# Patient Record
Sex: MALE | Race: BLACK OR AFRICAN AMERICAN | Employment: OTHER | URBAN - METROPOLITAN AREA
[De-identification: names, ages, dates, MRNs, and addresses within clinical notes are randomized per-mention and may not be internally consistent; named-entity substitution may affect disease eponyms.]

---

## 2019-03-22 ENCOUNTER — OFFICE VISIT (OUTPATIENT)
Dept: FAMILY MEDICINE CLINIC | Age: 59
End: 2019-03-22

## 2019-03-22 VITALS
DIASTOLIC BLOOD PRESSURE: 80 MMHG | RESPIRATION RATE: 16 BRPM | TEMPERATURE: 99.1 F | WEIGHT: 212 LBS | HEIGHT: 72 IN | SYSTOLIC BLOOD PRESSURE: 169 MMHG | HEART RATE: 74 BPM | OXYGEN SATURATION: 99 % | BODY MASS INDEX: 28.71 KG/M2

## 2019-03-22 DIAGNOSIS — N52.9 ERECTILE DYSFUNCTION, UNSPECIFIED ERECTILE DYSFUNCTION TYPE: ICD-10-CM

## 2019-03-22 DIAGNOSIS — E11.9 TYPE 2 DIABETES MELLITUS WITHOUT COMPLICATION, WITHOUT LONG-TERM CURRENT USE OF INSULIN (HCC): ICD-10-CM

## 2019-03-22 DIAGNOSIS — Z00.00 ANNUAL PHYSICAL EXAM: Primary | ICD-10-CM

## 2019-03-22 DIAGNOSIS — I10 ESSENTIAL HYPERTENSION: ICD-10-CM

## 2019-03-22 RX ORDER — LISINOPRIL 40 MG/1
40 TABLET ORAL DAILY
Qty: 90 TAB | Refills: 1 | Status: SHIPPED | OUTPATIENT
Start: 2019-03-22 | End: 2021-08-10

## 2019-03-22 RX ORDER — LISINOPRIL 40 MG/1
40 TABLET ORAL DAILY
COMMUNITY
End: 2019-03-22 | Stop reason: SDUPTHER

## 2019-03-22 RX ORDER — AMLODIPINE BESYLATE 10 MG/1
TABLET ORAL DAILY
COMMUNITY
End: 2019-03-22 | Stop reason: SDUPTHER

## 2019-03-22 RX ORDER — METFORMIN HYDROCHLORIDE 500 MG/1
500 TABLET ORAL
COMMUNITY
End: 2019-03-22 | Stop reason: SDUPTHER

## 2019-03-22 RX ORDER — HYDRALAZINE HYDROCHLORIDE 25 MG/1
25 TABLET, FILM COATED ORAL 3 TIMES DAILY
COMMUNITY
End: 2019-03-22 | Stop reason: SDUPTHER

## 2019-03-22 RX ORDER — SILDENAFIL CITRATE 20 MG/1
TABLET ORAL
Qty: 90 TAB | Refills: 1 | Status: CANCELLED | OUTPATIENT
Start: 2019-03-22

## 2019-03-22 RX ORDER — METFORMIN HYDROCHLORIDE 500 MG/1
500 TABLET ORAL
Qty: 90 TAB | Refills: 1 | Status: SHIPPED | OUTPATIENT
Start: 2019-03-22 | End: 2021-08-16 | Stop reason: ALTCHOICE

## 2019-03-22 RX ORDER — LANCETS
EACH MISCELLANEOUS
COMMUNITY
End: 2021-08-10

## 2019-03-22 RX ORDER — HYDRALAZINE HYDROCHLORIDE 25 MG/1
25 TABLET, FILM COATED ORAL 3 TIMES DAILY
Qty: 270 TAB | Refills: 1 | Status: SHIPPED | OUTPATIENT
Start: 2019-03-22 | End: 2021-08-10

## 2019-03-22 RX ORDER — AMLODIPINE BESYLATE 10 MG/1
10 TABLET ORAL DAILY
Qty: 90 TAB | Refills: 1 | Status: SHIPPED | OUTPATIENT
Start: 2019-03-22 | End: 2021-08-10

## 2019-03-22 NOTE — PROGRESS NOTES
Identified pt with two pt identifiers(name and ). Chief Complaint Patient presents with  Complete Physical  
  annual physical exam   
 Medication Refill Health Maintenance Due Topic  Hepatitis C Screening  DTaP/Tdap/Td series (1 - Tdap)  Shingrix Vaccine Age 50> (1 of 2)  FOBT Q 1 YEAR AGE 50-75  Influenza Age 5 to Adult Wt Readings from Last 3 Encounters:  
No data found for [employer] Temp Readings from Last 3 Encounters:  
No data found for Temp BP Readings from Last 3 Encounters:  
No data found for BP Pulse Readings from Last 3 Encounters:  
No data found for Pulse Learning Assessment: 
:  
 
No flowsheet data found. Depression Screening: 
:  
 
3 most recent PHQ Screens 3/22/2019 Little interest or pleasure in doing things Not at all Feeling down, depressed, irritable, or hopeless Not at all Total Score PHQ 2 0 Fall Risk Assessment: 
:  
 
No flowsheet data found. Abuse Screening: 
:  
 
Abuse Screening Questionnaire 3/22/2019 Do you ever feel afraid of your partner? Elie De León Are you in a relationship with someone who physically or mentally threatens you? Elie De León Is it safe for you to go home? [facility] Coordination of Care Questionnaire: 
:  
 
1) Have you been to an emergency room, urgent care clinic since your last visit? no  
Hospitalized since your last visit? no          
 
2) Have you seen or consulted any other health care providers outside of [address] [place] [address] since your last visit? no  (Include any pap smears or colon screenings in this section.) 3) Do you have an Advance Directive on file? no 
Are you interested in receiving information about Advance Directives? no 
 
Patient is accompanied by self I have received verbal consent from Julius Casillas to discuss any/all medical information while they are present in the room. Reviewed record in preparation for visit and have obtained necessary documentation. Medication reconciliation up to date and corrected with patient at this time.

## 2019-03-22 NOTE — PATIENT INSTRUCTIONS
DASH Diet: Care Instructions Your Care Instructions The DASH diet is an eating plan that can help lower your blood pressure. DASH stands for Dietary Approaches to Stop Hypertension. Hypertension is high blood pressure. The DASH diet focuses on eating foods that are high in calcium, potassium, and magnesium. These nutrients can lower blood pressure. The foods that are highest in these nutrients are fruits, vegetables, low-fat dairy products, nuts, seeds, and legumes. But taking calcium, potassium, and magnesium supplements instead of eating foods that are high in those nutrients does not have the same effect. The DASH diet also includes whole grains, fish, and poultry. The DASH diet is one of several lifestyle changes your doctor may recommend to lower your high blood pressure. Your doctor may also want you to decrease the amount of sodium in your diet. Lowering sodium while following the DASH diet can lower blood pressure even further than just the DASH diet alone. Follow-up care is a key part of your treatment and safety. Be sure to make and go to all appointments, and call your doctor if you are having problems. It's also a good idea to know your test results and keep a list of the medicines you take. How can you care for yourself at home? Following the DASH diet · Eat 4 to 5 servings of fruit each day. A serving is 1 medium-sized piece of fruit, ½ cup chopped or canned fruit, 1/4 cup dried fruit, or 4 ounces (½ cup) of fruit juice. Choose fruit more often than fruit juice. · Eat 4 to 5 servings of vegetables each day. A serving is 1 cup of lettuce or raw leafy vegetables, ½ cup of chopped or cooked vegetables, or 4 ounces (½ cup) of vegetable juice. Choose vegetables more often than vegetable juice. · Get 2 to 3 servings of low-fat and fat-free dairy each day. A serving is 8 ounces of milk, 1 cup of yogurt, or 1 ½ ounces of cheese. · Eat 6 to 8 servings of grains each day. A serving is 1 slice of bread, 1 ounce of dry cereal, or ½ cup of cooked rice, pasta, or cooked cereal. Try to choose whole-grain products as much as possible. · Limit lean meat, poultry, and fish to 2 servings each day. A serving is 3 ounces, about the size of a deck of cards. · Eat 4 to 5 servings of nuts, seeds, and legumes (cooked dried beans, lentils, and split peas) each week. A serving is 1/3 cup of nuts, 2 tablespoons of seeds, or ½ cup of cooked beans or peas. · Limit fats and oils to 2 to 3 servings each day. A serving is 1 teaspoon of vegetable oil or 2 tablespoons of salad dressing. · Limit sweets and added sugars to 5 servings or less a week. A serving is 1 tablespoon jelly or jam, ½ cup sorbet, or 1 cup of lemonade. · Eat less than 2,300 milligrams (mg) of sodium a day. If you limit your sodium to 1,500 mg a day, you can lower your blood pressure even more. Tips for success · Start small. Do not try to make dramatic changes to your diet all at once. You might feel that you are missing out on your favorite foods and then be more likely to not follow the plan. Make small changes, and stick with them. Once those changes become habit, add a few more changes. · Try some of the following: ? Make it a goal to eat a fruit or vegetable at every meal and at snacks. This will make it easy to get the recommended amount of fruits and vegetables each day. ? Try yogurt topped with fruit and nuts for a snack or healthy dessert. ? Add lettuce, tomato, cucumber, and onion to sandwiches. ? Combine a ready-made pizza crust with low-fat mozzarella cheese and lots of vegetable toppings. Try using tomatoes, squash, spinach, broccoli, carrots, cauliflower, and onions. ? Have a variety of cut-up vegetables with a low-fat dip as an appetizer instead of chips and dip. ? Sprinkle sunflower seeds or chopped almonds over salads.  Or try adding chopped walnuts or almonds to cooked vegetables. ? Try some vegetarian meals using beans and peas. Add garbanzo or kidney beans to salads. Make burritos and tacos with mashed lim beans or black beans. Where can you learn more? Go to http://alyson-mango.info/. Enter E714 in the search box to learn more about \"DASH Diet: Care Instructions. \" Current as of: July 22, 2018 Content Version: 11.9 © 8036-5419 [employer]. Care instructions adapted under license by [employer] (which disclaims liability or warranty for this information). If you have questions about a medical condition or this instruction, always ask your healthcare professional. [address] any warranty or liability for your use of this information.

## 2019-03-22 NOTE — PROGRESS NOTES
[facility] [address] [address] Fink, [address] 
[phone] Bonilla Pride is a 61 y.o. male Presenting for his annual checkup and health maintenance review and follow-up Last screening colonoscopy: 3 years ago. Results showed no abnromalities. Repeat due in 10 years. Diet:Healthy, well balanced, avoids fast food, eats good amount of fruits and vegetables Exercise: At work Smoking:  Former smoker 15-30 pack years, currently vapes Alcohol: Drinks 6 alcoholic drinks per week Sexual Activity: Currently sexually active, does not use protection Of note patient has been out of HTN and diabetes meds for last 2 weeks, otherwise denies any symptoms other than difficulty getting and maintianing erections. Vaccinations reviewed Immunization History Administered Date(s) Administered  Tdap 09/07/2010 Flu shot: does not get SHingles shot: Does not get Past Medical History:  
Diagnosis Date  Diabetes ([address] type 2  
 ED (erectile dysfunction)  HTN (hypertension)  Impaired fasting glucose   
 
 has a past surgical history that includes hx tonsil and adenoidectomy. Patient has no known allergies. Current Outpatient Medications Medication Sig  
 lancets misc by Does Not Apply route.  metFORMIN (GLUCOPHAGE) 500 mg tablet Take 1 Tab by mouth daily (with breakfast).  lisinopril (PRINIVIL, ZESTRIL) 40 mg tablet Take 1 Tab by mouth daily.  hydrALAZINE (APRESOLINE) 25 mg tablet Take 1 Tab by mouth three (3) times daily.  amLODIPine (NORVASC) 10 mg tablet Take 1 Tab by mouth daily. No current facility-administered medications for this visit. SOCIAL HX:  reports that he has quit smoking. His smoking use included cigarettes. He uses smokeless tobacco. He reports that he drinks about 3.6 oz of alcohol per week. He reports that he does not use drugs. FAMILY HX: family history includes Diabetes in his mother. Smoker: Social History Tobacco Use Smoking Status Former Smoker  Types: Cigarettes Smokeless Tobacco Current User Tobacco Comment Patient reports he is Vaping ETOH:  
Social History Substance and Sexual Activity Alcohol Use Yes  Alcohol/week: 3.6 oz  Types: 6 Cans of beer per week  Frequency: 2-3 times a week  Drinks per session: 3 or 4 Review of Systems (Positive ROS in BOLD) Constitutional: fever, chills, fatigue Respiratory: cough, shortness of breath or wheezing Cardiovascular: chest pain or palpitations Physical exam 
Blood pressure 169/80, pulse 74, temperature 99.1 °F (37.3 °C), temperature source Oral, resp. rate 16, height 6' (1.829 m), weight 212 lb (96.2 kg), SpO2 99 %. Wt Readings from Last 3 Encounters:  
03/22/19 212 lb (96.2 kg) General: Patient in NAD HEENT: PER/EOMI, no conjunctival pallor or scleral icterus. Normal post pharynx exam 
Cardiovascular: Regular rate and rhythm, No murmurs, rubs or gallops. No LE edema Respiratory: Lungs clear to auscultation bilaterally, no wheezing, rales or rhonchi. No accessory muscle use. GI:  Normoactive BS, non-tender in all 4 quadrants, non-distended, no hepato/splenomegaly Skin: No rashes or lesions noted on exposed skin Neuro: AAOx3. No gross motor or sensory defecits Psych: Appropriate mood and affect Assessment 1. Annual physical exam 
Counseled on diet and exercise. Preventative screening UTD, other than orders noted below. Immunizations UTD, other than orders noted below, pt declines flu/shingrix/pneumonia vaccines. Pt. Currently vapes, does nto currently meet guidline for low dose CT scan screening. Annual labs ordered. Medications refilled as needed - LIPID PANEL 
- MICROALBUMIN, UR, RAND W/ MICROALB/CREAT RATIO 
- HEMOGLOBIN A1C WITH EAG 
- PSA, DIAGNOSTIC (PROSTATE SPECIFIC AG) 2. Essential hypertension Elevated today, but has been out of medicaitons for 2 weeks, does not check BPs at home, adivsed patient to keep log, currently asymptomatic 
 
- lisinopril (PRINIVIL, ZESTRIL) 40 mg tablet; Take 1 Tab by mouth daily. Dispense: 90 Tab; Refill: 1 
- hydrALAZINE (APRESOLINE) 25 mg tablet; Take 1 Tab by mouth three (3) times daily. Dispense: 270 Tab; Refill: 1 
- amLODIPine (NORVASC) 10 mg tablet; Take 1 Tab by mouth daily. Dispense: 90 Tab; Refill: 1 
- LIPID PANEL 
- MICROALBUMIN, UR, RAND W/ MICROALB/CREAT RATIO 3. Type 2 diabetes mellitus without complication, without long-term current use of insulin [facility] Last HbA1c in 2017 was in 5s, will recheck, if still around 6 or lower, will D/C metformin 
 
- metFORMIN (GLUCOPHAGE) 500 mg tablet; Take 1 Tab by mouth daily (with breakfast). Dispense: 90 Tab; Refill: 1 
- HEMOGLOBIN A1C WITH EAG 4. Erectile dysfunction, unspecified erectile dysfunction type Likely organic cause, given difficulty getting and maintaining erections, as well as not having nocturnal or morning erections. No history of CAD or heart issues. Pt will need to get HTN under control and stop smoking. Will get basic lab workup. If he does those things, and labs are normal, will consider trial of viagra 
 
 
 
- sildenafil, antihypertensive, (REVATIO) 20 mg tablet; Take 2 tabs 1 hour prior to sexual activity as needed  Dispense: 90 Tab; Refill: 1 Follow-up and Dispositions · Return in about 3 months (around 6/22/2019) for HTN F/U.  
  
 
 
 
Babs Frank MD

## 2019-03-23 LAB
ALBUMIN/CREAT UR: 7.3 MG/G CREAT (ref 0–30)
CHOLEST SERPL-MCNC: 128 MG/DL (ref 100–199)
CREAT UR-MCNC: 161.3 MG/DL
EST. AVERAGE GLUCOSE BLD GHB EST-MCNC: 108 MG/DL
HBA1C MFR BLD: 5.4 % (ref 4.8–5.6)
HDLC SERPL-MCNC: 53 MG/DL
LDLC SERPL CALC-MCNC: 47 MG/DL (ref 0–99)
MICROALBUMIN UR-MCNC: 11.8 UG/ML
PSA SERPL-MCNC: 3.2 NG/ML (ref 0–4)
TESTOST SERPL-MCNC: 464 NG/DL (ref 264–916)
TRIGL SERPL-MCNC: 142 MG/DL (ref 0–149)
TSH SERPL DL<=0.005 MIU/L-ACNC: 2.31 UIU/ML (ref 0.45–4.5)
VLDLC SERPL CALC-MCNC: 28 MG/DL (ref 5–40)

## 2021-07-19 ENCOUNTER — HOSPITAL ENCOUNTER (EMERGENCY)
Age: 61
Discharge: OTHER INSTITUTION | End: 2021-07-20
Attending: EMERGENCY MEDICINE
Payer: COMMERCIAL

## 2021-07-19 ENCOUNTER — APPOINTMENT (OUTPATIENT)
Dept: CT IMAGING | Age: 61
End: 2021-07-19
Attending: EMERGENCY MEDICINE
Payer: COMMERCIAL

## 2021-07-19 DIAGNOSIS — I60.9 SUBARACHNOID BLEED (HCC): Primary | ICD-10-CM

## 2021-07-19 DIAGNOSIS — I16.1 HYPERTENSIVE EMERGENCY: ICD-10-CM

## 2021-07-19 DIAGNOSIS — N17.9 AKI (ACUTE KIDNEY INJURY) (HCC): ICD-10-CM

## 2021-07-19 DIAGNOSIS — Q28.2 AVM (ARTERIOVENOUS MALFORMATION) BRAIN: ICD-10-CM

## 2021-07-19 LAB
ALBUMIN SERPL-MCNC: 3.4 G/DL (ref 3.5–5)
ALBUMIN/GLOB SERPL: 1.2 {RATIO} (ref 1.1–2.2)
ALP SERPL-CCNC: 51 U/L (ref 45–117)
ALT SERPL-CCNC: 48 U/L (ref 12–78)
ANION GAP SERPL CALC-SCNC: 8 MMOL/L (ref 5–15)
AST SERPL-CCNC: 26 U/L (ref 15–37)
BASOPHILS # BLD: 0 K/UL (ref 0–0.1)
BASOPHILS NFR BLD: 1 % (ref 0–1)
BILIRUB SERPL-MCNC: 0.5 MG/DL (ref 0.2–1)
BUN SERPL-MCNC: 16 MG/DL (ref 6–20)
BUN/CREAT SERPL: 12 (ref 12–20)
CALCIUM SERPL-MCNC: 8.2 MG/DL (ref 8.5–10.1)
CHLORIDE SERPL-SCNC: 103 MMOL/L (ref 97–108)
CO2 SERPL-SCNC: 29 MMOL/L (ref 21–32)
CREAT SERPL-MCNC: 1.32 MG/DL (ref 0.7–1.3)
DIFFERENTIAL METHOD BLD: ABNORMAL
EOSINOPHIL # BLD: 0.2 K/UL (ref 0–0.4)
EOSINOPHIL NFR BLD: 2 % (ref 0–7)
ERYTHROCYTE [DISTWIDTH] IN BLOOD BY AUTOMATED COUNT: 13.7 % (ref 11.5–14.5)
GLOBULIN SER CALC-MCNC: 2.8 G/DL (ref 2–4)
GLUCOSE BLD STRIP.AUTO-MCNC: 138 MG/DL (ref 65–117)
GLUCOSE BLD STRIP.AUTO-MCNC: 187 MG/DL (ref 65–117)
GLUCOSE SERPL-MCNC: 145 MG/DL (ref 65–100)
HCT VFR BLD AUTO: 39.4 % (ref 36.6–50.3)
HGB BLD-MCNC: 13.4 G/DL (ref 12.1–17)
IMM GRANULOCYTES # BLD AUTO: 0 K/UL (ref 0–0.04)
IMM GRANULOCYTES NFR BLD AUTO: 1 % (ref 0–0.5)
INR PPP: 1 (ref 0.9–1.1)
LYMPHOCYTES # BLD: 2.2 K/UL (ref 0.8–3.5)
LYMPHOCYTES NFR BLD: 34 % (ref 12–49)
MCH RBC QN AUTO: 28.6 PG (ref 26–34)
MCHC RBC AUTO-ENTMCNC: 34 G/DL (ref 30–36.5)
MCV RBC AUTO: 84 FL (ref 80–99)
MONOCYTES # BLD: 0.6 K/UL (ref 0–1)
MONOCYTES NFR BLD: 10 % (ref 5–13)
NEUTS SEG # BLD: 3.5 K/UL (ref 1.8–8)
NEUTS SEG NFR BLD: 53 % (ref 32–75)
NRBC # BLD: 0 K/UL (ref 0–0.01)
NRBC BLD-RTO: 0 PER 100 WBC
PLATELET # BLD AUTO: 217 K/UL (ref 150–400)
PMV BLD AUTO: 10 FL (ref 8.9–12.9)
POTASSIUM SERPL-SCNC: 3.7 MMOL/L (ref 3.5–5.1)
PROT SERPL-MCNC: 6.2 G/DL (ref 6.4–8.2)
PROTHROMBIN TIME: 9.9 SEC (ref 9–11.1)
RBC # BLD AUTO: 4.69 M/UL (ref 4.1–5.7)
SERVICE CMNT-IMP: ABNORMAL
SERVICE CMNT-IMP: ABNORMAL
SODIUM SERPL-SCNC: 140 MMOL/L (ref 136–145)
TROPONIN I SERPL-MCNC: <0.05 NG/ML
WBC # BLD AUTO: 6.5 K/UL (ref 4.1–11.1)

## 2021-07-19 PROCEDURE — 96375 TX/PRO/DX INJ NEW DRUG ADDON: CPT

## 2021-07-19 PROCEDURE — 74011000636 HC RX REV CODE- 636: Performed by: EMERGENCY MEDICINE

## 2021-07-19 PROCEDURE — 96365 THER/PROPH/DIAG IV INF INIT: CPT

## 2021-07-19 PROCEDURE — 74011000250 HC RX REV CODE- 250: Performed by: EMERGENCY MEDICINE

## 2021-07-19 PROCEDURE — 96366 THER/PROPH/DIAG IV INF ADDON: CPT

## 2021-07-19 PROCEDURE — 85025 COMPLETE CBC W/AUTO DIFF WBC: CPT

## 2021-07-19 PROCEDURE — 99285 EMERGENCY DEPT VISIT HI MDM: CPT

## 2021-07-19 PROCEDURE — 82962 GLUCOSE BLOOD TEST: CPT

## 2021-07-19 PROCEDURE — 70450 CT HEAD/BRAIN W/O DYE: CPT

## 2021-07-19 PROCEDURE — 93005 ELECTROCARDIOGRAM TRACING: CPT

## 2021-07-19 PROCEDURE — 85610 PROTHROMBIN TIME: CPT

## 2021-07-19 PROCEDURE — 74011250636 HC RX REV CODE- 250/636: Performed by: EMERGENCY MEDICINE

## 2021-07-19 PROCEDURE — 70498 CT ANGIOGRAPHY NECK: CPT

## 2021-07-19 PROCEDURE — 80053 COMPREHEN METABOLIC PANEL: CPT

## 2021-07-19 PROCEDURE — 74011250637 HC RX REV CODE- 250/637: Performed by: EMERGENCY MEDICINE

## 2021-07-19 PROCEDURE — 84484 ASSAY OF TROPONIN QUANT: CPT

## 2021-07-19 PROCEDURE — 36415 COLL VENOUS BLD VENIPUNCTURE: CPT

## 2021-07-19 RX ORDER — MORPHINE SULFATE 4 MG/ML
4 INJECTION INTRAVENOUS
Status: COMPLETED | OUTPATIENT
Start: 2021-07-19 | End: 2021-07-19

## 2021-07-19 RX ORDER — ONDANSETRON 2 MG/ML
4 INJECTION INTRAMUSCULAR; INTRAVENOUS
Status: DISCONTINUED | OUTPATIENT
Start: 2021-07-19 | End: 2021-07-20 | Stop reason: HOSPADM

## 2021-07-19 RX ORDER — LABETALOL HYDROCHLORIDE 5 MG/ML
20 INJECTION, SOLUTION INTRAVENOUS ONCE
Status: COMPLETED | OUTPATIENT
Start: 2021-07-19 | End: 2021-07-19

## 2021-07-19 RX ORDER — HYDRALAZINE HYDROCHLORIDE 20 MG/ML
20 INJECTION INTRAMUSCULAR; INTRAVENOUS ONCE
Status: COMPLETED | OUTPATIENT
Start: 2021-07-19 | End: 2021-07-19

## 2021-07-19 RX ORDER — NIMODIPINE 30 MG/1
60 CAPSULE, LIQUID FILLED ORAL ONCE
Status: COMPLETED | OUTPATIENT
Start: 2021-07-19 | End: 2021-07-19

## 2021-07-19 RX ORDER — ACETAMINOPHEN 500 MG
1000 TABLET ORAL
Status: COMPLETED | OUTPATIENT
Start: 2021-07-19 | End: 2021-07-19

## 2021-07-19 RX ADMIN — NIMODIPINE 60 MG: 30 CAPSULE ORAL at 21:47

## 2021-07-19 RX ADMIN — MORPHINE SULFATE 4 MG: 4 INJECTION INTRAVENOUS at 22:45

## 2021-07-19 RX ADMIN — ONDANSETRON 4 MG: 2 INJECTION INTRAMUSCULAR; INTRAVENOUS at 23:53

## 2021-07-19 RX ADMIN — ACETAMINOPHEN 1000 MG: 500 TABLET ORAL at 22:12

## 2021-07-19 RX ADMIN — HYDRALAZINE HYDROCHLORIDE 20 MG: 20 INJECTION INTRAMUSCULAR; INTRAVENOUS at 22:11

## 2021-07-19 RX ADMIN — SODIUM CHLORIDE 5 MG/HR: 9 INJECTION, SOLUTION INTRAVENOUS at 21:30

## 2021-07-19 RX ADMIN — SODIUM CHLORIDE 15 MG/HR: 9 INJECTION, SOLUTION INTRAVENOUS at 23:42

## 2021-07-19 RX ADMIN — IOPAMIDOL 100 ML: 755 INJECTION, SOLUTION INTRAVENOUS at 21:26

## 2021-07-19 RX ADMIN — LABETALOL HYDROCHLORIDE 20 MG: 5 INJECTION INTRAVENOUS at 23:51

## 2021-07-20 ENCOUNTER — APPOINTMENT (OUTPATIENT)
Dept: INTERVENTIONAL RADIOLOGY/VASCULAR | Age: 61
DRG: 064 | End: 2021-07-20
Attending: STUDENT IN AN ORGANIZED HEALTH CARE EDUCATION/TRAINING PROGRAM
Payer: COMMERCIAL

## 2021-07-20 ENCOUNTER — HOSPITAL ENCOUNTER (INPATIENT)
Dept: INTERVENTIONAL RADIOLOGY/VASCULAR | Age: 61
Discharge: HOME | DRG: 064 | End: 2021-07-20
Attending: STUDENT IN AN ORGANIZED HEALTH CARE EDUCATION/TRAINING PROGRAM
Payer: COMMERCIAL

## 2021-07-20 ENCOUNTER — ANESTHESIA EVENT (OUTPATIENT)
Dept: INTERVENTIONAL RADIOLOGY/VASCULAR | Age: 61
DRG: 064 | End: 2021-07-20
Payer: COMMERCIAL

## 2021-07-20 ENCOUNTER — APPOINTMENT (OUTPATIENT)
Dept: MRI IMAGING | Age: 61
DRG: 064 | End: 2021-07-20
Attending: NURSE PRACTITIONER
Payer: COMMERCIAL

## 2021-07-20 ENCOUNTER — APPOINTMENT (OUTPATIENT)
Dept: CT IMAGING | Age: 61
DRG: 064 | End: 2021-07-20
Attending: NURSE PRACTITIONER
Payer: COMMERCIAL

## 2021-07-20 ENCOUNTER — HOSPITAL ENCOUNTER (INPATIENT)
Age: 61
LOS: 21 days | Discharge: HOME HEALTH | DRG: 064 | End: 2021-08-10
Attending: INTERNAL MEDICINE | Admitting: INTERNAL MEDICINE
Payer: COMMERCIAL

## 2021-07-20 ENCOUNTER — ANESTHESIA (OUTPATIENT)
Dept: INTERVENTIONAL RADIOLOGY/VASCULAR | Age: 61
DRG: 064 | End: 2021-07-20
Payer: COMMERCIAL

## 2021-07-20 ENCOUNTER — APPOINTMENT (OUTPATIENT)
Dept: NON INVASIVE DIAGNOSTICS | Age: 61
DRG: 064 | End: 2021-07-20
Attending: NURSE PRACTITIONER
Payer: COMMERCIAL

## 2021-07-20 VITALS
BODY MASS INDEX: 28.13 KG/M2 | RESPIRATION RATE: 23 BRPM | HEART RATE: 74 BPM | SYSTOLIC BLOOD PRESSURE: 162 MMHG | OXYGEN SATURATION: 99 % | DIASTOLIC BLOOD PRESSURE: 64 MMHG | HEIGHT: 72 IN | WEIGHT: 207.67 LBS | TEMPERATURE: 98.4 F

## 2021-07-20 VITALS
SYSTOLIC BLOOD PRESSURE: 112 MMHG | HEART RATE: 62 BPM | OXYGEN SATURATION: 99 % | DIASTOLIC BLOOD PRESSURE: 62 MMHG | RESPIRATION RATE: 14 BRPM

## 2021-07-20 DIAGNOSIS — I60.9 SAH (SUBARACHNOID HEMORRHAGE) (HCC): ICD-10-CM

## 2021-07-20 DIAGNOSIS — I67.848 CEREBRAL VASOSPASM: ICD-10-CM

## 2021-07-20 DIAGNOSIS — Q28.2 CEREBRAL ARTERIOVENOUS MALFORMATION (AVM): ICD-10-CM

## 2021-07-20 PROBLEM — I10 MALIGNANT HYPERTENSION: Status: ACTIVE | Noted: 2021-07-20

## 2021-07-20 PROBLEM — E87.6 ACUTE HYPOKALEMIA: Status: ACTIVE | Noted: 2021-07-20

## 2021-07-20 LAB
ANION GAP SERPL CALC-SCNC: 6 MMOL/L (ref 5–15)
ATRIAL RATE: 69 BPM
BUN SERPL-MCNC: 14 MG/DL (ref 6–20)
BUN/CREAT SERPL: 14 (ref 12–20)
CALCIUM SERPL-MCNC: 8.7 MG/DL (ref 8.5–10.1)
CALCULATED P AXIS, ECG09: 77 DEGREES
CALCULATED R AXIS, ECG10: 73 DEGREES
CALCULATED T AXIS, ECG11: 43 DEGREES
CHLORIDE SERPL-SCNC: 110 MMOL/L (ref 97–108)
CHOLEST SERPL-MCNC: 125 MG/DL
CO2 SERPL-SCNC: 25 MMOL/L (ref 21–32)
COVID-19 RAPID TEST, COVR: NOT DETECTED
CREAT SERPL-MCNC: 0.98 MG/DL (ref 0.7–1.3)
DIAGNOSIS, 93000: NORMAL
ECHO AV PEAK GRADIENT: 10.07 MMHG
ECHO AV PEAK VELOCITY: 158.69 CM/S
ECHO LA AREA 4C: 23.38 CM2
ECHO LA VOL 4C: 79.79 ML (ref 18–58)
ECHO LA VOLUME INDEX A4C: 36.27 ML/M2 (ref 16–28)
ECHO LV INTERNAL DIMENSION DIASTOLIC: 4.15 CM (ref 4.2–5.9)
ECHO LV INTERNAL DIMENSION SYSTOLIC: 3.03 CM
ECHO LV IVSD: 1.08 CM (ref 0.6–1)
ECHO LV MASS 2D: 208.6 G (ref 88–224)
ECHO LV MASS INDEX 2D: 94.8 G/M2 (ref 49–115)
ECHO LV POSTERIOR WALL DIASTOLIC: 1.62 CM (ref 0.6–1)
ECHO LVOT PEAK GRADIENT: 8.17 MMHG
ECHO LVOT PEAK VELOCITY: 142.88 CM/S
ECHO MV A VELOCITY: 87.09 CM/S
ECHO MV E VELOCITY: 83.75 CM/S
ECHO MV E/A RATIO: 0.96
ECHO RV INTERNAL DIMENSION: 3.89 CM
ECHO RV TAPSE: 2.65 CM (ref 1.5–2)
ERYTHROCYTE [DISTWIDTH] IN BLOOD BY AUTOMATED COUNT: 13.3 % (ref 11.5–14.5)
EST. AVERAGE GLUCOSE BLD GHB EST-MCNC: 114 MG/DL
GLUCOSE BLD STRIP.AUTO-MCNC: 140 MG/DL (ref 65–117)
GLUCOSE BLD STRIP.AUTO-MCNC: 97 MG/DL (ref 65–117)
GLUCOSE SERPL-MCNC: 123 MG/DL (ref 65–100)
HBA1C MFR BLD: 5.6 % (ref 4–5.6)
HCT VFR BLD AUTO: 43 % (ref 36.6–50.3)
HDLC SERPL-MCNC: 68 MG/DL
HDLC SERPL: 1.8 {RATIO} (ref 0–5)
HGB BLD-MCNC: 14.6 G/DL (ref 12.1–17)
LDLC SERPL CALC-MCNC: 25.2 MG/DL (ref 0–100)
MCH RBC QN AUTO: 28.6 PG (ref 26–34)
MCHC RBC AUTO-ENTMCNC: 34 G/DL (ref 30–36.5)
MCV RBC AUTO: 84.1 FL (ref 80–99)
NRBC # BLD: 0 K/UL (ref 0–0.01)
NRBC BLD-RTO: 0 PER 100 WBC
P-R INTERVAL, ECG05: 166 MS
PLATELET # BLD AUTO: 232 K/UL (ref 150–400)
PMV BLD AUTO: 10.5 FL (ref 8.9–12.9)
POTASSIUM SERPL-SCNC: 3.5 MMOL/L (ref 3.5–5.1)
Q-T INTERVAL, ECG07: 380 MS
QRS DURATION, ECG06: 84 MS
QTC CALCULATION (BEZET), ECG08: 407 MS
RBC # BLD AUTO: 5.11 M/UL (ref 4.1–5.7)
SERVICE CMNT-IMP: ABNORMAL
SERVICE CMNT-IMP: NORMAL
SODIUM SERPL-SCNC: 141 MMOL/L (ref 136–145)
SOURCE, COVRS: NORMAL
TRIGL SERPL-MCNC: 159 MG/DL (ref ?–150)
VENTRICULAR RATE, ECG03: 69 BPM
VLDLC SERPL CALC-MCNC: 31.8 MG/DL
WBC # BLD AUTO: 7.9 K/UL (ref 4.1–11.1)

## 2021-07-20 PROCEDURE — 92610 EVALUATE SWALLOWING FUNCTION: CPT

## 2021-07-20 PROCEDURE — 77030021532 HC CATH ANGI DX IMPRS MRTM -B

## 2021-07-20 PROCEDURE — 82962 GLUCOSE BLOOD TEST: CPT

## 2021-07-20 PROCEDURE — 74011250636 HC RX REV CODE- 250/636: Performed by: NURSE PRACTITIONER

## 2021-07-20 PROCEDURE — 70450 CT HEAD/BRAIN W/O DYE: CPT

## 2021-07-20 PROCEDURE — 77030012468 HC VLV BLEEDBK CNTRL ABBT -B

## 2021-07-20 PROCEDURE — 65610000006 HC RM INTENSIVE CARE

## 2021-07-20 PROCEDURE — 93306 TTE W/DOPPLER COMPLETE: CPT

## 2021-07-20 PROCEDURE — 70553 MRI BRAIN STEM W/O & W/DYE: CPT

## 2021-07-20 PROCEDURE — 76942 ECHO GUIDE FOR BIOPSY: CPT

## 2021-07-20 PROCEDURE — 74011000250 HC RX REV CODE- 250: Performed by: NURSE PRACTITIONER

## 2021-07-20 PROCEDURE — 80061 LIPID PANEL: CPT

## 2021-07-20 PROCEDURE — 76060000033 HC ANESTHESIA 1 TO 1.5 HR

## 2021-07-20 PROCEDURE — 74011250637 HC RX REV CODE- 250/637: Performed by: NURSE PRACTITIONER

## 2021-07-20 PROCEDURE — B31F1ZZ FLUOROSCOPY OF LEFT VERTEBRAL ARTERY USING LOW OSMOLAR CONTRAST: ICD-10-PCS | Performed by: STUDENT IN AN ORGANIZED HEALTH CARE EDUCATION/TRAINING PROGRAM

## 2021-07-20 PROCEDURE — C1769 GUIDE WIRE: HCPCS

## 2021-07-20 PROCEDURE — A9576 INJ PROHANCE MULTIPACK: HCPCS

## 2021-07-20 PROCEDURE — APPSS45 APP SPLIT SHARED TIME 31-45 MINUTES: Performed by: NURSE PRACTITIONER

## 2021-07-20 PROCEDURE — 83036 HEMOGLOBIN GLYCOSYLATED A1C: CPT

## 2021-07-20 PROCEDURE — 87635 SARS-COV-2 COVID-19 AMP PRB: CPT

## 2021-07-20 PROCEDURE — 74011250636 HC RX REV CODE- 250/636

## 2021-07-20 PROCEDURE — B3151ZZ FLUOROSCOPY OF BILATERAL COMMON CAROTID ARTERIES USING LOW OSMOLAR CONTRAST: ICD-10-PCS | Performed by: STUDENT IN AN ORGANIZED HEALTH CARE EDUCATION/TRAINING PROGRAM

## 2021-07-20 PROCEDURE — 74011000258 HC RX REV CODE- 258: Performed by: NURSE ANESTHETIST, CERTIFIED REGISTERED

## 2021-07-20 PROCEDURE — 74011636320 HC RX REV CODE- 636/320

## 2021-07-20 PROCEDURE — 2709999900 HC NON-CHARGEABLE SUPPLY

## 2021-07-20 PROCEDURE — C1760 CLOSURE DEV, VASC: HCPCS

## 2021-07-20 PROCEDURE — B41F1ZZ FLUOROSCOPY OF RIGHT LOWER EXTREMITY ARTERIES USING LOW OSMOLAR CONTRAST: ICD-10-PCS | Performed by: STUDENT IN AN ORGANIZED HEALTH CARE EDUCATION/TRAINING PROGRAM

## 2021-07-20 PROCEDURE — 74011250636 HC RX REV CODE- 250/636: Performed by: NURSE ANESTHETIST, CERTIFIED REGISTERED

## 2021-07-20 PROCEDURE — 85027 COMPLETE CBC AUTOMATED: CPT

## 2021-07-20 PROCEDURE — B31B1ZZ FLUOROSCOPY OF LEFT EXTERNAL CAROTID ARTERY USING LOW OSMOLAR CONTRAST: ICD-10-PCS | Performed by: STUDENT IN AN ORGANIZED HEALTH CARE EDUCATION/TRAINING PROGRAM

## 2021-07-20 PROCEDURE — 74011000250 HC RX REV CODE- 250: Performed by: NURSE ANESTHETIST, CERTIFIED REGISTERED

## 2021-07-20 PROCEDURE — 36415 COLL VENOUS BLD VENIPUNCTURE: CPT

## 2021-07-20 PROCEDURE — 80048 BASIC METABOLIC PNL TOTAL CA: CPT

## 2021-07-20 RX ORDER — DOCUSATE SODIUM 100 MG/1
100 CAPSULE, LIQUID FILLED ORAL
Status: DISCONTINUED | OUTPATIENT
Start: 2021-07-20 | End: 2021-07-24

## 2021-07-20 RX ORDER — LORAZEPAM 2 MG/ML
1 INJECTION INTRAMUSCULAR
Status: COMPLETED | OUTPATIENT
Start: 2021-07-20 | End: 2021-07-20

## 2021-07-20 RX ORDER — ACETAMINOPHEN 325 MG/1
650 TABLET ORAL
Status: DISCONTINUED | OUTPATIENT
Start: 2021-07-20 | End: 2021-08-10 | Stop reason: HOSPADM

## 2021-07-20 RX ORDER — LIDOCAINE HYDROCHLORIDE 20 MG/ML
20 INJECTION, SOLUTION INFILTRATION; PERINEURAL
Status: ACTIVE | OUTPATIENT
Start: 2021-07-20 | End: 2021-07-21

## 2021-07-20 RX ORDER — LISINOPRIL 20 MG/1
40 TABLET ORAL DAILY
Status: DISCONTINUED | OUTPATIENT
Start: 2021-07-20 | End: 2021-08-02

## 2021-07-20 RX ORDER — MAGNESIUM SULFATE 100 %
4 CRYSTALS MISCELLANEOUS AS NEEDED
Status: DISCONTINUED | OUTPATIENT
Start: 2021-07-20 | End: 2021-07-29

## 2021-07-20 RX ORDER — INSULIN LISPRO 100 [IU]/ML
INJECTION, SOLUTION INTRAVENOUS; SUBCUTANEOUS EVERY 6 HOURS
Status: DISCONTINUED | OUTPATIENT
Start: 2021-07-20 | End: 2021-07-20

## 2021-07-20 RX ORDER — HYDRALAZINE HYDROCHLORIDE 20 MG/ML
10 INJECTION INTRAMUSCULAR; INTRAVENOUS
Status: DISCONTINUED | OUTPATIENT
Start: 2021-07-20 | End: 2021-07-20

## 2021-07-20 RX ORDER — HYDRALAZINE HYDROCHLORIDE 20 MG/ML
10 INJECTION INTRAMUSCULAR; INTRAVENOUS
Status: COMPLETED | OUTPATIENT
Start: 2021-07-20 | End: 2021-07-20

## 2021-07-20 RX ORDER — PROPOFOL 10 MG/ML
INJECTION, EMULSION INTRAVENOUS AS NEEDED
Status: DISCONTINUED | OUTPATIENT
Start: 2021-07-20 | End: 2021-07-20 | Stop reason: HOSPADM

## 2021-07-20 RX ORDER — DEXAMETHASONE SODIUM PHOSPHATE 4 MG/ML
10 INJECTION, SOLUTION INTRA-ARTICULAR; INTRALESIONAL; INTRAMUSCULAR; INTRAVENOUS; SOFT TISSUE ONCE
Status: COMPLETED | OUTPATIENT
Start: 2021-07-20 | End: 2021-07-20

## 2021-07-20 RX ORDER — LORAZEPAM 2 MG/ML
1 INJECTION INTRAMUSCULAR ONCE
Status: COMPLETED | OUTPATIENT
Start: 2021-07-20 | End: 2021-07-20

## 2021-07-20 RX ORDER — ACETAMINOPHEN 650 MG/1
650 SUPPOSITORY RECTAL
Status: DISCONTINUED | OUTPATIENT
Start: 2021-07-20 | End: 2021-08-10 | Stop reason: HOSPADM

## 2021-07-20 RX ORDER — LEVETIRACETAM 500 MG/1
500 TABLET ORAL 2 TIMES DAILY
Status: DISCONTINUED | OUTPATIENT
Start: 2021-07-20 | End: 2021-08-10 | Stop reason: HOSPADM

## 2021-07-20 RX ORDER — DEXMEDETOMIDINE HYDROCHLORIDE 4 UG/ML
0-1.5 INJECTION, SOLUTION INTRAVENOUS
Status: DISCONTINUED | OUTPATIENT
Start: 2021-07-20 | End: 2021-07-20

## 2021-07-20 RX ORDER — HYDROCHLOROTHIAZIDE 25 MG/1
25 TABLET ORAL DAILY
Status: DISCONTINUED | OUTPATIENT
Start: 2021-07-20 | End: 2021-07-21

## 2021-07-20 RX ORDER — POTASSIUM CHLORIDE 7.45 MG/ML
10 INJECTION INTRAVENOUS
Status: COMPLETED | OUTPATIENT
Start: 2021-07-20 | End: 2021-07-20

## 2021-07-20 RX ORDER — NALOXONE HYDROCHLORIDE 0.4 MG/ML
0.4 INJECTION, SOLUTION INTRAMUSCULAR; INTRAVENOUS; SUBCUTANEOUS AS NEEDED
Status: DISCONTINUED | OUTPATIENT
Start: 2021-07-20 | End: 2021-08-10 | Stop reason: HOSPADM

## 2021-07-20 RX ORDER — VERAPAMIL HYDROCHLORIDE 2.5 MG/ML
40 INJECTION, SOLUTION INTRAVENOUS ONCE
Status: ACTIVE | OUTPATIENT
Start: 2021-07-20 | End: 2021-07-20

## 2021-07-20 RX ORDER — SODIUM CHLORIDE, SODIUM LACTATE, POTASSIUM CHLORIDE, CALCIUM CHLORIDE 600; 310; 30; 20 MG/100ML; MG/100ML; MG/100ML; MG/100ML
50 INJECTION, SOLUTION INTRAVENOUS CONTINUOUS
Status: DISCONTINUED | OUTPATIENT
Start: 2021-07-20 | End: 2021-07-26

## 2021-07-20 RX ORDER — LABETALOL HYDROCHLORIDE 5 MG/ML
10 INJECTION, SOLUTION INTRAVENOUS
Status: DISCONTINUED | OUTPATIENT
Start: 2021-07-20 | End: 2021-07-20

## 2021-07-20 RX ORDER — DEXTROSE 50 % IN WATER (D50W) INTRAVENOUS SYRINGE
25-50 AS NEEDED
Status: DISCONTINUED | OUTPATIENT
Start: 2021-07-20 | End: 2021-07-22 | Stop reason: SDUPTHER

## 2021-07-20 RX ORDER — ONDANSETRON 2 MG/ML
4 INJECTION INTRAMUSCULAR; INTRAVENOUS
Status: DISCONTINUED | OUTPATIENT
Start: 2021-07-20 | End: 2021-08-10 | Stop reason: HOSPADM

## 2021-07-20 RX ORDER — LABETALOL HYDROCHLORIDE 5 MG/ML
10 INJECTION, SOLUTION INTRAVENOUS
Status: DISCONTINUED | OUTPATIENT
Start: 2021-07-20 | End: 2021-07-21

## 2021-07-20 RX ORDER — LIDOCAINE HYDROCHLORIDE 20 MG/ML
20 INJECTION, SOLUTION INFILTRATION; PERINEURAL
Status: DISPENSED | OUTPATIENT
Start: 2021-07-20 | End: 2021-07-20

## 2021-07-20 RX ORDER — PROPOFOL 10 MG/ML
INJECTION, EMULSION INTRAVENOUS
Status: DISCONTINUED | OUTPATIENT
Start: 2021-07-20 | End: 2021-07-20 | Stop reason: HOSPADM

## 2021-07-20 RX ORDER — SODIUM CHLORIDE, SODIUM LACTATE, POTASSIUM CHLORIDE, CALCIUM CHLORIDE 600; 310; 30; 20 MG/100ML; MG/100ML; MG/100ML; MG/100ML
INJECTION, SOLUTION INTRAVENOUS
Status: DISCONTINUED | OUTPATIENT
Start: 2021-07-20 | End: 2021-07-20 | Stop reason: HOSPADM

## 2021-07-20 RX ORDER — LABETALOL HYDROCHLORIDE 5 MG/ML
10 INJECTION, SOLUTION INTRAVENOUS
Status: COMPLETED | OUTPATIENT
Start: 2021-07-20 | End: 2021-07-20

## 2021-07-20 RX ORDER — NIMODIPINE 30 MG/1
60 CAPSULE, LIQUID FILLED ORAL EVERY 4 HOURS
Status: DISCONTINUED | OUTPATIENT
Start: 2021-07-20 | End: 2021-08-09

## 2021-07-20 RX ORDER — DEXAMETHASONE SODIUM PHOSPHATE 4 MG/ML
4 INJECTION, SOLUTION INTRA-ARTICULAR; INTRALESIONAL; INTRAMUSCULAR; INTRAVENOUS; SOFT TISSUE EVERY 6 HOURS
Status: DISCONTINUED | OUTPATIENT
Start: 2021-07-20 | End: 2021-07-21

## 2021-07-20 RX ORDER — LORAZEPAM 2 MG/ML
INJECTION INTRAMUSCULAR
Status: COMPLETED
Start: 2021-07-20 | End: 2021-07-20

## 2021-07-20 RX ADMIN — SODIUM CHLORIDE 15 MG/HR: 900 INJECTION, SOLUTION INTRAVENOUS at 22:31

## 2021-07-20 RX ADMIN — DEXAMETHASONE SODIUM PHOSPHATE 4 MG: 4 INJECTION, SOLUTION INTRAMUSCULAR; INTRAVENOUS at 23:10

## 2021-07-20 RX ADMIN — LEVETIRACETAM 500 MG: 500 TABLET ORAL at 10:53

## 2021-07-20 RX ADMIN — LORAZEPAM 1 MG: 2 INJECTION INTRAMUSCULAR; INTRAVENOUS at 23:00

## 2021-07-20 RX ADMIN — DEXMEDETOMIDINE HYDROCHLORIDE 10 MCG: 100 INJECTION, SOLUTION, CONCENTRATE INTRAVENOUS at 07:33

## 2021-07-20 RX ADMIN — SODIUM CHLORIDE 15 MG/HR: 900 INJECTION, SOLUTION INTRAVENOUS at 23:50

## 2021-07-20 RX ADMIN — NIMODIPINE 60 MG: 30 CAPSULE, LIQUID FILLED ORAL at 04:01

## 2021-07-20 RX ADMIN — LABETALOL HYDROCHLORIDE 10 MG: 5 INJECTION, SOLUTION INTRAVENOUS at 15:11

## 2021-07-20 RX ADMIN — SODIUM CHLORIDE 15 MG/HR: 900 INJECTION, SOLUTION INTRAVENOUS at 03:07

## 2021-07-20 RX ADMIN — SODIUM CHLORIDE 12.5 MG/HR: 900 INJECTION, SOLUTION INTRAVENOUS at 12:32

## 2021-07-20 RX ADMIN — SODIUM CHLORIDE 12.5 MG/HR: 900 INJECTION, SOLUTION INTRAVENOUS at 14:13

## 2021-07-20 RX ADMIN — NIMODIPINE 60 MG: 30 CAPSULE, LIQUID FILLED ORAL at 15:24

## 2021-07-20 RX ADMIN — NIMODIPINE 60 MG: 30 CAPSULE, LIQUID FILLED ORAL at 18:29

## 2021-07-20 RX ADMIN — LABETALOL HYDROCHLORIDE 10 MG: 5 INJECTION, SOLUTION INTRAVENOUS at 18:29

## 2021-07-20 RX ADMIN — DEXAMETHASONE SODIUM PHOSPHATE 10 MG: 4 INJECTION, SOLUTION INTRAMUSCULAR; INTRAVENOUS at 17:17

## 2021-07-20 RX ADMIN — SODIUM CHLORIDE, POTASSIUM CHLORIDE, SODIUM LACTATE AND CALCIUM CHLORIDE 100 ML/HR: 600; 310; 30; 20 INJECTION, SOLUTION INTRAVENOUS at 18:32

## 2021-07-20 RX ADMIN — PROPOFOL 50 MG: 10 INJECTION, EMULSION INTRAVENOUS at 07:38

## 2021-07-20 RX ADMIN — LEVETIRACETAM 500 MG: 500 TABLET ORAL at 17:17

## 2021-07-20 RX ADMIN — HYDROCHLOROTHIAZIDE 25 MG: 25 TABLET ORAL at 17:17

## 2021-07-20 RX ADMIN — PROPOFOL 50 MG: 10 INJECTION, EMULSION INTRAVENOUS at 07:46

## 2021-07-20 RX ADMIN — POTASSIUM CHLORIDE 10 MEQ: 7.46 INJECTION, SOLUTION INTRAVENOUS at 10:54

## 2021-07-20 RX ADMIN — LISINOPRIL 40 MG: 20 TABLET ORAL at 17:17

## 2021-07-20 RX ADMIN — DEXMEDETOMIDINE HYDROCHLORIDE 10 MCG: 100 INJECTION, SOLUTION, CONCENTRATE INTRAVENOUS at 07:46

## 2021-07-20 RX ADMIN — SODIUM CHLORIDE 15 MG/HR: 900 INJECTION, SOLUTION INTRAVENOUS at 18:58

## 2021-07-20 RX ADMIN — GADOTERIDOL 20 ML: 279.3 INJECTION, SOLUTION INTRAVENOUS at 22:13

## 2021-07-20 RX ADMIN — NIMODIPINE 60 MG: 30 CAPSULE, LIQUID FILLED ORAL at 23:10

## 2021-07-20 RX ADMIN — LABETALOL HYDROCHLORIDE 10 MG: 5 INJECTION INTRAVENOUS at 21:23

## 2021-07-20 RX ADMIN — DEXMEDETOMIDINE HYDROCHLORIDE 10 MCG: 100 INJECTION, SOLUTION, CONCENTRATE INTRAVENOUS at 07:38

## 2021-07-20 RX ADMIN — NIMODIPINE 60 MG: 30 CAPSULE, LIQUID FILLED ORAL at 10:52

## 2021-07-20 RX ADMIN — LORAZEPAM 1 MG: 2 INJECTION INTRAMUSCULAR; INTRAVENOUS at 21:23

## 2021-07-20 RX ADMIN — SODIUM CHLORIDE 15 MG/HR: 9 INJECTION, SOLUTION INTRAVENOUS at 01:17

## 2021-07-20 RX ADMIN — SODIUM CHLORIDE 15 MG/HR: 900 INJECTION, SOLUTION INTRAVENOUS at 05:49

## 2021-07-20 RX ADMIN — SODIUM CHLORIDE, POTASSIUM CHLORIDE, SODIUM LACTATE AND CALCIUM CHLORIDE: 600; 310; 30; 20 INJECTION, SOLUTION INTRAVENOUS at 07:23

## 2021-07-20 RX ADMIN — DEXMEDETOMIDINE HYDROCHLORIDE 10 MCG: 100 INJECTION, SOLUTION, CONCENTRATE INTRAVENOUS at 07:52

## 2021-07-20 RX ADMIN — SODIUM CHLORIDE 12.5 MG/HR: 900 INJECTION, SOLUTION INTRAVENOUS at 10:53

## 2021-07-20 RX ADMIN — LABETALOL HYDROCHLORIDE 10 MG: 5 INJECTION INTRAVENOUS at 02:22

## 2021-07-20 RX ADMIN — SODIUM CHLORIDE, POTASSIUM CHLORIDE, SODIUM LACTATE AND CALCIUM CHLORIDE 100 ML/HR: 600; 310; 30; 20 INJECTION, SOLUTION INTRAVENOUS at 01:08

## 2021-07-20 RX ADMIN — POTASSIUM CHLORIDE 10 MEQ: 7.46 INJECTION, SOLUTION INTRAVENOUS at 12:35

## 2021-07-20 RX ADMIN — PROPOFOL 50 MCG/KG/MIN: 10 INJECTION, EMULSION INTRAVENOUS at 07:46

## 2021-07-20 RX ADMIN — SODIUM CHLORIDE 15 MG/HR: 900 INJECTION, SOLUTION INTRAVENOUS at 20:33

## 2021-07-20 RX ADMIN — LORAZEPAM 1 MG: 2 INJECTION INTRAMUSCULAR; INTRAVENOUS at 21:40

## 2021-07-20 NOTE — CONSULTS
[address] [place] [address]    Patient: Lisa Overton MRN: [id number]  SSN: [id number]    YOB: 1960  Age: 64 y.o. Sex: male      Chief Complaint: Dizziness    Subjective:      Lisa Overton is a 64 y.o. male with a pmhx of HTN and DMII who presented to the [facility] ED on 21 for acute onset of dizziness, headache, left sided hearing loss, and slight left-sided weakness. Upon arrival to the ED his BP was 265/120. A code stroke was called and CT head showed an acute subarachnoid hemorrhage anterior to the sarahi. 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 He was started on a cardene gtt, given both hydralazine and labetalol pushes and was transferred to [facility] for a higher level of care. NIS was consulted and plan for a cerebral angiogram and possible embolization of the AVM/AVF today. The patient denies any family hx of aneurysms or AVMs. He reports he was formerly a 2 PPD smoker x 30 years but quit approx 1 month ago. He is supposed to be taking BP medications but has been off them for over a month. At this time, he reports 4/10 frontal headache and mild blurred vision. He denies any neck pain but does report mild neck stiffness. He denies any nausea and reports his dizziness has resolved.      Past Medical History:   Diagnosis Date    Diabetes ([address]     type 2    ED (erectile dysfunction)     HTN (hypertension)     Hypertension     Impaired fasting glucose      Family History   Problem Relation Age of Onset    Diabetes Mother         type 2    Hypertension Mother     Heart Disease Mother         CHF    Hypertension Father     Heart Disease Father 48        MI     Social History     Tobacco Use    Smoking status: Former Smoker     Packs/day: 2.00     Years: 20.00     Pack years: 40.00     Types: Cigarettes     Quit date: 2015     Years since quittin.4    Smokeless tobacco: Current User    Tobacco comment: Patient reports he is Vaping    Substance Use Topics    Alcohol use: Yes     Alcohol/week: 6.0 standard drinks     Types: 6 Cans of beer per week      Prior to Admission Medications   Prescriptions Last Dose Informant Patient Reported? Taking? amLODIPine (NORVASC) 10 mg tablet  Self Yes No   Sig: Take 10 mg by mouth daily (with breakfast). amLODIPine (NORVASC) 10 mg tablet   No No   Sig: Take 1 Tab by mouth daily. aspirin (ASPIRIN) 325 mg tablet   No No   Sig: Take 1 Tab by mouth two (2) times a day. bisacodyl (DULCOLAX, BISACODYL,) 10 mg suppository   No No   Sig: Insert 10 mg into rectum daily. hydrALAZINE (APRESOLINE) 25 mg tablet  Self Yes No   Sig: Take 25 mg by mouth three (3) times daily. hydrALAZINE (APRESOLINE) 25 mg tablet   No No   Sig: Take 1 Tab by mouth three (3) times daily. hydrochlorothiazide (HYDRODIURIL) 25 mg tablet  Self Yes No   Sig: Take 25 mg by mouth daily (after breakfast). lancets misc   Yes No   Sig: by Does Not Apply route. lisinopril (PRINIVIL, ZESTRIL) 40 mg tablet  Self Yes No   Sig: Take 40 mg by mouth daily (after breakfast). lisinopril (PRINIVIL, ZESTRIL) 40 mg tablet   No No   Sig: Take 1 Tab by mouth daily. metFORMIN (GLUCOPHAGE) 500 mg tablet  Self Yes No   Sig: Take 500 mg by mouth Daily (before breakfast). metFORMIN (GLUCOPHAGE) 500 mg tablet   No No   Sig: Take 1 Tab by mouth daily (with breakfast). oxyCODONE-acetaminophen (PERCOCET 7.5) 7.5-325 mg per tablet   No No   Sig: Take 1-2 Tabs by mouth every four (4) hours as needed for Pain. Max Daily Amount: 12 Tabs. promethazine (PHENERGAN) 25 mg tablet   No No   Sig: Take 1 Tab by mouth every six (6) hours as needed for Nausea. traMADol (ULTRAM) 50 mg tablet   No No   Sig: Take 1 Tab by mouth every six (6) hours as needed for Pain. Max Daily Amount: 200 mg.       Facility-Administered Medications: None       No Known Allergies    Review of Systems:  Pertinent items are noted in the History of Present Illness. Objective:     Visit Vitals  BP (!) 169/70   Pulse 79   Temp 98.3 °F (36.8 °C)   Resp 22   SpO2 96%     Physical Exam:  GENERAL: Calm, cooperative, NAD  SKIN: Warm, dry, color appropriate for ethnicity. Neurologic Exam:  Mental Status:  Alert and oriented x 4. Appropriate affect, mood and behavior. Language:    Normal fluency, repetition, comprehension and naming. Cranial Nerves:   Pupils 2 mm, equal, round and reactive to light. Visual fields full to confrontation. Extraocular movements intact. Facial sensation intact. Full facial strength, no asymmetry. Hearing diminished on left side. No dysarthria. Shoulder shrug 5/5 bilaterally. Motor:    No pronator drift. Bulk and tone normal.      5/5 power in all extremities proximally and distally. No involuntary movements. Sensation:    Sensation intact throughout to light touch. Coordination & Gait: Normal. FTN and HTS intact with no ataxia present.     NIHSS:      1a-LOC:0    1b-Month/Age:0    1c-Open/Close Hand:0    2-Best Gaze:0    3-Visual Fields:0    4-Facial Palsy:0    5a-Left Arm:0    5b-Right Arm:0    6a-Left Le    6b-Right Le    7-Limb Ataxia:0    8-Sensory:0    9-Best Language:0    10-Dysarthria:0    11-Extinction/Inattention:0  TOTAL SCORE:0    Labs:  Lab Results   Component Value Date/Time    WBC 6.5 2021 09:38 PM    HGB 13.4 2021 09:38 PM    HCT 39.4 2021 09:38 PM    PLATELET 874  09:38 PM    MCV 84.0 2021 09:38 PM      Lab Results   Component Value Date/Time    Sodium 140 2021 09:38 PM    Potassium 3.7 2021 09:38 PM    Chloride 103 2021 09:38 PM    CO2 29 2021 09:38 PM    Anion gap 8 2021 09:38 PM    Glucose 145 (H) 2021 09:38 PM    BUN 16 2021 09:38 PM    Creatinine 1.32 (H) 2021 09:38 PM    BUN/Creatinine ratio 12 2021 09:38 PM    GFR est AA >60 2021 09:38 PM    GFR est non-AA 55 (L) 07/19/2021 09:38 PM    Calcium 8.2 (L) 07/19/2021 09:38 PM     Lab Results   Component Value Date/Time    Troponin-I, Qt. <0.05 07/19/2021 09:38 PM     Imaging:  CT Results (maximum last 3): Results from Hospital Encounter encounter on 07/19/21    CTA CODE NEURO HEAD AND NECK W CONT    Narrative  PRELIMINARY REPORT:    Mild atherosclerotic change at the right carotid bifurcation. 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 One measures  4 mm. Another measures 4 mm within the superior cerebellum. The lytic aneurysm  within the superior cerebellum is irregular and may be the source of the  hemorrhage. Preliminary report was provided by Dr. Karli Maria, the on-call radiologist, at 2135  hours. Findings discussed with Dr. Dean Murray    Final report to follow. CT CODE NEURO HEAD WO CONTRAST    Narrative  INDICATION:  Code Stroke    Exam: Unenhanced head CT. No comparisons. 5 mm axial images were obtained from  the skull base to vertex. Sagittal and coronal reformats were performed. CT dose  reduction was achieved through use of a standardized protocol tailored for this  examination and automatic exposure control for dose modulation. Findings: The ventricles and cisterns are of normal size and configuration. There are no extra-axial fluid collections mass lesions or mass effect. There  are no extra-axial fluid collections or midline shift. There is acute  subarachnoid hemorrhage in the prepontine cistern and. Paranasal sinuses are  aerated and no depressed skull fracture    Impression  1. Acute subarachnoid hemorrhage anterior to the sarahi    The findings were called to Dr. Dean Murray on 7/19/2021 at 2107 by myself.   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 Shiva Delgadillo today   - NPO except meds for procedure   - Day 1/21 of Nimotop to prevent delayed cerebral ischemia   - Keppra 500 mg BID for seizure ppx   - LR at 100 ml/hr to maintain euvolemia   - Strict I&O   - ECHO pending   - q 1 neuro checks   - SBP goal <160,  Cardene/Hydralazine/Labetalol PRN   - PT/OT/SLP evals   - Assess NICOM PRN for fluid responsiveness    2.) HTN emergency   - With /120 on admission    I have discussed the diagnosis and the intended plan as seen in the above orders with Dr. Shiva Delgadillo, the patient and the primary RN. Patient updated on current plan of care and is in agreement. All questions were answered. Thank you for this consult and participating in the care of this patient.   Signed By: Manny Mendez NP     July 20, 2021

## 2021-07-20 NOTE — PROGRESS NOTES
SLP Contact Note    SLP evaluation complete. Based off of bedside presentation, would recommend soft and bite sized diet and thin liquids. However, per RN, pt looking different neurologically than he did an hour ago and is going for a repeat head CT. Therefore, SLP will not place diet order until these results come back and come confirm that it is reasonable to initiate a diet.       Thank you,  VANDANA Clinton.Ed, [id number] [facility]  Speech-Language Pathologist

## 2021-07-20 NOTE — ED NOTES
Discharge or Transfer Assessment: Patient A&O x4 and in no distress. Physical re-examination reveals mild improvement in pts condition with reassessment of vital signs completed at the time of admission transfer and/or discharge. Verification of hospital location [facility] and room 7114(ICU) completed with EMS provider [facility] transport team.    Last BP, 162/64, HR 74, regular.

## 2021-07-20 NOTE — ED NOTES
BP right arm 191/63  Left arm 192/63    Dr. Connor Colby aware of continued elevated blood pressure. New order received. Transport eta 2330. Patient and significant other, Sara Layton, updated. Sara Layton can be reached at [phone] (girlfriend of 21 years). Patient denies any  contact for other family members at this time.

## 2021-07-20 NOTE — ED NOTES
Nicardipine gtt at 15mg/hr; BP remains elevated above goal range. Patient continues to c/o frontal area intense headache. Dr. Chitra Clifford updated on patient condition. Order received. Bed assignment received from [facility],  7157-bed 1. [facility] called for transport.

## 2021-07-20 NOTE — PROGRESS NOTES
0730: Bedside shift change report given to Misbah Martini  (oncoming nurse) by Keon Alarcon  (offgoing nurse). Report included the following information SBAR, Kardex, Intake/Output and MAR.     0900: Pt back from angio. 1115: Pt having vision changes. Pt complains of diploplia. No other neuro changes noted to exam. Notified NIS and Intensivist. Pt to go for stat head CT.    1135: Pt back from CT. Neuro IR NP at bedside. 1600: Pt complaining of blurred vision. Notified intensivist and NIS.    9980: Per NIS, hold off on CT for now. RN to continue to monitor. MRI later this afternoon. 1900: MRI screening form complete. Per MRI tech, MRI tentatively scheduled at 2100.    1930: Bedside shift change report given to Keon Alarcon (oncoming nurse) by Misbah Martini  (offgoing nurse). Report included the following information SBAR, Kardex, Intake/Output and MAR.

## 2021-07-20 NOTE — H&P
[facility]    ICU TEAM History and Physical    Name: Jl Baca   : 1960   MRN: [id number]   Date: 2021      Progress Note: 2021      Reason for ICU Admission: [facility]    HPI:  Jl Baca is a 64 y.o. male with significant past with a history of hypertension and diabetes, not currently take any medications, who presented to the ER with report of dizziness, headache, and left sided hearing loss with some slight left-sided weakness that started at 7:45 PM on 21. Patient reported that he was at home when the symptoms started with some pain in the left neck/left head and then noticed that he had some hearing loss of the left ear. Reports that this being new. Had some mild weakness of the left arm which is improved. But having some dizziness reports feeling off balance. Denies any alcohol use. Denies any trauma or injuries to the head or neck. Upon arrival to ED patient's blood pressure was 260/120. Started on Cardene gtt. Denies any nausea or vmiting. Denies any slurred speech or facial droop. Patient reports that he supposed to be on medications for blood pressure but has not been taking them for a long time. On no anticoagulants. CT and CTA head/neck was done: Acute subarachnoid hemorrhage anterior to the sarahi. 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 Tele-neuro evaluated and NIS was called for consult. Patient was transferred to [facility] ICU. Plan for diagnostic angiogram in the am.     Subjective:   Overnight Events:   2021       POD:  * No surgery found *    S/P:       Assessment:     ICU Problems:  1. SAH with AVM and multiple associated aneurysms. 2. Hypertensive Emergency  3. DM2    ICU Comprehensive Plan of Care:     Plans for this Shift:   1. Admit to ICU  2. NIS consulted  3. Keep n.p.o. for plan for angiogram morning  4. Neurochecks every hour  5. Nimotop per NIS  6. Keppra per NIS  7. SBP Goal of: > 90 mmHg and < 160 mmHg  8.  MAP Goal of: > 65 mmHg  9. Nicardipine (Cardene) - For above SBP/MAP goals  10. IVFs: LR @ 100 ml/hr  11. Transfusion Trigger (Hgb): <7 g/dL  12. Respiratory Goals:  a. Chlorhexidine   b. Optimize PEEP/Ventilation/Oxygenation  c. Goal Tidal Volume 6 cc/kg based on IBW  d. Aim for lung protective ventilation  e. Head of bed > 30 degrees  13. Pulmonary toilet: Incentive Spirometry   14. SpO2 Goal: > 92%  15. Keep K>4; Mg>2   16. PT/OT: NA   17. Discussed Plan of Care/Code Status: Full Code  18. Appreciate Consultants Input: NIS  19. Discussed Care Plan with Bedside RN  20. Documentation of Current Medications  21. Rest of Plan Below:    F - Feeding:  No NPO  A - Analgesia: Acetaminophen  S - Sedation: None  T - DVT Prophylaxis: SCD's or Sequential Compression Device   H - Head of Bed: > 30 Degrees  U - Ulcer Prophylaxis: Not at this time   G - Glycemic Control: Q 6hr Accuchecks while NPO  S - Spontaneous Breathing Trial: N/A  B - Bowel Regimen: Docusate (Colace)  I - Indwelling Catheter:   Tubes: None  Lines: Peripheral IV  Drains: None  D - De-escalation of Antibiotics: none    Active Problem List:     Problem List  Date Reviewed: 3/22/2019        Codes Class    SAH (subarachnoid hemorrhage) (HCC) ICD-10-CM: I60.9  ICD-9-CM: 200         Essential hypertension ICD-10-CM: I10  ICD-9-CM: 401.9         Type 2 diabetes mellitus without complication, without long-term current use of insulin (HCC) ICD-10-CM: E11.9  ICD-9-CM: 250.00         Arthritis of hip ICD-10-CM: M16.10  ICD-9-CM: 716.95               Past Medical History:      has a past medical history of Diabetes ([address] ED (erectile dysfunction), HTN (hypertension), Hypertension, and Impaired fasting glucose. Past Surgical History:      has a past surgical history that includes hx tonsil and adenoidectomy. Home Medications:     Prior to Admission medications    Medication Sig Start Date End Date Taking? Authorizing Provider   lancets misc by Does Not Apply route. Provider, Historical   metFORMIN (GLUCOPHAGE) 500 mg tablet Take 1 Tab by mouth daily (with breakfast). 3/22/19   Elliott Ramesh MD   lisinopril (PRINIVIL, ZESTRIL) 40 mg tablet Take 1 Tab by mouth daily. 3/22/19   Elliott Ramesh MD   hydrALAZINE (APRESOLINE) 25 mg tablet Take 1 Tab by mouth three (3) times daily. 3/22/19   Elliott Ramesh MD   amLODIPine (NORVASC) 10 mg tablet Take 1 Tab by mouth daily. 3/22/19   Elliott Ramesh MD   oxyCODONE-acetaminophen (PERCOCET 7.5) 7.5-325 mg per tablet Take 1-2 Tabs by mouth every four (4) hours as needed for Pain. Max Daily Amount: 12 Tabs. 3/20/15   Julianne Ravi PA   traMADol (ULTRAM) 50 mg tablet Take 1 Tab by mouth every six (6) hours as needed for Pain. Max Daily Amount: 200 mg. 3/20/15   MELANI Warren   bisacodyl (DULCOLAX, BISACODYL,) 10 mg suppository Insert 10 mg into rectum daily. 3/20/15   MELANI Warren   promethazine (PHENERGAN) 25 mg tablet Take 1 Tab by mouth every six (6) hours as needed for Nausea. 3/20/15   MELANI Warren   aspirin (ASPIRIN) 325 mg tablet Take 1 Tab by mouth two (2) times a day. 3/20/15   MELANI Warren   metFORMIN (GLUCOPHAGE) 500 mg tablet Take 500 mg by mouth Daily (before breakfast). Provider, Historical   lisinopril (PRINIVIL, ZESTRIL) 40 mg tablet Take 40 mg by mouth daily (after breakfast). Provider, Historical   hydrochlorothiazide (HYDRODIURIL) 25 mg tablet Take 25 mg by mouth daily (after breakfast). Provider, Historical   amLODIPine (NORVASC) 10 mg tablet Take 10 mg by mouth daily (with breakfast). Provider, Historical   hydrALAZINE (APRESOLINE) 25 mg tablet Take 25 mg by mouth three (3) times daily.     Provider, Historical       Allergies/Social/Family History:     No Known Allergies   Social History     Tobacco Use    Smoking status: Former Smoker     Packs/day: 2.00     Years: 20.00     Pack years: 40.00     Types: Cigarettes     Quit date: 2015     Years since quittin.4    Smokeless tobacco: Current User    Tobacco comment: Patient reports he is Vaping    Substance Use Topics    Alcohol use: Yes     Alcohol/week: 6.0 standard drinks     Types: 6 Cans of beer per week      Family History   Problem Relation Age of Onset    Diabetes Mother         type 2    Hypertension Mother     Heart Disease Mother         CHF    Hypertension Father     Heart Disease Father 48        MI       Review of Systems:     Review of systems not obtained due to patient factors. Objective:   Vital Signs:  Visit Vitals  BP (!) 169/70   Pulse 79   Temp 98.3 °F (36.8 °C)   Resp 22   SpO2 96%        Temp (24hrs), Av.5 °F (36.9 °C), Min:98.3 °F (36.8 °C), Max:98.8 °F (37.1 °C)           Intake/Output:   No intake or output data in the 24 hours ending 21    Physical Exam:    General:  alert, cooperative, no distress, appears stated age  Eye:  conjunctivae/corneas clear. PERRL, EOM's intact. Neurologic:  Decreased hearing in left ear. MARTÍNEZ, follows commands appropriately. Lymphatic:  Cervical, supraclavicular, and axillary nodes normal.   Neck:  normal and no erythema or exudates noted. Lungs:  clear to auscultation bilaterally  Heart:  regular rate and rhythm, S1, S2 normal, no murmur, click, rub or gallop  Abdomen:  soft, non-tender. Bowel sounds normal. No masses,  no organomegaly  Cardiovascular:  Regular rate and rhythm, S1S2 present, without murmur or extra heart sounds, pedal pulses normal and no edema  Skin:  Normal. and no rash or abnormalities    LABS AND  DATA: Personally reviewed  Recent Labs     21   WBC 6.5   HGB 13.4   HCT 39.4        Recent Labs     21      K 3.7      CO2 29   BUN 16   CREA 1.32*   *   CA 8.2*     Recent Labs     21   AP 51   TP 6.2*   ALB 3.4*   GLOB 2.8     Recent Labs     21   INR 1.0   PTP 9.9      No results for input(s): PHI, PCO2I, PO2I, FIO2I in the last 72 hours.   Recent Labs     07/19/21 2138   TROIQ <0.05       Hemodynamics:   PAP:   CO:     Wedge:   CI:     CVP:    SVR:       PVR:       Ventilator Settings:  Mode Rate Tidal Volume Pressure FiO2 PEEP                    Peak airway pressure:      Minute ventilation:          MEDS: Reviewed    Imaging:      CXR Results  (Last 48 hours)    None          CT Results  (Last 48 hours)               07/19/21 2126  CTA CODE NEURO HEAD AND NECK W CONT Preliminary result    Narrative:  PRELIMINARY REPORT:       Mild atherosclerotic change at the right carotid bifurcation. No flow limiting   stenosis within the neck       There is an AVM in the superior cerebellum with multiple aneurysms. One measures   4 mm. Another measures 4 mm within the superior cerebellum. The lytic aneurysm   within the superior cerebellum is irregular and may be the source of the   hemorrhage. Preliminary report was provided by Dr. Bria Collazo, the on-call radiologist, at 2135   hours. Findings discussed with Dr. Isabel Gallegos       Final report to follow. 07/19/21 2104  CT CODE NEURO HEAD WO CONTRAST Final result    Impression:  1. Acute subarachnoid hemorrhage anterior to the sarahi       The findings were called to Dr. Isabel Gallegos on 7/19/2021 at 2107 by myself. 789           Narrative:  INDICATION:  Code Stroke        Exam: Unenhanced head CT. No comparisons. 5 mm axial images were obtained from   the skull base to vertex. Sagittal and coronal reformats were performed. CT dose   reduction was achieved through use of a standardized protocol tailored for this   examination and automatic exposure control for dose modulation. Findings: The ventricles and cisterns are of normal size and configuration. There are no extra-axial fluid collections mass lesions or mass effect. There   are no extra-axial fluid collections or midline shift. There is acute   subarachnoid hemorrhage in the prepontine cistern and.  Paranasal sinuses are   aerated and no depressed skull fracture               ECHO:  Pending    Multidisciplinary Rounds Completed:  Pending    ABCDEF Bundle/Checklist Completed:  Yes    SPECIAL EQUIPMENT  None    DISPOSITION  Stay in ICU    CRITICAL CARE CONSULTANT NOTE  I had a face to face encounter with the patient, reviewed and interpreted patient data including clinical events, labs, images, vital signs, I/O's, and examined patient. I have discussed the case and the plan and management of the patient's care with the consulting services, the bedside nurses and the respiratory therapist.      NOTE OF PERSONAL INVOLVEMENT IN CARE   This patient has a high probability of imminent, clinically significant deterioration, which requires the highest level of preparedness to intervene urgently. I participated in the decision-making and personally managed or directed the management of the following life and organ supporting interventions that required my frequent assessment to treat or prevent imminent deterioration. I personally spent 45 minutes of critical care time. This is time spent at this critically ill patient's bedside actively involved in patient care as well as the coordination of care and discussions with the patient's family. This does not include any procedural time which has been billed separately.     Chandler Montero AGACNP-BC     [facility]

## 2021-07-20 NOTE — ED NOTES
TRANSFER - OUT REPORT:    Verbal report given to Pantera Rios RN(name) on [employer]  being transferred to [place] [address](unit) for routine progression of care       Report consisted of patients Situation, Background, Assessment and   Recommendations(SBAR). Information from the following report(s) SBAR, ED Summary, Intake/Output, MAR, Recent Results and Cardiac Rhythm SR, rate 70-80s was reviewed with the receiving nurse. Lines:   Peripheral IV 07/19/21 Left Forearm (Active)   Site Assessment Clean, dry, & intact 07/19/21 2106   Phlebitis Assessment 0 07/19/21 2106   Infiltration Assessment 0 07/19/21 2106   Dressing Status Clean, dry, & intact 07/19/21 2106   Dressing Type Tape;Trach dressing 07/19/21 2106   Alcohol Cap Used Yes 07/19/21 2106       Peripheral IV 65/29/37 Left Cephalic (Active)   Site Assessment Clean, dry, & intact 07/19/21 2113   Phlebitis Assessment 0 07/19/21 2113   Infiltration Assessment 0 07/19/21 2113   Dressing Status Clean, dry, & intact 07/19/21 2113   Dressing Type 4 X 4 07/19/21 2113        Opportunity for questions and clarification was provided.       Patient transported with:   Monitor  [employer]/ [employer] transport team.

## 2021-07-20 NOTE — ADVANCED PRACTICE NURSE
NIS Brief Update Note:       Mr. Soha Yoon was taken for diagnostic angiogram today with Dr. Kashif Scott. Diagnostic revealed AVM in the cerebellum with multiple feeders coming off the left PCA, left anterior choroidal and left SCA with a large single draining vein that drains into the vein of Gaston. 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 Post procedure the patient was seen and examined. He began to complain of diplopia ( new post procedure) around 11 AM. 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 Plan:   - STAT head CT to eval for any post procedure changes   - eye patch for diplopia   - continue with close monitoring of neuro status ( q1 hour neuro changes)   - MRI brain w/wo   - plan for diagnostic angiogram again tomorrow AM with possible embolization of flow related aneurysm     Physical Exam:  GENERAL: Calm, cooperative, NAD  SKIN: Warm, dry, color appropriate for ethnicity.      Neurologic Exam:  Mental Status:             Alert and oriented x 4. Appropriate affect, mood and behavior.        Language:                   Normal fluency, repetition, comprehension and naming.     Cranial Nerves:           Pupils 2 mm, equal, round and reactive to light. Visual fields full to confrontation. Left eye fails to abduct when looking towards the left                                       Diplopia when looking far away                                          Facial sensation intact. Full facial strength, no asymmetry. Hearing diminished on left side. No dysarthria. Shoulder shrug 5/5 bilaterally.    Motor:                          No pronator drift. Bulk and tone normal.                                       5/5 power in all extremities proximally and distally. No involuntary movements.     Sensation:                   Sensation intact throughout to light touch.      Coordination & Gait:   Normal. FTN and HTS intact with no ataxia present.     NIHSS:      1a-LOC:0    1b-Month/Age:0    1c-Open/Close Hand:0    2-Best Gaze:0    3-Visual Fields:0    4-Facial Palsy:0    5a-Left Arm:0    5b-Right Arm:0    6a-Left Le    6b-Right Le    7-Limb Ataxia:0    8-Sensory:0    9-Best Language:0    10-Dysarthria:0    11-Extinction/Inattention:0  TOTAL SCORE:0    M.  Aliyah Yoon, MIRIAM  NIS

## 2021-07-20 NOTE — PROGRESS NOTES
Reviewed chart for transitions of care, and discussed in rounds. CM met with patient at bedside to explain role and offer support. Patient is alert and oriented x4, and confirmed demographics. Baseline:   ADLs/IDALS:Independent, to include driving  Previous Home Health:None  Previous SNF/IPR:None  ER Contact: Wife Pau Zuniga [phone]    Patient lives in a single level house with 6 steps to enter. Patient is independent with ADLs/IDALs   Patient has no previous HH or equipment needs. Patient's preferred pharmacy is [address] [place] [address]. Patient's wife is expected to transport at discharge. Reason for Admission:  Acute SAH                     RUR Score:    12%                 Plan for utilizing home health:    TBD      PCP: First and Last name:  Kisha Miranda MD     Name of Practice:    Are you a current patient: Yes/No:    Approximate date of last visit: over a year   Can you participate in a virtual visit with your PCP: Yes                    Current Advanced Directive/Advance Care Plan: Full Code      Healthcare Decision Maker:   Click here to complete [address] including selection of the Healthcare Decision Maker Relationship (ie \"Primary\")           Patient presented  to [facility] with neck pain, dizziness and left sided weakness and hearing loss. Per notes patient has not taken his B/P meds in a month. Patient taken tot angio for cerebral angiogram.     Care manager met with patient to introduce self and explain role. Patient is independent with ADL' and IADL's and works for [employer]. CM confirmed demographic information. Will follow for transitions of care.   Jennifer Troy RN,Care  Management

## 2021-07-20 NOTE — ANESTHESIA PREPROCEDURE EVALUATION
Anesthetic History   No history of anesthetic complications            Review of Systems / Medical History  Patient summary reviewed, nursing notes reviewed and pertinent labs reviewed    Pulmonary          Smoker (quit last month)         Neuro/Psych   Within defined limits           Cardiovascular    Hypertension: well controlled              Exercise tolerance: >4 METS     GI/Hepatic/Renal  Within defined limits              Endo/Other    Diabetes: well controlled, type 2    Arthritis     Other Findings              Physical Exam    Airway  Mallampati: III  TM Distance: 4 - 6 cm  Neck ROM: normal range of motion   Mouth opening: Normal     Cardiovascular    Rhythm: regular  Rate: normal         Dental    Dentition: Upper dentition intact and Lower dentition intact     Pulmonary  Breath sounds clear to auscultation               Abdominal  GI exam deferred       Other Findings            Anesthetic Plan    ASA: 3  Anesthesia type: MAC          Induction: Intravenous  Anesthetic plan and risks discussed with: Patient

## 2021-07-20 NOTE — PROGRESS NOTES
Physical Therapy: [facility]    2098: Chart reviewed and discussed with RN in preparation for PT eval. Pt is on bedrest with legs straight for 2 hours s/p angiogram. Will follow up after 1100 as able and appropriate. 1157: Made second attempt to see pt for PT eval. Per RN, pt may be taken down for a second angiogram shortly and RN requested we hold for now. Will follow up this afternoon as able and appropriate.     Phan Cobos, SPT

## 2021-07-20 NOTE — PROGRESS NOTES
Problem: Dysphagia (Adult)  Goal: *Acute Goals and Plan of Care (Insert Text)  Description: Speech Therapy Goals  Initiated 7/20/2021    1. Patient will tolerate soft and bite sized diet and thin liquids without adverse effects within 7 days. Outcome: Progressing Towards Goal     SPEECH LANGUAGE PATHOLOGY BEDSIDE SWALLOW EVALUATION  Patient: Yazmin Reeves [de-identified]64 y.o. male)  Date: 7/20/2021  Primary Diagnosis: SAH (subarachnoid hemorrhage) (HCC) [I60.9]        Precautions:       ASSESSMENT :  Based on the objective data described below, the patient presents with mostly functional oropharyngeal phases of swallow on this date save for some impulsivity causing lingual residue with solid consistency. Based off of bedside presentation, would recommend soft and bite sized diet and thin liquids. However, per RN, pt looking different neurologically than he did an hour ago and is going for a repeat head CT. Per chart review, pt now going for  asecond angiogram. Therefore, SLP will not place diet order until these results come back. RN can certainly complete swallow screen if pt is appropriate. SLP will follow-up tomorrow. Patient will benefit from skilled intervention to address the above impairments. Patients rehabilitation potential is considered to be Guarded     PLAN :  Recommendations and Planned Interventions:  --Await further neuro intervention  --RN can complete swallow screen again and initiate soft and bite sized diet/thin liquids if pt appropriate    Frequency/Duration: Patient will be followed by speech-language pathology 3 times a week to address goals. Discharge Recommendations: To Be Determined     SUBJECTIVE:   Patient stated, \"It's okay to [address] [place] [address] when SLP apologized after laughing at [facility] that pt was watching on tv.     OBJECTIVE:     Past Medical History:   Diagnosis Date    Acute hypokalemia 7/20/2021    Cerebral arteriovenous malformation (AVM) 7/20/2021    Diabetes ([address]     type 2 ED (erectile dysfunction)     HTN (hypertension)     Hypertension     Impaired fasting glucose     Malignant hypertension 7/20/2021     Past Surgical History:   Procedure Laterality Date    HX TONSIL AND ADENOIDECTOMY         Diet prior to admission: regular diet/thin liquids  Current Diet:  NPO   Cognitive and Communication Status:  Neurologic State: Alert  Orientation Level: Oriented X4  Cognition: Follows commands  Perception: Appears intact  Perseveration: No perseveration noted  Safety/Judgement: Awareness of environment  Oral Assessment:  Oral Assessment  Labial: No impairment  Dentition: Natural  Oral Hygiene: oral mucosa moist and clear of secretions  Lingual: No impairment  Velum: No impairment  Mandible: No impairment  P.O. Trials:  Patient Position: upright in bed  Vocal quality prior to P.O.: No impairment  Consistency Presented: Thin liquid; Solid  How Presented: Self-fed/presented;Cup/sip;Spoon;Straw     Bolus Acceptance: No impairment  Bolus Formation/Control: Impaired  Type of Impairment:  (impulsive with oral residue)  Propulsion: No impairment  Oral Residue: 10-50% of bolus; Lingual  Initiation of Swallow: No impairment  Laryngeal Elevation: Functional  Aspiration Signs/Symptoms: None  Pharyngeal Phase Characteristics: No impairment, issues, or problems              Oral Phase Severity: Mild  Pharyngeal Phase Severity : No impairment    NOMS:   The NOMS functional outcome measure was used to quantify this patient's level of swallowing impairment. Based on the NOMS, the patient was determined to be at level 4 for swallow function       NOMS Swallowing Levels:  Level 1 (CN): NPO  Level 2 (CM): NPO but takes consistency in therapy  Level 3 (CL): Takes less than 50% of nutrition p.o. and continues with nonoral feedings; and/or safe with mod cues; and/or max diet restriction  Level 4 (CK):  Safe swallow but needs mod cues; and/or mod diet restriction; and/or still requires some nonoral feeding/supplements  Level 5 (CJ): Safe swallow with min diet restriction; and/or needs min cues  Level 6 (CI): Independent with p.o.; rare cues; usually self cues; may need to avoid some foods or needs extra time  Level 7 ([address] [place] [address]): Independent for all p.o.  DANNY. (2003). National Outcomes Measurement System (NOMS): Adult Speech-Language Pathology User's Guide. Pain:  Pain Scale 1: Numeric (0 - 10)  Pain Intensity 1: 4  Pain Location 1: Head    After treatment:   Call bell within reach and Nursing notified    COMMUNICATION/EDUCATION:     The patient's plan of care including recommendations, planned interventions, and recommended diet changes were discussed with: Registered nurse. Patient/family have participated as able in goal setting and plan of care.     Thank you for this referral.  EMILIA Salazar  Time Calculation: 10 mins

## 2021-07-20 NOTE — BRIEF OP NOTE
NEUROINTERVENTIONAL SURGERY POST-PROCEDURE NOTE    PROCEDURE:  Diagnostic cerebral angiogram    VESSEL(S) STUDIED:  1. Right common carotid artery  2. Left common carotid artery  3. Left internal carotid artery  4. Left external carotid artery  5. Left vertebral artery   6. Right common femoral artery VESSEL(S) TREATED:  1. None      PRELIMINARY REPORT & DISPOSITION:   There is evidence of a cerebellar AVM with multiple feeders coming off the left PCA, left anterior choroidal and left SCA with a large single draining vein that drains into the vein of Gaston. 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 EBL: 5 cc    COMPLICATIONS:  None    FOLLOW-UP:  SBP <140 nmmHg DATE OF SERVICE:  7/20/2021 1:52 PM     ATTENDING SURGEON(S):  Theresa Ventura MD    ANESTHESIA:   General anesthesia    MEDICATIONS:   See nursing record    PUNCTURE SITE:  Right common femoral artery. Arteriotomy closed with 6F angioseal. Flat x 2 hours. Right leg straight for 4h.           Theresa Ventura MD  [address] [facility]

## 2021-07-20 NOTE — PROGRESS NOTES
SLP Contact Note    Consult received and appreciated. Chart reviewed. Pt is NPO for angio this morning and therefore will hold for now. SLP will complete evaluation as pt and SLP schedule permit.       Thank you,  GABBIE DaltonEd, [id number] [facility]  Speech-Language Pathologist

## 2021-07-20 NOTE — PROGRESS NOTES
Pt arrives in bed  to angio department accompanied by stafffor cerebral angiogram procedure. All assessments completed and consent was reviewed. Education given was regarding procedure, under anesthesia care, post-procedure care and  management/follow-up. Opportunity for questions was provided and all questions and concerns were addressed. Under anesthesia care for procedure, see that record for vitals and meds  angioseal placed to right femoral artery  Post procedure; Pt tolerated well, responds to voice, will transport and report to ICU RN Wolf Naranjo.   Py without change in status and without incident

## 2021-07-20 NOTE — ED PROVIDER NOTES
24-year-old male with significant past with a history of hypertension diabetes not currently take any medications presenting to the ER with report of dizziness, headache and left hearing loss with some slight left-sided weakness that started at 7:45 PM.  Patient reports that he was at home when the symptoms started with some pain in the left neck/left head and then noticed that he had some hearing loss of the left ear. Reports that this being new. Had some mild weakness of the left arm which is improved. But having some dizziness reports feeling off balance. Denies any alcohol use. Denies any trauma or injuries to the head or neck. Upon arrival patient's blood pressure was 260/120. Denies any nausea or vomiting. Denies any slurred speech or facial droop. Patient reports that he supposed to be on medications for blood pressure but has not been taking them for a long time. No anticoagulants.              Past Medical History:   Diagnosis Date    Diabetes ([facility] [address]     type 2    ED (erectile dysfunction)     HTN (hypertension)     Hypertension     Impaired fasting glucose        Past Surgical History:   Procedure Laterality Date    HX TONSIL AND ADENOIDECTOMY           Family History:   Problem Relation Age of Onset    Diabetes Mother         type 2    Hypertension Mother     Heart Disease Mother         CHF    Hypertension Father     Heart Disease Father 48        MI       Social History     Socioeconomic History    Marital status:      Spouse name: Not on file    Number of children: Not on file    Years of education: Not on file    Highest education level: Not on file   Occupational History    Not on file   Tobacco Use    Smoking status: Former Smoker     Packs/day: 2.00     Years: 20.00     Pack years: 40.00     Types: Cigarettes     Quit date: 2015     Years since quittin.4    Smokeless tobacco: Current User    Tobacco comment: Patient reports he is Vaping    Substance and Sexual Activity    Alcohol use: Yes     Alcohol/week: 6.0 standard drinks     Types: 6 Cans of beer per week    Drug use: Never    Sexual activity: Yes   Other Topics Concern    Not on file   Social History Narrative    ** Merged History Encounter **          Social Determinants of Health     Financial Resource Strain:     Difficulty of Paying Living Expenses:    Food Insecurity:     Worried About Running Out of Food in the Last Year:     Ran Out of Food in the Last Year:    Transportation Needs:     Lack of Transportation (Medical):  Lack of Transportation (Non-Medical):    Physical Activity:     Days of Exercise per Week:     Minutes of Exercise per Session:    Stress:     Feeling of Stress :    Social Connections:     Frequency of Communication with Friends and Family:     Frequency of Social Gatherings with Friends and Family:     Attends Holiness Services:     Active Member of Clubs or Organizations:     Attends Club or Organization Meetings:     Marital Status:    Intimate Partner Violence:     Fear of Current or Ex-Partner:     Emotionally Abused:     Physically Abused:     Sexually Abused: ALLERGIES: Patient has no known allergies. Review of Systems   Constitutional: Negative for chills and fever. HENT: Positive for hearing loss. Negative for congestion and sore throat. Eyes: Negative for photophobia, pain and visual disturbance. Respiratory: Negative for shortness of breath. Cardiovascular: Negative for chest pain. Gastrointestinal: Negative for abdominal pain, diarrhea, nausea and vomiting. Genitourinary: Negative for dysuria and flank pain. Musculoskeletal: Positive for neck pain. Negative for back pain. Skin: Negative for rash. Neurological: Positive for dizziness, weakness and headaches. Negative for tremors, syncope, facial asymmetry, speech difficulty and numbness.        Vitals:    07/19/21 2340 07/19/21 2350 07/19/21 2351 07/20/21 0010   BP: Chestine Dubs ) 192/62 (!) 199/67 (!) 192/68 (!) 162/64   Pulse: 82 85 86 74   Resp: 20 23     Temp:       SpO2: 98% 99%     Weight:       Height:                Physical Exam  Vitals and nursing note reviewed. Constitutional:       Appearance: He is well-developed. HENT:      Head: Normocephalic. Right Ear: Hearing, tympanic membrane, ear canal and external ear normal.      Left Ear: Tympanic membrane, ear canal and external ear normal. Decreased hearing noted. Nose: Nose normal.      Mouth/Throat:      Pharynx: Oropharynx is clear. Eyes:      Extraocular Movements: Extraocular movements intact. Conjunctiva/sclera: Conjunctivae normal.      Pupils: Pupils are equal, round, and reactive to light. Cardiovascular:      Rate and Rhythm: Normal rate and regular rhythm. Pulses: Normal pulses. Pulmonary:      Effort: Pulmonary effort is normal. No respiratory distress. Breath sounds: Normal breath sounds. Abdominal:      General: Bowel sounds are normal.      Palpations: Abdomen is soft. Tenderness: There is no abdominal tenderness. Musculoskeletal:         General: Normal range of motion. Cervical back: Normal range of motion and neck supple. Skin:     General: Skin is warm. Capillary Refill: Capillary refill takes less than 2 seconds. Findings: No rash. Neurological:      Mental Status: He is alert and oriented to person, place, and time. Sensory: Sensation is intact. Motor: Motor function is intact. Coordination: Coordination is intact. Finger-Nose-Finger Test normal.      Comments: Reports decreased hearing of the left ear. MDM  Number of Diagnoses or Management Options  SHILO (acute kidney injury) ([address]  AVM (arteriovenous malformation) brain  Hypertensive emergency  Subarachnoid bleed ([facility] [address]  Diagnosis management comments: Based on patient's symptoms level 1 stroke alert was called.   Concern for intracranial hemorrhage as result of patient's symptoms and elevated blood pressure. No anticoagulation. Patient went to CAT scan and received call from radiologist reporting subarachnoid hemorrhage anterior to the sarahi. Spoke with telemetry neuro. Patient going to get CTAs. Starting patient on Cardene for elevated blood pressure. CTA head neck showing AVM near the cerebellum with multiple aneurysm with one measuring up to 4 mm in size. Spoke with neuro intervention regarding patient's exam and imaging findings. Recommending systolic blood pressure less than 140. Plan to take patient for procedure in the morning. Spoke with Dr. Lethaniel Severe, ICU intensivist.  Accepting patient to ICU. Will transfer. Total critical care time spent exclusive of procedures:  60min         Amount and/or Complexity of Data Reviewed  Clinical lab tests: reviewed  Tests in the radiology section of CPT®: reviewed  Tests in the medicine section of CPT®: reviewed      ED Course as of Jul 20 0010 Mon Jul 19, 2021 2057 VAN score: Negative  Last Known Well: 1945  Blood Glucose (EMS acceptable): 187   Blood Pressure (EMS acceptable): 265/120  Anticoagulants: none   GLUCOSE,FAST - POC(!): 187 [ZD]   2119 IMPRESSION  1. Acute subarachnoid hemorrhage anterior to the sarahi   CT CODE NEURO HEAD WO CONTRAST [ZD]   2127 EKG normal sinus rhythm rate of 69 bpm with normal intervals. LVH criteria. No ST elevation or depressions. EKG interpreted by Rashard Perla MD      [ZD]   2141 AVM in the superior cerebellum with multiple aneurysms. One measures  4 mm. Another measures 4 mm within the superior cerebellum. The lytic aneurysm  within the superior cerebellum is irregular and may be the source of the  hemorrhage. CTA CODE NEURO HEAD AND NECK W CONT [ZD]   2311 Blood pressure remaining elevated still. Patient is on Cardene 15 mg/h with blood pressure still uncontrolled. Given 20 mg of hydralazine with no improvement. No improvement after receiving morphine.   Will order labetalol [ZD]      ED Course User Index  [ZD] Julia Golden MD       Procedures

## 2021-07-20 NOTE — PROGRESS NOTES
Occupational Therapy  07/20/21    Order acknowledged, chart reviewed. Pt is on bedrest with legs straight for 2 hours s/p angiogram. Will follow up after 1100 as able and appropriate.     Thank you,   Ming Galvan, OTD, OTR/L

## 2021-07-20 NOTE — ANESTHESIA POSTPROCEDURE EVALUATION
* No procedures listed *. MAC    Anesthesia Post Evaluation        Patient participation: complete - patient participated  Level of consciousness: awake  Pain management: adequate  Airway patency: patent  Anesthetic complications: no  Cardiovascular status: hemodynamically stable  Respiratory status: acceptable  Hydration status: acceptable  Comments: The patient is ready for PACU discharge. Jazz Piña DO                   Post anesthesia nausea and vomiting:  controlled      INITIAL Post-op Vital signs: No vitals data found for the desired time range.

## 2021-07-20 NOTE — ED TRIAGE NOTES
Signs and symptoms: neck pain and dizziness  VAN score: Negative  Last Known Well: 1945  Blood Glucose (EMS acceptable): 187   Blood Pressure (EMS acceptable): 265/120  Anticoagulants: none    Code Stroke Level 1 initiated at this time.

## 2021-07-20 NOTE — ED NOTES
Patient reports improvement in hearing and no more numbness in neck or left arm. C/o continued headache, frontal area bilaterally, above eyes. Dr. Julita Rivas updated on change in patient status.

## 2021-07-20 NOTE — PROGRESS NOTES
TRANSFER - IN REPORT:    Verbal report received from April, RN (name) on Mark Benavidez  being received from [facility] ED (unit) for routine progression of care      Report consisted of patients Situation, Background, Assessment and   Recommendations(SBAR). Information from the following report(s) SBAR, Kardex, ED Summary, Intake/Output, MAR, Accordion, Recent Results, Cardiac Rhythm NSR, Alarm Parameters  and Dual Neuro Assessment was reviewed with the receiving nurse. Opportunity for questions and clarification was provided. Assessment completed upon patients arrival to unit and care assumed. 0100: Patient arrived via EMS. Neuro NP at bedside. Dual neuro. Patient intact. Slight deficit in left hearing. SBP goal <160. Primary Nurse Lai Bell and Jennifer Anthony, RN performed a dual skin assessment on this patient No impairment noted    0700: Neuro IR physician and angio nurses at bedside. Patient transported to angio suite.

## 2021-07-21 ENCOUNTER — APPOINTMENT (OUTPATIENT)
Dept: VASCULAR SURGERY | Age: 61
DRG: 064 | End: 2021-07-21
Attending: NURSE PRACTITIONER
Payer: COMMERCIAL

## 2021-07-21 LAB
ANION GAP SERPL CALC-SCNC: 10 MMOL/L (ref 5–15)
BASILAR ARTERY EDV: 12.3 CM/S
BASILAR ARTERY MEAN VEL: 29 CM/S
BASILAR ARTERY PSV: 63 CM/S
BUN SERPL-MCNC: 14 MG/DL (ref 6–20)
BUN/CREAT SERPL: 14 (ref 12–20)
CALCIUM SERPL-MCNC: 7.9 MG/DL (ref 8.5–10.1)
CHLORIDE SERPL-SCNC: 113 MMOL/L (ref 97–108)
CO2 SERPL-SCNC: 19 MMOL/L (ref 21–32)
CREAT SERPL-MCNC: 0.98 MG/DL (ref 0.7–1.3)
ERYTHROCYTE [DISTWIDTH] IN BLOOD BY AUTOMATED COUNT: 13.3 % (ref 11.5–14.5)
GLUCOSE SERPL-MCNC: 158 MG/DL (ref 65–100)
HCT VFR BLD AUTO: 40.9 % (ref 36.6–50.3)
HGB BLD-MCNC: 13.9 G/DL (ref 12.1–17)
LEFT EX ICA EDV: 12 CM/S
LEFT EX ICA MEAN VEL: 25 CM/S
LEFT EX ICA PSV: 51 CM/S
LEFT ICA DIST DIAS: 12.1 CM/S
LEFT ICA DIST SYS: 50.8 CM/S
LEFT PCA 1 EDV: 19 CM/S
LEFT PCA 1 MEAN VEL: 28 CM/S
LEFT PCA 1 PSV: 47 CM/S
LEFT VERTEBRAL EDV TCD: 11.9 CM/S
LEFT VERTEBRAL MEAN VEL: 22 CM/S
LEFT VERTEBRAL PSV TCD: 41.5 CM/S
MCH RBC QN AUTO: 28.4 PG (ref 26–34)
MCHC RBC AUTO-ENTMCNC: 34 G/DL (ref 30–36.5)
MCV RBC AUTO: 83.6 FL (ref 80–99)
NRBC # BLD: 0 K/UL (ref 0–0.01)
NRBC BLD-RTO: 0 PER 100 WBC
PLATELET # BLD AUTO: 226 K/UL (ref 150–400)
PMV BLD AUTO: 10.4 FL (ref 8.9–12.9)
POTASSIUM SERPL-SCNC: 4 MMOL/L (ref 3.5–5.1)
RBC # BLD AUTO: 4.89 M/UL (ref 4.1–5.7)
RIGHT EX ICA EDV: 14 CM/S
RIGHT EX ICA MEAN VEL: 29 CM/S
RIGHT EX ICA PSV: 60 CM/S
RIGHT ICA DIST DIAS: 13.9 CM/S
RIGHT ICA DIST SYS: 60 CM/S
RIGHT PCA 1 EDV: 23.3 CM/S
RIGHT PCA 1 MEAN VEL: 30 CM/S
RIGHT PCA 1 PSV: 42.9 CM/S
RIGHT VERTEBRAL EDV TCD: 14 CM/S
RIGHT VERTEBRAL MEAN VEL: 24 CM/S
RIGHT VERTEBRAL PSV TCD: 44.8 CM/S
SODIUM SERPL-SCNC: 142 MMOL/L (ref 136–145)
WBC # BLD AUTO: 6.7 K/UL (ref 4.1–11.1)

## 2021-07-21 PROCEDURE — 74011250637 HC RX REV CODE- 250/637: Performed by: SURGERY

## 2021-07-21 PROCEDURE — 74011250636 HC RX REV CODE- 250/636: Performed by: NURSE PRACTITIONER

## 2021-07-21 PROCEDURE — 36415 COLL VENOUS BLD VENIPUNCTURE: CPT

## 2021-07-21 PROCEDURE — 74011250637 HC RX REV CODE- 250/637: Performed by: NURSE PRACTITIONER

## 2021-07-21 PROCEDURE — 74011000250 HC RX REV CODE- 250: Performed by: NURSE PRACTITIONER

## 2021-07-21 PROCEDURE — 80048 BASIC METABOLIC PNL TOTAL CA: CPT

## 2021-07-21 PROCEDURE — 65610000006 HC RM INTENSIVE CARE

## 2021-07-21 PROCEDURE — 93886 INTRACRANIAL COMPLETE STUDY: CPT

## 2021-07-21 PROCEDURE — 85027 COMPLETE CBC AUTOMATED: CPT

## 2021-07-21 RX ORDER — CLONIDINE 0.2 MG/24H
1 PATCH, EXTENDED RELEASE TRANSDERMAL
Status: DISCONTINUED | OUTPATIENT
Start: 2021-07-21 | End: 2021-07-26

## 2021-07-21 RX ORDER — METOPROLOL TARTRATE 50 MG/1
50 TABLET ORAL EVERY 12 HOURS
Status: DISCONTINUED | OUTPATIENT
Start: 2021-07-21 | End: 2021-07-24

## 2021-07-21 RX ORDER — LABETALOL HYDROCHLORIDE 5 MG/ML
20 INJECTION, SOLUTION INTRAVENOUS
Status: DISCONTINUED | OUTPATIENT
Start: 2021-07-21 | End: 2021-07-24

## 2021-07-21 RX ORDER — AMLODIPINE BESYLATE 5 MG/1
10 TABLET ORAL DAILY
Status: DISCONTINUED | OUTPATIENT
Start: 2021-07-22 | End: 2021-07-21

## 2021-07-21 RX ORDER — DEXAMETHASONE SODIUM PHOSPHATE 4 MG/ML
4 INJECTION, SOLUTION INTRA-ARTICULAR; INTRALESIONAL; INTRAMUSCULAR; INTRAVENOUS; SOFT TISSUE EVERY 8 HOURS
Status: DISCONTINUED | OUTPATIENT
Start: 2021-07-21 | End: 2021-07-26

## 2021-07-21 RX ADMIN — DEXAMETHASONE SODIUM PHOSPHATE 4 MG: 4 INJECTION, SOLUTION INTRAMUSCULAR; INTRAVENOUS at 16:00

## 2021-07-21 RX ADMIN — LEVETIRACETAM 500 MG: 500 TABLET ORAL at 09:19

## 2021-07-21 RX ADMIN — NIMODIPINE 60 MG: 30 CAPSULE, LIQUID FILLED ORAL at 07:14

## 2021-07-21 RX ADMIN — LEVETIRACETAM 500 MG: 500 TABLET ORAL at 18:16

## 2021-07-21 RX ADMIN — DEXAMETHASONE SODIUM PHOSPHATE 4 MG: 4 INJECTION, SOLUTION INTRAMUSCULAR; INTRAVENOUS at 05:21

## 2021-07-21 RX ADMIN — SODIUM CHLORIDE 15 MG/HR: 900 INJECTION, SOLUTION INTRAVENOUS at 04:52

## 2021-07-21 RX ADMIN — SODIUM CHLORIDE 12.5 MG/HR: 900 INJECTION, SOLUTION INTRAVENOUS at 20:37

## 2021-07-21 RX ADMIN — SODIUM CHLORIDE 15 MG/HR: 900 INJECTION, SOLUTION INTRAVENOUS at 06:31

## 2021-07-21 RX ADMIN — SODIUM CHLORIDE 15 MG/HR: 900 INJECTION, SOLUTION INTRAVENOUS at 16:09

## 2021-07-21 RX ADMIN — HYDROCHLOROTHIAZIDE 25 MG: 25 TABLET ORAL at 09:19

## 2021-07-21 RX ADMIN — SODIUM CHLORIDE, POTASSIUM CHLORIDE, SODIUM LACTATE AND CALCIUM CHLORIDE 100 ML/HR: 600; 310; 30; 20 INJECTION, SOLUTION INTRAVENOUS at 16:14

## 2021-07-21 RX ADMIN — LABETALOL HYDROCHLORIDE 20 MG: 5 INJECTION INTRAVENOUS at 20:37

## 2021-07-21 RX ADMIN — SODIUM CHLORIDE, POTASSIUM CHLORIDE, SODIUM LACTATE AND CALCIUM CHLORIDE 25 ML/HR: 600; 310; 30; 20 INJECTION, SOLUTION INTRAVENOUS at 22:07

## 2021-07-21 RX ADMIN — LISINOPRIL 40 MG: 20 TABLET ORAL at 09:19

## 2021-07-21 RX ADMIN — SODIUM CHLORIDE 15 MG/HR: 900 INJECTION, SOLUTION INTRAVENOUS at 03:14

## 2021-07-21 RX ADMIN — DEXAMETHASONE SODIUM PHOSPHATE 4 MG: 4 INJECTION, SOLUTION INTRAMUSCULAR; INTRAVENOUS at 10:14

## 2021-07-21 RX ADMIN — NIMODIPINE 60 MG: 30 CAPSULE, LIQUID FILLED ORAL at 18:16

## 2021-07-21 RX ADMIN — SODIUM CHLORIDE 15 MG/HR: 900 INJECTION, SOLUTION INTRAVENOUS at 10:11

## 2021-07-21 RX ADMIN — NIMODIPINE 60 MG: 30 CAPSULE, LIQUID FILLED ORAL at 03:04

## 2021-07-21 RX ADMIN — SODIUM CHLORIDE 15 MG/HR: 900 INJECTION, SOLUTION INTRAVENOUS at 08:17

## 2021-07-21 RX ADMIN — SODIUM CHLORIDE 15 MG/HR: 900 INJECTION, SOLUTION INTRAVENOUS at 11:57

## 2021-07-21 RX ADMIN — NIMODIPINE 60 MG: 30 CAPSULE, LIQUID FILLED ORAL at 10:14

## 2021-07-21 RX ADMIN — SODIUM CHLORIDE, POTASSIUM CHLORIDE, SODIUM LACTATE AND CALCIUM CHLORIDE 100 ML/HR: 600; 310; 30; 20 INJECTION, SOLUTION INTRAVENOUS at 05:20

## 2021-07-21 RX ADMIN — NIMODIPINE 60 MG: 30 CAPSULE, LIQUID FILLED ORAL at 22:05

## 2021-07-21 RX ADMIN — METOPROLOL TARTRATE 50 MG: 50 TABLET, FILM COATED ORAL at 22:00

## 2021-07-21 RX ADMIN — SODIUM CHLORIDE 10 MG/HR: 900 INJECTION, SOLUTION INTRAVENOUS at 22:45

## 2021-07-21 RX ADMIN — SODIUM CHLORIDE 15 MG/HR: 900 INJECTION, SOLUTION INTRAVENOUS at 01:30

## 2021-07-21 RX ADMIN — SODIUM CHLORIDE 15 MG/HR: 900 INJECTION, SOLUTION INTRAVENOUS at 18:49

## 2021-07-21 RX ADMIN — SODIUM CHLORIDE 15 MG/HR: 900 INJECTION, SOLUTION INTRAVENOUS at 13:54

## 2021-07-21 RX ADMIN — DEXAMETHASONE SODIUM PHOSPHATE 4 MG: 4 INJECTION, SOLUTION INTRAMUSCULAR; INTRAVENOUS at 22:06

## 2021-07-21 RX ADMIN — NIMODIPINE 60 MG: 30 CAPSULE, LIQUID FILLED ORAL at 16:00

## 2021-07-21 NOTE — PROGRESS NOTES
Bedside, Verbal and Written shift change report given to Kaity Eddy RN (oncoming nurse) by Vikas Miramontes RN (offgoing nurse). Report included the following information SBAR, Kardex, Intake/Output, MAR, Accordion, Recent Results, Med Rec Status, Cardiac Rhythm NSR, Alarm Parameters  and Dual Neuro Assessment. 2300: Patient presenting with worsening left sided facial droop. Neuro NP notified.

## 2021-07-21 NOTE — PROGRESS NOTES
Physical Therapy: Hold    Chart reviewed and attempted to see patient for PT evaluation, however ECHO at bedside and noted per chart patient to be taken for diagnostic angiogram today. Will follow up as able and appropriate.     Cornelia Turner, SPT

## 2021-07-21 NOTE — PROGRESS NOTES
Occupational Therapy  07/21/21    Order acknowledged, chart reviewed. Attempted to see for OT evaluaiton however ECHO at bedside. Will follow up as able & appropriate.      Thank you,   Marina Salazar, OTD, OTR/L

## 2021-07-21 NOTE — PROGRESS NOTES
Bedside and Verbal shift change report given to Caitlin Pryor RN (oncoming nurse) by Martin Mendez RN (offgoing nurse). Report included the following information SBAR, Kardex, Intake/Output, MAR, Recent Results, Cardiac Rhythm NSR, Alarm Parameters  and Dual Neuro Assessment. 0800 VSS, Lt facial droop noted.  equal 5/5 strength. Equal dorsi and plantar flexion. No complaints of pain or numbness. RYDER, 3.

## 2021-07-21 NOTE — PROGRESS NOTES
Speech pathology note  Reviewed chart and note patient NPO for diagnostic angiogram today. SLP will follow when patient cleared for PO intake. Thank you.     Olayinka Ramirez., GIO-SLP

## 2021-07-21 NOTE — PROGRESS NOTES
Neurocritical Care Brief Progress Note:    Pt planned to go to MRI this evening. Pt stating he is extremely claustrophobic and would require medicine to undergo the study. Was given 1 mg and then taken for scan. While in MRI, additional 1 mg was given. Once he returned to the floor he was noted with worsesning of his left facial droop, was previously mild and now with moderate droop. No drift in left arm nor left leg. Pt very drowsy and slow to respond, but is following commands appropriately. This is likely med effect from the Ativan. Called radiology to get stat read, per radiologist tiny 1 mm foci of diffusion restriction in the left cerebellum and left cerebral hemisphere as described likely embolic in origin, and possible mild meningeal enhancement which may be secondary to meningeal irritation from known subarachnoid hemorrhage. D/w Dr. Dannial Merlin, agrees that this is likely med effect given worsening his previous droop since MRI just obtained and was negative for worsening hemorrhage. Addendum: At 0000 neuro check pt still quite drowsy but facial droop appears improved but not yet back to baseline.       Andi Sarmiento NP  Neurocritical Care Nurse Practitioner  [phone]

## 2021-07-21 NOTE — PROGRESS NOTES
[facility]   1960   [id number]     Neurointerventional Surgery Progress Note  [facility]  [facility]uro[facility] Care NP      Admit Date: 2021   LOS: 1 day        Daily Progress Note: 2021  The patient denied any new neurological symptoms overnight. Continues to have diplopia, ataxia, and facial droop. Denied any headache, nausea, vomiting, or dysphagia. Planned for diagnostic angiogram with possible embolization of flow related aneurysm has being cancelled due to related high risk involved which Dr. Kashif Scott discussed with patient and spouse. Planned for radiation therapy. A comprehensive review of systems was negative except for: positive for diplopia and facial asymmetry. NIHSS Stroke Scale      1a  Level of consciousness: 0=alert; keenly responsive   1b. LOC questions:  0=Answers both questions correctly   1c. LOC commands: 0=Performs both tasks correctly   2. Best Gaze: 0=normal   3. Visual: 0=No visual loss   4. Facial Palsy: 2=Partial paralysis (total or near total paralysis of the lower face)   5a. Motor left arm: 0=No drift, arm holds 90 (or 45) degrees for full 10 seconds   5b. Motor right arm: 0=No drift, arm holds 90 (or 45) degrees for full 10 seconds   6a. Motor left le=No drift; leg holds 30-degree position for full 5 seconds. 6b  Motor right le=No drift; leg holds 30-degree position for full 5 seconds. 7. Limb Ataxia: 1=Present in one limb   8. Sensory: 0=Normal; no sensory loss   9. Best Language:  0=No aphasia, normal   10. Dysarthria: 1=Mild to moderate, patient slurs at least some words and at worst, can be understood with some difficulty   11.  Extinction and Inattention: 0=No abnormality    Total:    4          Objective:     Patient Vitals for the past 8 hrs:   BP Pulse Resp SpO2   21 0900 (!) 150/77 71 15 98 %   21 0830 (!) 155/70 65 18 98 %   21 0800 (!) 128/51 72 20 --   21 0700 (!) 160/72 68 20 98 %   21 0600 (!) 167/69 81 19 94 %   07/21/21 0500 (!) 131/45 63 20 96 %       Physical Exam:    GENERAL: Alert, cooperative, no distress, appears stated age,   LUNG: Clear to auscultation bilaterally  HEART: Regular rate and rhythm, S1, S2 normal, no murmur, click, rub or gallop  Menta Status: AOx3. Follow commands appropriately   Cranial Nerves: facial asymetry and diplopia otherwise; II-XII intact sensation grossly intact. , negative findings: speech normal in context and clarity, memory intact grossly  Reflexes: Normal/Full and symmetric. Motor strength: 5/5 x4 ext; Full proximally and distally, no involuntary movements - tremors  Sensation: Intact to vibration, pain, and light touch  Cerebellar: ataxia L limb Finger-to-nose and heel-to-shin intact  Gait: Deferred  Plantar responses: Down going toes bilaterally    Lab Results   Component Value Date/Time    WBC 6.7 07/21/2021 04:13 AM    HGB 13.9 07/21/2021 04:13 AM    HCT 40.9 07/21/2021 04:13 AM    PLATELET 947 73/16/8099 04:13 AM    MCV 83.6 07/21/2021 04:13 AM      Lab Results   Component Value Date/Time    Sodium 142 07/21/2021 04:13 AM    Potassium 4.0 07/21/2021 04:13 AM    Chloride 113 (H) 07/21/2021 04:13 AM    CO2 19 (L) 07/21/2021 04:13 AM    Anion gap 10 07/21/2021 04:13 AM    Glucose 158 (H) 07/21/2021 04:13 AM    BUN 14 07/21/2021 04:13 AM    Creatinine 0.98 07/21/2021 04:13 AM    BUN/Creatinine ratio 14 07/21/2021 04:13 AM    GFR est AA >60 07/21/2021 04:13 AM    GFR est non-AA >60 07/21/2021 04:13 AM    Calcium 7.9 (L) 07/21/2021 04:13 AM    Bilirubin, total 0.5 07/19/2021 09:38 PM    Alk.  phosphatase 51 07/19/2021 09:38 PM    Protein, total 6.2 (L) 07/19/2021 09:38 PM    Albumin 3.4 (L) 07/19/2021 09:38 PM    Globulin 2.8 07/19/2021 09:38 PM    A-G Ratio 1.2 07/19/2021 09:38 PM    ALT (SGPT) 48 07/19/2021 09:38 PM    AST (SGOT) 26 07/19/2021 09:38 PM         CT Results (most recent):  Results from [place] encounter on 07/20/21    CT HEAD WO CONT    Narrative  EXAM: CT HEAD WO CONT    INDICATION: new onset of diploplia    COMPARISON: Head CT performed on 7/19/2021. CONTRAST: None. TECHNIQUE: Unenhanced CT of the head was performed using 5 mm images. Brain and  bone windows were generated. Coronal and sagittal reformats. CT dose reduction  was achieved through use of a standardized protocol tailored for this  examination and automatic exposure control for dose modulation. FINDINGS:  Ventricles are unchanged in size. . Gray-white differentiation appears to be less  distinct on the current study. . Again noted is subarachnoid hemorrhage in the  prepontine cistern and possible extension into the sylvian fissures and ambient  cistern. This is less pronounced on the current study. . The basilar cisterns are  open. No CT evidence of acute infarct. The bone windows demonstrate no abnormalities. The visualized portions of the  paranasal sinuses and mastoid air cells are clear. Impression  1. Stable ventricular size. 2.  Questionable diffuse cerebral edema. 3.  Subarachnoid hemorrhage in the prepontine cistern and ambient cisterns as  well as sylvian fissures are decreased from the prior study. MRI Results (most recent):  Results from [place] encounter on 07/20/21    MRI BRAIN W WO CONT    Narrative  EXAM:  MRI BRAIN W WO CONT    INDICATION:    further imaging eval of cerebellar AVM    COMPARISON:  CTA performed the same day. CONTRAST: 20 ml of ProHance    TECHNIQUE:  Multiplanar multisequence acquisition without and with contrast of the brain. FINDINGS:  The ventricles are normal in size and position. Tiny 1 mm foci of tracer noted  in the left cerebellum, left parietal-occipital region, left frontal lobe, left  parietal lobe again noted is a vascular malformation involving the left superior  cerebellar peduncle which is better evaluated on the recent CTA. Possible mild  meningeal enhancement is present.  No definite abnormal intraparenchymal  enhancement. Scattered sulcal FLAIR hyperintensity likely reflecting known  subarachnoid hemorrhage. No definite cerebral edema is identified. Dilated  superior orbital veins are noted which is a new finding. Expected arterial  flow-voids are present. The paranasal sinuses, mastoid air cells, and middle ears are clear. The orbital  contents are within normal limits. No significant osseous or scalp lesions are  identified. Impression  1. Tiny 1 mm foci of diffusion restriction in the left cerebellum and left  cerebral hemisphere as described likely embolic in origin. 2.  Possible mild meningeal enhancement which may be secondary to meningeal  irritation from known subarachnoid hemorrhage. 3.  Dilated superior ophthalmic veins are noted which is new when compared to  prior imaging. This is of uncertain clinical significance. There appears to be  normal enhancement of the cavernous sinus bilaterally. Assessment/Plan:   64 y.o. 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 Clayton Knox 1, Mistry Grade 2  · S/P: Procedure(s): Diagnostic cerebral angiogram Diagnostic revealed            AVM in the cerebellum with multiple feeders coming off the left PCA, left            anterior choroidal and left SCA with a large single draining vein that drains          into the vein of [place]. 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 · Day 2/21 of Nimotop to prevent delayed cerebral ischemia  · Keppra 500 mg BID for seizure ppx  · LR at 100 ml/hr to maintain euvolemia  · Strict I&O  · ECHO shows EF of 70%; Dilated left atrium.  No shunt  · q 1 neuro checks  · SBP goal <160,  Cardene/Hydralazine/Labetalol PRN  · PT/OT/SLP evals  · Assess NICOM PRN for fluid responsiveness  · Daily TCD's for vasospasm watch     2.) HTN emergency              - With /120 on admission    Darylene Beverage, NP  12:06 PM     Greater than 35 minutes were spent in patient management, greater than half of which was spent in counseling and coordination of care.

## 2021-07-22 ENCOUNTER — APPOINTMENT (OUTPATIENT)
Dept: VASCULAR SURGERY | Age: 61
DRG: 064 | End: 2021-07-22
Attending: NURSE PRACTITIONER
Payer: COMMERCIAL

## 2021-07-22 LAB
ABDOMINAL PROX AORTA VEL: 137.4 CM/S
ANION GAP SERPL CALC-SCNC: 8 MMOL/L (ref 5–15)
BUN SERPL-MCNC: 17 MG/DL (ref 6–20)
BUN/CREAT SERPL: 18 (ref 12–20)
CALCIUM SERPL-MCNC: 8.2 MG/DL (ref 8.5–10.1)
CELIAC EDV: 28.6 CM/S
CELIAC PSV: 244.5 CM/S
CHLORIDE SERPL-SCNC: 112 MMOL/L (ref 97–108)
CO2 SERPL-SCNC: 20 MMOL/L (ref 21–32)
CREAT SERPL-MCNC: 0.96 MG/DL (ref 0.7–1.3)
DIST AORTIC AP: 1.72 CM
ERYTHROCYTE [DISTWIDTH] IN BLOOD BY AUTOMATED COUNT: 13.7 % (ref 11.5–14.5)
GLUCOSE BLD STRIP.AUTO-MCNC: 128 MG/DL (ref 65–117)
GLUCOSE BLD STRIP.AUTO-MCNC: 135 MG/DL (ref 65–117)
GLUCOSE BLD STRIP.AUTO-MCNC: 153 MG/DL (ref 65–117)
GLUCOSE BLD STRIP.AUTO-MCNC: 159 MG/DL (ref 65–117)
GLUCOSE SERPL-MCNC: 154 MG/DL (ref 65–100)
HCT VFR BLD AUTO: 41.5 % (ref 36.6–50.3)
HGB BLD-MCNC: 14.4 G/DL (ref 12.1–17)
LEFT KIDNEY LENGTH: 11.01 CM
LEFT KIDNEY WIDTH: 5.46 CM
LEFT RENAL DIST DIAS: 22.6 CM/S
LEFT RENAL DIST RAR: 0.79
LEFT RENAL DIST RI: 0.79
LEFT RENAL DIST SYS: 108.1 CM/S
LEFT RENAL LOWER PARENCHYMA MAX: 35.5 CM/S
LEFT RENAL LOWER PARENCHYMA MIN: 13.5 CM/S
LEFT RENAL LOWER PARENCHYMA RI: 0.62
LEFT RENAL MID DIAS: 22.6 CM/S
LEFT RENAL MID RAR: 1.01
LEFT RENAL MID RI: 0.84
LEFT RENAL MID SYS: 138.8 CM/S
LEFT RENAL MIDDLE PARENCHYMA MAX: 40.9 CM/S
LEFT RENAL MIDDLE PARENCHYMA MIN: 13.5 CM/S
LEFT RENAL MIDDLE PARENCHYMA RI: 0.67
LEFT RENAL PROX DIAS: 22.6 CM/S
LEFT RENAL PROX RAR: 0.87
LEFT RENAL PROX RI: 0.81
LEFT RENAL PROX SYS: 119.1 CM/S
LEFT RENAL UPPER PARENCHYMA MAX: 31.3 CM/S
LEFT RENAL UPPER PARENCHYMA MIN: 10.4 CM/S
LEFT RENAL UPPER PARENCHYMA RI: 0.67
MCH RBC QN AUTO: 29 PG (ref 26–34)
MCHC RBC AUTO-ENTMCNC: 34.7 G/DL (ref 30–36.5)
MCV RBC AUTO: 83.7 FL (ref 80–99)
NRBC # BLD: 0 K/UL (ref 0–0.01)
NRBC BLD-RTO: 0 PER 100 WBC
PLATELET # BLD AUTO: 240 K/UL (ref 150–400)
PMV BLD AUTO: 10.3 FL (ref 8.9–12.9)
POTASSIUM SERPL-SCNC: 4.1 MMOL/L (ref 3.5–5.1)
PROX AORTIC AP: 2.13 CM
PROX SMA EDV: 16.9 CM/S
PROX SMA PSV: 213.1 CM/S
RBC # BLD AUTO: 4.96 M/UL (ref 4.1–5.7)
RIGHT KIDNEY LENGTH: 10.51 CM
RIGHT KIDNEY WIDTH: 5 CM
RIGHT RENAL DIST DIAS: 36.5 CM/S
RIGHT RENAL DIST RAR: 1.44
RIGHT RENAL DIST RI: 0.82
RIGHT RENAL DIST SYS: 198.4 CM/S
RIGHT RENAL LOWER PARENCHYMA MAX: 18.2 CM/S
RIGHT RENAL LOWER PARENCHYMA MIN: 6.5 CM/S
RIGHT RENAL LOWER PARENCHYMA RI: 0.64
RIGHT RENAL MID DIAS: 40.4 CM/S
RIGHT RENAL MID RAR: 1.5
RIGHT RENAL MID RI: 0.8
RIGHT RENAL MID SYS: 206.2 CM/S
RIGHT RENAL MIDDLE PARENCHYMA MAX: 31.1 CM/S
RIGHT RENAL MIDDLE PARENCHYMA MIN: 10.8 CM/S
RIGHT RENAL MIDDLE PARENCHYMA RI: 0.65
RIGHT RENAL PROX DIAS: 22.3 CM/S
RIGHT RENAL PROX RAR: 0.88
RIGHT RENAL PROX RI: 0.82
RIGHT RENAL PROX SYS: 120.8 CM/S
RIGHT RENAL UPPER PARENCHYMA MAX: 24.3 CM/S
RIGHT RENAL UPPER PARENCHYMA MIN: 10.8 CM/S
RIGHT RENAL UPPER PARENCHYMA RI: 0.56
SERVICE CMNT-IMP: ABNORMAL
SODIUM SERPL-SCNC: 140 MMOL/L (ref 136–145)
WBC # BLD AUTO: 13.6 K/UL (ref 4.1–11.1)

## 2021-07-22 PROCEDURE — 74011000250 HC RX REV CODE- 250: Performed by: NURSE PRACTITIONER

## 2021-07-22 PROCEDURE — 74011250637 HC RX REV CODE- 250/637: Performed by: NURSE PRACTITIONER

## 2021-07-22 PROCEDURE — APPNB30 APP NON BILLABLE TIME 0-30 MINS: Performed by: NURSE PRACTITIONER

## 2021-07-22 PROCEDURE — 82962 GLUCOSE BLOOD TEST: CPT

## 2021-07-22 PROCEDURE — 65610000006 HC RM INTENSIVE CARE

## 2021-07-22 PROCEDURE — 97530 THERAPEUTIC ACTIVITIES: CPT

## 2021-07-22 PROCEDURE — 99232 SBSQ HOSP IP/OBS MODERATE 35: CPT | Performed by: STUDENT IN AN ORGANIZED HEALTH CARE EDUCATION/TRAINING PROGRAM

## 2021-07-22 PROCEDURE — 93975 VASCULAR STUDY: CPT

## 2021-07-22 PROCEDURE — 74011000258 HC RX REV CODE- 258: Performed by: NURSE PRACTITIONER

## 2021-07-22 PROCEDURE — 97535 SELF CARE MNGMENT TRAINING: CPT

## 2021-07-22 PROCEDURE — 85027 COMPLETE CBC AUTOMATED: CPT

## 2021-07-22 PROCEDURE — 74011250636 HC RX REV CODE- 250/636: Performed by: NURSE PRACTITIONER

## 2021-07-22 PROCEDURE — 97165 OT EVAL LOW COMPLEX 30 MIN: CPT

## 2021-07-22 PROCEDURE — 97161 PT EVAL LOW COMPLEX 20 MIN: CPT

## 2021-07-22 PROCEDURE — 97116 GAIT TRAINING THERAPY: CPT

## 2021-07-22 PROCEDURE — 80048 BASIC METABOLIC PNL TOTAL CA: CPT

## 2021-07-22 PROCEDURE — 36415 COLL VENOUS BLD VENIPUNCTURE: CPT

## 2021-07-22 RX ORDER — INSULIN LISPRO 100 [IU]/ML
INJECTION, SOLUTION INTRAVENOUS; SUBCUTANEOUS
Status: DISCONTINUED | OUTPATIENT
Start: 2021-07-22 | End: 2021-08-10 | Stop reason: HOSPADM

## 2021-07-22 RX ORDER — MAGNESIUM SULFATE 100 %
4 CRYSTALS MISCELLANEOUS AS NEEDED
Status: DISCONTINUED | OUTPATIENT
Start: 2021-07-22 | End: 2021-08-10 | Stop reason: HOSPADM

## 2021-07-22 RX ORDER — HYDRALAZINE HYDROCHLORIDE 10 MG/1
10 TABLET, FILM COATED ORAL 3 TIMES DAILY
Status: DISCONTINUED | OUTPATIENT
Start: 2021-07-22 | End: 2021-07-23

## 2021-07-22 RX ORDER — DEXTROSE 50 % IN WATER (D50W) INTRAVENOUS SYRINGE
12.5-25 AS NEEDED
Status: DISCONTINUED | OUTPATIENT
Start: 2021-07-22 | End: 2021-08-10 | Stop reason: HOSPADM

## 2021-07-22 RX ADMIN — SODIUM CHLORIDE 10 MG/HR: 900 INJECTION, SOLUTION INTRAVENOUS at 07:02

## 2021-07-22 RX ADMIN — NIMODIPINE 60 MG: 30 CAPSULE, LIQUID FILLED ORAL at 15:28

## 2021-07-22 RX ADMIN — NIMODIPINE 60 MG: 30 CAPSULE, LIQUID FILLED ORAL at 04:05

## 2021-07-22 RX ADMIN — SODIUM CHLORIDE 12.5 MG/HR: 900 INJECTION, SOLUTION INTRAVENOUS at 09:35

## 2021-07-22 RX ADMIN — SODIUM CHLORIDE 10 MG/HR: 900 INJECTION, SOLUTION INTRAVENOUS at 18:02

## 2021-07-22 RX ADMIN — NIMODIPINE 60 MG: 30 CAPSULE, LIQUID FILLED ORAL at 19:00

## 2021-07-22 RX ADMIN — LABETALOL HYDROCHLORIDE 20 MG: 5 INJECTION INTRAVENOUS at 00:05

## 2021-07-22 RX ADMIN — LEVETIRACETAM 500 MG: 500 TABLET ORAL at 09:17

## 2021-07-22 RX ADMIN — DEXAMETHASONE SODIUM PHOSPHATE 4 MG: 4 INJECTION, SOLUTION INTRAMUSCULAR; INTRAVENOUS at 22:07

## 2021-07-22 RX ADMIN — SODIUM CHLORIDE 10 MG/HR: 900 INJECTION, SOLUTION INTRAVENOUS at 15:00

## 2021-07-22 RX ADMIN — SODIUM CHLORIDE 10 MG/HR: 900 INJECTION, SOLUTION INTRAVENOUS at 20:27

## 2021-07-22 RX ADMIN — SODIUM CHLORIDE 7.5 MG/HR: 900 INJECTION, SOLUTION INTRAVENOUS at 01:30

## 2021-07-22 RX ADMIN — SODIUM CHLORIDE 10 MG/HR: 900 INJECTION, SOLUTION INTRAVENOUS at 12:11

## 2021-07-22 RX ADMIN — NIMODIPINE 60 MG: 30 CAPSULE, LIQUID FILLED ORAL at 12:07

## 2021-07-22 RX ADMIN — METOPROLOL TARTRATE 50 MG: 50 TABLET, FILM COATED ORAL at 20:26

## 2021-07-22 RX ADMIN — SODIUM CHLORIDE 7.5 MG/HR: 900 INJECTION, SOLUTION INTRAVENOUS at 23:54

## 2021-07-22 RX ADMIN — LEVETIRACETAM 500 MG: 500 TABLET ORAL at 19:00

## 2021-07-22 RX ADMIN — NIMODIPINE 60 MG: 30 CAPSULE, LIQUID FILLED ORAL at 07:01

## 2021-07-22 RX ADMIN — DOCUSATE SODIUM 100 MG: 100 CAPSULE, LIQUID FILLED ORAL at 20:26

## 2021-07-22 RX ADMIN — SODIUM CHLORIDE, POTASSIUM CHLORIDE, SODIUM LACTATE AND CALCIUM CHLORIDE 50 ML/HR: 600; 310; 30; 20 INJECTION, SOLUTION INTRAVENOUS at 22:00

## 2021-07-22 RX ADMIN — HYDRALAZINE HYDROCHLORIDE 10 MG: 10 TABLET, FILM COATED ORAL at 19:00

## 2021-07-22 RX ADMIN — LISINOPRIL 40 MG: 20 TABLET ORAL at 09:17

## 2021-07-22 RX ADMIN — DEXAMETHASONE SODIUM PHOSPHATE 4 MG: 4 INJECTION, SOLUTION INTRAMUSCULAR; INTRAVENOUS at 06:59

## 2021-07-22 RX ADMIN — METOPROLOL TARTRATE 50 MG: 50 TABLET, FILM COATED ORAL at 09:17

## 2021-07-22 RX ADMIN — DEXAMETHASONE SODIUM PHOSPHATE 4 MG: 4 INJECTION, SOLUTION INTRAMUSCULAR; INTRAVENOUS at 14:44

## 2021-07-22 RX ADMIN — NIMODIPINE 60 MG: 30 CAPSULE, LIQUID FILLED ORAL at 22:07

## 2021-07-22 RX ADMIN — HYDRALAZINE HYDROCHLORIDE 10 MG: 10 TABLET, FILM COATED ORAL at 22:07

## 2021-07-22 NOTE — PROGRESS NOTES
Problem: Self Care Deficits Care Plan (Adult)  Goal: *Acute Goals and Plan of Care (Insert Text)  Description:   FUNCTIONAL STATUS PRIOR TO ADMISSION: Patient was independent and active without use of DME, independent for basic and instrumental ADLs, working full time as a , driving. HOME SUPPORT: The patient lived with family but did not require assist.    Occupational Therapy Goals  Initiated 7/22/2021  1. Patient will perform grooming at the sink with supervision/set-up within 7 day(s). 2.  Patient will perform bathing with supervision/set-up within 7 day(s). 3.  Patient will perform upper and lower body dressing with supervision/set-up within 7 day(s). 4.  Patient will perform toilet transfers with supervision/set-up within 7 day(s). 5.  Patient will perform all aspects of toileting with supervision/set-up within 7 day(s). 6.  Patient will participate in upper extremity therapeutic exercise/activities with supervision/set-up for 10 minutes within 7 day(s). 7.  Patient will utilize energy conservation techniques during functional activities with verbal and visual cues within 7 day(s). Outcome: Not Met     OCCUPATIONAL THERAPY EVALUATION  Patient: Robel Barber [de-identified]64 y.o. male)  Date: 7/22/2021  Primary Diagnosis: SAH (subarachnoid hemorrhage) ([facility] [address] [I60.9]        Precautions: Fall, SBP <140, Seizure       ASSESSMENT  Based on the objective data described below, the patient presents with decreased balance, activity tolerance, safety awareness, insight into deficits, attention, strength, coordination, and visual tracking s/p admission for [facility], VM noted on imaging. At baseline, he is highly IND for ADLs and mobility with no AD, lives with family, works as an . He now presents below this baseline, requiring CGA-Min A x1-2 for limited OOB mobility, toileting, dressing, and bathing tasks completed this session.  Min-Max cues required for safety awareness for all functional tasks, patient initially agitated and frustrated, however progressed to calm and cooperative with MAX education. With visual scanning assessment, patient with R dysconjugate gaze and frequent R eye blinking, reports resolved diplopia and no blurred vision, unaware of E ocular deficits. Considering his high PLOF and current status, recommend d/c to IPR to maximize safety & functional independence. Current Level of Function Impacting Discharge (ADLs/self-care): Min A for ADLs, CGA-Min A x1-2 for OOB mobility    Functional Outcome Measure: The patient scored 45/100 on the Barthel Index, indicating 55% impairment in ADLs and mobility, infer at least 80% dependence on others for IADLs. Other factors to consider for discharge: fall risk, PMH, PLOF, acute neuro deficits     Patient will benefit from skilled therapy intervention to address the above noted impairments. PLAN :  Recommendations and Planned Interventions: self care training, functional mobility training, therapeutic exercise, balance training, visual/perceptual training, therapeutic activities, cognitive retraining, endurance activities, neuromuscular re-education, patient education, home safety training, and family training/education    Frequency/Duration: Patient will be followed by occupational therapy 5 times a week to address goals. Recommendation for discharge: (in order for the patient to meet his/her long term goals)  Therapy 3 hours per day 5-7 days per week    This discharge recommendation:  A follow-up discussion with the attending provider and/or case management is planned    IF patient discharges home will need the following DME: To be determined (TBD) pending progress       SUBJECTIVE:   Patient stated This is crazy, why can't y'all just take in the wheelchair to the other bathroom.  MAX safety education with fair-poor carryover    OBJECTIVE DATA SUMMARY:   HISTORY:   Past Medical History:   Diagnosis Date    Acute hypokalemia 7/20/2021    Cerebral arteriovenous malformation (AVM) 7/20/2021    Diabetes ([facility] [address]     type 2    ED (erectile dysfunction)     HTN (hypertension)     Hypertension     Impaired fasting glucose     Malignant hypertension 7/20/2021     Past Surgical History:   Procedure Laterality Date    HX TONSIL AND ADENOIDECTOMY       Expanded or extensive additional review of patient history:     Home Situation  Home Environment: Private residence  Living Alone: No  Support Systems: Spouse/Significant Other/Partner, Family member(s)  Current DME Used/Available at Home: None  Tub or Shower Type: Tub/Shower combination    Hand dominance: Right    EXAMINATION OF PERFORMANCE DEFICITS:  Cognitive/Behavioral Status:  Neurologic State: Alert  Orientation Level: Oriented X4  Cognition: Decreased attention/concentration; Follows commands; Impulsive; Impaired decision making;Poor safety awareness  Perception: Appears intact  Perseveration: No perseveration noted  Safety/Judgement: Awareness of environment;Decreased awareness of need for assistance;Decreased awareness of need for safety;Decreased insight into deficits    Skin: Appears intact    Edema: None    Hearing:       Vision/Perceptual:    Tracking: Able to track stimulus in all quadrants w/o difficulty (dysconjugate R ocular gaze w/ tracking no blur per pt)                 Diplopia: No    Acuity: Within Defined Limits    Corrective Lenses: Reading glasses    Range of Motion:  AROM: Within functional limits                         Strength:  Strength: Generally decreased, functional                Coordination:  Coordination: Generally decreased, functional  Fine Motor Skills-Upper: Left Intact; Right Intact    Gross Motor Skills-Upper: Left Intact; Right Intact    Tone & Sensation:  Tone: Normal  Sensation: Intact                      Balance:  Sitting: Intact  Standing: Impaired; With support  Standing - Static: Fair;Constant support  Standing - Dynamic : Fair;Poor;Constant support    Functional Mobility and Transfers for ADLs:  Bed Mobility:  Supine to Sit: Contact guard assistance (mod cues for safety)  Sit to Supine: Contact guard assistance (mod cues for safety)  Scooting: Contact guard assistance    Transfers:  Sit to Stand: Contact guard assistance  Stand to Sit: Contact guard assistance  Bathroom Mobility: Contact guard assistance;Minimum assistance (A x1-2 for 2 lateral LOB)  Toilet Transfer : Contact guard assistance (mod cues for safety)  Assistive Device : Gait Belt    ADL Assessment:  Feeding: Setup    Oral Facial Hygiene/Grooming: Contact guard assistance    Bathing: Minimum assistance    Upper Body Dressing: Minimum assistance    Lower Body Dressing: Minimum assistance    Toileting: Minimum assistance                ADL Intervention and task modifications:       Grooming  Grooming Assistance: Contact guard assistance  Position Performed: Standing (at sink)  Washing Face: Contact guard assistance  Washing Hands: Contact guard assistance (min cues for safety)  Cues: Verbal cues provided;Visual cues provided; Tactile cues provided         Lower Body Bathing  Bathing Assistance: Minimum assistance  Perineal  : Minimum assistance  Position Performed: Standing  Cues: Tactile cues provided;Verbal cues provided;Visual cues provided  Lower Body : Minimum assistance (proximal LEs)  Position Performed: Standing  Cues: Physical assistance;Verbal cues provided;Visual cues provided    Upper Body Dressing Assistance  Dressing Assistance: Minimum assistance  Hospital Gown: Minimum  assistance (mod cues for coordination with lines/leads)  Cues: Physical assistance; Tactile cues provided;Verbal cues provided;Visual cues provided    Lower Body Dressing Assistance  Dressing Assistance: Minimum assistance  Pants With Elastic Waist: Minimum assistance (simulated)  Socks: Supervision; Compensatory technique training  Leg Crossed Method Used: Yes  Position Performed: Seated edge of bed;Standing  Cues: Physical assistance; Tactile cues provided;Verbal cues provided;Visual cues provided    Toileting  Toileting Assistance: Minimum assistance (intermittent Min A for mobility)  Bladder Hygiene: Supervision  Bowel Hygiene: Contact guard assistance (simulated)  Clothing Management: Minimum assistance  Cues: Tactile cues provided;Verbal cues provided;Visual cues provided    Cognitive Retraining  Safety/Judgement: Awareness of environment;Decreased awareness of need for assistance;Decreased awareness of need for safety;Decreased insight into deficits      Functional Measure:  BUE WNL, no functional deficit beside very minimally decreased LUE coordination with FTN (not limiting, possibly d/t RUE dominance)  Barthel Index:    Bathin  Bladder: 5  Bowels: 5  Groomin  Dressin  Feeding: 10  Mobility: 0  Stairs: 0  Toilet Use: 5  Transfer (Bed to Chair and Back): 10  Total: 45/100        The Barthel ADL Index: Guidelines  1. The index should be used as a record of what a patient does, not as a record of what a patient could do. 2. The main aim is to establish degree of independence from any help, physical or verbal, however minor and for whatever reason. 3. The need for supervision renders the patient not independent. 4. A patient's performance should be established using the best available evidence. Asking the patient, friends/relatives and nurses are the usual sources, but direct observation and common sense are also important. However direct testing is not needed. 5. Usually the patient's performance over the preceding 24-48 hours is important, but occasionally longer periods will be relevant. 6. Middle categories imply that the patient supplies over 50 per cent of the effort. 7. Use of aids to be independent is allowed. Mei Overton., Barthel, D.W. (1789). Functional evaluation: the Barthel Index. [address] [facility] (14)2.   RAMONA Sadler, Bianka Collins., Martha Olivarez., Jw SHIRLEY. (1999). Measuring the change indisability after inpatient rehabilitation; comparison of the responsiveness of the Barthel Index and Functional Crawford Measure. Journal of Neurology, Neurosurgery, and Psychiatry, 66(4), 243-550. RODOLFO Clark, ANABELA Rodriguez, & Elias Pang M.A. (2004.) Assessment of post-stroke quality of life in cost-effectiveness studies: The usefulness of the Barthel Index and the EuroQoL-5D. Quality of Life Research, 15, 305-77       Occupational Therapy Evaluation Charge Determination   History Examination Decision-Making   LOW Complexity : Brief history review  LOW Complexity : 1-3 performance deficits relating to physical, cognitive , or psychosocial skils that result in activity limitations and / or participation restrictions  LOW Complexity : No comorbidities that affect functional and no verbal or physical assistance needed to complete eval tasks       Based on the above components, the patient evaluation is determined to be of the following complexity level: LOW   Pain Rating:  None reported    Activity Tolerance:   Fair and initially hypertensive (above parameters of SBP <140), improved with increased cardene (RN adjusting during session    After treatment patient left in no apparent distress:    Supine in bed, Call bell within reach, Bed / chair alarm activated, and Side rails x 3    COMMUNICATION/EDUCATION:   The patients plan of care was discussed with: Physical therapist, Registered nurse, and Neuro NP. Patient was educated regarding his deficit(s) of decreased balance, visual tracking, safety awareness, insight into deficits, activity tolerance, strength, and coordination as this relates to his diagnosis of SAH with AVM. He demonstrated Fair understanding as evidenced by verbal discussion & carryover with activity.     Home safety education was provided and the patient/caregiver indicated understanding., Patient/family have participated as able in goal setting and plan of care. , and Patient/family agree to work toward stated goals and plan of care. This patients plan of care is appropriate for delegation to BETTY.     Thank you for this referral.  REINA Bray, OTR/L  Time Calculation: 34 mins

## 2021-07-22 NOTE — PROGRESS NOTES
Brandontraci Vinicio   1960   [id number]     Neurointerventional Surgery Progress Note  [facility]      Admit Date: 2021     LOS: 3 day        Daily Progress Note: 2021  The patient denied any new neurological symptoms overnight. Continues to have diplopia, ataxia, and facial droop. Denied any headache, nausea, vomiting, or dysphagia. Having right vision disconjugate. BP remains elevated in the 150's despite Cardene drip of 12 mg, 50 mg of metoprolol, clonidine 0.2, and lisinopril 40 mg. Also on Nimotop 60 mg Q 4hr.     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 Started patient on Hydralazine with the plan to taper Cardene drip. The patient may need nephrology evaluation    Dr. Gilma Fowler discussed patient's brainstem lesion with neurosurgery for radiation therapy which neuro surgery will defer to IR team.    A comprehensive review of systems was negative except for: positive for diplopia and facial asymmetry. NIHSS Stroke Scale      1a  Level of consciousness: 0=alert; keenly responsive   1b. LOC questions:  0=Answers both questions correctly   1c. LOC commands: 0=Performs both tasks correctly   2. Best Gaze: 0=normal   3. Visual: 0=No visual loss   4. Facial Palsy: 2=Partial paralysis (total or near total paralysis of the lower face)   5a. Motor left arm: 0=No drift, arm holds 90 (or 45) degrees for full 10 seconds   5b. Motor right arm: 0=No drift, arm holds 90 (or 45) degrees for full 10 seconds   6a. Motor left le=No drift; leg holds 30-degree position for full 5 seconds. 6b  Motor right le=No drift; leg holds 30-degree position for full 5 seconds. 7. Limb Ataxia: 1=Present in one limb   8. Sensory: 0=Normal; no sensory loss   9. Best Language:  0=No aphasia, normal   10.  Dysarthria: 1=Mild to moderate, patient slurs at least some words and at worst, can be understood with some difficulty   11. Extinction and Inattention: 0=No abnormality    Total:    4          Objective:     Patient Vitals for the past 8 hrs:   BP Temp Pulse Resp SpO2   07/22/21 1600 124/65 97.6 °F (36.4 °C) 63 18 96 %   07/22/21 1500 137/69 -- 61 19 98 %   07/22/21 1400 137/66 -- 63 25 97 %   07/22/21 1300 132/67 -- 63 14 98 %   07/22/21 1200 (!) 147/83 98 °F (36.7 °C) 68 19 100 %   07/22/21 1100 (!) 117/57 -- 64 19 --       Physical Exam:    GENERAL: Alert, cooperative, no distress, appears stated age,   LUNG: Clear to auscultation bilaterally  HEART: Regular rate and rhythm, S1, S2 normal, no murmur, click, rub or gallop  Mental Status: AOx3. Follow commands appropriately  Abdomen: normal bowel sounds, non-tender and non-distended    Cranial Nerves: facial asymetry and diplopia otherwise; II-XII intact sensation grossly intact. , negative findings: speech normal in context and clarity, memory intact grossly  Reflexes: Normal/Full and symmetric. Motor strength: 5/5 x4 ext;  Full proximally and distally, no involuntary movements - tremors  Sensation: Intact to vibration, pain, and light touch  Cerebellar: ataxia L limb Finger-to-nose and heel-to-shin intact  Gait: Deferred  Plantar responses: Down going toes bilaterally    Lab Results   Component Value Date/Time    WBC 13.6 (H) 07/22/2021 07:08 AM    HGB 14.4 07/22/2021 07:08 AM    HCT 41.5 07/22/2021 07:08 AM    PLATELET 009 45/40/1142 07:08 AM    MCV 83.7 07/22/2021 07:08 AM      Lab Results   Component Value Date/Time    Sodium 140 07/22/2021 07:08 AM    Potassium 4.1 07/22/2021 07:08 AM    Chloride 112 (H) 07/22/2021 07:08 AM    CO2 20 (L) 07/22/2021 07:08 AM    Anion gap 8 07/22/2021 07:08 AM    Glucose 154 (H) 07/22/2021 07:08 AM    BUN 17 07/22/2021 07:08 AM    Creatinine 0.96 07/22/2021 07:08 AM    BUN/Creatinine ratio 18 07/22/2021 07:08 AM    GFR est AA >60 07/22/2021 07:08 AM    GFR est non-AA >60 07/22/2021 07:08 AM    Calcium 8.2 (L) 07/22/2021 07:08 AM Bilirubin, total 0.5 07/19/2021 09:38 PM    Alk. phosphatase 51 07/19/2021 09:38 PM    Protein, total 6.2 (L) 07/19/2021 09:38 PM    Albumin 3.4 (L) 07/19/2021 09:38 PM    Globulin 2.8 07/19/2021 09:38 PM    A-G Ratio 1.2 07/19/2021 09:38 PM    ALT (SGPT) 48 07/19/2021 09:38 PM    AST (SGOT) 26 07/19/2021 09:38 PM         CT Results (most recent):  Results from [place] encounter on 07/20/21    CT HEAD WO CONT    Narrative  EXAM: CT HEAD WO CONT    INDICATION: new onset of diploplia    COMPARISON: Head CT performed on 7/19/2021. CONTRAST: None. TECHNIQUE: Unenhanced CT of the head was performed using 5 mm images. Brain and  bone windows were generated. Coronal and sagittal reformats. CT dose reduction  was achieved through use of a standardized protocol tailored for this  examination and automatic exposure control for dose modulation. FINDINGS:  Ventricles are unchanged in size. . Gray-white differentiation appears to be less  distinct on the current study. . Again noted is subarachnoid hemorrhage in the  prepontine cistern and possible extension into the sylvian fissures and ambient  cistern. This is less pronounced on the current study. . The basilar cisterns are  open. No CT evidence of acute infarct. The bone windows demonstrate no abnormalities. The visualized portions of the  paranasal sinuses and mastoid air cells are clear. Impression  1. Stable ventricular size. 2.  Questionable diffuse cerebral edema. 3.  Subarachnoid hemorrhage in the prepontine cistern and ambient cisterns as  well as sylvian fissures are decreased from the prior study. MRI Results (most recent):  Results from [place] encounter on 07/20/21    MRI BRAIN W WO CONT    Narrative  EXAM:  MRI BRAIN W WO CONT    INDICATION:    further imaging eval of cerebellar AVM    COMPARISON:  CTA performed the same day.     CONTRAST: 20 ml of ProHance    TECHNIQUE:  Multiplanar multisequence acquisition without and with contrast of the brain. FINDINGS:  The ventricles are normal in size and position. Tiny 1 mm foci of tracer noted  in the left cerebellum, left parietal-occipital region, left frontal lobe, left  parietal lobe again noted is a vascular malformation involving the left superior  cerebellar peduncle which is better evaluated on the recent CTA. Possible mild  meningeal enhancement is present. No definite abnormal intraparenchymal  enhancement. Scattered sulcal FLAIR hyperintensity likely reflecting known  subarachnoid hemorrhage. No definite cerebral edema is identified. Dilated  superior orbital veins are noted which is a new finding. Expected arterial  flow-voids are present. The paranasal sinuses, mastoid air cells, and middle ears are clear. The orbital  contents are within normal limits. No significant osseous or scalp lesions are  identified. Impression  1. Tiny 1 mm foci of diffusion restriction in the left cerebellum and left  cerebral hemisphere as described likely embolic in origin. 2.  Possible mild meningeal enhancement which may be secondary to meningeal  irritation from known subarachnoid hemorrhage. 3.  Dilated superior ophthalmic veins are noted which is new when compared to  prior imaging. This is of uncertain clinical significance. There appears to be  normal enhancement of the cavernous sinus bilaterally. Assessment/Plan:   64 y.o. 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       Clayton Knox 1, Mistry Grade 2  · S/P: Procedure(s): Diagnostic cerebral angiogram Diagnostic revealed            AVM in the cerebellum with multiple feeders coming off the left PCA, left            anterior choroidal and left SCA with a large single draining vein that drains          into the vein of Gaston. 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 · Day 3/21 of Nimotop to prevent delayed cerebral ischemia  · Keppra 500 mg BID for seizure ppx  · LR at 100 ml/hr to maintain euvolemia  · Strict I&O  · ECHO shows EF of 70%; Dilated left atrium. No shunt  · q 1 neuro checks  · SBP goal <140,  Cardene/Hydralazine/Labetalol PRN  · PT/OT/SLP evals  · Assess NICOM PRN for fluid responsiveness  · Daily TCD's for vasospasm watch     2.) HTN emergency  · With /120 on admission  · Started Hydralazine low dose as we come down Denis Mtz NP  12:06 PM     Greater than 35 minutes were spent in patient management, greater than half of which was spent in counseling and coordination of care.

## 2021-07-22 NOTE — PROGRESS NOTES
Problem: Mobility Impaired (Adult and Pediatric)  Goal: *Acute Goals and Plan of Care (Insert Text)  Description: FUNCTIONAL STATUS PRIOR TO ADMISSION: Patient was independent with all mobility and active (worked as ). He lived at home with his wife in a 1-story house. Physical Therapy Goals  Initiated 7/22/2021  1. Patient will move from supine to sit and sit to supine , scoot up and down, and roll side to side in bed with independence within 7 day(s). 2.  Patient will transfer from bed to chair and chair to bed with minimal assistance using the least restrictive device within 7 day(s). 3.  Patient will perform sit to stand with supervision/set-up within 7 day(s). 4.  Patient will ambulate with min assistance  for 50 feet with the least restrictive device within 7 day(s). 5.  Patient will ascend/descend 3 stairs with 2 handrail(s) with moderate assistance within 7 day(s). Outcome: Not Met     PHYSICAL THERAPY EVALUATION  Patient: Ronald Delong [de-identified]64 y.o. male)  Date: 7/22/2021  Primary Diagnosis: SA[facility] [I60.9]        Precautions: Fall, SBP <140    ASSESSMENT  Based on the objective data described below, the patient presents with impaired cognition (decreased insight into deficits, decreased safety awareness, perseveration), ataxic gait, impaired balance, decreased activity tolerance, and overall decline in functional mobility s/p SAH and superior cerebellar AVM. At baseline, pt is independent with all mobility. He lives with his wife and he works as an . This date, pt received in supine and agreeable to therapy. Pt with no focal deficits in LEs with seated formal testing (strength, sensation, and coordination intact). Pt needed moderate cues for safety awareness for all transfers. Pt ambulated 10 ft and demonstrated ataxic gait, needing Paz x2 to correct several LOB.  Pt with frustrated outburst (upset about lines, not wanting to use room toilet) but able to be redirected with education and quickly apologized. Further mobility deferred secondary to blood pressure; nurse aware and reports has BP meds available. (BP listed below.) Pt is far below his independent baseline and would benefit from IPR to maximize safety and function with mobility. BP in supine prior to therapy: 165/81  Sitting EOB: 150/87  Standin/76  Supine at end of therapy: 148/70    Current Level of Function Impacting Discharge (mobility/balance): Paz x2 for ambulation    Functional Outcome Measure: The patient scored 45/100 on the Barthel outcome measure which is indicative of 55% impairment or dependence on others for functional mobility and ADLs. Other factors to consider for discharge: Fall risk, impaired cognition, independent baseline, lives with wife     Patient will benefit from skilled therapy intervention to address the above noted impairments. PLAN :  Recommendations and Planned Interventions: bed mobility training, transfer training, gait training, therapeutic exercises, neuromuscular re-education, patient and family training/education, and therapeutic activities      Frequency/Duration: Patient will be followed by physical therapy: 5 times a week to address goals. Recommendation for discharge: (in order for the patient to meet his/her long term goals)  Therapy 3 hours per day 5-7 days per week    This discharge recommendation:  Has not yet been discussed the attending provider and/or case management    IF patient discharges home will need the following DME: to be determined (TBD)         SUBJECTIVE:   Patient stated \"Can't you just put me in wheelchair and roll me to a real bathroom?  (Pt frustrated at having to use the toilet under the sink)    OBJECTIVE DATA SUMMARY:   HISTORY:    Past Medical History:   Diagnosis Date    Acute hypokalemia 2021    Cerebral arteriovenous malformation (AVM) 2021    [facility] ([facility]     type 2    ED (erectile dysfunction)     HTN (hypertension)     Hypertension     Impaired fasting glucose     Malignant hypertension 7/20/2021     Past Surgical History:   Procedure Laterality Date    HX TONSIL AND ADENOIDECTOMY       Home Situation  Home Environment: Private residence  Living Alone: No  Support Systems: Spouse/Significant Other/Partner, Family member(s)  Current DME Used/Available at Home: None  Tub or Shower Type: Tub/Shower combination    EXAMINATION/PRESENTATION/DECISION MAKING:   Critical Behavior:  Neurologic State: Alert  Orientation Level: Oriented X4  Cognition: Decreased attention/concentration, Follows commands, Impulsive, Impaired decision making, Poor safety awareness  Safety/Judgement: Awareness of environment, Decreased awareness of need for assistance, Decreased awareness of need for safety, Decreased insight into deficits    Range Of Motion:  AROM: Within functional limits    Strength:    Strength: Generally decreased, functional     Tone & Sensation:   Tone: Normal  Sensation: Intact     Coordination:  Coordination: Generally decreased, functional  Vision:   Tracking: Able to track stimulus in all quadrants w/o difficulty (dysconjugate R ocular gaze w/ tracking no blur per pt)  Diplopia: No  Acuity: Within Defined Limits  Corrective Lenses: Reading glasses  Functional Mobility:  Bed Mobility:  Supine to Sit: Contact guard assistance  Sit to Supine: Contact guard assistance  Scooting: Contact guard assistance    Transfers:  Sit to Stand: Contact guard assistance  Stand to Sit: Contact guard assistance     Balance:   Sitting: Intact  Standing: Impaired; With support  Standing - Static: Fair;Constant support  Standing - Dynamic : Fair;Poor;Constant support  Ambulation/Gait Training:  Distance (ft): 10 Feet (ft)  Assistive Device: Gait belt  Ambulation - Level of Assistance: Minimal assistance;Assist x2  Gait Abnormalities: Ataxic; Shuffling gait  Base of Support: Widened  Speed/Rita: Pace decreased (<100 feet/min)  Step Length: Right shortened;Left shortened    Functional Measure:  Barthel Index:    Bathin  Bladder: 5  Bowels: 5  Groomin  Dressin  Feeding: 10  Mobility: 0  Stairs: 0  Toilet Use: 5  Transfer (Bed to Chair and Back): 10  Total: 45/100       The Barthel ADL Index: Guidelines  1. The index should be used as a record of what a patient does, not as a record of what a patient could do. 2. The main aim is to establish degree of independence from any help, physical or verbal, however minor and for whatever reason. 3. The need for supervision renders the patient not independent. 4. A patient's performance should be established using the best available evidence. Asking the patient, friends/relatives and nurses are the usual sources, but direct observation and common sense are also important. However direct testing is not needed. 5. Usually the patient's performance over the preceding 24-48 hours is important, but occasionally longer periods will be relevant. 6. Middle categories imply that the patient supplies over 50 per cent of the effort. 7. Use of aids to be independent is allowed. Krystal Chacko., Barthel, D.W. (1243). Functional evaluation: the Barthel Index. [facility] (14)2. Gregory Anthony brittney Nia, RAMONA, Betsy Nielson., Dez Corcoran.[facility], [address] [place] [address] (). Measuring the change indisability after inpatient rehabilitation; comparison of the responsiveness of the Barthel Index and Functional [place] Measure. Journal of Neurology, Neurosurgery, and Psychiatry, 66(4), 596-450. Delaney Salazar, N.J.A, ANABELA Rodriguez, & Lai Ralph M.A. (2004.) Assessment of post-stroke quality of life in cost-effectiveness studies: The usefulness of the Barthel Index and the EuroQoL-5D.  Quality of Life Research, 15, 153-89        Physical Therapy Evaluation Charge Determination   History Examination Presentation Decision-Making   HIGH Complexity :3+ comorbidities / personal factors will impact the outcome/ POC  HIGH Complexity : 4+ Standardized tests and measures addressing body structure, function, activity limitation and / or participation in recreation  MEDIUM Complexity : Evolving with changing characteristics  Other outcome measures Barthel  MEDIUM      Based on the above components, the patient evaluation is determined to be of the following complexity level: MEDIUM    Activity Tolerance:   Fair, Poor    After treatment patient left in no apparent distress:   Supine in bed (per RN, prepping for procedure), Call bell within reach, and Bed / chair alarm activated    COMMUNICATION/EDUCATION:   The patients plan of care was discussed with: Occupational therapist and Registered nurse. Fall prevention education was provided and the patient/caregiver indicated understanding., Patient/family have participated as able in goal setting and plan of care. , and Patient/family agree to work toward stated goals and plan of care. Thank you for this referral.  Radha Diez, SPT   Time Calculation: 42 mins    Regarding student involvement in patient care:  A student participated in this treatment session. Per CMS Medicare statements and APTA guidelines I certify that the following was true:  1. I was present and directly observed the entire session. 2. I made all skilled judgments and clinical decisions regarding care. 3. I am the practitioner responsible for assessment, treatment, and documentation.

## 2021-07-22 NOTE — PROGRESS NOTES
Speech Therapy    Chart reviewed and spoke with RN. Note patient initially seen by SLP 7/20 and found to have mostly functional oropharyngeal swallow with soft and bite sized diet with thin liquids recommended. He had some neuro changes and had further testing. He has since resumed a regular consistency diet and has been tolerating it without difficulty, per RN. Will complete SLP order. Thank you. Aleah Jameson M.S., CCC-SLP

## 2021-07-22 NOTE — PROGRESS NOTES
[facility]    ICU Intensivist- Critical Care Progress Note    Name: Kathleen Butts   : 1960   MRN: [id number]   Admit: 2021 12:52 AM      Diagnosis:     Principal Problem:    Cerebral arteriovenous malformation (AVM)     Problem List:    Celiac artery stenosis    Hypertensive emergency    Vasogenic cerebral edema    SAH (subarachnoid hemorrhage)    Type 2 diabetes mellitus without complication, without long-term current use of insulin    ICU Comprehensive Plan of Care:     Plans for this Shift:   1. Brainstem AVM with diffuse vasogenic cerebral edema and SAH (prepontine cistern & syivian fissues)- management with possible radiation therapy deferred to IR team. MRI suggests embolic restriction in left cerebellum and left cerebral hemisphere, as well as mild meningeal inflammation. 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 Continue vasospasm watch. 2. Hypertensive emergency- better controlled, still requires Cardene infusion, as well as multiple oral antihypertensives. Subjective:     Progress Note:   2021   3:44 PM     Reason for ICU Admission:   Cerebral arteriovenous malformation (AVM)     Overnight Events: The patient denied any new neurological symptoms overnight. Continues to have diplopia with right vision disconjugate, ataxia, and facial droop. SBP remains elevated in the 150's   (despite Cardene drip of 12 mg, 50 mg of metoprolol, clonidine 0.2, lisinopril 40 mg, and Nimotop 60 mg Q 4hr). Past Medical History:      has a past medical history of Acute hypokalemia (2021), Cerebral arteriovenous malformation (AVM) (2021), Diabetes ([facility] ED (erectile dysfunction), HTN (hypertension), Hypertension, Impaired fasting glucose, and Malignant hypertension (2021).     Past Surgical History:      has a past surgical history that includes hx tonsil and adenoidectomy. Current Medications:     Current Facility-Administered Medications   Medication Dose Route Frequency    insulin lispro (HUMALOG) injection   SubCUTAneous AC&HS    glucose chewable tablet 16 g  4 Tablet Oral PRN    dextrose (D50W) injection syrg 12.5-25 g  12.5-25 g IntraVENous PRN    glucagon (GLUCAGEN) injection 1 mg  1 mg IntraMUSCular PRN    cloNIDine (CATAPRES) 0.2 mg/24 hr patch 1 Patch  1 Patch TransDERmal Q7D    labetaloL (NORMODYNE;TRANDATE) injection 20 mg  20 mg IntraVENous Q4H PRN    dexamethasone (DECADRON) 4 mg/mL injection 4 mg  4 mg IntraVENous Q8H    metoprolol tartrate (LOPRESSOR) tablet 50 mg  50 mg Oral Q12H    ondansetron (ZOFRAN) injection 4 mg  4 mg IntraVENous Q6H PRN    naloxone (NARCAN) injection 0.4 mg  0.4 mg IntraVENous PRN    acetaminophen (TYLENOL) tablet 650 mg  650 mg Oral Q4H PRN    Or    acetaminophen (TYLENOL) solution 650 mg  650 mg Per NG tube Q4H PRN    Or    acetaminophen (TYLENOL) suppository 650 mg  650 mg Rectal Q4H PRN    lactated Ringers infusion  125 mL/hr IntraVENous CONTINUOUS    glucose chewable tablet 16 g  4 Tablet Oral PRN    niMODipine (NIMOTOP) capsule 60 mg  60 mg Oral Q4H    levETIRAcetam (KEPPRA) tablet 500 mg  500 mg Oral BID    niCARdipine (CARDENE) 25 mg in 0.9% sodium chloride 250 mL infusion  0-15 mg/hr IntraVENous TITRATE    docusate sodium (COLACE) capsule 100 mg  100 mg Oral DAILY PRN    lisinopriL (PRINIVIL, ZESTRIL) tablet 40 mg  40 mg Oral DAILY       Allergies/Social/Family History:     No Known Allergies   Social History     Tobacco Use    Smoking status: Former Smoker     Packs/day: 2.00     Years: 20.00     Pack years: 40.00     Types: Cigarettes     Quit date: 2015     Years since quittin.4    Smokeless tobacco: Current User    Tobacco comment: Patient reports he is Vaping    Substance Use Topics    Alcohol use:  Yes     Alcohol/week: 6.0 standard drinks     Types: 6 Cans of beer per week      Family History   Problem Relation Age of Onset    Diabetes Mother         type 2    Hypertension Mother     Heart Disease Mother         CHF    Hypertension Father     Heart Disease Father 48        MI       Review of Systems:     Pertinent items are noted in HPI. Objective:   Vital Signs:  Visit Vitals  /69   Pulse 61   Temp 98 °F (36.7 °C)   Resp 19   Ht 6' (1.829 m)   Wt 107 kg (235 lb 14.3 oz)   SpO2 98%   BMI 31.99 kg/m²      O2 Device: None (Room air) Temp (24hrs), Av °F (36.7 °C), Min:97.8 °F (36.6 °C), Max:98.1 °F (36.7 °C)           Intake/Output:     Intake/Output Summary (Last 24 hours) at 2021 1544  Last data filed at 2021 1500  Gross per 24 hour   Intake 3736.75 ml   Output 2350 ml   Net 1386.75 ml       Physical Exam:  General:   Alert, cooperative, no distress, appears stated age. Facial asymmetry with diplopia. Dysmetria LUE. Eyes:   Conjunctivae/corneas clear. PERRL, EOMs intact. Ears:   Normal external ear canals bilaterally. Nose:   No drainage or sinus tenderness. Mouth/Throat:  Moist mucous membranes. Dentition normal.    Neck:  Symmetrical, trachea midline, no JVD or carotid bruit. Back:    No CVA tenderness to percussion. Lungs:    Clear to auscultation bilaterally. Heart:   Regular rate and rhythm. S1, S2 normal, without murmur, click, rub or gallop. Abdomen:    Normoactive bowel sounds. Soft, non-tender, no masses or organomegaly. Extremities:  Atraumatic, no cyanosis or edema. Vascular:  Pulses 2+ and symmetric UE/LE bilat   Skin:  Normal color, non-diaphoretic, brisk capillary refill (Less than 2 seconds). No rashes or lesions. Lymph nodes:  No Lymphadenopathy. Neurologic:  CN II-XII intact. Normal strength.         LABS AND  DATA: Personally reviewed  Recent Labs     21  0708 21  0413   WBC 13.6* 6.7   HGB 14.4 13.9   HCT 41.5 40.9    226     Recent Labs     21  0708 07/21/21  0413    142   K 4.1 4.0   * 113*   CO2 20* 19*   BUN 17 14   CREA 0.96 0.98   * 158*   CA 8.2* 7.9*     Recent Labs     07/19/21 2138   AP 51   TP 6.2*   ALB 3.4*   GLOB 2.8     Recent Labs     07/19/21 2138   INR 1.0   PTP 9.9      No results for input(s): PHI, PCO2I, PO2I, FIO2I in the last 72 hours. Recent Labs     07/19/21 2138   TROIQ <0.05       MEDS: Reviewed    RADIOLOGY:  No results found. Assessment:     Hospital Problems  Date Reviewed: 7/20/2021        Codes Class Noted POA    Malignant hypertension ICD-10-CM: I10  ICD-9-CM: 401.0 Acute 7/20/2021 Yes        * (Principal) Cerebral arteriovenous malformation (AVM) ICD-10-CM: Q28.2  ICD-9-CM: 747.81 Acute 7/20/2021 Yes    Overview Signed 7/20/2021  7:03 AM by Alejandra Lara MD     ([id number])- CT (Brain): There is an AVM in the superior cerebellum with multiple aneurysms. One measures 4 mm. Another measures 4 mm within the superior cerebellum. Acute hypokalemia ICD-10-CM: E87.6  ICD-9-CM: 276.8 Acute 7/20/2021 Yes        SAH (subarachnoid hemorrhage) ([facility] ICD-10-CM: I60.9  ICD-9-CM: 616  7/20/2021 Yes            Multidisciplinary Rounds Completed: Yes     ABCDEF Bundle/Checklist  Pain Medications: None  Target RASS: 0 - Alert & Calm - Spontaneously pays attention to caregiver  Sedation Medications: None  CAM-ICU:  Negative  Discussed Plan of Care (goals of care):  Yes  Addressed Code Status: Full Code     CARDIOVASCULAR  Cardiac Gtts: Nicardipine (Cardene)  SBP Goal of: > 90 mmHg  MAP Goal of: > 65 mmHg  Transfusion Trigger (Hgb): <7 g/dL     RESPIRATORY  Vent Goals:   Head of bed > 30 degrees  Aggressive bronchopulmonary hygiene  DVT Prophylaxis (if no, list reason): SCD's or Sequential Compression Device   SPO2 Goal: > 92%  Pulmonary toilet: Incentive Spirometry      T/L/D  Tubes: None  Lines: Peripheral IV  Drains: None     SPECIAL EQUIPMENT  None     DISPOSITION  Stay in ICU     CRITICAL CARE CONSULTANT NOTE  I provided a face-to-face bedside physician/patient encounter, greater than the usual and customary amount normally needed, due to the high complexity of medical decision-making required.     I reviewed and interpreted patient data including clinical events, labs, images, vital signs, I/O's, and examined patient.       I have actively participated in multi-disciplinary discussions (Neurology, IR, ICU Nursing Staff) regarding the case in formulating an optimal therapeutic plan, and effecting a management strategy for this patient.     NOTE OF PERSONAL INVOLVEMENT IN CARE   This patient has a high probability of imminent, clinically significant deterioration, which requires the highest level of preparedness to intervene urgently. I participated in the decision-making and personally managed, or directed the management of, a myriad of life and organ supporting interventions which required my frequent-interval clinical reassessments, in order to treat or prevent imminent deterioration.     I personally spent 40 minutes of critical care time. This is time spent at this critically ill patient's bedside actively involved in patient care as well as the coordination of care and discussions with the patient's family.   This does not include any procedural time which has been billed separately.       Rashmi Saeed MD, [facility] Critical Care  7/22/2021

## 2021-07-22 NOTE — PROGRESS NOTES
1930: Bedside and Verbal shift change report given to MEETA Langston, RN (oncoming nurse) by HERIBERTO Neil RN (offgoing nurse). Report included the following information SBAR, Kardex, Procedure Summary, Intake/Output, MAR, Accordion, Recent Results, Cardiac Rhythm NSR, Alarm Parameters  and Dual Neuro Assessment. 0730: Bedside and Verbal shift change report given to GABBIE Jorgensen RN (oncoming nurse) by Lilliana Langston RN (offgoing nurse). Report included the following information SBAR, Kardex, Procedure Summary, Intake/Output, MAR, Accordion, Recent Results, Cardiac Rhythm NSR, Alarm Parameters  and Dual Neuro Assessment. Shift Summary:  Patient remained neurologically stable overnight. Stated this morning that his diplopia had resolved. NSR. Patient required multiple doses of PRN labetalol to maintain SBP <140. Clonidine patch added yesterday and PO metoprolol added. Cardene titrated all night as well. RA. Adequate UOP- 1500 mL. Order to titrate IVF to have total IV intake goal 125 ml/hr. Currently, LR running at 25 ml/hr and nicardipine at 10 mg/hr (100 ml/hr).

## 2021-07-22 NOTE — PROGRESS NOTES
Physician Progress Note      PATIENT:               Ronak Stanley  CSN #:                  [id number]  :                       1960  ADMIT DATE:       2021 12:52 AM  DISCH DATE:  RESPONDING  PROVIDER #:        Evaristo Cedillo MD          QUERY TEXT:    Hello NP [address]    Patient admitted with [address] and underwent diagnostic angiogram. noted to have new onset of diplopia post angiogram.    If possible could you please clarify: The medical record reflects the following:    Risk Factors: 61M ICH  Clinical Indicators:     Nathaniel BARNEY  Post procedure the patient was seen and examined. He began to complain of diplopia ( new post procedure) around 11 AM. 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 Kathy Wray MD  PRELIMINARY REPORT & DISPOSITION:  There is evidence of a cerebellar AVM with multiple feeders coming off the left PCA, left anterior choroidal and left SCA with a large single draining vein that drains into the vein of Gaston. 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 CT Head  post angiogram  IMPRESSION  1. Stable ventricular size. ?  2. Questionable diffuse cerebral edema. ?  3. Subarachnoid hemorrhage in the prepontine cistern and ambient cisterns as  well as sylvian fissures are decreased from the prior study. MRI  post angiogram  IMPRESSION  1. Tiny 1 mm foci of diffusion restriction in the left cerebellum and left  cerebral hemisphere as described likely embolic in origin. ?  2. Possible mild meningeal enhancement which may be secondary to meningeal  irritation from known subarachnoid hemorrhage. ?  3. Dilated superior ophthalmic veins are noted which is new when compared to  prior imaging. This is of uncertain clinical significance.  There appears to be  normal enhancement of the cavernous sinus bilaterally. Treatment: - STAT head CT to eval for any post procedure changes  - eye patch for diplopia  - continue with close monitoring of neuro status ( q1 hour neuro changes)  - MRI brain w/wo  - plan for diagnostic angiogram again tomorrow AM with possible embolization of flow related aneurysm      procedure not carried out as determined too high risk per MD and NP note 7/21    Thank you,    Ana Lilia Acevedo BSN RN CRCR  Clinical Documentation Improvement  [phone]  Options provided:  -- diplopia as a postprocedural complication  -- diplopia related to other incidental risk factor (please specify) occurring following procedure and not a complication, Please document incidental risk factor. -- diplopia not related to procedure and is new exam finding related to admission condition  -- Other - I will add my own diagnosis  -- Disagree - Not applicable / Not valid  -- Disagree - Clinically unable to determine / Unknown  -- Refer to Clinical Documentation Reviewer    PROVIDER RESPONSE TEXT:    diplopia not related to procedure and is new exam finding related to admission condition.     Query created by: Milton Mojica on 7/22/2021 2:25 PM      Electronically signed by:  Rubia Rivera MD 7/22/2021 3:47 PM

## 2021-07-22 NOTE — PROGRESS NOTES
[facility]    ICU Intensivist- Critical Care Progress Note    Name: Aston Ortiz   : 1960   MRN: [id number]   Admit: 2021 12:52 AM      Diagnosis:     Principal Problem:    Cerebral arteriovenous malformation (AVM)    Problem List:   Acute hypokalemia   Malignant hypertension   SAH (subarachnoid hemorrhage)   Type 2 diabetes mellitus without complication, without long-term current use of insulin   Essential hypertension   Arthritis of hip    ICU Comprehensive Plan of Care:     Plans for this Shift:   1. Multiple cerebellar aneurysms- the lytic aneurysm within the superior cerebellum is irregular and may be the source of the hemorrhage. Cerebral angiography is planned. 2. Malignant hypertension- multiple pharmaceutical approach is required for satisfactory B/P control. 3. Acute hypokalemia- potassium being repleted      Subjective:     Progress Note:   2021   11:48 PM     Reason for ICU Admission:   Cerebral arteriovenous malformation (AVM)     Recent Events:   Aston Ortiz is a 64year-old W/M with HTN, NNIDDM who presented via privately owned vehicle to [facility]/ED for emergent medical evaluation of his sudden onset of dizziness, headache, left sided hearing loss,and some slight left-sided weakness which started suddenly ([id number] ~19:45 PM on 21). Patient was home when his symptoms began with mild pain in the left neck/left head, which progressed to some hearing loss of the left ear and associated dizziness, followed by some mild transient weakness of the left arm (which had quickly improved). Initial B/P upon arrival was 260/120 required Cardene gtt. CT and CTA head/neck revealed an acute subarachnoid hemorrhage anterior to the sarahi. There was an AVM in the superior cerebellum with multiple 4 mm aneurysms. Diagnostic angiogram planned.   Past Medical History:      has a past medical history of Acute hypokalemia (2021), Cerebral arteriovenous malformation (AVM) (2021), Diabetes ([facility] [address] ED (erectile dysfunction), HTN (hypertension), Hypertension, Impaired fasting glucose, and Malignant hypertension (2021). Past Surgical History:      has a past surgical history that includes hx tonsil and adenoidectomy.     Current Medications:     Current Facility-Administered Medications   Medication Dose Route Frequency    cloNIDine (CATAPRES) 0.2 mg/24 hr patch 1 Patch  1 Patch TransDERmal Q7D    labetaloL (NORMODYNE;TRANDATE) injection 20 mg  20 mg IntraVENous Q4H PRN    dexamethasone (DECADRON) 4 mg/mL injection 4 mg  4 mg IntraVENous Q8H    metoprolol tartrate (LOPRESSOR) tablet 50 mg  50 mg Oral Q12H    ondansetron (ZOFRAN) injection 4 mg  4 mg IntraVENous Q6H PRN    naloxone (NARCAN) injection 0.4 mg  0.4 mg IntraVENous PRN    acetaminophen (TYLENOL) tablet 650 mg  650 mg Oral Q4H PRN    Or    acetaminophen (TYLENOL) solution 650 mg  650 mg Per NG tube Q4H PRN    Or    acetaminophen (TYLENOL) suppository 650 mg  650 mg Rectal Q4H PRN    lactated Ringers infusion  125 mL/hr IntraVENous CONTINUOUS    glucose chewable tablet 16 g  4 Tablet Oral PRN    glucagon (GLUCAGEN) injection 1 mg  1 mg IntraMUSCular PRN    dextrose (D50W) injection syrg 12.5-25 g  25-50 mL IntraVENous PRN    niMODipine (NIMOTOP) capsule 60 mg  60 mg Oral Q4H    levETIRAcetam (KEPPRA) tablet 500 mg  500 mg Oral BID    niCARdipine (CARDENE) 25 mg in 0.9% sodium chloride 250 mL infusion  0-15 mg/hr IntraVENous TITRATE    docusate sodium (COLACE) capsule 100 mg  100 mg Oral DAILY PRN    lisinopriL (PRINIVIL, ZESTRIL) tablet 40 mg  40 mg Oral DAILY       Allergies/Social/Family History:     No Known Allergies   Social History     Tobacco Use    Smoking status: Former Smoker     Packs/day: 2.00     Years: 20.00     Pack years: 40.00     Types: Cigarettes     Quit date: 2015     Years since quittin.4    Smokeless tobacco: Current User    Tobacco comment: Patient reports he is Vaping    Substance Use Topics    Alcohol use: Yes     Alcohol/week: 6.0 standard drinks     Types: 6 Cans of beer per week      Family History   Problem Relation Age of Onset    Diabetes Mother         type 2    Hypertension Mother     Heart Disease Mother         CHF    Hypertension Father     Heart Disease Father 48        MI       Review of Systems:     A comprehensive review of systems was negative. Objective:   Vital Signs:  Visit Vitals  /68   Pulse 79   Temp 98.1 °F (36.7 °C)   Resp 23   Ht 6' (1.829 m)   Wt 98 kg (216 lb 0.8 oz)   SpO2 97%   BMI 29.30 kg/m²      O2 Device: None (Room air) Temp (24hrs), Av.1 °F (36.7 °C), Min:97.7 °F (36.5 °C), Max:98.6 °F (37 °C)           Intake/Output:     Intake/Output Summary (Last 24 hours) at 2021 2348  Last data filed at 2021 2200  Gross per 24 hour   Intake 6848.27 ml   Output 1500 ml   Net 5348.27 ml       Physical Exam:  General:   Alert, cooperative, no distress, appears stated age. Eyes:   Conjunctivae/corneas clear. PERRL, EOMs intact. Ears:   Normal external ear canals bilaterally. Decreased hearing acuity on left. Nose:   No drainage or sinus tenderness. Mouth/Throat:  Moist mucous membranes. Dentition normal.    Neck:  Symmetrical, trachea midline, no JVD or carotid bruit. Back:    No CVA tenderness to percussion. Lungs:    Clear to auscultation bilaterally. Heart:   Regular rate and rhythm. S1, S2 normal, without murmur, click, rub or gallop. Abdomen:    Normoactive bowel sounds. Soft, non-tender, no masses or organomegaly. Extremities:  Atraumatic, no cyanosis or edema. Vascular:  Pulses 2+ and symmetric UE/LE bilat   Skin:  Normal color, non-diaphoretic, briske capillary refill (Less than 2 seconds). No rashes or lesions. Lymph nodes:  No Lymphadenopathy. Neurologic:  CN II-XII intact. Normal strength.         LABS AND  DATA: Personally reviewed  Recent Labs     21  0413 21  0411   WBC 6.7 7.9   HGB 13.9 14.6   HCT 40.9 43.0    232     Recent Labs     07/21/21  0413 07/20/21 0411    141   K 4.0 3.5   * 110*   CO2 19* 25   BUN 14 14   CREA 0.98 0.98   * 123*   CA 7.9* 8.7     Recent Labs     07/19/21 2138   AP 51   TP 6.2*   ALB 3.4*   GLOB 2.8     Recent Labs     07/19/21 2138   INR 1.0   PTP 9.9      No results for input(s): PHI, PCO2I, PO2I, FIO2I in the last 72 hours. Recent Labs     07/19/21 2138   TROIQ <0.05     MEDS: Reviewed    RADIOLOGY:  No results found. Assessment:     Hospital Problems  Date Reviewed: 7/20/2021        Codes Class Noted POA    Malignant hypertension ICD-10-CM: I10  ICD-9-CM: 401.0 Acute 7/20/2021 Yes        * (Principal) Cerebral arteriovenous malformation (AVM) ICD-10-CM: Q28.2  ICD-9-CM: 747.81 Acute 7/20/2021 Yes    Overview Signed 7/20/2021  7:03 AM by Rosa Khoury MD     ([id number])- CT (Brain): There is an AVM in the superior cerebellum with multiple aneurysms. One measures 4 mm. Another measures 4 mm within the superior cerebellum. Acute hypokalemia ICD-10-CM: E87.6  ICD-9-CM: 276.8 Acute 7/20/2021 Yes        SAH (subarachnoid hemorrhage) ([facility] [address] ICD-10-CM: I60.9  ICD-9-CM: 661  7/20/2021 Yes            Multidisciplinary Rounds Completed: Yes    ABCDEF Bundle/Checklist  Pain Medications: None  Target RASS: 0 - Alert & Calm - Spontaneously pays attention to caregiver  Sedation Medications: None  CAM-ICU:  Negative  Discussed Plan of Care (goals of care):  Yes  Addressed Code Status: Full Code    CARDIOVASCULAR  Cardiac Gtts: Nicardipine (Cardene)  SBP Goal of: > 90 mmHg  MAP Goal of: > 65 mmHg  Transfusion Trigger (Hgb): <7 g/dL    RESPIRATORY  Vent Goals:   Head of bed > 30 degrees  Aggressive bronchopulmonary hygiene  DVT Prophylaxis (if no, list reason): SCD's or Sequential Compression Device   SPO2 Goal: > 92%  Pulmonary toilet: Incentive Spirometry     T/L/D  Tubes: None  Lines: Peripheral IV  Drains: None    SPECIAL EQUIPMENT  None    DISPOSITION  Stay in ICU    CRITICAL CARE CONSULTANT NOTE  I provided a face-to-face bedside physician/patient encounter, greater than the usual and customary amount normally needed, due to the high complexity of medical decision-making required. I reviewed and interpreted patient data including clinical events, labs, images, vital signs, I/O's, and examined patient. I have actively participated in multi-disciplinary discussions (Neurosurgery, Radiology, ICU Nursing Staff) regarding the case in formulating an optimal therapeutic plan, and effecting a management strategy for this patient. NOTE OF PERSONAL INVOLVEMENT IN CARE   This patient has a high probability of imminent, clinically significant deterioration, which requires the highest level of preparedness to intervene urgently. I participated in the decision-making and personally managed, or directed the management of, a myriad of life and organ supporting interventions which required my frequent-interval clinical reassessments, in order to treat or prevent imminent deterioration. I personally spent 40 minutes of critical care time. This is time spent at this critically ill patient's bedside actively involved in patient care as well as the coordination of care and discussions with the patient's family. This does not include any procedural time which has been billed separately.     Liborio Soliz MD, [facility] Critical Care  7/21/2021

## 2021-07-22 NOTE — PROGRESS NOTES
Lesion is in the brainstem/cerebellum  I spoke with   Dr Gilma Fowler and he is referring pt for focused rad therapy  I agree and will defer care to the IR team

## 2021-07-22 NOTE — PROGRESS NOTES
Occupational Therapy  07/22/21    Orders received, chart reviewed and patient evaluated by occupational therapy. Pending progression with skilled acute occupational therapy, recommend:  Therapy 3 hours per day 5-7 days per week     Recommend with nursing ADLs with assist, OOB to chair 3x/day and toileting via brief ambulation to/from toilet with 1-2 assist (second assist for lines) and gait belt. Thank you for completing as able in order to maintain patient strength, endurance and independence. Full evaluation to follow.      Thank you,   Yuval Jackson, REINA, OTR/L

## 2021-07-23 ENCOUNTER — APPOINTMENT (OUTPATIENT)
Dept: CT IMAGING | Age: 61
DRG: 064 | End: 2021-07-23
Attending: NURSE PRACTITIONER
Payer: COMMERCIAL

## 2021-07-23 ENCOUNTER — HOSPITAL ENCOUNTER (INPATIENT)
Dept: VASCULAR SURGERY | Age: 61
Discharge: HOME | DRG: 064 | End: 2021-07-23
Attending: NURSE PRACTITIONER
Payer: COMMERCIAL

## 2021-07-23 PROBLEM — G93.6 VASOGENIC CEREBRAL EDEMA (HCC): Status: ACTIVE | Noted: 2021-07-21

## 2021-07-23 PROBLEM — I77.1 CELIAC ARTERY STENOSIS (HCC): Chronic | Status: ACTIVE | Noted: 2021-07-21

## 2021-07-23 PROBLEM — I16.1 HYPERTENSIVE EMERGENCY: Status: ACTIVE | Noted: 2021-07-21

## 2021-07-23 LAB
ANION GAP SERPL CALC-SCNC: 7 MMOL/L (ref 5–15)
BASILAR ARTERY EDV: 22.7 CM/S
BASILAR ARTERY MEAN VEL: 35 CM/S
BASILAR ARTERY PSV: 59 CM/S
BUN SERPL-MCNC: 21 MG/DL (ref 6–20)
BUN/CREAT SERPL: 24 (ref 12–20)
CALCIUM SERPL-MCNC: 7.9 MG/DL (ref 8.5–10.1)
CHLORIDE SERPL-SCNC: 111 MMOL/L (ref 97–108)
CO2 SERPL-SCNC: 22 MMOL/L (ref 21–32)
CREAT SERPL-MCNC: 0.86 MG/DL (ref 0.7–1.3)
ERYTHROCYTE [DISTWIDTH] IN BLOOD BY AUTOMATED COUNT: 13.6 % (ref 11.5–14.5)
GLUCOSE BLD STRIP.AUTO-MCNC: 125 MG/DL (ref 65–117)
GLUCOSE BLD STRIP.AUTO-MCNC: 144 MG/DL (ref 65–117)
GLUCOSE BLD STRIP.AUTO-MCNC: 158 MG/DL (ref 65–117)
GLUCOSE BLD STRIP.AUTO-MCNC: 191 MG/DL (ref 65–117)
GLUCOSE SERPL-MCNC: 160 MG/DL (ref 65–100)
HCT VFR BLD AUTO: 42 % (ref 36.6–50.3)
HGB BLD-MCNC: 14.2 G/DL (ref 12.1–17)
LEFT EX ICA EDV: 14 CM/S
LEFT EX ICA MEAN VEL: 24 CM/S
LEFT EX ICA PSV: 44 CM/S
LEFT ICA DIST DIAS: 13.7 CM/S
LEFT ICA DIST SYS: 43.5 CM/S
LEFT PCA 1 EDV: 17.9 CM/S
LEFT PCA 1 MEAN VEL: 26 CM/S
LEFT PCA 1 PSV: 41 CM/S
LEFT VERTEBRAL EDV TCD: 16.2 CM/S
LEFT VERTEBRAL MEAN VEL: 27 CM/S
LEFT VERTEBRAL PSV TCD: 48.5 CM/S
MAGNESIUM SERPL-MCNC: 2.8 MG/DL (ref 1.6–2.4)
MCH RBC QN AUTO: 28.2 PG (ref 26–34)
MCHC RBC AUTO-ENTMCNC: 33.8 G/DL (ref 30–36.5)
MCV RBC AUTO: 83.5 FL (ref 80–99)
NRBC # BLD: 0 K/UL (ref 0–0.01)
NRBC BLD-RTO: 0 PER 100 WBC
PHOSPHATE SERPL-MCNC: 2.4 MG/DL (ref 2.6–4.7)
PLATELET # BLD AUTO: 255 K/UL (ref 150–400)
PMV BLD AUTO: 10.5 FL (ref 8.9–12.9)
POTASSIUM SERPL-SCNC: 4.1 MMOL/L (ref 3.5–5.1)
RBC # BLD AUTO: 5.03 M/UL (ref 4.1–5.7)
RIGHT EX ICA EDV: 15 CM/S
RIGHT EX ICA MEAN VEL: 28 CM/S
RIGHT EX ICA PSV: 54 CM/S
RIGHT ICA DIST DIAS: 15 CM/S
RIGHT ICA DIST SYS: 53.9 CM/S
RIGHT PCA 1 EDV: 24.3 CM/S
RIGHT PCA 1 MEAN VEL: 30 CM/S
RIGHT PCA 1 PSV: 42.5 CM/S
RIGHT VERTEBRAL EDV TCD: 14 CM/S
RIGHT VERTEBRAL MEAN VEL: 21 CM/S
RIGHT VERTEBRAL PSV TCD: 36 CM/S
SERVICE CMNT-IMP: ABNORMAL
SODIUM SERPL-SCNC: 140 MMOL/L (ref 136–145)
WBC # BLD AUTO: 12.5 K/UL (ref 4.1–11.1)

## 2021-07-23 PROCEDURE — 82962 GLUCOSE BLOOD TEST: CPT

## 2021-07-23 PROCEDURE — APPNB30 APP NON BILLABLE TIME 0-30 MINS: Performed by: NURSE PRACTITIONER

## 2021-07-23 PROCEDURE — 74011250637 HC RX REV CODE- 250/637: Performed by: NURSE PRACTITIONER

## 2021-07-23 PROCEDURE — 36415 COLL VENOUS BLD VENIPUNCTURE: CPT

## 2021-07-23 PROCEDURE — 80048 BASIC METABOLIC PNL TOTAL CA: CPT

## 2021-07-23 PROCEDURE — 97112 NEUROMUSCULAR REEDUCATION: CPT

## 2021-07-23 PROCEDURE — 93886 INTRACRANIAL COMPLETE STUDY: CPT

## 2021-07-23 PROCEDURE — 74011250636 HC RX REV CODE- 250/636: Performed by: NURSE PRACTITIONER

## 2021-07-23 PROCEDURE — 83735 ASSAY OF MAGNESIUM: CPT

## 2021-07-23 PROCEDURE — 74011000250 HC RX REV CODE- 250: Performed by: NURSE PRACTITIONER

## 2021-07-23 PROCEDURE — 85027 COMPLETE CBC AUTOMATED: CPT

## 2021-07-23 PROCEDURE — 74011636637 HC RX REV CODE- 636/637: Performed by: NURSE PRACTITIONER

## 2021-07-23 PROCEDURE — 99232 SBSQ HOSP IP/OBS MODERATE 35: CPT | Performed by: STUDENT IN AN ORGANIZED HEALTH CARE EDUCATION/TRAINING PROGRAM

## 2021-07-23 PROCEDURE — 70450 CT HEAD/BRAIN W/O DYE: CPT

## 2021-07-23 PROCEDURE — 84100 ASSAY OF PHOSPHORUS: CPT

## 2021-07-23 PROCEDURE — 65610000006 HC RM INTENSIVE CARE

## 2021-07-23 PROCEDURE — 97116 GAIT TRAINING THERAPY: CPT

## 2021-07-23 PROCEDURE — 97530 THERAPEUTIC ACTIVITIES: CPT

## 2021-07-23 PROCEDURE — 97535 SELF CARE MNGMENT TRAINING: CPT

## 2021-07-23 RX ORDER — HYDRALAZINE HYDROCHLORIDE 25 MG/1
25 TABLET, FILM COATED ORAL 3 TIMES DAILY
Status: DISCONTINUED | OUTPATIENT
Start: 2021-07-23 | End: 2021-07-24

## 2021-07-23 RX ORDER — HYDRALAZINE HYDROCHLORIDE 10 MG/1
15 TABLET, FILM COATED ORAL ONCE
Status: COMPLETED | OUTPATIENT
Start: 2021-07-23 | End: 2021-07-23

## 2021-07-23 RX ORDER — HYDRALAZINE HYDROCHLORIDE 20 MG/ML
20 INJECTION INTRAMUSCULAR; INTRAVENOUS ONCE
Status: COMPLETED | OUTPATIENT
Start: 2021-07-23 | End: 2021-07-23

## 2021-07-23 RX ADMIN — HYDRALAZINE HYDROCHLORIDE 20 MG: 20 INJECTION, SOLUTION INTRAMUSCULAR; INTRAVENOUS at 04:28

## 2021-07-23 RX ADMIN — DEXAMETHASONE SODIUM PHOSPHATE 4 MG: 4 INJECTION, SOLUTION INTRAMUSCULAR; INTRAVENOUS at 21:54

## 2021-07-23 RX ADMIN — HYDRALAZINE HYDROCHLORIDE 10 MG: 10 TABLET, FILM COATED ORAL at 08:24

## 2021-07-23 RX ADMIN — NIMODIPINE 60 MG: 30 CAPSULE, LIQUID FILLED ORAL at 22:00

## 2021-07-23 RX ADMIN — NIMODIPINE 60 MG: 30 CAPSULE, LIQUID FILLED ORAL at 03:10

## 2021-07-23 RX ADMIN — SODIUM CHLORIDE 10 MG/HR: 900 INJECTION, SOLUTION INTRAVENOUS at 03:11

## 2021-07-23 RX ADMIN — NIMODIPINE 60 MG: 30 CAPSULE, LIQUID FILLED ORAL at 14:38

## 2021-07-23 RX ADMIN — SODIUM CHLORIDE 7.5 MG/HR: 900 INJECTION, SOLUTION INTRAVENOUS at 16:15

## 2021-07-23 RX ADMIN — SODIUM CHLORIDE 7.5 MG/HR: 900 INJECTION, SOLUTION INTRAVENOUS at 09:37

## 2021-07-23 RX ADMIN — LEVETIRACETAM 500 MG: 500 TABLET ORAL at 08:24

## 2021-07-23 RX ADMIN — HYDRALAZINE HYDROCHLORIDE 15 MG: 10 TABLET, FILM COATED ORAL at 18:15

## 2021-07-23 RX ADMIN — SODIUM CHLORIDE 7.5 MG/HR: 900 INJECTION, SOLUTION INTRAVENOUS at 13:34

## 2021-07-23 RX ADMIN — NIMODIPINE 60 MG: 30 CAPSULE, LIQUID FILLED ORAL at 06:57

## 2021-07-23 RX ADMIN — HYDRALAZINE HYDROCHLORIDE 25 MG: 25 TABLET, FILM COATED ORAL at 21:54

## 2021-07-23 RX ADMIN — SODIUM CHLORIDE 10 MG/HR: 900 INJECTION, SOLUTION INTRAVENOUS at 06:08

## 2021-07-23 RX ADMIN — LEVETIRACETAM 500 MG: 500 TABLET ORAL at 18:15

## 2021-07-23 RX ADMIN — SODIUM CHLORIDE, POTASSIUM CHLORIDE, SODIUM LACTATE AND CALCIUM CHLORIDE 50 ML/HR: 600; 310; 30; 20 INJECTION, SOLUTION INTRAVENOUS at 21:00

## 2021-07-23 RX ADMIN — NIMODIPINE 60 MG: 30 CAPSULE, LIQUID FILLED ORAL at 11:40

## 2021-07-23 RX ADMIN — DEXAMETHASONE SODIUM PHOSPHATE 4 MG: 4 INJECTION, SOLUTION INTRAMUSCULAR; INTRAVENOUS at 14:38

## 2021-07-23 RX ADMIN — METOPROLOL TARTRATE 50 MG: 50 TABLET, FILM COATED ORAL at 08:24

## 2021-07-23 RX ADMIN — METOPROLOL TARTRATE 50 MG: 50 TABLET, FILM COATED ORAL at 21:53

## 2021-07-23 RX ADMIN — LISINOPRIL 40 MG: 20 TABLET ORAL at 08:24

## 2021-07-23 RX ADMIN — DEXAMETHASONE SODIUM PHOSPHATE 4 MG: 4 INJECTION, SOLUTION INTRAMUSCULAR; INTRAVENOUS at 06:56

## 2021-07-23 RX ADMIN — NIMODIPINE 60 MG: 30 CAPSULE, LIQUID FILLED ORAL at 18:15

## 2021-07-23 RX ADMIN — INSULIN LISPRO 2 UNITS: 100 INJECTION, SOLUTION INTRAVENOUS; SUBCUTANEOUS at 12:26

## 2021-07-23 RX ADMIN — HYDRALAZINE HYDROCHLORIDE 10 MG: 10 TABLET, FILM COATED ORAL at 16:54

## 2021-07-23 RX ADMIN — SODIUM CHLORIDE 7.5 MG/HR: 900 INJECTION, SOLUTION INTRAVENOUS at 21:00

## 2021-07-23 NOTE — PROGRESS NOTES
[facility]    ICU Intensivist- Critical Care Progress Note    Name: Herman Henderson   : 1960   MRN: [id number]   Admit: 2021 12:52 AM      Diagnosis:     Principal Problem:    Cerebral arteriovenous malformation (AVM)     Problem List:    Celiac artery stenosis    Hypertensive emergency    Vasogenic cerebral edema    SAH (subarachnoid hemorrhage)    Type 2 diabetes mellitus without complication, without long-term current use of insulin     ICU Comprehensive Plan of Care:     Plans for this Shift:   1. Brainstem AVM with diffuse vasogenic cerebral edema and SAH (prepontine cistern & syivian fissues)- continue vasospasm watch.     2. Hypertensive emergency- better controlled. Subjective:     Progress Note:   2021   6:47 AM     Reason for ICU Admission:   Cerebral arteriovenous malformation (AVM)     Overnight Events:   No significant events. Past Medical History:      has a past medical history of Acute hypokalemia (2021), Celiac artery stenosis ([address] (2021), Cerebral arteriovenous malformation (AVM) (2021), Diabetes ([address] ED (erectile dysfunction), HTN (hypertension), Hypertension, Hypertensive emergency (2021), Impaired fasting glucose, Malignant hypertension (2021), and Vasogenic cerebral edema ([address] (2021). Past Surgical History:      has a past surgical history that includes hx tonsil and adenoidectomy.     Current Medications:     Current Facility-Administered Medications   Medication Dose Route Frequency    insulin lispro (HUMALOG) injection   SubCUTAneous AC&HS    glucose chewable tablet 16 g  4 Tablet Oral PRN    dextrose (D50W) injection syrg 12.5-25 g  12.5-25 g IntraVENous PRN    glucagon (GLUCAGEN) injection 1 mg  1 mg IntraMUSCular PRN    hydrALAZINE (APRESOLINE) tablet 10 mg  10 mg Oral TID    cloNIDine (CATAPRES) 0.2 mg/24 hr patch 1 Patch  1 Patch TransDERmal Q7D    labetaloL (NORMODYNE;TRANDATE) injection 20 mg  20 mg IntraVENous Q4H PRN    dexamethasone (DECADRON) 4 mg/mL injection 4 mg  4 mg IntraVENous Q8H    metoprolol tartrate (LOPRESSOR) tablet 50 mg  50 mg Oral Q12H    ondansetron (ZOFRAN) injection 4 mg  4 mg IntraVENous Q6H PRN    naloxone (NARCAN) injection 0.4 mg  0.4 mg IntraVENous PRN    acetaminophen (TYLENOL) tablet 650 mg  650 mg Oral Q4H PRN    Or    acetaminophen (TYLENOL) solution 650 mg  650 mg Per NG tube Q4H PRN    Or    acetaminophen (TYLENOL) suppository 650 mg  650 mg Rectal Q4H PRN    lactated Ringers infusion  125 mL/hr IntraVENous CONTINUOUS    glucose chewable tablet 16 g  4 Tablet Oral PRN    niMODipine (NIMOTOP) capsule 60 mg  60 mg Oral Q4H    levETIRAcetam (KEPPRA) tablet 500 mg  500 mg Oral BID    niCARdipine (CARDENE) 25 mg in 0.9% sodium chloride 250 mL infusion  0-15 mg/hr IntraVENous TITRATE    docusate sodium (COLACE) capsule 100 mg  100 mg Oral DAILY PRN    lisinopriL (PRINIVIL, ZESTRIL) tablet 40 mg  40 mg Oral DAILY       Allergies/Social/Family History:     No Known Allergies   Social History     Tobacco Use    Smoking status: Former Smoker     Packs/day: 2.00     Years: 20.00     Pack years: 40.00     Types: Cigarettes     Quit date: 2015     Years since quittin.4    Smokeless tobacco: Current User    Tobacco comment: Patient reports he is Vaping    Substance Use Topics    Alcohol use: Yes     Alcohol/week: 6.0 standard drinks     Types: 6 Cans of beer per week      Family History   Problem Relation Age of Onset    Diabetes Mother         type 2    Hypertension Mother     Heart Disease Mother         CHF    Hypertension Father     Heart Disease Father 48        MI       Review of Systems:     A comprehensive review of systems was negative.     Objective:   Vital Signs:  Visit Vitals  /65   Pulse (!) 54   Temp 97.6 °F (36.4 °C)   Resp 14   Ht 6' (1.829 m)   Wt 107 kg (235 lb 14.3 oz)   SpO2 100%   BMI 31.99 kg/m²      O2 Device: None (Room air) Temp (24hrs), Av.6 °F (36.4 °C), Min:97 °F (36.1 °C), Max:98 °F (36.7 °C)           Intake/Output:     Intake/Output Summary (Last 24 hours) at 2021 0647  Last data filed at 2021 0600  Gross per 24 hour   Intake 3704.58 ml   Output 1400 ml   Net 2304.58 ml       Physical Exam:  General:   Alert, cooperative, no distress, appears stated age. Facial asymmetry with diplopia. Dysmetria LUE. Eyes:   Conjunctivae/corneas clear. PERRL, EOMs intact. Ears:   Normal external ear canals bilaterally. Nose:   No drainage or sinus tenderness. Mouth/Throat:  Moist mucous membranes. Dentition normal.    Neck:  Symmetrical, trachea midline, no JVD or carotid bruit. Back:    No CVA tenderness to percussion. Lungs:    Clear to auscultation bilaterally. Heart:   Regular rate and rhythm. S1, S2 normal, without murmur, click, rub or gallop. Abdomen:    Normoactive bowel sounds. Soft, non-tender, no masses or organomegaly. Extremities:  Atraumatic, no cyanosis or edema. Vascular:  Pulses 2+ and symmetric UE/LE bilat   Skin:  Normal color, non-diaphoretic, brisk capillary refill (Less than 2 seconds). No rashes or lesions. Lymph nodes:  No Lymphadenopathy. Neurologic:  CN II-XII intact. Normal strength.       LABS AND  DATA: Personally reviewed  Recent Labs     21  0430 21  0708   WBC 12.5* 13.6*   HGB 14.2 14.4   HCT 42.0 41.5    240     Recent Labs     21  0430 21  0708    140   K 4.1 4.1   * 112*   CO2 22 20*   BUN 21* 17   CREA 0.86 0.96   * 154*   CA 7.9* 8.2*   MG 2.8*  --    PHOS 2.4*  --      No results for input(s): AP, TBIL, TP, ALB, GLOB, AML, LPSE in the last 72 hours. No lab exists for component: SGOT, GPT, AMYP  No results for input(s): INR, PTP, APTT, INREXT in the last 72 hours. No results for input(s): PHI, PCO2I, PO2I, FIO2I in the last 72 hours. No results for input(s): CPK, CKMB, TROIQ, BNPP in the last 72 hours.     MEDS: Reviewed    RADIOLOGY:  No results found. Assessment:     Hospital Problems  Date Reviewed: 7/20/2021        Codes Class Noted POA    Celiac artery stenosis (HCC) (Chronic) ICD-10-CM: I77.4  ICD-9-CM: 447. 4 Chronic 7/21/2021 Yes        Vasogenic cerebral edema (HCC) ICD-10-CM: G93.6  ICD-9-CM: 544. 5 Acute 7/21/2021 No        Hypertensive emergency ICD-10-CM: I16.1  ICD-9-CM: 401.9 Acute 7/21/2021 Yes        Malignant hypertension ICD-10-CM: I10  ICD-9-CM: 401.0 Acute 7/20/2021 Yes        * (Principal) Cerebral arteriovenous malformation (AVM) ICD-10-CM: Q28.2  ICD-9-CM: 747.81 Acute 7/20/2021 Yes    Overview Signed 7/20/2021  7:03 AM by Alejandra Lara MD     ([id number])- CT (Brain): There is an AVM in the superior cerebellum with multiple aneurysms. One measures 4 mm. Another measures 4 mm within the superior cerebellum.              Acute hypokalemia ICD-10-CM: E87.6  ICD-9-CM: 276.8 Acute 7/20/2021 Yes        SAH (subarachnoid hemorrhage) ([facility] [address] ICD-10-CM: I60.9  ICD-9-CM: 327  7/20/2021 Yes            Multidisciplinary Rounds Completed:  Yes     ABCDEF Bundle/Checklist  Pain Medications: None  Target RASS: 0 - Alert & Calm - Spontaneously pays attention to caregiver  Sedation Medications: None  CAM-ICU:  Negative  Discussed Plan of Care (goals of care): Yes  Addressed Code Status: Full Code     CARDIOVASCULAR  Cardiac Gtts: Nicardipine (Cardene)  SBP Goal of: > 90 mmHg  MAP Goal of: > 65 mmHg  Transfusion Trigger (Hgb): <7 g/dL     RESPIRATORY  Vent Goals:   Head of bed > 30 degrees  Aggressive bronchopulmonary hygiene  DVT Prophylaxis (if no, list reason): SCD's or Sequential Compression Device   SPO2 Goal: > 92%  Pulmonary toilet: Incentive Spirometry      T/L/D  Tubes: None  Lines: Peripheral IV  Drains: None     SPECIAL EQUIPMENT  None     DISPOSITION  Stay in ICU     CRITICAL CARE CONSULTANT NOTE  I provided a face-to-face bedside physician/patient encounter, greater than the usual and customary amount normally needed, due to the high complexity of medical decision-making required.     I reviewed and interpreted patient data including clinical events, labs, images, vital signs, I/O's, and examined patient.       I have actively participated in multi-disciplinary discussions (Neurology, IR, ICU Nursing Staff) regarding the case in formulating an optimal therapeutic plan, and effecting a management strategy for this patient.     NOTE OF PERSONAL INVOLVEMENT IN CARE   This patient has a high probability of imminent, clinically significant deterioration, which requires the highest level of preparedness to intervene urgently.  I participated in the decision-making and personally managed, or directed the management of, a myriad of life and organ supporting interventions which required my frequent-interval clinical reassessments, in order to treat or prevent imminent deterioration.     I personally spent 30 minutes of critical care time.  This is time spent at this critically ill patient's bedside actively involved in patient care as well as the coordination of care and discussions with the patient's family.  This does not include any procedural time which has been billed separately.  Cara Smith MD, [address]  Staff [address] [facility]  7/23/2021      \

## 2021-07-23 NOTE — PROGRESS NOTES
[facility]   1960   [id number]     Neurointerventional Surgery Progress Note  Ashlyn Valdovinos, [facility]  Neurocritical Care NP      Admit Date: 7/20/2021     LOS: 4 day        Daily Progress Note: 7/23/2021  The patient admits improvement with diplopia; misalignment improving; eye patch in-place. Admits dizziness with ambulation. Denied any headache, nausea, vomiting, or dysphagia. Having right vision disconjugate. BP slightly improved after adding hydralazine 10 mg tid; remains elevated. Remain on Cardene drip of 7.5 mg, 50 mg of metoprolol, clonidine 0.2, and lisinopril 40 mg. Also on Nimotop 60 mg Q 4hr.       TCD's 7/23/21 Very limited study. Only the PCA was visualized and is patent and without evidence of vasospasm. The MCA, JOSSELYN and terminal ICA were not visualized. The vertebral and basilar appear patent and without evidence of vasospasm. 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 Patient denied any abd pain, diarrhea,  nausea or vomiting except for constipation which he received bowel regimen. BS present and passing gas; non-distended or tender. Mild leukocytosis likely due to steroids induced; patient is afebrile and shows no signs of infection    Last head imagine was 7/20/21; will repeat head CT wo contrast given patient reporting vertiginous dizziness and cerebellum subarachnoid hemorrhage. Patient's dizziness dissipated the day after admission. A comprehensive review of systems was negative except for diplopia and facial asymmetry. The patient admits improvement with diplopia seems to have diplopia distance away. Admits vertiginous dizziness with ambulation likely from cerebellum SAH. NIHSS Stroke Scale    1a  Level of consciousness: 0=alert; keenly responsive   1b. LOC questions:  0=Answers both questions correctly   1c. LOC commands: 0=Performs both tasks correctly   2.   Best Gaze: 1=partial gaze palsy; gaze is abnormal in one or both eyes, but forced deviation or total gaze paresis is not present. 3. Visual: 0=No visual loss   4. Facial Palsy: 1=Minor paralysis (flattened nasolabial fold, asymmetric on smiling)   5a. Motor left arm: 0=No drift, arm holds 90 (or 45) degrees for full 10 seconds   5b. Motor right arm: 0=No drift, arm holds 90 (or 45) degrees for full 10 seconds   6a. Motor left le=No drift; leg holds 30-degree position for full 5 seconds. 6b  Motor right le=No drift; leg holds 30-degree position for full 5 seconds. 7. Limb Ataxia: 0=Absent   8. Sensory: 0=Normal; no sensory loss   9. Best Language:  0=No aphasia, normal   10. Dysarthria: 1=Mild to moderate, patient slurs at least some words and at worst, can be understood with some difficulty   11. Extinction and Inattention: 0=No abnormality    Total:    3         Objective:     Patient Vitals for the past 8 hrs:   BP Pulse Resp SpO2 Weight   21 0915 -- -- -- -- 108.3 kg (238 lb 12.1 oz)   21 0700 125/78 66 20 100 % --   21 0600 122/65 (!) 54 14 100 % --   21 0500 138/66 60 18 100 % --       Physical Exam:    GENERAL: Alert, cooperative, no distress, appears stated age,   LUNG: Clear to auscultation bilaterally  HEART: Regular rate and rhythm, S1, S2 normal, no murmur, click, rub or gallop  Mental Status: AOx3. Follow commands appropriately  Abdomen: normal bowel sounds, non-tender and non-distended    Cranial Nerves: facial asymetry and diplopia far distance otherwise; II-XII intact sensation grossly intact. , speech mild dysarthric otherwise normal in context and clarity, memory intact grossly  Reflexes: Normal/Full and symmetric. Motor strength: 5/5 x4 ext;  Full proximally and distally, no involuntary movements - tremors  Sensation: Intact to pain, and light touch  Cerebellar: improved ataxia L limb Finger-to-nose and heel-to-shin intact  Gait: Deferred  Plantar responses: Down going toes bilaterally    Lab Results   Component Value Date/Time    WBC 12.5 (H) 07/23/2021 04:30 AM    HGB 14.2 07/23/2021 04:30 AM    HCT 42.0 07/23/2021 04:30 AM    PLATELET 289 54/95/3164 04:30 AM    MCV 83.5 07/23/2021 04:30 AM      Lab Results   Component Value Date/Time    Sodium 140 07/23/2021 04:30 AM    Potassium 4.1 07/23/2021 04:30 AM    Chloride 111 (H) 07/23/2021 04:30 AM    CO2 22 07/23/2021 04:30 AM    Anion gap 7 07/23/2021 04:30 AM    Glucose 160 (H) 07/23/2021 04:30 AM    BUN 21 (H) 07/23/2021 04:30 AM    Creatinine 0.86 07/23/2021 04:30 AM    BUN/Creatinine ratio 24 (H) 07/23/2021 04:30 AM    GFR est AA >60 07/23/2021 04:30 AM    GFR est non-AA >60 07/23/2021 04:30 AM    Calcium 7.9 (L) 07/23/2021 04:30 AM    Bilirubin, total 0.5 07/19/2021 09:38 PM    Alk. phosphatase 51 07/19/2021 09:38 PM    Protein, total 6.2 (L) 07/19/2021 09:38 PM    Albumin 3.4 (L) 07/19/2021 09:38 PM    Globulin 2.8 07/19/2021 09:38 PM    A-G Ratio 1.2 07/19/2021 09:38 PM    ALT (SGPT) 48 07/19/2021 09:38 PM    AST (SGOT) 26 07/19/2021 09:38 PM         CT Results (most recent):  Results from [place] encounter on 07/20/21    CT HEAD WO CONT    Narrative  EXAM: CT HEAD WO CONT    INDICATION: new onset of diploplia    COMPARISON: Head CT performed on 7/19/2021. CONTRAST: None. TECHNIQUE: Unenhanced CT of the head was performed using 5 mm images. Brain and  bone windows were generated. Coronal and sagittal reformats. CT dose reduction  was achieved through use of a standardized protocol tailored for this  examination and automatic exposure control for dose modulation. FINDINGS:  Ventricles are unchanged in size. . Gray-white differentiation appears to be less  distinct on the current study. . Again noted is subarachnoid hemorrhage in the  prepontine cistern and possible extension into the sylvian fissures and ambient  cistern. This is less pronounced on the current study. . The basilar cisterns are  open.  No CT evidence of acute infarct. The bone windows demonstrate no abnormalities. The visualized portions of the  paranasal sinuses and mastoid air cells are clear. Impression  1. Stable ventricular size. 2.  Questionable diffuse cerebral edema. 3.  Subarachnoid hemorrhage in the prepontine cistern and ambient cisterns as  well as sylvian fissures are decreased from the prior study. MRI Results (most recent):  Results from [facility] encounter on 07/20/21    MRI BRAIN W WO CONT    Narrative  EXAM:  MRI BRAIN W WO CONT    INDICATION:    further imaging eval of cerebellar AVM    COMPARISON:  CTA performed the same day. CONTRAST: 20 ml of ProHance    TECHNIQUE:  Multiplanar multisequence acquisition without and with contrast of the brain. FINDINGS:  The ventricles are normal in size and position. Tiny 1 mm foci of tracer noted  in the left cerebellum, left parietal-occipital region, left frontal lobe, left  parietal lobe again noted is a vascular malformation involving the left superior  cerebellar peduncle which is better evaluated on the recent CTA. Possible mild  meningeal enhancement is present. No definite abnormal intraparenchymal  enhancement. Scattered sulcal FLAIR hyperintensity likely reflecting known  subarachnoid hemorrhage. No definite cerebral edema is identified. Dilated  superior orbital veins are noted which is a new finding. Expected arterial  flow-voids are present. The paranasal sinuses, mastoid air cells, and middle ears are clear. The orbital  contents are within normal limits. No significant osseous or scalp lesions are  identified. Impression  1. Tiny 1 mm foci of diffusion restriction in the left cerebellum and left  cerebral hemisphere as described likely embolic in origin. 2.  Possible mild meningeal enhancement which may be secondary to meningeal  irritation from known subarachnoid hemorrhage.     3.  Dilated superior ophthalmic veins are noted which is new when compared to  prior imaging. This is of uncertain clinical significance. There appears to be  normal enhancement of the cavernous sinus bilaterally. Assessment/Plan:   64 y.o. 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 Clayton Knox 1, Mistry Grade 2  · S/P: Procedure(s): Diagnostic cerebral angiogram Diagnostic revealed            AVM in the cerebellum with multiple feeders coming off the left PCA, left            anterior choroidal and left SCA with a large single draining vein that drains          into the vein of Gaston. 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 · Day 4/21 of Nimotop to prevent delayed cerebral ischemia  · Keppra 500 mg BID for seizure ppx  · LR at 125 ml/hr to maintain euvolemia  · Strict I&O; +575  · ECHO shows EF of 70%; Dilated left atrium. No shunt  · q 1 neuro checks  · SBP goal <140,  Cardene/Hydralazine/Labetalol PRN  · PT/OT/SLP evals  · Assess NICOM PRN for fluid responsiveness  · Daily TCD's for vasospasm watch  · Eye patch for diplopia with alternating each eye Q1hr and off 1hr  · Will require Non-emergent ophthalmology evaluation upon dc  · Pending repeat head CT wo contrast     2.) HTN emergency  · With /120 on admission  · BP appears to be improving after adding Hydralazine low dose; will                    increase to 25 mg tid and come off Cardene sachin Giang NP  12:08 PM     Greater than 35 minutes were spent in patient management, greater than half of which was spent in counseling and coordination of care.

## 2021-07-23 NOTE — PROGRESS NOTES
Problem: Mobility Impaired (Adult and Pediatric)  Goal: *Acute Goals and Plan of Care (Insert Text)  Description: FUNCTIONAL STATUS PRIOR TO ADMISSION: Patient was independent with all mobility and active (worked as ). He lived at home with his wife in a 1-story house. Physical Therapy Goals  Initiated 7/22/2021  1. Patient will move from supine to sit and sit to supine , scoot up and down, and roll side to side in bed with independence within 7 day(s). 2.  Patient will transfer from bed to chair and chair to bed with minimal assistance using the least restrictive device within 7 day(s). 3.  Patient will perform sit to stand with supervision/set-up within 7 day(s). 4.  Patient will ambulate with min assistance  for 50 feet with the least restrictive device within 7 day(s). 5.  Patient will ascend/descend 3 stairs with 2 handrail(s) with moderate assistance within 7 day(s). Outcome: Progressing Towards Goal    PHYSICAL THERAPY TREATMENT  Patient: Jonathan Alexander (89 y.o. male)  Date: 7/23/2021  Diagnosis: SAH (subarachnoid hemorrhage) (HCC) [I60.9] Cerebral arteriovenous malformation (AVM)       Precautions:  Fall risk  Chart, physical therapy assessment, plan of care and goals were reviewed. ASSESSMENT  Patient continues with skilled PT services and is progressing towards goals. Patient remains limited by impaired cognition, decreased safety awareness, ataxic gait, impaired balance, decreased activity tolerance, and overall decline in functional mobility. Today, pt completed all transfers with SBA but was limited by dizziness. He states he is dizzy constantly (including at rest) but it gets worse as he transfers supine>sit>stand and even worse when he is walking. He demonstrates an ataxic gait pattern and inattention to R environment (today with 3 instances of running into obstacles on the R despite max verbal cues).  Today, ambulated 30ft x 2 reps with Paz (seated rest in between ambulation secondary to increasing dizziness). BP today in 140's seated EOB before ambulation and 165/73 seated in the chair at end of session. Continuing to recommend IPR as patient is far below his independent baseline. Current Level of Function Impacting Discharge (mobility/balance): Paz for gait with VC for safety    Other factors to consider for discharge: Fall risk, independent baseline, visual deficits, lives with wife         PLAN :  Patient continues to benefit from skilled intervention to address the above impairments. Continue treatment per established plan of care. to address goals. Recommendation for discharge: (in order for the patient to meet his/her long term goals)  Therapy 3 hours per day 5-7 days per week    This discharge recommendation:  Has not yet been discussed the attending provider and/or case management    IF patient discharges home will need the following DME: TBD       SUBJECTIVE:   Patient stated I have no feeling at all in my left ear.     OBJECTIVE DATA SUMMARY:   Critical Behavior:  Neurologic State: Alert, Eyes open spontaneously  Orientation Level: Oriented X4  Cognition: Follows commands  Safety/Judgement: Awareness of environment, Decreased awareness of need for assistance, Decreased awareness of need for safety, Decreased insight into deficits  Functional Mobility Training:  Bed Mobility:  Supine to Sit: Stand-by assistance  Scooting: Stand-by assistance     Transfers:  Sit to Stand: Stand-by assistance  Stand to Sit: Stand-by assistance        Balance:  Sitting: Intact  Standing: Impaired  Standing - Static: Fair  Standing - Dynamic : Fair;Poor;Constant support  Ambulation/Gait Training:  Distance (ft): 30 Feet (ft) (x 2 reps)  Assistive Device: Gait belt  Ambulation - Level of Assistance: Minimal assistance;Assist x1  Gait Abnormalities: Ataxic; Altered arm swing;Trunk sway increased  Speed/Rita: Pace decreased (<100 feet/min); Shuffled  Step Length: Left shortened;Right shortened    Activity Tolerance:   Fair    After treatment patient left in no apparent distress:   Sitting in chair, Call bell within reach, and Bed / chair alarm activated    COMMUNICATION/COLLABORATION:   The patients plan of care was discussed with: Occupational therapist and Registered nurse. Denita Clements, [facility]   Time Calculation: 38 mins    Regarding student involvement in patient care:  A student participated in this treatment session. Per CMS Medicare statements and APTA guidelines I certify that the following was true:  1. I was present and directly observed the entire session. 2. I made all skilled judgments and clinical decisions regarding care. 3. I am the practitioner responsible for assessment, treatment, and documentation.

## 2021-07-23 NOTE — PROGRESS NOTES
Neurocritical Care Brief Progress Note:    28-year-old male with SAH with cerebellar AVM with multiple feeders coming off the left PCA, left anterior choroidal and left SCA with a large single draining vein that drains into the vein of Gaston. 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 Complains of continued double vision, left facial droop/numbness and left side hearing loss. Physical Exam:  Gen: NAD, calm, cooperative  Neuro: A&Ox4. Follows commands. Speech clear. Affect normal. PERRL, 3 mm bilaterally. Visual fields full to confrontation. However, has stated double vision at a distance. No disconjugate gaze present. EOMI. Significant left lower facial droop with stated left facial numbness noted on light touch. Stated decreased hearing on the left. Beni spontaneously. Strength 5/5 in UE and LE BL. Negative drift. Bulk and tone normal. No involuntary movements. Gait deferred. Ataxia not noted on my exam.   Skin: Warm, dry, color appropriate for ethnicity. Continue current plan per NIS.      Daisy Wu NP  Neurocritical Care Nurse Practitioner  [phone]

## 2021-07-23 NOTE — PROGRESS NOTES
Problem: Self Care Deficits Care Plan (Adult)  Goal: *Acute Goals and Plan of Care (Insert Text)  Description:   FUNCTIONAL STATUS PRIOR TO ADMISSION: Patient was independent and active without use of DME, independent for basic and instrumental ADLs, working full time as a , driving. HOME SUPPORT: The patient lived with family but did not require assist.    Occupational Therapy Goals  Initiated 7/22/2021  1. Patient will perform grooming at the sink with supervision/set-up within 7 day(s). 2.  Patient will perform bathing with supervision/set-up within 7 day(s). 3.  Patient will perform upper and lower body dressing with supervision/set-up within 7 day(s). 4.  Patient will perform toilet transfers with supervision/set-up within 7 day(s). 5.  Patient will perform all aspects of toileting with supervision/set-up within 7 day(s). 6.  Patient will participate in upper extremity therapeutic exercise/activities with supervision/set-up for 10 minutes within 7 day(s). 7.  Patient will utilize energy conservation techniques during functional activities with verbal and visual cues within 7 day(s). Outcome: Progressing Towards Goal     OCCUPATIONAL THERAPY TREATMENT  Patient: Jose J Rubio [de-identified]64 y.o. male)  Date: 7/23/2021  Diagnosis: SAH (subarachnoid hemorrhage) (HCC) [I60.9] Cerebral arteriovenous malformation (AVM)       Precautions:  Fall, SBP <160  Chart, occupational therapy assessment, plan of care, and goals were reviewed. ASSESSMENT  Patient continues with skilled OT services and is progressing towards goals however remains limited by impaired standing balance, activity tolerance, safety awareness, attention to the R, visual acuity, coordination, BLE ROM, distal lower body access, and carryover of safety precautions noted with lower body dressing, toileting, OOB mobility, and visual tasks completed this session.  Improved engagement with therapy and adherence to safety precautions, however requires up to [address] for bathroom mobility with mod cues for attention to R side. Min-Max A for lower body ADLs with fair-poor maintenance of limiting hip flexion for distal lower body access. Diplopia continues from yesterday (started hours after OT eval, moreso with distance vision), persisting with binocular vision but not with monocular, therefore eye patch provided, educated on wearing schedule. Considering his high PLOF, recommend d/c to IPR as he remains far from his IND baseline. Current Level of Function Impacting Discharge (ADLs): Min-Max A for ADLs, CGA-Min A for limited OOB mobility    Other factors to consider for discharge: fall risk, visual deficits, R inattention, assist for all OOB mobility/ADLs/IADLs         PLAN :  Patient continues to benefit from skilled intervention to address the above impairments. Continue treatment per established plan of care to address goals. Recommend with staff: Recommend with nursing, ADLs with assist, OOB to chair 3x/day and toileting via functional mobility to and from bathroom. Thank you for completing as able in order to maintain patient strength, endurance and independence. Recommendation for discharge: (in order for the patient to meet his/her long term goals)  Therapy 3 hours per day 5-7 days per week    This discharge recommendation:  Has been made in collaboration with the attending provider and/or case management    IF patient discharges home will need the following DME: To be determined (TBD)         SUBJECTIVE:   Patient stated Vik Mahoney need to go back. It's getting worse.     OBJECTIVE DATA SUMMARY:   Cognitive/Behavioral Status:  Neurologic State: Alert  Orientation Level: Oriented X4  Cognition: Appropriate for age attention/concentration; Follows commands;Poor safety awareness  Perception: Cues to attend to right side of body  Perseveration: No perseveration noted  Safety/Judgement: Decreased awareness of environment;Decreased awareness of need for assistance;Decreased insight into deficits; Decreased awareness of need for safety    Functional Mobility and Transfers for ADLs:  Bed Mobility:  Supine to Sit: Stand-by assistance  Scooting: Stand-by assistance    Transfers:  Sit to Stand: Stand-by assistance;Contact guard assistance  Functional Transfers  Bathroom Mobility: Contact guard assistance;Minimum assistance  Toilet Transfer : Contact guard assistance       Balance:  Sitting: Intact  Standing: Impaired; Without support  Standing - Static: Fair  Standing - Dynamic : Fair;Poor;Constant support    ADL Intervention:       Grooming  Grooming Assistance: Contact guard assistance  Position Performed: Standing (at sink)  Washing Hands: Contact guard assistance      Lower Body Dressing Assistance  Dressing Assistance: Moderate assistance  Underpants: Compensatory technique training  Pants With Elastic Waist: Minimum assistance; Compensatory technique training (simulated)  Socks: Compensatory technique training;Maximum assistance (decreased L>R hip ER seated in chair)  Leg Crossed Method Used: No (unable in chair)  Position Performed: Seated in chair;Standing  Cues: Physical assistance;Visual cues provided;Verbal cues provided; Tactile cues provided    Toileting  Toileting Assistance: Contact guard assistance  Bladder Hygiene: Supervision  Bowel Hygiene: Contact guard assistance  Clothing Management: Contact guard assistance  Cues: Tactile cues provided;Verbal cues provided;Visual cues provided    Cognitive Retraining  Orientation Retraining: Awareness of environment (mod cues for R environment)  Safety/Judgement: Decreased awareness of environment;Decreased awareness of need for assistance;Decreased insight into deficits; Decreased awareness of need for safety    Neuromuscular Re-Education:   Persisting diplopia with distance vision, eye patch provided, discussed wearing schedule of 2 hours on each eye, 2 hours off, and off when sleeping    Pain:  None    Activity Tolerance:   Fair and increased dizziness with continued activity    After treatment patient left in no apparent distress:   Sitting in chair, Call bell within reach, and Bed / chair alarm activated    COMMUNICATION/COLLABORATION:   The patients plan of care was discussed with: Physical therapist and Registered nurse.      REINA Wheeler, OTR/L  Time Calculation: 38 mins

## 2021-07-24 ENCOUNTER — APPOINTMENT (OUTPATIENT)
Dept: CT IMAGING | Age: 61
DRG: 064 | End: 2021-07-24
Attending: NURSE PRACTITIONER
Payer: COMMERCIAL

## 2021-07-24 ENCOUNTER — APPOINTMENT (OUTPATIENT)
Dept: VASCULAR SURGERY | Age: 61
DRG: 064 | End: 2021-07-24
Attending: NURSE PRACTITIONER
Payer: COMMERCIAL

## 2021-07-24 LAB
ANION GAP SERPL CALC-SCNC: 6 MMOL/L (ref 5–15)
BUN SERPL-MCNC: 17 MG/DL (ref 6–20)
BUN/CREAT SERPL: 23 (ref 12–20)
CALCIUM SERPL-MCNC: 8.2 MG/DL (ref 8.5–10.1)
CHLORIDE SERPL-SCNC: 109 MMOL/L (ref 97–108)
CO2 SERPL-SCNC: 24 MMOL/L (ref 21–32)
CREAT SERPL-MCNC: 0.75 MG/DL (ref 0.7–1.3)
ERYTHROCYTE [DISTWIDTH] IN BLOOD BY AUTOMATED COUNT: 13.2 % (ref 11.5–14.5)
GLUCOSE BLD STRIP.AUTO-MCNC: 116 MG/DL (ref 65–117)
GLUCOSE BLD STRIP.AUTO-MCNC: 156 MG/DL (ref 65–117)
GLUCOSE BLD STRIP.AUTO-MCNC: 240 MG/DL (ref 65–117)
GLUCOSE SERPL-MCNC: 120 MG/DL (ref 65–100)
HCT VFR BLD AUTO: 43.2 % (ref 36.6–50.3)
HGB BLD-MCNC: 14.8 G/DL (ref 12.1–17)
MAGNESIUM SERPL-MCNC: 2.6 MG/DL (ref 1.6–2.4)
MCH RBC QN AUTO: 28.5 PG (ref 26–34)
MCHC RBC AUTO-ENTMCNC: 34.3 G/DL (ref 30–36.5)
MCV RBC AUTO: 83.1 FL (ref 80–99)
NRBC # BLD: 0 K/UL (ref 0–0.01)
NRBC BLD-RTO: 0 PER 100 WBC
PHOSPHATE SERPL-MCNC: 2.6 MG/DL (ref 2.6–4.7)
PLATELET # BLD AUTO: 254 K/UL (ref 150–400)
PMV BLD AUTO: 10.2 FL (ref 8.9–12.9)
POTASSIUM SERPL-SCNC: 3.9 MMOL/L (ref 3.5–5.1)
RBC # BLD AUTO: 5.2 M/UL (ref 4.1–5.7)
SERVICE CMNT-IMP: ABNORMAL
SERVICE CMNT-IMP: ABNORMAL
SERVICE CMNT-IMP: NORMAL
SODIUM SERPL-SCNC: 139 MMOL/L (ref 136–145)
TROPONIN I SERPL-MCNC: <0.05 NG/ML
WBC # BLD AUTO: 10.7 K/UL (ref 4.1–11.1)

## 2021-07-24 PROCEDURE — 36415 COLL VENOUS BLD VENIPUNCTURE: CPT

## 2021-07-24 PROCEDURE — 74011250637 HC RX REV CODE- 250/637: Performed by: ANESTHESIOLOGY

## 2021-07-24 PROCEDURE — 74011250637 HC RX REV CODE- 250/637: Performed by: NURSE PRACTITIONER

## 2021-07-24 PROCEDURE — 74011000250 HC RX REV CODE- 250: Performed by: NURSE PRACTITIONER

## 2021-07-24 PROCEDURE — 80048 BASIC METABOLIC PNL TOTAL CA: CPT

## 2021-07-24 PROCEDURE — 93886 INTRACRANIAL COMPLETE STUDY: CPT

## 2021-07-24 PROCEDURE — 74011250636 HC RX REV CODE- 250/636: Performed by: NURSE PRACTITIONER

## 2021-07-24 PROCEDURE — 74011250636 HC RX REV CODE- 250/636: Performed by: STUDENT IN AN ORGANIZED HEALTH CARE EDUCATION/TRAINING PROGRAM

## 2021-07-24 PROCEDURE — 99232 SBSQ HOSP IP/OBS MODERATE 35: CPT | Performed by: STUDENT IN AN ORGANIZED HEALTH CARE EDUCATION/TRAINING PROGRAM

## 2021-07-24 PROCEDURE — 74011636637 HC RX REV CODE- 636/637: Performed by: NURSE PRACTITIONER

## 2021-07-24 PROCEDURE — 82962 GLUCOSE BLOOD TEST: CPT

## 2021-07-24 PROCEDURE — 74011000258 HC RX REV CODE- 258: Performed by: NURSE PRACTITIONER

## 2021-07-24 PROCEDURE — 93005 ELECTROCARDIOGRAM TRACING: CPT

## 2021-07-24 PROCEDURE — 84484 ASSAY OF TROPONIN QUANT: CPT

## 2021-07-24 PROCEDURE — APPSS45 APP SPLIT SHARED TIME 31-45 MINUTES: Performed by: NURSE PRACTITIONER

## 2021-07-24 PROCEDURE — 74011000250 HC RX REV CODE- 250: Performed by: STUDENT IN AN ORGANIZED HEALTH CARE EDUCATION/TRAINING PROGRAM

## 2021-07-24 PROCEDURE — 85027 COMPLETE CBC AUTOMATED: CPT

## 2021-07-24 PROCEDURE — 70450 CT HEAD/BRAIN W/O DYE: CPT

## 2021-07-24 PROCEDURE — 84100 ASSAY OF PHOSPHORUS: CPT

## 2021-07-24 PROCEDURE — 83735 ASSAY OF MAGNESIUM: CPT

## 2021-07-24 PROCEDURE — 65610000006 HC RM INTENSIVE CARE

## 2021-07-24 PROCEDURE — 74011250636 HC RX REV CODE- 250/636: Performed by: ANESTHESIOLOGY

## 2021-07-24 RX ORDER — HYDRALAZINE HYDROCHLORIDE 50 MG/1
50 TABLET, FILM COATED ORAL 3 TIMES DAILY
Status: DISCONTINUED | OUTPATIENT
Start: 2021-07-24 | End: 2021-07-24

## 2021-07-24 RX ORDER — LABETALOL HYDROCHLORIDE 5 MG/ML
20 INJECTION, SOLUTION INTRAVENOUS
Status: DISCONTINUED | OUTPATIENT
Start: 2021-07-24 | End: 2021-07-24

## 2021-07-24 RX ORDER — BISACODYL 5 MG
10 TABLET, DELAYED RELEASE (ENTERIC COATED) ORAL DAILY PRN
Status: DISCONTINUED | OUTPATIENT
Start: 2021-07-24 | End: 2021-08-10 | Stop reason: HOSPADM

## 2021-07-24 RX ORDER — HYDRALAZINE HYDROCHLORIDE 50 MG/1
100 TABLET, FILM COATED ORAL 3 TIMES DAILY
Status: DISCONTINUED | OUTPATIENT
Start: 2021-07-24 | End: 2021-08-01

## 2021-07-24 RX ORDER — AMLODIPINE BESYLATE 5 MG/1
10 TABLET ORAL DAILY
Status: DISCONTINUED | OUTPATIENT
Start: 2021-07-24 | End: 2021-08-02

## 2021-07-24 RX ORDER — METOPROLOL TARTRATE 50 MG/1
50 TABLET ORAL ONCE
Status: COMPLETED | OUTPATIENT
Start: 2021-07-24 | End: 2021-07-24

## 2021-07-24 RX ORDER — METOPROLOL TARTRATE 50 MG/1
100 TABLET ORAL EVERY 12 HOURS
Status: DISCONTINUED | OUTPATIENT
Start: 2021-07-24 | End: 2021-07-25

## 2021-07-24 RX ORDER — POLYETHYLENE GLYCOL 3350 17 G/17G
17 POWDER, FOR SOLUTION ORAL DAILY PRN
Status: DISCONTINUED | OUTPATIENT
Start: 2021-07-24 | End: 2021-08-10 | Stop reason: HOSPADM

## 2021-07-24 RX ORDER — AMOXICILLIN 250 MG
1 CAPSULE ORAL DAILY
Status: DISCONTINUED | OUTPATIENT
Start: 2021-07-25 | End: 2021-08-10 | Stop reason: HOSPADM

## 2021-07-24 RX ORDER — HYDRALAZINE HYDROCHLORIDE 20 MG/ML
20 INJECTION INTRAMUSCULAR; INTRAVENOUS
Status: DISCONTINUED | OUTPATIENT
Start: 2021-07-24 | End: 2021-08-10 | Stop reason: HOSPADM

## 2021-07-24 RX ORDER — LABETALOL HYDROCHLORIDE 5 MG/ML
10 INJECTION, SOLUTION INTRAVENOUS
Status: DISCONTINUED | OUTPATIENT
Start: 2021-07-24 | End: 2021-08-10 | Stop reason: HOSPADM

## 2021-07-24 RX ADMIN — METOPROLOL TARTRATE 50 MG: 50 TABLET, FILM COATED ORAL at 09:43

## 2021-07-24 RX ADMIN — LABETALOL HYDROCHLORIDE 20 MG: 5 INJECTION INTRAVENOUS at 07:08

## 2021-07-24 RX ADMIN — NIMODIPINE 60 MG: 30 CAPSULE, LIQUID FILLED ORAL at 19:25

## 2021-07-24 RX ADMIN — NIMODIPINE 60 MG: 30 CAPSULE, LIQUID FILLED ORAL at 15:46

## 2021-07-24 RX ADMIN — SODIUM CHLORIDE 15 MG/HR: 9 INJECTION, SOLUTION INTRAVENOUS at 17:55

## 2021-07-24 RX ADMIN — AMLODIPINE BESYLATE 10 MG: 5 TABLET ORAL at 18:04

## 2021-07-24 RX ADMIN — SODIUM CHLORIDE 10 MG/HR: 9 INJECTION, SOLUTION INTRAVENOUS at 13:00

## 2021-07-24 RX ADMIN — DEXAMETHASONE SODIUM PHOSPHATE 4 MG: 4 INJECTION, SOLUTION INTRAMUSCULAR; INTRAVENOUS at 15:45

## 2021-07-24 RX ADMIN — DEXAMETHASONE SODIUM PHOSPHATE 4 MG: 4 INJECTION, SOLUTION INTRAMUSCULAR; INTRAVENOUS at 07:08

## 2021-07-24 RX ADMIN — DOCUSATE SODIUM 100 MG: 100 CAPSULE, LIQUID FILLED ORAL at 09:42

## 2021-07-24 RX ADMIN — INSULIN LISPRO 3 UNITS: 100 INJECTION, SOLUTION INTRAVENOUS; SUBCUTANEOUS at 12:33

## 2021-07-24 RX ADMIN — SODIUM CHLORIDE 5 MG/HR: 900 INJECTION, SOLUTION INTRAVENOUS at 06:22

## 2021-07-24 RX ADMIN — HYDRALAZINE HYDROCHLORIDE 50 MG: 50 TABLET, FILM COATED ORAL at 09:42

## 2021-07-24 RX ADMIN — METOPROLOL TARTRATE 50 MG: 50 TABLET, FILM COATED ORAL at 12:33

## 2021-07-24 RX ADMIN — SODIUM CHLORIDE 15 MG/HR: 9 INJECTION, SOLUTION INTRAVENOUS at 20:22

## 2021-07-24 RX ADMIN — SODIUM CHLORIDE 10 MG/HR: 9 INJECTION, SOLUTION INTRAVENOUS at 15:29

## 2021-07-24 RX ADMIN — SODIUM CHLORIDE 7.5 MG/HR: 9 INJECTION, SOLUTION INTRAVENOUS at 10:19

## 2021-07-24 RX ADMIN — LEVETIRACETAM 500 MG: 500 TABLET ORAL at 09:42

## 2021-07-24 RX ADMIN — METOPROLOL TARTRATE 100 MG: 50 TABLET, FILM COATED ORAL at 20:23

## 2021-07-24 RX ADMIN — NIMODIPINE 60 MG: 30 CAPSULE, LIQUID FILLED ORAL at 07:08

## 2021-07-24 RX ADMIN — NIMODIPINE 60 MG: 30 CAPSULE, LIQUID FILLED ORAL at 04:00

## 2021-07-24 RX ADMIN — SODIUM CHLORIDE, POTASSIUM CHLORIDE, SODIUM LACTATE AND CALCIUM CHLORIDE 25 ML/HR: 600; 310; 30; 20 INJECTION, SOLUTION INTRAVENOUS at 13:01

## 2021-07-24 RX ADMIN — HYDRALAZINE HYDROCHLORIDE 20 MG: 20 INJECTION INTRAMUSCULAR; INTRAVENOUS at 18:03

## 2021-07-24 RX ADMIN — POLYETHYLENE GLYCOL 3350 17 G: 17 POWDER, FOR SOLUTION ORAL at 15:46

## 2021-07-24 RX ADMIN — LISINOPRIL 40 MG: 20 TABLET ORAL at 09:42

## 2021-07-24 RX ADMIN — ACETAMINOPHEN 650 MG: 325 TABLET ORAL at 20:55

## 2021-07-24 RX ADMIN — HYDRALAZINE HYDROCHLORIDE 100 MG: 50 TABLET, FILM COATED ORAL at 15:46

## 2021-07-24 RX ADMIN — LEVETIRACETAM 500 MG: 500 TABLET ORAL at 18:04

## 2021-07-24 RX ADMIN — NIMODIPINE 60 MG: 30 CAPSULE, LIQUID FILLED ORAL at 11:00

## 2021-07-24 RX ADMIN — SODIUM CHLORIDE 7.5 MG/HR: 900 INJECTION, SOLUTION INTRAVENOUS at 01:00

## 2021-07-24 RX ADMIN — SODIUM CHLORIDE 2 MG/MIN: 9 INJECTION, SOLUTION INTRAVENOUS at 21:36

## 2021-07-24 RX ADMIN — SODIUM CHLORIDE 15 MG/HR: 9 INJECTION, SOLUTION INTRAVENOUS at 22:00

## 2021-07-24 RX ADMIN — INSULIN LISPRO 2 UNITS: 100 INJECTION, SOLUTION INTRAVENOUS; SUBCUTANEOUS at 18:02

## 2021-07-24 RX ADMIN — LABETALOL HYDROCHLORIDE 10 MG: 5 INJECTION INTRAVENOUS at 16:55

## 2021-07-24 NOTE — PROGRESS NOTES
0730: Bedside shift change report given to Jules Alford RN (oncoming nurse) by Virginia Vigil RN (offgoing nurse). Report included the following information SBAR, Kardex, Intake/Output, MAR, Accordion, Recent Results, Cardiac Rhythm NSR-SB and Dual Neuro Assessment. 0800: Pt A&Ox4, L facial droop with diminished sensation on L side of face, MARTÍNEZ equally, conjugate gaze, PERRLA-4mm, continues to have occasional double vision, no complaints of pain. Dr. Dede Perry at bedside, aware of above exam. Order received to continue q1hr neuro exams today and to keep SBP<150.     1353: In CT.    1443: Per ECTOR Grant, keep SBP<140.     1540: Richard Dodd NP notified of HR 58-64,  with Cardene maxed at 15mg/hr. Per NP, continue to give PRN labetalol. 1900: Pt reports having developed chest pain and numbness in bilateral fingers and toes and in lips when blood pressure was 138/62 at 1830. BP currently 157/68. Pt states that he is no longer having chest pain or numbness now that his BP is in the 150s. Pt is agitated, stating that his blood pressure was too low which he believes was causing his numbness and chest pain. Neuro exam is unchanged. ECTOR Grant notified of this and is at bedside. No new orders received at this time. 1920: NICOM assessed at bedside and shows 32.1 % change in SVI with PLR indicating patient is fluid responsive. ECTOR Grant notified. No new orders received. 2035: Pt reports sharp 3/10 left sided neck/head pain. States that the pain feels similar to pain he had when he first came to hospital. Neuro exam otherwise unchanged. /76. Ana Lilia White NP notified and at bedside to assess pt. Order received for STAT EKG, troponin, PRN Tylenol, and for CT at 2200. Per Ana Lilia White NP, continue to hold LR infusion while Cardene gtt is running at 150mL/hr.     1915: EKG reviewed by Ana Lilia White NP and Yoli Mancilla NP. No orders received.      2330: Bedside shift change report given to Jackolyn Gondola, RN (oncoming nurse) by Jules Alford RN (offgoing nurse). Report included the following information SBAR, Kardex, Intake/Output, MAR, Accordion, Recent Results, Cardiac Rhythm SB-NSR and Dual Neuro Assessment.

## 2021-07-24 NOTE — PROGRESS NOTES
[facility]    ICU Intensivist- Critical Care Progress Note    Name: Herman Henderson   : 1960   MRN: [id number]   Admit: 2021 12:52 AM      Diagnosis:     Principal Problem:    Cerebral arteriovenous malformation (AVM)    Left Prepontine Hemorrhage      Problem List:    Celiac artery stenosis    Hypertensive emergency    Vasogenic cerebral edema    SAH (subarachnoid hemorrhage)    Type 2 diabetes mellitus without complication, without long-term current use of insulin     ICU Comprehensive Plan of Care:     1. Neurological System  TCDs  Nimodipine  Keppra  Decadron  Q1 hr neurochecks  Spontaneous Awakening Trial: N/A  Sedation: N/A  Analgesia: Acetaminophen    2. Cardiovascular System  Clonidine  Hydralazine  Lisinopril  Metoprolol  SBP Goal of: < 140 mmHg  MAP Goal of: > 65 mmHg  Nicardipine (Cardene) - For above SBP/MAP goals  Transfusion Trigger (Hgb): <7 g/dL  Keep K > 4; Mg > 2     3. Respiratory System  Head of bed > 30 degrees  Aggressive bronchopulmonary hygiene  Incentive spirometry  Spontaneous Breathing Trial: N/A  Pulmonary toilet: Incentive Spirometry   SpO2 Goal: > 92%  DVT Prophylaxis: SCD's or Sequential Compression Device     4. Renal/GI/Endocrine System  IVFs: LR  Ulcer Prophylaxis: Not at this time   Bowel Regimen: Docusate (Colace)  Feeding:  Yes   Blood Sugar Goal 120-180 - Glycemic Control: Insulin    5. Infectious Disease  Indwelling Catheter:  Tubes: None  Lines: Peripheral IV  Drains: None    6. PT/OT: PT consulted and on board, OT consulted and on board and Speech therapy consulted and on board     7. Goals of Care Discussion with family Yes     8. Plan of Care/Code Status: Full Code    9. Discussed Care Plan with Bedside RN    10.  Documentation of Current Medications    Subjective:     Progress Note:   2021   6:47 AM     Reason for ICU Admission:   Cerebral arteriovenous malformation (AVM)     Overnight Events:    -- On Cardene    Past Medical History: has a past medical history of Acute hypokalemia (7/20/2021), Celiac artery stenosis ([facility] [address] (7/21/2021), Cerebral arteriovenous malformation (AVM) (7/20/2021), Diabetes ([facility] [address] ED (erectile dysfunction), HTN (hypertension), Hypertension, Hypertensive emergency (7/21/2021), Impaired fasting glucose, Malignant hypertension (7/20/2021), and Vasogenic cerebral edema ([facility] [address] (7/21/2021). Past Surgical History:      has a past surgical history that includes hx tonsil and adenoidectomy.     Current Medications:     Current Facility-Administered Medications   Medication Dose Route Frequency    hydrALAZINE (APRESOLINE) tablet 50 mg  50 mg Oral TID    niCARdipine (CARDENE) 25 mg in 0.9% sodium chloride 250 mL infusion  0-15 mg/hr IntraVENous TITRATE    labetaloL (NORMODYNE;TRANDATE) injection 20 mg  20 mg IntraVENous Q4H PRN    insulin lispro (HUMALOG) injection   SubCUTAneous AC&HS    glucose chewable tablet 16 g  4 Tablet Oral PRN    dextrose (D50W) injection syrg 12.5-25 g  12.5-25 g IntraVENous PRN    glucagon (GLUCAGEN) injection 1 mg  1 mg IntraMUSCular PRN    cloNIDine (CATAPRES) 0.2 mg/24 hr patch 1 Patch  1 Patch TransDERmal Q7D    dexamethasone (DECADRON) 4 mg/mL injection 4 mg  4 mg IntraVENous Q8H    metoprolol tartrate (LOPRESSOR) tablet 50 mg  50 mg Oral Q12H    ondansetron (ZOFRAN) injection 4 mg  4 mg IntraVENous Q6H PRN    naloxone (NARCAN) injection 0.4 mg  0.4 mg IntraVENous PRN    acetaminophen (TYLENOL) tablet 650 mg  650 mg Oral Q4H PRN    Or    acetaminophen (TYLENOL) solution 650 mg  650 mg Per NG tube Q4H PRN    Or    acetaminophen (TYLENOL) suppository 650 mg  650 mg Rectal Q4H PRN    lactated Ringers infusion  125 mL/hr IntraVENous CONTINUOUS    glucose chewable tablet 16 g  4 Tablet Oral PRN    niMODipine (NIMOTOP) capsule 60 mg  60 mg Oral Q4H    levETIRAcetam (KEPPRA) tablet 500 mg  500 mg Oral BID    docusate sodium (COLACE) capsule 100 mg  100 mg Oral DAILY PRN    lisinopriL (PRINIVIL, ZESTRIL) tablet 40 mg  40 mg Oral DAILY       Allergies/Social/Family History:     No Known Allergies   Social History     Tobacco Use    Smoking status: Former Smoker     Packs/day: 2.00     Years: 20.00     Pack years: 40.00     Types: Cigarettes     Quit date: 2015     Years since quittin.4    Smokeless tobacco: Current User    Tobacco comment: Patient reports he is Vaping    Substance Use Topics    Alcohol use: Yes     Alcohol/week: 6.0 standard drinks     Types: 6 Cans of beer per week      Family History   Problem Relation Age of Onset    Diabetes Mother         type 2    Hypertension Mother     Heart Disease Mother         CHF    Hypertension Father     Heart Disease Father 48        MI       Review of Systems:     A comprehensive review of systems was negative. Objective:   Vital Signs:  Visit Vitals  BP (!) 142/74   Pulse (!) 58   Temp 97.4 °F (36.3 °C)   Resp 16   Ht 6' (1.829 m)   Wt 108.3 kg (238 lb 12.1 oz)   SpO2 98%   BMI 32.38 kg/m²      O2 Device: None (Room air) Temp (24hrs), Av.7 °F (36.5 °C), Min:97.4 °F (36.3 °C), Max:97.9 °F (36.6 °C)           Intake/Output:     Intake/Output Summary (Last 24 hours) at 2021 0912  Last data filed at 2021 1353  Gross per 24 hour   Intake 2749.75 ml   Output 1875 ml   Net 874.75 ml       Physical Exam:  General:   Alert, cooperative, no distress, appears stated age. Facial asymmetry with diplopia. Dysmetria LUE. Eyes:   Conjunctivae/corneas clear. PERRL, EOMs intact. Ears:   Normal external ear canals bilaterally. Nose:   No drainage or sinus tenderness. Mouth/Throat:  Moist mucous membranes. Dentition normal.    Neck:  Symmetrical, trachea midline, no JVD or carotid bruit. Back:    No CVA tenderness to percussion. Lungs:    Clear to auscultation bilaterally. Heart:   Regular rate and rhythm. S1, S2 normal, without murmur, click, rub or gallop. Abdomen:    Normoactive bowel sounds.  Soft, non-tender, no masses or organomegaly. Extremities:  Atraumatic, no cyanosis or edema. Vascular:  Pulses 2+ and symmetric UE/LE bilat   Skin:  Normal color, non-diaphoretic, brisk capillary refill (Less than 2 seconds). No rashes or lesions. Lymph nodes:  No Lymphadenopathy. Neurologic:  CN II-XII intact. Normal strength.       LABS AND  DATA: Personally reviewed  Recent Labs     07/24/21  0720 07/23/21  0430   WBC 10.7 12.5*   HGB 14.8 14.2   HCT 43.2 42.0    255     Recent Labs     07/24/21  0720 07/23/21  0430    140   K 3.9 4.1   * 111*   CO2 24 22   BUN 17 21*   CREA 0.75 0.86   * 160*   CA 8.2* 7.9*   MG 2.6* 2.8*   PHOS 2.6 2.4*     No results for input(s): AP, TBIL, TP, ALB, GLOB, AML, LPSE in the last 72 hours. No lab exists for component: SGOT, GPT, AMYP  No results for input(s): INR, PTP, APTT, INREXT, INREXT in the last 72 hours. No results for input(s): PHI, PCO2I, PO2I, FIO2I in the last 72 hours. No results for input(s): CPK, CKMB, TROIQ, BNPP in the last 72 hours. MEDS: Reviewed    RADIOLOGY:  CT HEAD WO CONT    Result Date: 7/23/2021  No hydrocephalus. Stable left prepontine subarachnoid hemorrhage          Assessment:     Hospital Problems  Date Reviewed: 7/20/2021        Codes Class Noted POA    Celiac artery stenosis (HCC) (Chronic) ICD-10-CM: I77.4  ICD-9-CM: 447. 4 Chronic 7/21/2021 Yes        Vasogenic cerebral edema (HCC) ICD-10-CM: G93.6  ICD-9-CM: 965. 5 Acute 7/21/2021 No        Hypertensive emergency ICD-10-CM: I16.1  ICD-9-CM: 401.9 Acute 7/21/2021 Yes        Malignant hypertension ICD-10-CM: I10  ICD-9-CM: 401.0 Acute 7/20/2021 Yes        * (Principal) Cerebral arteriovenous malformation (AVM) ICD-10-CM: Q28.2  ICD-9-CM: 747.81 Acute 7/20/2021 Yes    Overview Signed 7/20/2021  7:03 AM by Mik Song MD     ([id number])- CT (Brain): There is an AVM in the superior cerebellum with multiple aneurysms. One measures 4 mm.  Another measures 4 mm within the superior cerebellum. Acute hypokalemia ICD-10-CM: E87.6  ICD-9-CM: 276.8 Acute 7/20/2021 Yes        SAH (subarachnoid hemorrhage) ([facility] [address] ICD-10-CM: I60.9  ICD-9-CM: 648  7/20/2021 Yes            Multidisciplinary Rounds Completed:  Yes     ABCDEF Bundle/Checklist  Pain Medications: None  Target RASS: 0 - Alert & Calm - Spontaneously pays attention to caregiver  Sedation Medications: None  CAM-ICU:  Negative  Discussed Plan of Care (goals of care): Yes  Addressed Code Status: Full Code     CARDIOVASCULAR  Cardiac Gtts: Nicardipine (Cardene)  SBP Goal of: > 90 mmHg  MAP Goal of: > 65 mmHg  Transfusion Trigger (Hgb): <7 g/dL     RESPIRATORY  Vent Goals:   Head of bed > 30 degrees  Aggressive bronchopulmonary hygiene  DVT Prophylaxis (if no, list reason): SCD's or Sequential Compression Device   SPO2 Goal: > 92%  Pulmonary toilet: Incentive Spirometry      T/L/D  Tubes: None  Lines: Peripheral IV  Drains: None     SPECIAL EQUIPMENT  None     DISPOSITION  Stay in ICU     CRITICAL CARE CONSULTANT NOTE  I provided a face-to-face bedside physician/patient encounter, greater than the usual and customary amount normally needed, due to the high complexity of medical decision-making required.     I reviewed and interpreted patient data including clinical events, labs, images, vital signs, I/O's, and examined patient.       I have actively participated in multi-disciplinary discussions (Neurology, IR, ICU Nursing Staff) regarding the case in formulating an optimal therapeutic plan, and effecting a management strategy for this patient.     NOTE OF PERSONAL INVOLVEMENT IN CARE   This patient has a high probability of imminent, clinically significant deterioration, which requires the highest level of preparedness to intervene urgently.  I participated in the decision-making and personally managed, or directed the management of, a myriad of life and organ supporting interventions which required my frequent-interval clinical reassessments, in order to treat or prevent imminent deterioration.     I personally spent 55 minutes of critical care time.  This is time spent at this critically ill patient's bedside actively involved in patient care as well as the coordination of care and discussions with the patient's family.  This does not include any procedural time which has been billed separately.  Wilber Cherry [address], DO   Staff Intensivist/Anesthesiologist  Critical Care Medicine

## 2021-07-24 NOTE — PROGRESS NOTES
RN informed me that patient is complaining that he is receiving too many blood pressure medications and he would like to talk to a provider. I went and spoke to the patient and informed him that his head CT today showed a slight increase in the hemorrhage. We are trying to keep his blood pressure on the lower end to prevent any further bleeding. He reports he starts to feel bilateral numbness/tingling in his hands, feet, and around his lips with associated chest pain when his SBP drops to the high 130s. Huber Edmond He reports that when his systolic blood pressure increases to the 150's, his symptoms resolve. He denies any current symptoms. He is maxed out on Cardene. He also received IV labetalol and IV hydralazine in addition to PO antihypertensives due to inability to keep SBP <140. Informed RN to assess NICOM study to assess fluid responsive. ? If too many combinations of blood pressure medications is contributing to the patient's symptoms. SBP is currently in the 150's and the patient is asymptomatic. TCDs today show a very limited study. No evidence of vasospasm in the visualized segments bilaterally. The right MCA, bilateral JOSSELYN and terminal ICA were not visualized. The patient is +1800 mls on I's and O's currently. I would like to keep patient's BP under 140 due to hemorrhage and Head CT showing slight increase in hemorrhage today. May consider adding labetalol drip for now to slowly titrate to meet BP parameters. Will discuss with oncoming night shift Neurocritical care NP.     Grecia Reddy, [facility]  Neurocritical care NP

## 2021-07-24 NOTE — PROGRESS NOTES
Neurointerventional Surgery Progress Note  Taniajuan Stallings, AGACNP-BC  Neurocritical Care NP      Admit Date: 7/20/2021   LOS: 4 days        Daily Progress Note: 7/24/2021    S/P: POD 4 status post diagnostic cerebral angiogram    HPI: Yazmin Reeves is a 70-year-old male with a past medical history significant for hypertension and diabetes type 2 who initially presented to [facility] ED on 7/19/2021 for acute onset of dizziness, headache, left-sided hearing loss, and left-sided weakness. Upon arrival to the ED his blood pressure was noted to be 265/120 per review of notes. A code stroke was called and CT of the head showed an acute subarachnoid hemorrhage anterior to the sarahi. CTA of the head and neck showed an AVM in the left superior cerebellar peduncle, with the dominant drainage into the vein of Gaston. No large vessel occlusion, no hemodynamically significant stenosis. Redemonstrated subarachnoid hemorrhage in the basilar cisterns. He was started on a Cardene drip and was also given IV hydralazine and labetalol. He was then transferred to [facility] for a higher level of care. NIS was consulted and patient underwent a diagnostic cerebral angiogram on 7/20/2021. The angiogram showed evidence of a cerebral AVM with multiple feeders coming of the left PCA, left anterior choroidal and left SCA with a large single draining vein that drains into the vein of Villanueva. 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 Subjective: The patient is sitting up in the chair. He does complain of having some constipation. He has been receiving a stool softener. He does feel the urge to go and is passing gas. He was able to have a small bowel movement just now. He denies any headache, speech difficulty, difficulty swallowing, chest pain, shortness of breath, nausea, or vomiting. He still endorses some double vision, but reports that it has not worsened.   He also complains of dizziness with movement, balance, and coordination issues. He reports that he stutters at baseline. He also complains of left facial numbness and left facial weakness.      Current Facility-Administered Medications   Medication Dose Route Frequency Provider Last Rate Last Admin    niCARdipine (CARDENE) 25 mg in 0.9% sodium chloride 250 mL infusion  0-15 mg/hr IntraVENous TITRATE Adalgisa Medina  mL/hr at 07/24/21 1300 10 mg/hr at 07/24/21 1300    labetaloL (NORMODYNE;TRANDATE) injection 20 mg  20 mg IntraVENous Q4H PRN Lu Rodríguez MD        metoprolol tartrate (LOPRESSOR) tablet 100 mg  100 mg Oral Q12H Herberth Morales DO        hydrALAZINE (APRESOLINE) tablet 100 mg  100 mg Oral TID Herberth Morales DO        [START ON 7/25/2021] senna-docusate (PERICOLACE) 8.6-50 mg per tablet 1 Tablet  1 Tablet Oral DAILY Aarti Grant NP        polyethylene glycol (MIRALAX) packet 17 g  17 g Oral DAILY PRN Aarti Grant NP        bisacodyL (DULCOLAX) tablet 10 mg  10 mg Oral DAILY PRN Aarti Grant NP        insulin lispro (HUMALOG) injection   SubCUTAneous AC&HS Leora Madison NP   3 Units at 07/24/21 1233    glucose chewable tablet 16 g  4 Tablet Oral PRN Leora Madison NP        dextrose (D50W) injection syrg 12.5-25 g  12.5-25 g IntraVENous PRN Leora Madison NP        glucagon (GLUCAGEN) injection 1 mg  1 mg IntraMUSCular PRN Leora Madison NP        cloNIDine (CATAPRES) 0.2 mg/24 hr patch 1 Patch  1 Patch TransDERmal Q7D Brian Aj MD   1 Patch at 07/21/21 1606    dexamethasone (DECADRON) 4 mg/mL injection 4 mg  4 mg IntraVENous Q8H Rema Gallegos NP   4 mg at 07/24/21 0708    ondansetron (ZOFRAN) injection 4 mg  4 mg IntraVENous Q6H PRN ANGELINE Agudelo        naloxone [facility]) injection 0.4 mg  0.4 mg IntraVENous PRN ANGELINE Agudelo        acetaminophen (TYLENOL) tablet 650 mg  650 mg Oral Q4H PRN ANGELINE Agudelo        Or    acetaminophen (TYLENOL) solution 650 mg  650 mg Per NG tube Q4H PRN ANGELINE Newton        Or    acetaminophen (TYLENOL) suppository 650 mg  650 mg Rectal Q4H PRN ANGELINE Morales        lactated Ringers infusion  125 mL/hr IntraVENous CONTINUOUS Rosy [address], NP 25 mL/hr at 21 1301 25 mL/hr at 21 1301    glucose chewable tablet 16 g  4 Tablet Oral PRN ANGELINE Morales        niMODipine (NIMOTOP) capsule 60 mg  60 mg Oral Q4H Gabbi Gallegos, NP   60 mg at 21 1100    levETIRAcetam (KEPPRA) tablet 500 mg  500 mg Oral BID Rosy [address], NP   500 mg at 21 9125    lisinopriL (PRINIVIL, ZESTRIL) tablet 40 mg  40 mg Oral DAILY Eden Gallegos NP   40 mg at 21 194        No Known Allergies    Review of Systems:  Pertinent items are noted in the History of Present Illness. Objective:     Vital signs  Temp (24hrs), Av.5 °F (36.4 °C), Min:97.3 °F (36.3 °C), Max:97.7 °F (36.5 °C)    07 -  1900  In: 1855.8 [P.O.:1105; I.V.:750.8]  Out: 800 [Urine:800]  1901 -  0700  In: 4999.3 [P.O.:500;  I.V.:4499.3]  Out: 2450 [Urine:2450]    Visit Vitals  BP (!) 147/74   Pulse (!) 59   Temp 97.3 °F (36.3 °C)   Resp 16   Ht 6' (1.829 m)   Wt 238 lb 12.1 oz (108.3 kg)   SpO2 100%   BMI 32.38 kg/m²      O2 Device: None (Room air)     Pain control  Pain Assessment  Pain Scale 1: Numeric (0 - 10)  Pain Intensity 1: 0  Pain Location 1: Head  Pain Orientation 1: Posterior  Pain Description 1: Aching, Constant  Pain Intervention(s) 1: Distraction, Declines    PT/OT  Gait     Gait  Base of Support: Widened  Speed/Rita: Pace decreased (<100 feet/min), Shuffled  Step Length: Left shortened, Right shortened  Gait Abnormalities: Ataxic, Altered arm swing, Trunk sway increased  Ambulation - Level of Assistance: Minimal assistance, Assist x1  Distance (ft): 30 Feet (ft) (x 2 reps)  Assistive Device: Gait belt             Vitals:    21 1200 21 1238 07/24/21 1300 07/24/21 1400   BP: (!) 143/61 (!) 145/78 (!) 143/68 (!) 147/74   Pulse: 63 63 (!) 59 (!) 59   Resp: 14  16 16   Temp: 97.3 °F (36.3 °C)      SpO2: 100%  100% 100%   Weight:       Height:            Physical Exam:  GENERAL: alert, cooperative, no distress, appears stated age  LUNG: clear to auscultation bilaterally  HEART: regular rate and rhythm, S1, S2 normal, no murmur, click, rub or gallop  ABDOMEN: soft, non-tender to palpation. Active bowel sounds. No masses,  no organomegaly  EXTREMITIES:  extremities normal, atraumatic, no cyanosis, trace peripheral edema, distal pulse 2+ bilaterally   SKIN: Skin cool to touch. Appropriate for ethnicity. Right groin site clean, dry, and intact. No hematoma, bruising, or bleeding noted. Neurologic Exam:  Mental Status:  Alert and oriented x 4. Appropriate affect, mood and behavior. Language:    Normal fluency, repetition, comprehension and naming. Cranial Nerves:        Pupils equal, round and reactive to light. Visual fields full to confrontation. Left eye dysconjugate at times (intermittently deviates inward), otherwise, extraocular movements intact. Mild nystagmus when looking to the left. Decreased sensation on the left side of face. Left facial asymmetry     Tongue protrudes to midline, palate elevates symmetrically. No dysarthria. Motor:    No pronator drift. Bulk and tone normal.      5/5 power in all extremities proximally and distally. No involuntary movements. Sensation:    Decreased sensation on the left side of face, otherwise sensation intact to light touch. No neglect. Coordination & Gait: Incoordination in RUE and LUE with FTN (right worse than left). HTS intact bilaterally. Gait deferred.      NIHSS:      1a-LOC:0    1b-Month/Age:0    1c-Open/Close Hand:0    2-Best Gaze:0    3-Visual Fields:0    4-Facial Palsy:2    5a-Left Arm:0    5b-Right Arm:0 6a-Left Le    6b-Right Le    7-Limb Ataxia:2    8-Sensory:1    9-Best Language:0    10-Dysarthria:0    11-Extinction/Inattention:0  TOTAL SCORE:5        24 hour results:    Recent Results (from the past 24 hour(s))   GLUCOSE, POC    Collection Time: 21  5:04 PM   Result Value Ref Range    Glucose (POC) 125 (H) 65 - 117 mg/dL    Performed by Chris Reveles    GLUCOSE, POC    Collection Time: 21  9:55 PM   Result Value Ref Range    Glucose (POC) 191 (H) 65 - 117 mg/dL    Performed by [address] [place] [address] [id number]    Collection Time: 21  7:20 AM   Result Value Ref Range    Magnesium 2.6 (H) 1.6 - 2.4 mg/dL   PHOSPHORUS    Collection Time: 21  7:20 AM   Result Value Ref Range    Phosphorus 2.6 2.6 - 4.7 MG/DL   METABOLIC PANEL, BASIC    Collection Time: 21  7:20 AM   Result Value Ref Range    Sodium 139 136 - 145 mmol/L    Potassium 3.9 3.5 - 5.1 mmol/L    Chloride 109 (H) 97 - 108 mmol/L    CO2 24 21 - 32 mmol/L    Anion gap 6 5 - 15 mmol/L    Glucose 120 (H) 65 - 100 mg/dL    BUN 17 6 - 20 MG/DL    Creatinine 0.75 0.70 - 1.30 MG/DL    BUN/Creatinine ratio 23 (H) 12 - 20      GFR est AA >60 >60 ml/min/1.73m2    GFR est non-AA >60 >60 ml/min/1.73m2    Calcium 8.2 (L) 8.5 - 10.1 MG/DL   CBC W/O DIFF    Collection Time: 21  7:20 AM   Result Value Ref Range    WBC 10.7 4.1 - 11.1 K/uL    RBC 5.20 4. 10 - 5.70 M/uL    HGB 14.8 12.1 - 17.0 g/dL    HCT 43.2 36.6 - 50.3 %    MCV 83.1 80.0 - 99.0 FL    MCH 28.5 26.0 - 34.0 PG    MCHC 34.3 30.0 - 36.5 g/dL    RDW 13.2 11.5 - 14.5 %    PLATELET 119 132 - 659 K/uL    MPV 10.2 8.9 - 12.9 FL    NRBC 0.0 0  WBC    ABSOLUTE NRBC 0.00 0.00 - 0.01 K/uL   GLUCOSE, POC    Collection Time: 21  7:20 AM   Result Value Ref Range    Glucose (POC) 116 65 - 117 mg/dL    Performed by BISI BATRES    TCD INTRACRANIAL ARTERIES COMPLETE    Collection Time: 21  9:49 AM   Result Value Ref Range    Right PCA 1 PSV 38.6 cm/s    Right PCA 1 EDV 20.5 cm/s    Right Vertebral PSV 36.0 cm/s    Right Vertebral EDV 16.6 cm/s    Left PCA 1 EDV 16.3 cm/s    Left PCA 1 PSV 38 cm/s    Left MCA 1 EDV 28.7 cm/s    Left Vertebral PSV 48.9 cm/s    Left Vertebral EDV 19.2 cm/s    Basilar Artery EDV 20.1 cm/s    Right ICA dist sys 45.6 cm/s    Right ICA dist mabry 10.6 cm/s    Left ICA dist sys 44.3 cm/s    Left ICA dist mabry 14.5 cm/s    Right PCA 1 Mean Velocity 27 cm/s    Right External ICA PSV 46 cm/s    Right External ICA EDV 11 cm/s    Right External ICA Mean Velocity 23 cm/s    Left MCA 1 PSV 56 cm/s    Left MCA 1 Mean Velocity 38 cm/s    Left External ICA PSV 44 cm/s    Left External ICA EDV 15 cm/s    Left External ICA Mean Velocity 25 cm/s    Left PCA 1 Mean Velocity 23 cm/s    Left Lindegaard Ratio 1.5     Basilar Artery PSV 56 cm/s    Basilar Artery Mean Farhat 32 cm/s    Right Vertebral Mean Velocity 23 cm/s    Left Vertebral Mean Velocity 29 cm/s   GLUCOSE, POC    Collection Time: 07/24/21 11:30 AM   Result Value Ref Range    Glucose (POC) 240 (H) 65 - 117 mg/dL    Performed by Jewel Rubin           Imaging:  CT of head on 7/19/2021 at 2104 shows  IMPRESSION  1. Acute subarachnoid hemorrhage anterior to the sarahi. CTA of the head and neck on 7/19/2021 at 2126 shows  IMPRESSION     1. Arteriovenous malformation in the left superior cerebellar peduncle, with the  dominant drainage into the vein of Gaston. 2. No large vessel occlusion, no hemodynamically significant stenosis. 3. Redemonstrated subarachnoid hemorrhage in the basilar cisterns. CT the head on 7/20/2021 at 1129 shows  IMPRESSION  1. Stable ventricular size.     2.  Questionable diffuse cerebral edema.     3.  Subarachnoid hemorrhage in the prepontine cistern and ambient cisterns as  well as sylvian fissures are decreased from the prior study. CT of head on 7/23/2021 at 2233 shows     IMPRESSION  No hydrocephalus. Stable left prepontine subarachnoid hemorrhage.     CT of Head on 7/24/2021 at 1357  IMPRESSION:   Left prepontine hemorrhage is slightly increased. Assessment:     Principal Problem:    Cerebral arteriovenous malformation (AVM) (7/20/2021)      Overview: ([id number])- CT (Brain): There is an AVM in the superior cerebellum with multiple aneurysms. One measures 4 mm. Another measures 4 mm within the superior cerebellum. Active Problems:    Malignant hypertension (7/20/2021)      Acute hypokalemia (7/20/2021)      Celiac artery stenosis ([address] (7/21/2021)      Vasogenic cerebral edema ([address] (7/21/2021)      Hypertensive emergency (7/21/2021)      SAH (subarachnoid hemorrhage) ([address] (7/20/2021)        Plan:   Acute SAH in the setting of cerebellar AVM with associated flow related aneurysm, [place] 1, Mistry Grade 2  - s/p diagnostic cerebral angiogram on 7/20/2021  - Day 5 of 21 of Nimotop for the prevention of delayed cerebral ischemia  - continue Keppra 500 mg BID for seizure ppx  - continue LR at 125 ml/hr to maintain euvolemia  - Patient noted to be ataxic in BUE (R>L) appears to be relatively new on exam compared to previous documentation, he also has intermittent left eye dysconjugate gaze, left facial numbness and left facial weakness. Nystagmus noted when looking to the left. Given ataxia on exam Head CT was repeated today for any interval changes. Repeat Head CT shows left prepontine hemorrhage is slightly increased. - Strict I's and O's, patient +1180 mls on  I's and O's currently  - ECHO shows EF 65-70%, dilated left atrium. No shunting seen. Mild (grade 1) left ventricular diastolic dysfunction. - continue every hour neuro checks  - SBP goal <140, Cardene/Labetalol PRN  - PT/OT/SLP evals   - assess NICOM PRN for fluid responsiveness   - daily TCDs, TCDs today show there is a limited study. No evidence of vasospasm in the visualized segments bilaterally. The right MCA, bilateral JOSSELYN and terminal ICA were not visualized.   - NIS following     HTN  - SBP goal <140 as stated above  - Cardene/Labetalol PRN    Constipation  - discontinue Colace and start Rissa-colace daily  - Miralax ordered PRN daily  - Dulcolax ordered 10 mg daily PRN    Activity: Up with assistance   DVT ppx: SCDs  Dispo: TBD    Plan d/w Dr. [place] Duke, Dr. Audi Palafox, RN, and patient.        Shannon Medina NP

## 2021-07-24 NOTE — PROGRESS NOTES
1930: Bedside and Verbal shift change report given to MEETA Langston, RN (oncoming nurse) by HERIBERTO Neil RN (offgoing nurse). Report included the following information SBAR, Kardex, Intake/Output, MAR, Accordion, Recent Results, Cardiac Rhythm SB, Alarm Parameters  and Dual Neuro Assessment. 0730: Bedside and Verbal shift change report given to MÓNICA Sawant (oncoming nurse) by Estrellita Langston RN (offgoing nurse). Report included the following information SBAR, Kardex, Intake/Output, MAR, Accordion, Recent Results, Cardiac Rhythm SB, Alarm Parameters  and Dual Neuro Assessment.

## 2021-07-24 NOTE — PROGRESS NOTES
Neurocritical Care Brief Progress Note:    77-year-old male with SAH with cerebellar AVM with multiple feeders coming off the left PCA, left anterior choroidal and left SCA with a large single draining vein that drains into the vein of Gaston. 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 He received his eye patch today and states it is helping a lot. Denies any current dizziness. CTH tonight showed no hydrocephalus. Stable left prepontine SAH. Physical Exam:  Gen: NAD, calm, cooperative  Neuro: A&Ox4. Follows commands. Speech clear. Affect normal. PERRL, 3 mm bilaterally. Visual fields full to confrontation. However, has stated double vision at a distance. Mild disconjugate gaze present at times. EOMI. Significant left lower facial droop with stated left facial numbness noted on light touch. Stated decreased hearing on the left. Beni spontaneously. Strength 5/5 in UE and LE BL. Negative drift. Bulk and tone normal. No involuntary movements. Gait deferred. Ataxia not noted on my exam.   Skin: Warm, dry, color appropriate for ethnicity. Continue current plan per NIS.      Rama De NP  Neurocritical Care Nurse Practitioner  [phone]

## 2021-07-25 ENCOUNTER — APPOINTMENT (OUTPATIENT)
Dept: VASCULAR SURGERY | Age: 61
DRG: 064 | End: 2021-07-25
Attending: NURSE PRACTITIONER
Payer: COMMERCIAL

## 2021-07-25 LAB
ANION GAP SERPL CALC-SCNC: 6 MMOL/L (ref 5–15)
ATRIAL RATE: 59 BPM
ATRIAL RATE: 60 BPM
BUN SERPL-MCNC: 17 MG/DL (ref 6–20)
BUN/CREAT SERPL: 22 (ref 12–20)
CALCIUM SERPL-MCNC: 7.5 MG/DL (ref 8.5–10.1)
CALCULATED P AXIS, ECG09: 145 DEGREES
CALCULATED P AXIS, ECG09: 145 DEGREES
CALCULATED R AXIS, ECG10: 94 DEGREES
CALCULATED R AXIS, ECG10: 99 DEGREES
CALCULATED T AXIS, ECG11: -28 DEGREES
CALCULATED T AXIS, ECG11: -29 DEGREES
CHLORIDE SERPL-SCNC: 111 MMOL/L (ref 97–108)
CO2 SERPL-SCNC: 23 MMOL/L (ref 21–32)
CREAT SERPL-MCNC: 0.78 MG/DL (ref 0.7–1.3)
DIAGNOSIS, 93000: NORMAL
DIAGNOSIS, 93000: NORMAL
ERYTHROCYTE [DISTWIDTH] IN BLOOD BY AUTOMATED COUNT: 13.2 % (ref 11.5–14.5)
GLUCOSE BLD STRIP.AUTO-MCNC: 117 MG/DL (ref 65–117)
GLUCOSE BLD STRIP.AUTO-MCNC: 118 MG/DL (ref 65–117)
GLUCOSE BLD STRIP.AUTO-MCNC: 140 MG/DL (ref 65–117)
GLUCOSE BLD STRIP.AUTO-MCNC: 167 MG/DL (ref 65–117)
GLUCOSE BLD STRIP.AUTO-MCNC: 179 MG/DL (ref 65–117)
GLUCOSE SERPL-MCNC: 129 MG/DL (ref 65–100)
HCT VFR BLD AUTO: 41.5 % (ref 36.6–50.3)
HGB BLD-MCNC: 14.3 G/DL (ref 12.1–17)
MAGNESIUM SERPL-MCNC: 2.6 MG/DL (ref 1.6–2.4)
MCH RBC QN AUTO: 28.4 PG (ref 26–34)
MCHC RBC AUTO-ENTMCNC: 34.5 G/DL (ref 30–36.5)
MCV RBC AUTO: 82.5 FL (ref 80–99)
NRBC # BLD: 0 K/UL (ref 0–0.01)
NRBC BLD-RTO: 0 PER 100 WBC
P-R INTERVAL, ECG05: 138 MS
P-R INTERVAL, ECG05: 152 MS
PHOSPHATE SERPL-MCNC: 3.1 MG/DL (ref 2.6–4.7)
PLATELET # BLD AUTO: 247 K/UL (ref 150–400)
PMV BLD AUTO: 10.4 FL (ref 8.9–12.9)
POTASSIUM SERPL-SCNC: 3.9 MMOL/L (ref 3.5–5.1)
Q-T INTERVAL, ECG07: 412 MS
Q-T INTERVAL, ECG07: 420 MS
QRS DURATION, ECG06: 100 MS
QRS DURATION, ECG06: 92 MS
QTC CALCULATION (BEZET), ECG08: 412 MS
QTC CALCULATION (BEZET), ECG08: 415 MS
RBC # BLD AUTO: 5.03 M/UL (ref 4.1–5.7)
SERVICE CMNT-IMP: ABNORMAL
SERVICE CMNT-IMP: NORMAL
SODIUM SERPL-SCNC: 140 MMOL/L (ref 136–145)
VENTRICULAR RATE, ECG03: 59 BPM
VENTRICULAR RATE, ECG03: 60 BPM
WBC # BLD AUTO: 10.5 K/UL (ref 4.1–11.1)

## 2021-07-25 PROCEDURE — 74011250637 HC RX REV CODE- 250/637: Performed by: NURSE PRACTITIONER

## 2021-07-25 PROCEDURE — 36415 COLL VENOUS BLD VENIPUNCTURE: CPT

## 2021-07-25 PROCEDURE — 85027 COMPLETE CBC AUTOMATED: CPT

## 2021-07-25 PROCEDURE — 74011250637 HC RX REV CODE- 250/637: Performed by: ANESTHESIOLOGY

## 2021-07-25 PROCEDURE — 80048 BASIC METABOLIC PNL TOTAL CA: CPT

## 2021-07-25 PROCEDURE — 74011250636 HC RX REV CODE- 250/636: Performed by: ANESTHESIOLOGY

## 2021-07-25 PROCEDURE — 83735 ASSAY OF MAGNESIUM: CPT

## 2021-07-25 PROCEDURE — 99233 SBSQ HOSP IP/OBS HIGH 50: CPT | Performed by: STUDENT IN AN ORGANIZED HEALTH CARE EDUCATION/TRAINING PROGRAM

## 2021-07-25 PROCEDURE — 74011250636 HC RX REV CODE- 250/636: Performed by: NURSE PRACTITIONER

## 2021-07-25 PROCEDURE — 74011000250 HC RX REV CODE- 250: Performed by: NURSE PRACTITIONER

## 2021-07-25 PROCEDURE — 93886 INTRACRANIAL COMPLETE STUDY: CPT

## 2021-07-25 PROCEDURE — 82962 GLUCOSE BLOOD TEST: CPT

## 2021-07-25 PROCEDURE — 74011636637 HC RX REV CODE- 636/637: Performed by: NURSE PRACTITIONER

## 2021-07-25 PROCEDURE — 74011000258 HC RX REV CODE- 258: Performed by: NURSE PRACTITIONER

## 2021-07-25 PROCEDURE — 84100 ASSAY OF PHOSPHORUS: CPT

## 2021-07-25 PROCEDURE — 65610000006 HC RM INTENSIVE CARE

## 2021-07-25 PROCEDURE — APPSS45 APP SPLIT SHARED TIME 31-45 MINUTES: Performed by: NURSE PRACTITIONER

## 2021-07-25 RX ORDER — METOPROLOL TARTRATE 50 MG/1
100 TABLET ORAL EVERY 8 HOURS
Status: DISCONTINUED | OUTPATIENT
Start: 2021-07-25 | End: 2021-07-27

## 2021-07-25 RX ORDER — ALPRAZOLAM 0.5 MG/1
0.25 TABLET ORAL ONCE
Status: DISCONTINUED | OUTPATIENT
Start: 2021-07-25 | End: 2021-07-25

## 2021-07-25 RX ORDER — ALPRAZOLAM 0.5 MG/1
0.5 TABLET ORAL ONCE
Status: COMPLETED | OUTPATIENT
Start: 2021-07-25 | End: 2021-07-25

## 2021-07-25 RX ADMIN — NIMODIPINE 60 MG: 30 CAPSULE, LIQUID FILLED ORAL at 15:19

## 2021-07-25 RX ADMIN — DOCUSATE SODIUM 50 MG AND SENNOSIDES 8.6 MG 1 TABLET: 8.6; 5 TABLET, FILM COATED ORAL at 09:06

## 2021-07-25 RX ADMIN — SODIUM CHLORIDE 2.5 MG/HR: 9 INJECTION, SOLUTION INTRAVENOUS at 03:18

## 2021-07-25 RX ADMIN — SODIUM CHLORIDE 15 MG/HR: 9 INJECTION, SOLUTION INTRAVENOUS at 14:33

## 2021-07-25 RX ADMIN — SODIUM CHLORIDE 0.5 MG/MIN: 9 INJECTION, SOLUTION INTRAVENOUS at 15:44

## 2021-07-25 RX ADMIN — NIMODIPINE 60 MG: 30 CAPSULE, LIQUID FILLED ORAL at 22:20

## 2021-07-25 RX ADMIN — DEXAMETHASONE SODIUM PHOSPHATE 4 MG: 4 INJECTION, SOLUTION INTRAMUSCULAR; INTRAVENOUS at 06:40

## 2021-07-25 RX ADMIN — NIMODIPINE 60 MG: 30 CAPSULE, LIQUID FILLED ORAL at 18:40

## 2021-07-25 RX ADMIN — SODIUM CHLORIDE 0.5 MG/MIN: 9 INJECTION, SOLUTION INTRAVENOUS at 12:10

## 2021-07-25 RX ADMIN — LISINOPRIL 40 MG: 20 TABLET ORAL at 09:07

## 2021-07-25 RX ADMIN — ALPRAZOLAM 0.5 MG: 0.5 TABLET ORAL at 13:41

## 2021-07-25 RX ADMIN — INSULIN LISPRO 2 UNITS: 100 INJECTION, SOLUTION INTRAVENOUS; SUBCUTANEOUS at 21:09

## 2021-07-25 RX ADMIN — SODIUM CHLORIDE 15 MG/HR: 9 INJECTION, SOLUTION INTRAVENOUS at 22:22

## 2021-07-25 RX ADMIN — NIMODIPINE 60 MG: 30 CAPSULE, LIQUID FILLED ORAL at 11:27

## 2021-07-25 RX ADMIN — DEXAMETHASONE SODIUM PHOSPHATE 4 MG: 4 INJECTION, SOLUTION INTRAMUSCULAR; INTRAVENOUS at 21:11

## 2021-07-25 RX ADMIN — SODIUM CHLORIDE 7.5 MG/HR: 9 INJECTION, SOLUTION INTRAVENOUS at 09:19

## 2021-07-25 RX ADMIN — POLYETHYLENE GLYCOL 3350 17 G: 17 POWDER, FOR SOLUTION ORAL at 09:06

## 2021-07-25 RX ADMIN — SODIUM CHLORIDE 15 MG/HR: 9 INJECTION, SOLUTION INTRAVENOUS at 11:28

## 2021-07-25 RX ADMIN — METOPROLOL TARTRATE 100 MG: 50 TABLET, FILM COATED ORAL at 13:41

## 2021-07-25 RX ADMIN — SODIUM CHLORIDE, POTASSIUM CHLORIDE, SODIUM LACTATE AND CALCIUM CHLORIDE 75 ML/HR: 600; 310; 30; 20 INJECTION, SOLUTION INTRAVENOUS at 01:44

## 2021-07-25 RX ADMIN — NIMODIPINE 60 MG: 30 CAPSULE, LIQUID FILLED ORAL at 00:11

## 2021-07-25 RX ADMIN — SODIUM CHLORIDE 15 MG/HR: 9 INJECTION, SOLUTION INTRAVENOUS at 17:33

## 2021-07-25 RX ADMIN — SODIUM CHLORIDE 15 MG/HR: 9 INJECTION, SOLUTION INTRAVENOUS at 20:33

## 2021-07-25 RX ADMIN — HYDRALAZINE HYDROCHLORIDE 100 MG: 50 TABLET, FILM COATED ORAL at 15:20

## 2021-07-25 RX ADMIN — INSULIN LISPRO 2 UNITS: 100 INJECTION, SOLUTION INTRAVENOUS; SUBCUTANEOUS at 17:36

## 2021-07-25 RX ADMIN — METOPROLOL TARTRATE 100 MG: 50 TABLET, FILM COATED ORAL at 09:08

## 2021-07-25 RX ADMIN — DEXAMETHASONE SODIUM PHOSPHATE 4 MG: 4 INJECTION, SOLUTION INTRAMUSCULAR; INTRAVENOUS at 00:08

## 2021-07-25 RX ADMIN — LEVETIRACETAM 500 MG: 500 TABLET ORAL at 09:08

## 2021-07-25 RX ADMIN — LEVETIRACETAM 500 MG: 500 TABLET ORAL at 18:40

## 2021-07-25 RX ADMIN — HYDRALAZINE HYDROCHLORIDE 20 MG: 20 INJECTION INTRAMUSCULAR; INTRAVENOUS at 10:09

## 2021-07-25 RX ADMIN — DEXAMETHASONE SODIUM PHOSPHATE 4 MG: 4 INJECTION, SOLUTION INTRAMUSCULAR; INTRAVENOUS at 13:42

## 2021-07-25 RX ADMIN — NIMODIPINE 60 MG: 30 CAPSULE, LIQUID FILLED ORAL at 03:26

## 2021-07-25 RX ADMIN — SODIUM CHLORIDE, POTASSIUM CHLORIDE, SODIUM LACTATE AND CALCIUM CHLORIDE 100 ML/HR: 600; 310; 30; 20 INJECTION, SOLUTION INTRAVENOUS at 12:46

## 2021-07-25 RX ADMIN — HYDRALAZINE HYDROCHLORIDE 100 MG: 50 TABLET, FILM COATED ORAL at 09:08

## 2021-07-25 RX ADMIN — METOPROLOL TARTRATE 100 MG: 50 TABLET, FILM COATED ORAL at 21:10

## 2021-07-25 RX ADMIN — SODIUM CHLORIDE 15 MG/HR: 9 INJECTION, SOLUTION INTRAVENOUS at 13:07

## 2021-07-25 RX ADMIN — SODIUM CHLORIDE 15 MG/HR: 9 INJECTION, SOLUTION INTRAVENOUS at 18:49

## 2021-07-25 RX ADMIN — HYDRALAZINE HYDROCHLORIDE 100 MG: 50 TABLET, FILM COATED ORAL at 00:11

## 2021-07-25 RX ADMIN — AMLODIPINE BESYLATE 10 MG: 5 TABLET ORAL at 09:07

## 2021-07-25 RX ADMIN — NIMODIPINE 60 MG: 30 CAPSULE, LIQUID FILLED ORAL at 06:40

## 2021-07-25 RX ADMIN — HYDRALAZINE HYDROCHLORIDE 100 MG: 50 TABLET, FILM COATED ORAL at 21:10

## 2021-07-25 RX ADMIN — INSULIN LISPRO 2 UNITS: 100 INJECTION, SOLUTION INTRAVENOUS; SUBCUTANEOUS at 13:37

## 2021-07-25 RX ADMIN — SODIUM CHLORIDE 15 MG/HR: 9 INJECTION, SOLUTION INTRAVENOUS at 15:45

## 2021-07-25 NOTE — PROGRESS NOTES
Neurointerventional Surgery Progress Note  Kacy Martino AGACNP-BC  Neurocritical Care NP      Admit Date: 7/20/2021   LOS: 5 days        Daily Progress Note: 7/25/2021    S/P: POD 5 status post diagnostic cerebral angiogram    HPI: Ronald Delong is a 19-year-old male with a past medical history significant for hypertension and diabetes type 2 who initially presented to [facility] ED on 7/19/2021 for acute onset of dizziness, headache, left-sided hearing loss, and left-sided weakness. Upon arrival to the ED his blood pressure was noted to be 265/120 per review of notes. A code stroke was called and CT of the head showed an acute subarachnoid hemorrhage anterior to the sarahi. CTA of the head and neck showed an AVM in the left superior cerebellar peduncle, with the dominant drainage into the vein of Gaston. No large vessel occlusion, no hemodynamically significant stenosis. Redemonstrated subarachnoid hemorrhage in the basilar cisterns. He was started on a Cardene drip and was also given IV hydralazine and labetalol. He was then transferred to [address] [place] [address] for a higher level of care. NIS was consulted and patient underwent a diagnostic cerebral angiogram on 7/20/2021. The angiogram showed evidence of a cerebral AVM with multiple feeders coming of the left PCA, left anterior choroidal and left SCA with a large single draining vein that drains into the vein of Villanueva. 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 Subjective:   Yesterday evening patient reported that he was having chest pain and numbness/tingling around his mouth, feet, and hands bilaterally when his BP dropped to the high 130's. Symptoms resolved when BP increased to the 150's. He also complained of left neck pain last night. EKG was completed and reportedly showed t-wave abnormality in inferior leads, but patient's troponin level was negative. The patient is feeling a little anxious today.   He reports that he wants to get up out of the bed and move around. He reports about 15 minutes ago he started having some numbness around his lips and numbness in his fingertips. SBP is in the 150's. He denies any tingling or numbness sensation in any other areas of his body. He is on Cardene for blood pressure control. Labetalol was added to assist in blood pressure control. He denies any headache, chest pain, shortness of breath, tingling, difficulty swallowing, nausea, vomiting, abdominal pain, or difficulty with speech. He does endorse double vision which has not worsened, dizziness with movement, and left facial numbness as previous. He reports that he did have a bowel movement yesterday but has not had any today. He is passing gas. He denies any abdominal cramping. He is on a bowel regimen.      Current Facility-Administered Medications   Medication Dose Route Frequency Provider Last Rate Last Admin    metoprolol tartrate (LOPRESSOR) tablet 100 mg  100 mg Oral Q8H Herberth Morales DO   100 mg at 07/25/21 1341    hydrALAZINE (APRESOLINE) tablet 100 mg  100 mg Oral TID Herberth Morales DO   100 mg at 07/25/21 1520    senna-docusate (PERICOLACE) 8.6-50 mg per tablet 1 Tablet  1 Tablet Oral DAILY Aziza Grant NP   1 Tablet at 07/25/21 0906    polyethylene glycol (MIRALAX) packet 17 g  17 g Oral DAILY PRN Aziza Grant NP   17 g at 07/25/21 0906    bisacodyL (DULCOLAX) tablet 10 mg  10 mg Oral DAILY PRN Aziza Grant NP        labetaloL (NORMODYNE;TRANDATE) injection 10 mg  10 mg IntraVENous Q4H PRN Aziza Grant NP   10 mg at 07/24/21 1655    niCARdipine (CARDENE) 25 mg in 0.9% sodium chloride 250 mL infusion  0-15 mg/hr IntraVENous TITRATE Aziza Grant  mL/hr at 07/25/21 1733 15 mg/hr at 07/25/21 1733    amLODIPine (NORVASC) tablet 10 mg  10 mg Oral DAILY Herberth Morales DO   10 mg at 07/25/21 8998    hydrALAZINE (APRESOLINE) 20 mg/mL injection 20 mg  20 mg IntraVENous Q6H PRN Herberth Morales DO 20 mg at 07/25/21 1009    labetaloL (NORMODYNE;TRANDATE) 300 mg in 0.9% sodium chloride 150 mL (2 mg / 1 mL) infusion  0.5-2 mg/min IntraVENous TITRATE ANGELINE Sheppard 15 mL/hr at 07/25/21 1544 0.5 mg/min at 07/25/21 1544    insulin lispro (HUMALOG) injection   SubCUTAneous AC&HS Ziyad Gallegos NP   2 Units at 07/25/21 1736    glucose chewable tablet 16 g  4 Tablet Oral PRN Ziyad Gallegos NP        dextrose (D50W) injection syrg 12.5-25 g  12.5-25 g IntraVENous PRN Ziyad Gallegos NP        glucagon (GLUCAGEN) injection 1 mg  1 mg IntraMUSCular PRN Ziyad Gallegos NP        cloNIDine (CATAPRES) 0.2 mg/24 hr patch 1 Patch  1 Patch TransDERmal Q7D Brian Aj MD   1 Patch at 07/21/21 1606    dexamethasone (DECADRON) 4 mg/mL injection 4 mg  4 mg IntraVENous Q8H Case Gallegos NP   4 mg at 07/25/21 1342    ondansetron (ZOFRAN) injection 4 mg  4 mg IntraVENous Q6H PRN ANGELINE Nicole        naloxone [facility]) injection 0.4 mg  0.4 mg IntraVENous PRN ANGELINE Nicole        acetaminophen (TYLENOL) tablet 650 mg  650 mg Oral Q4H PRN ANGELINE Nicole   650 mg at 07/24/21 2055    Or    acetaminophen (TYLENOL) solution 650 mg  650 mg Per NG tube Q4H PRN ANGELINE Nicole        Or    acetaminophen (TYLENOL) suppository 650 mg  650 mg Rectal Q4H PRN ANGELINE Nicole        lactated Ringers infusion  100 mL/hr IntraVENous CONTINUOUS Reddy Grant  mL/hr at 07/25/21 1246 100 mL/hr at 07/25/21 1246    glucose chewable tablet 16 g  4 Tablet Oral PRN ANGELINE Nicole        niMODipine (NIMOTOP) capsule 60 mg  60 mg Oral Q4H Case Gallegos NP   60 mg at 07/25/21 1519    levETIRAcetam (KEPPRA) tablet 500 mg  500 mg Oral BID Case Gallegos NP   500 mg at 07/25/21 0908    lisinopriL (PRINIVIL, ZESTRIL) tablet 40 mg  40 mg Oral DAILY Cristian Clay NP   40 mg at 07/25/21 2161        No Known Allergies    Review of Systems:  Pertinent items are noted in the History of Present Illness. Objective:     Vital signs  Temp (24hrs), Av.2 °F (36.2 °C), Min:96.5 °F (35.8 °C), Max:97.5 °F (36.4 °C)   701 - 1900  In: 1798.7 [P.O.:720; I.V.:1078.7]  Out: 825 [Urine:825]  1901 - 700  In: 6261.9 [P.O.:1935; I.V.:4326.9]  Out: 3520 [Urine:3520]    Visit Vitals  BP (!) 119/58   Pulse 60   Temp (!) 96.5 °F (35.8 °C)   Resp 21   Ht 6' (1.829 m)   Wt 248 lb 0.3 oz (112.5 kg)   SpO2 99%   BMI 33.64 kg/m²      O2 Device: (P) None (Room air)     Pain control  Pain Assessment  Pain Scale 1: Numeric (0 - 10)  Pain Intensity 1: 0  Pain Onset 1: 15 minutes ago  Pain Location 1: Head, Neck  Pain Orientation 1: Left  Pain Description 1: Sharp  Pain Intervention(s) 1: Medication (see MAR), MD notified (comment) Leigh Ann Hamilton NP)    PT/OT  Gait     Gait  Base of Support: Widened  Speed/Rita: Pace decreased (<100 feet/min), Shuffled  Step Length: Left shortened, Right shortened  Gait Abnormalities: Ataxic, Altered arm swing, Trunk sway increased  Ambulation - Level of Assistance: Minimal assistance, Assist x1  Distance (ft): 30 Feet (ft) (x 2 reps)  Assistive Device: Gait belt             Vitals:    21 1341 21 1400 21 1415 21 1519   BP: (!) 146/61 133/62 (!) 117/59 (!) 119/58   Pulse: 62 63 (!) 59 60   Resp:  17 21    Temp:       SpO2:  99%     Weight:       Height:            Physical Exam:  GENERAL: alert, cooperative, no distress, appears stated age  LUNG: clear to auscultation bilaterally  HEART: regular rate and rhythm, S1, S2 normal, no murmur, click, rub or gallop  ABDOMEN: semi-soft, non-tender to palpation. Active bowel sounds. No masses,  no organomegaly  EXTREMITIES:  extremities normal, atraumatic, no cyanosis, trace peripheral edema, distal pulse 2+ bilaterally   SKIN: Skin cool to touch. Appropriate for ethnicity. Right groin site clean, dry, and intact. No hematoma, bruising, or bleeding noted.      Neurologic Exam:  Mental Status:  Alert and oriented x 4. Appropriate affect, mood and behavior. Language:    Normal fluency, repetition, comprehension and naming. Cranial Nerves:        Pupils equal, round and reactive to light. Visual fields full to confrontation. Left eye dysconjugate at times (intermittently deviates inward), otherwise, extraocular movements intact. Mild nystagmus when looking to the left. Decreased sensation on the left side of face. Left facial asymmetry     Tongue protrudes to midline, palate elevates symmetrically. No dysarthria. Motor:    No pronator drift. Bulk and tone normal.      5/5 power in all extremities proximally and distally. No involuntary movements. Sensation:    Decreased sensation on the left side of face, otherwise sensation intact to light touch and pinprick. No neglect. Coordination & Gait: Some dysmetria noted in RUE and LUE with FTN (right worse than left, but appears to better). HTS intact bilaterally. Gait deferred.      24 hour results:    Recent Results (from the past 24 hour(s))   EKG, 12 LEAD, INITIAL    Collection Time: 07/24/21  9:04 PM   Result Value Ref Range    Ventricular Rate 59 BPM    Atrial Rate 59 BPM    P-R Interval 138 ms    QRS Duration 92 ms    Q-T Interval 420 ms    QTC Calculation (Bezet) 415 ms    Calculated P Axis 145 degrees    Calculated R Axis 99 degrees    Calculated T Axis -28 degrees    Diagnosis       ** Poor data quality, interpretation may be adversely affected  ** Suspect arm lead reversal, interpretation assumes no reversal  Unusual P axis, possible ectopic atrial bradycardia  Rightward axis  Pulmonary disease pattern  Nonspecific T wave abnormality  When compared with ECG of 19-JUL-2021 21:09,  Ectopic atrial rhythm has replaced Sinus rhythm  QRS voltage has decreased  Nonspecific T wave abnormality, worse in Inferior leads  Nonspecific T wave abnormality now evident in Lateral leads     TROPONIN I    Collection Time: 07/24/21  9:09 PM   Result Value Ref Range    Troponin-I, Qt. <0.05 <0.05 ng/mL   GLUCOSE, POC    Collection Time: 07/25/21 12:12 AM   Result Value Ref Range    Glucose (POC) 118 (H) 65 - 117 mg/dL    Performed by Stella Canada    MAGNESIUM    Collection Time: 07/25/21  3:33 AM   Result Value Ref Range    Magnesium 2.6 (H) 1.6 - 2.4 mg/dL   PHOSPHORUS    Collection Time: 07/25/21  3:33 AM   Result Value Ref Range    Phosphorus 3.1 2.6 - 4.7 MG/DL   CBC W/O DIFF    Collection Time: 07/25/21  3:33 AM   Result Value Ref Range    WBC 10.5 4.1 - 11.1 K/uL    RBC 5.03 4. 10 - 5.70 M/uL    HGB 14.3 12.1 - 17.0 g/dL    HCT 41.5 36.6 - 50.3 %    MCV 82.5 80.0 - 99.0 FL    MCH 28.4 26.0 - 34.0 PG    MCHC 34.5 30.0 - 36.5 g/dL    RDW 13.2 11.5 - 14.5 %    PLATELET 549 560 - 727 K/uL    MPV 10.4 8.9 - 12.9 FL    NRBC 0.0 0  WBC    ABSOLUTE NRBC 0.00 0.00 - 0.08 K/uL   METABOLIC PANEL, BASIC    Collection Time: 07/25/21  3:33 AM   Result Value Ref Range    Sodium 140 136 - 145 mmol/L    Potassium 3.9 3.5 - 5.1 mmol/L    Chloride 111 (H) 97 - 108 mmol/L    CO2 23 21 - 32 mmol/L    Anion gap 6 5 - 15 mmol/L    Glucose 129 (H) 65 - 100 mg/dL    BUN 17 6 - 20 MG/DL    Creatinine 0.78 0.70 - 1.30 MG/DL    BUN/Creatinine ratio 22 (H) 12 - 20      GFR est AA >60 >60 ml/min/1.73m2    GFR est non-AA >60 >60 ml/min/1.73m2    Calcium 7.5 (L) 8.5 - 10.1 MG/DL   GLUCOSE, POC    Collection Time: 07/25/21  6:39 AM   Result Value Ref Range    Glucose (POC) 117 65 - 117 mg/dL    Performed by Stella Canada    GLUCOSE, POC    Collection Time: 07/25/21 12:04 PM   Result Value Ref Range    Glucose (POC) 140 (H) 65 - 117 mg/dL    Performed by Annamaria Rolle    TCD INTRACRANIAL ARTERIES COMPLETE    Collection Time: 07/25/21 12:14 PM   Result Value Ref Range    Right PCA 1 PSV 53.9 cm/s    Right PCA 1 EDV 17.6 cm/s    Right Vertebral PSV 62.4 cm/s    Right Vertebral EDV 24.0 cm/s    Left PCA 1 EDV 15.1 cm/s    Left Vertebral PSV 66.9 cm/s    Left Vertebral EDV 16.7 cm/s    Basilar Artery EDV 20.4 cm/s    Right ICA dist sys 56.3 cm/s    Right ICA dist mabry 18.9 cm/s    Left ICA dist sys 52.6 cm/s    Left ICA dist mabry 14.3 cm/s    Right PCA 1 Mean Velocity 30 cm/s    Right External ICA PSV 56 cm/s    Right External ICA EDV 19 cm/s    Right External ICA Mean Velocity 31 cm/s    Left External ICA PSV 53 cm/s    Left External ICA EDV 14 cm/s    Left External ICA Mean Velocity 27 cm/s    Left PCA 1 PSV 34 cm/s    Left PCA 1 Mean Velocity 21 cm/s    Basilar Artery PSV 67 cm/s    Basilar Artery Mean Farhat 36 cm/s    Right Vertebral Mean Velocity 37 cm/s    Left Vertebral Mean Velocity 34 cm/s   GLUCOSE, POC    Collection Time: 07/25/21  4:39 PM   Result Value Ref Range    Glucose (POC) 167 (H) 65 - 117 mg/dL    Performed by Case Brizuela           Imaging:  CT of head on 7/19/2021 at 2104 shows  IMPRESSION  1. Acute subarachnoid hemorrhage anterior to the sarahi. CTA of the head and neck on 7/19/2021 at 2126 shows  IMPRESSION     1. Arteriovenous malformation in the left superior cerebellar peduncle, with the  dominant drainage into the vein of Gaston. 2. No large vessel occlusion, no hemodynamically significant stenosis. 3. Redemonstrated subarachnoid hemorrhage in the basilar cisterns. CT the head on 7/20/2021 at 1129 shows  IMPRESSION  1. Stable ventricular size.     2.  Questionable diffuse cerebral edema.     3.  Subarachnoid hemorrhage in the prepontine cistern and ambient cisterns as  well as sylvian fissures are decreased from the prior study. MRI brain with and without 7/20/2021 at 2214 shows  IMPRESSION  1.   Tiny 1 mm foci of diffusion restriction in the left cerebellum and left  cerebral hemisphere as described likely embolic in origin.     2.  Possible mild meningeal enhancement which may be secondary to meningeal  irritation from known subarachnoid hemorrhage.     3.  Dilated superior ophthalmic veins are noted which is new when compared to  prior imaging. This is of uncertain clinical significance. There appears to be  normal enhancement of the cavernous sinus bilaterally. CT of head on 7/23/2021 at 2233 shows     IMPRESSION  No hydrocephalus. Stable left prepontine subarachnoid hemorrhage. CT of Head on 7/24/2021 at 1357  IMPRESSION:   Left prepontine hemorrhage is slightly increased. CT of the head on 7/24/2021 at 10:21 PM shows  IMPRESSION  Stable left prepontine hemorrhage  Assessment:     Principal Problem:    Cerebral arteriovenous malformation (AVM) (7/20/2021)      Overview: ([id number])- CT (Brain): There is an AVM in the superior cerebellum with multiple aneurysms. One measures 4 mm. Another measures 4 mm within the superior cerebellum. Active Problems:    Malignant hypertension (7/20/2021)      Acute hypokalemia (7/20/2021)      Celiac artery stenosis ([address] (7/21/2021)      Vasogenic cerebral edema ([address] (7/21/2021)      Hypertensive emergency (7/21/2021)      SAH (subarachnoid hemorrhage) ([address] (7/20/2021)        Plan:   Acute SAH in the setting of cerebellar AVM with associated flow related aneurysm, [place] Knox 1, Mistry Grade 2  - s/p diagnostic cerebral angiogram on 7/20/2021  - Day 6 of 21 of Nimotop for the prevention of delayed cerebral ischemia  - continue Keppra 500 mg BID for seizure ppx  - decreased LR to 100 ml/hr to maintain euvolemia  - Repeat head CT last night showed stable left prepontine hemorrhage  - Strict I's and O's, patient +973 mls on  I's and O's currently  - ECHO shows EF 65-70%, dilated left atrium. No shunting seen. Mild (grade 1) left ventricular diastolic dysfunction. - continue every hour neuro checks  - SBP goal <140, Cardene/Labetalol PRN  - PT/OT/SLP evals   - assess NICOM PRN for fluid responsiveness   - daily TCDs, TCDs today show limited exam.  Anterior intracranial circulation is not visualized.   No evidence of vasospasm in the posterior circulation.  - NIS following     HTN  - SBP goal <140 as stated above  - Cardene/Labetalol PRN    Constipation  - continue Rissa-colace daily  - Miralax  PRN daily  - Dulcolax 10 mg daily PRN    Anxiety  - Possible numbness around lips and fingertips maybe related to anxiety  - ordered 0.5 mg of Xanax once     Activity: Up with assistance   DVT ppx: SCDs  Dispo: TBD    Plan d/w Dr. Jaret Partida, Dr. Fay Benjamin, [facility], and patient.        Quincy Courser, NP

## 2021-07-25 NOTE — PROGRESS NOTES
Neurocritical Care Brief Progress Note:    66-year-old male with SAH with cerebellar AVM with multiple feeders coming off the left PCA, left anterior choroidal and left SCA with a large single draining vein that drains into the vein of Gaston. 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 Nursing states that he was having difficulty keeping BP under 140 today. Patient on cardene gtt, prn medications, as well as multiple oral medications. Also patient has been agitated today and was complaining of chest pain earlier today. States he has chest pain when BP goes too low. Is upset about taking so much BP medication. Had [address] this afternoon that showed left prepontine hemorrhage is slightly increased. Did complain of left neck pain extending from the left chest to neck and across head this evening. States the pain is similar to when he first came in. Pain is 3/10. States he is tired of being here and is probably causing himself to get workup. Reassured patient that this is not his fault. Physical Exam:  Gen: NAD, calm, cooperative  Neuro: A&Ox4. Follows commands. Speech clear. Affect normal. PERRL, 3 mm bilaterally. Visual fields full to confrontation. However, has stated double vision at a distance. Mild disconjugate gaze present at times. EOMI. Mild end-gaze nystagmus when looking to the left. Occasional twitching of the right eyelid. Significant left lower facial droop with stated left facial numbness noted on light touch. Stated decreased hearing on the left. Beni spontaneously. Strength 5/5 in UE and LE BL. Negative drift. Bulk and tone normal. No involuntary movements. Gait deferred. Again no ataxia not noted on my exam.   Skin: Warm, dry, color appropriate for ethnicity. PLAN: Completed EKG showing nonspecific T-wave abnormality in inferior leads. Troponin negative. Repeat CTH tonight pending to monitor slight increase in hemorrhage on scan earlier today.  Intensivist added labetalol gtt to assist in BP control.      Discussed plan with Dr. Esequiel Patel and Brian Rae, NP Shaka Malcolm NP  Neurocritical Care Nurse Practitioner  [phone]

## 2021-07-25 NOTE — PROGRESS NOTES
0730: Bedside shift change report given to Jason Wu RN (oncoming nurse) by Paul Sutherland RN (offgoing nurse). Report included the following information SBAR, Kardex, Intake/Output, MAR, Accordion, Recent Results, Cardiac Rhythm SB-NSR and Dual Neuro Assessment. 1208: Pt reports numbness in bilateral fingertips and across mouth. Neuro exam otherwise unchanged. Blood sugar currently 140. /83. ECTOR Grant notified and in to assess. No new orders received. Cardene gtt currently maxed at 15mg/hr. Labetalol gtt stared. 1930: Bedside shift change report given to Ute Hubbard RN (oncoming nurse) by Jason Wu RN (offgoing nurse). Report included the following information SBAR, Kardex, Intake/Output, MAR, Accordion, Recent Results, Cardiac Rhythm NSR and Dual Neuro Assessment.

## 2021-07-25 NOTE — PROGRESS NOTES
[facility]    ICU Intensivist- Critical Care Progress Note    Name: Wendy Muir   : 1960   MRN: [id number]   Admit: 2021 12:52 AM      Diagnosis:     Principal Problem:    Cerebral arteriovenous malformation (AVM)    Left Prepontine Hemorrhage      Problem List:    Celiac artery stenosis    Hypertensive emergency    Vasogenic cerebral edema    SAH (subarachnoid hemorrhage)    Type 2 diabetes mellitus without complication, without long-term current use of insulin     ICU Comprehensive Plan of Care:     1. Neurological System  TCDs  Nimodipine  Keppra  Decadron  Q1 hr neurochecks  Spontaneous Awakening Trial: N/A  Sedation: N/A  Analgesia: Acetaminophen    2. Cardiovascular System  Clonidine  Hydralazine  Lisinopril  Metoprolol  SBP Goal of: < 140 mmHg  MAP Goal of: > 65 mmHg  Labetalol & Nicardipine (Cardene) - For above SBP/MAP goals  Transfusion Trigger (Hgb): <7 g/dL  Keep K > 4; Mg > 2     3. Respiratory System  Head of bed > 30 degrees  Aggressive bronchopulmonary hygiene  Incentive spirometry  Spontaneous Breathing Trial: N/A  Pulmonary toilet: Incentive Spirometry   SpO2 Goal: > 92%  DVT Prophylaxis: SCD's or Sequential Compression Device     4. Renal/GI/Endocrine System  IVFs: LR  Ulcer Prophylaxis: Not at this time   Bowel Regimen: Docusate (Colace)  Feeding:  Yes   Blood Sugar Goal 120-180 - Glycemic Control: Insulin    5. Infectious Disease  Indwelling Catheter:  Tubes: None  Lines: Peripheral IV  Drains: None    6. PT/OT: PT consulted and on board, OT consulted and on board and Speech therapy consulted and on board     7. Goals of Care Discussion with family Yes     8. Plan of Care/Code Status: Full Code    9. Discussed Care Plan with Bedside RN    10.  Documentation of Current Medications    Subjective:     Progress Note:   2021   6:47 AM     Reason for ICU Admission:   Cerebral arteriovenous malformation (AVM)     Overnight Events:    -- On Cardene   -- On Cardene/Labetalol    Past Medical History:      has a past medical history of Acute hypokalemia (7/20/2021), Celiac artery stenosis ([facility] [address] (7/21/2021), Cerebral arteriovenous malformation (AVM) (7/20/2021), Diabetes ([facility] [address] ED (erectile dysfunction), HTN (hypertension), Hypertension, Hypertensive emergency (7/21/2021), Impaired fasting glucose, Malignant hypertension (7/20/2021), and Vasogenic cerebral edema ([facility] [address] (7/21/2021). Past Surgical History:      has a past surgical history that includes hx tonsil and adenoidectomy.     Current Medications:     Current Facility-Administered Medications   Medication Dose Route Frequency    metoprolol tartrate (LOPRESSOR) tablet 100 mg  100 mg Oral Q8H    hydrALAZINE (APRESOLINE) tablet 100 mg  100 mg Oral TID    senna-docusate (PERICOLACE) 8.6-50 mg per tablet 1 Tablet  1 Tablet Oral DAILY    polyethylene glycol (MIRALAX) packet 17 g  17 g Oral DAILY PRN    bisacodyL (DULCOLAX) tablet 10 mg  10 mg Oral DAILY PRN    labetaloL (NORMODYNE;TRANDATE) injection 10 mg  10 mg IntraVENous Q4H PRN    niCARdipine (CARDENE) 25 mg in 0.9% sodium chloride 250 mL infusion  0-15 mg/hr IntraVENous TITRATE    amLODIPine (NORVASC) tablet 10 mg  10 mg Oral DAILY    hydrALAZINE (APRESOLINE) 20 mg/mL injection 20 mg  20 mg IntraVENous Q6H PRN    labetaloL (NORMODYNE;TRANDATE) 300 mg in 0.9% sodium chloride 150 mL (2 mg / 1 mL) infusion  0.5-2 mg/min IntraVENous TITRATE    insulin lispro (HUMALOG) injection   SubCUTAneous AC&HS    glucose chewable tablet 16 g  4 Tablet Oral PRN    dextrose (D50W) injection syrg 12.5-25 g  12.5-25 g IntraVENous PRN    glucagon (GLUCAGEN) injection 1 mg  1 mg IntraMUSCular PRN    cloNIDine (CATAPRES) 0.2 mg/24 hr patch 1 Patch  1 Patch TransDERmal Q7D    dexamethasone (DECADRON) 4 mg/mL injection 4 mg  4 mg IntraVENous Q8H    ondansetron (ZOFRAN) injection 4 mg  4 mg IntraVENous Q6H PRN    naloxone (NARCAN) injection 0.4 mg  0.4 mg IntraVENous PRN  acetaminophen (TYLENOL) tablet 650 mg  650 mg Oral Q4H PRN    Or    acetaminophen (TYLENOL) solution 650 mg  650 mg Per NG tube Q4H PRN    Or    acetaminophen (TYLENOL) suppository 650 mg  650 mg Rectal Q4H PRN    lactated Ringers infusion  100 mL/hr IntraVENous CONTINUOUS    glucose chewable tablet 16 g  4 Tablet Oral PRN    niMODipine (NIMOTOP) capsule 60 mg  60 mg Oral Q4H    levETIRAcetam (KEPPRA) tablet 500 mg  500 mg Oral BID    lisinopriL (PRINIVIL, ZESTRIL) tablet 40 mg  40 mg Oral DAILY       Allergies/Social/Family History:     No Known Allergies   Social History     Tobacco Use    Smoking status: Former Smoker     Packs/day: 2.00     Years: 20.00     Pack years: 40.00     Types: Cigarettes     Quit date: 2015     Years since quittin.4    Smokeless tobacco: Current User    Tobacco comment: Patient reports he is Vaping    Substance Use Topics    Alcohol use: Yes     Alcohol/week: 6.0 standard drinks     Types: 6 Cans of beer per week      Family History   Problem Relation Age of Onset    Diabetes Mother         type 2    Hypertension Mother     Heart Disease Mother         CHF    Hypertension Father     Heart Disease Father 48        MI       Review of Systems:     A comprehensive review of systems was negative. Objective:   Vital Signs:  Visit Vitals  BP (!) 150/67   Pulse (!) 58   Temp 97.4 °F (36.3 °C)   Resp 19   Ht 6' (1.829 m)   Wt 112.5 kg (248 lb 0.3 oz)   SpO2 99%   BMI 33.64 kg/m²      O2 Device: None (Room air) Temp (24hrs), Av.4 °F (36.3 °C), Min:97.3 °F (36.3 °C), Max:97.5 °F (36.4 °C)           Intake/Output:     Intake/Output Summary (Last 24 hours) at 2021 1027  Last data filed at 2021 1011  Gross per 24 hour   Intake 5672.58 ml   Output 2320 ml   Net 3352.58 ml       Physical Exam:  General:   Alert, cooperative, no distress, appears stated age. Facial asymmetry with diplopia. Dysmetria LUE. Eyes:   Conjunctivae/corneas clear.  PERRL, EOMs intact. Ears:   Normal external ear canals bilaterally. Nose:   No drainage or sinus tenderness. Mouth/Throat:  Moist mucous membranes. Dentition normal.    Neck:  Symmetrical, trachea midline, no JVD or carotid bruit. Back:    No CVA tenderness to percussion. Lungs:    Clear to auscultation bilaterally. Heart:   Regular rate and rhythm. S1, S2 normal, without murmur, click, rub or gallop. Abdomen:    Normoactive bowel sounds. Soft, non-tender, no masses or organomegaly. Extremities:  Atraumatic, no cyanosis or edema. Vascular:  Pulses 2+ and symmetric UE/LE bilat   Skin:  Normal color, non-diaphoretic, brisk capillary refill (Less than 2 seconds). No rashes or lesions. Lymph nodes:  No Lymphadenopathy. Neurologic:  CN II-XII intact. Normal strength.       LABS AND  DATA: Personally reviewed  Recent Labs     07/25/21  0333 07/24/21  0720   WBC 10.5 10.7   HGB 14.3 14.8   HCT 41.5 43.2    254     Recent Labs     07/25/21  0333 07/24/21  0720    139   K 3.9 3.9   * 109*   CO2 23 24   BUN 17 17   CREA 0.78 0.75   * 120*   CA 7.5* 8.2*   MG 2.6* 2.6*   PHOS 3.1 2.6     No results for input(s): AP, TBIL, TP, ALB, GLOB, AML, LPSE in the last 72 hours. No lab exists for component: SGOT, GPT, AMYP  No results for input(s): INR, PTP, APTT, INREXT, INREXT in the last 72 hours. No results for input(s): PHI, PCO2I, PO2I, FIO2I in the last 72 hours. Recent Labs     07/24/21 2109   TROIQ <0.05       MEDS: Reviewed    RADIOLOGY:  CT HEAD WO CONT    Result Date: 7/25/2021  Stable left prepontine hemorrhage    CT HEAD WO CONT    Result Date: 7/24/2021  Left prepontine hemorrhage is slightly increased. Assessment:     Hospital Problems  Date Reviewed: 7/20/2021        Codes Class Noted POA    Celiac artery stenosis (HCC) (Chronic) ICD-10-CM: I77.4  ICD-9-CM: 447. 4 Chronic 7/21/2021 Yes        Vasogenic cerebral edema (HCC) ICD-10-CM: G93.6  ICD-9-CM: 193. 5 Acute 7/21/2021 No        Hypertensive emergency ICD-10-CM: I16.1  ICD-9-CM: 401.9 Acute 7/21/2021 Yes        Malignant hypertension ICD-10-CM: I10  ICD-9-CM: 401.0 Acute 7/20/2021 Yes        * (Principal) Cerebral arteriovenous malformation (AVM) ICD-10-CM: Q28.2  ICD-9-CM: 747.81 Acute 7/20/2021 Yes    Overview Signed 7/20/2021  7:03 AM by Edvin Crawford MD     ([id number])- CT (Brain): There is an AVM in the superior cerebellum with multiple aneurysms. One measures 4 mm. Another measures 4 mm within the superior cerebellum.              Acute hypokalemia ICD-10-CM: E87.6  ICD-9-CM: 276.8 Acute 7/20/2021 Yes        SAH (subarachnoid hemorrhage) ([facility] [address] ICD-10-CM: I60.9  ICD-9-CM: 607  7/20/2021 Yes            Multidisciplinary Rounds Completed:  Yes     ABCDEF Bundle/Checklist  Pain Medications: None  Target RASS: 0 - Alert & Calm - Spontaneously pays attention to caregiver  Sedation Medications: None  CAM-ICU:  Negative  Discussed Plan of Care (goals of care): Yes  Addressed Code Status: Full Code     CARDIOVASCULAR  Cardiac Gtts: Nicardipine (Cardene) & Labetalol  SBP Goal of: > 90 mmHg  MAP Goal of: > 65 mmHg  Transfusion Trigger (Hgb): <7 g/dL     RESPIRATORY  Vent Goals:   Head of bed > 30 degrees  Aggressive bronchopulmonary hygiene  DVT Prophylaxis (if no, list reason): SCD's or Sequential Compression Device   SPO2 Goal: > 92%  Pulmonary toilet: Incentive Spirometry      T/L/D  Tubes: None  Lines: Peripheral IV  Drains: None     SPECIAL EQUIPMENT  None     DISPOSITION  Stay in ICU     CRITICAL CARE CONSULTANT NOTE  I provided a face-to-face bedside physician/patient encounter, greater than the usual and customary amount normally needed, due to the high complexity of medical decision-making required.     I reviewed and interpreted patient data including clinical events, labs, images, vital signs, I/O's, and examined patient.       I have actively participated in multi-disciplinary discussions (Neurology, IR, ICU Nursing Staff) regarding the case in formulating an optimal therapeutic plan, and effecting a management strategy for this patient.     NOTE OF PERSONAL INVOLVEMENT IN CARE   This patient has a high probability of imminent, clinically significant deterioration, which requires the highest level of preparedness to intervene urgently.  I participated in the decision-making and personally managed, or directed the management of, a myriad of life and organ supporting interventions which required my frequent-interval clinical reassessments, in order to treat or prevent imminent deterioration.     I personally spent 75 minutes of critical care time.  This is time spent at this critically ill patient's bedside actively involved in patient care as well as the coordination of care and discussions with the patient's family.  This does not include any procedural time which has been billed separately.  Wilber Cherry 188, DO   Staff Intensivist/Anesthesiologist  Critical Care Medicine

## 2021-07-26 ENCOUNTER — APPOINTMENT (OUTPATIENT)
Dept: VASCULAR SURGERY | Age: 61
DRG: 064 | End: 2021-07-26
Attending: NURSE PRACTITIONER
Payer: COMMERCIAL

## 2021-07-26 LAB
ANION GAP SERPL CALC-SCNC: 6 MMOL/L (ref 5–15)
BASILAR ARTERY EDV: 20.1 CM/S
BASILAR ARTERY EDV: 20.4 CM/S
BASILAR ARTERY MEAN VEL: 32 CM/S
BASILAR ARTERY MEAN VEL: 36 CM/S
BASILAR ARTERY PSV: 56 CM/S
BASILAR ARTERY PSV: 67 CM/S
BUN SERPL-MCNC: 23 MG/DL (ref 6–20)
BUN/CREAT SERPL: 25 (ref 12–20)
CALCIUM SERPL-MCNC: 7.5 MG/DL (ref 8.5–10.1)
CHLORIDE SERPL-SCNC: 109 MMOL/L (ref 97–108)
CO2 SERPL-SCNC: 22 MMOL/L (ref 21–32)
CREAT SERPL-MCNC: 0.91 MG/DL (ref 0.7–1.3)
ERYTHROCYTE [DISTWIDTH] IN BLOOD BY AUTOMATED COUNT: 12.8 % (ref 11.5–14.5)
GLUCOSE BLD STRIP.AUTO-MCNC: 123 MG/DL (ref 65–117)
GLUCOSE BLD STRIP.AUTO-MCNC: 147 MG/DL (ref 65–117)
GLUCOSE BLD STRIP.AUTO-MCNC: 147 MG/DL (ref 65–117)
GLUCOSE BLD STRIP.AUTO-MCNC: 161 MG/DL (ref 65–117)
GLUCOSE SERPL-MCNC: 149 MG/DL (ref 65–100)
HCT VFR BLD AUTO: 39.9 % (ref 36.6–50.3)
HGB BLD-MCNC: 13.7 G/DL (ref 12.1–17)
LEFT EX ICA EDV: 14 CM/S
LEFT EX ICA EDV: 15 CM/S
LEFT EX ICA MEAN VEL: 25 CM/S
LEFT EX ICA MEAN VEL: 27 CM/S
LEFT EX ICA PSV: 44 CM/S
LEFT EX ICA PSV: 53 CM/S
LEFT ICA DIST DIAS: 14.3 CM/S
LEFT ICA DIST DIAS: 14.5 CM/S
LEFT ICA DIST SYS: 44.3 CM/S
LEFT ICA DIST SYS: 52.6 CM/S
LEFT LINDEGAARD RATIO: 1.5
LEFT MCA 1 EDV: 28.7 CM/S
LEFT MCA 1 MEAN VEL: 38 CM/S
LEFT MCA 1 PSV: 56 CM/S
LEFT PCA 1 EDV: 15.1 CM/S
LEFT PCA 1 EDV: 16.3 CM/S
LEFT PCA 1 MEAN VEL: 21 CM/S
LEFT PCA 1 MEAN VEL: 23 CM/S
LEFT PCA 1 PSV: 34 CM/S
LEFT PCA 1 PSV: 38 CM/S
LEFT VERTEBRAL EDV TCD: 16.7 CM/S
LEFT VERTEBRAL EDV TCD: 19.2 CM/S
LEFT VERTEBRAL MEAN VEL: 29 CM/S
LEFT VERTEBRAL MEAN VEL: 34 CM/S
LEFT VERTEBRAL PSV TCD: 48.9 CM/S
LEFT VERTEBRAL PSV TCD: 66.9 CM/S
MAGNESIUM SERPL-MCNC: 2.7 MG/DL (ref 1.6–2.4)
MCH RBC QN AUTO: 28.4 PG (ref 26–34)
MCHC RBC AUTO-ENTMCNC: 34.3 G/DL (ref 30–36.5)
MCV RBC AUTO: 82.6 FL (ref 80–99)
NRBC # BLD: 0 K/UL (ref 0–0.01)
NRBC BLD-RTO: 0 PER 100 WBC
PHOSPHATE SERPL-MCNC: 3.1 MG/DL (ref 2.6–4.7)
PLATELET # BLD AUTO: 263 K/UL (ref 150–400)
PMV BLD AUTO: 10.7 FL (ref 8.9–12.9)
POTASSIUM SERPL-SCNC: 4 MMOL/L (ref 3.5–5.1)
RBC # BLD AUTO: 4.83 M/UL (ref 4.1–5.7)
RIGHT EX ICA EDV: 11 CM/S
RIGHT EX ICA EDV: 19 CM/S
RIGHT EX ICA MEAN VEL: 23 CM/S
RIGHT EX ICA MEAN VEL: 31 CM/S
RIGHT EX ICA PSV: 46 CM/S
RIGHT EX ICA PSV: 56 CM/S
RIGHT ICA DIST DIAS: 10.6 CM/S
RIGHT ICA DIST DIAS: 18.9 CM/S
RIGHT ICA DIST SYS: 45.6 CM/S
RIGHT ICA DIST SYS: 56.3 CM/S
RIGHT PCA 1 EDV: 17.6 CM/S
RIGHT PCA 1 EDV: 20.5 CM/S
RIGHT PCA 1 MEAN VEL: 27 CM/S
RIGHT PCA 1 MEAN VEL: 30 CM/S
RIGHT PCA 1 PSV: 38.6 CM/S
RIGHT PCA 1 PSV: 53.9 CM/S
RIGHT VERTEBRAL EDV TCD: 16.6 CM/S
RIGHT VERTEBRAL EDV TCD: 24 CM/S
RIGHT VERTEBRAL MEAN VEL: 23 CM/S
RIGHT VERTEBRAL MEAN VEL: 37 CM/S
RIGHT VERTEBRAL PSV TCD: 36 CM/S
RIGHT VERTEBRAL PSV TCD: 62.4 CM/S
SERVICE CMNT-IMP: ABNORMAL
SODIUM SERPL-SCNC: 137 MMOL/L (ref 136–145)
WBC # BLD AUTO: 12.1 K/UL (ref 4.1–11.1)

## 2021-07-26 PROCEDURE — 74011000250 HC RX REV CODE- 250: Performed by: INTERNAL MEDICINE

## 2021-07-26 PROCEDURE — 65610000006 HC RM INTENSIVE CARE

## 2021-07-26 PROCEDURE — 97530 THERAPEUTIC ACTIVITIES: CPT

## 2021-07-26 PROCEDURE — 74011250636 HC RX REV CODE- 250/636: Performed by: NURSE PRACTITIONER

## 2021-07-26 PROCEDURE — 97112 NEUROMUSCULAR REEDUCATION: CPT

## 2021-07-26 PROCEDURE — 82962 GLUCOSE BLOOD TEST: CPT

## 2021-07-26 PROCEDURE — 80048 BASIC METABOLIC PNL TOTAL CA: CPT

## 2021-07-26 PROCEDURE — 74011250637 HC RX REV CODE- 250/637: Performed by: INTERNAL MEDICINE

## 2021-07-26 PROCEDURE — 74011000250 HC RX REV CODE- 250: Performed by: NURSE PRACTITIONER

## 2021-07-26 PROCEDURE — 74011250637 HC RX REV CODE- 250/637: Performed by: NURSE PRACTITIONER

## 2021-07-26 PROCEDURE — 93886 INTRACRANIAL COMPLETE STUDY: CPT

## 2021-07-26 PROCEDURE — 74011250637 HC RX REV CODE- 250/637: Performed by: ANESTHESIOLOGY

## 2021-07-26 PROCEDURE — 74011000258 HC RX REV CODE- 258: Performed by: NURSE PRACTITIONER

## 2021-07-26 PROCEDURE — 97116 GAIT TRAINING THERAPY: CPT

## 2021-07-26 PROCEDURE — 36415 COLL VENOUS BLD VENIPUNCTURE: CPT

## 2021-07-26 PROCEDURE — 83735 ASSAY OF MAGNESIUM: CPT

## 2021-07-26 PROCEDURE — 85027 COMPLETE CBC AUTOMATED: CPT

## 2021-07-26 PROCEDURE — 99232 SBSQ HOSP IP/OBS MODERATE 35: CPT | Performed by: STUDENT IN AN ORGANIZED HEALTH CARE EDUCATION/TRAINING PROGRAM

## 2021-07-26 PROCEDURE — 84100 ASSAY OF PHOSPHORUS: CPT

## 2021-07-26 PROCEDURE — APPSS30 APP SPLIT SHARED TIME 16-30 MINUTES: Performed by: NURSE PRACTITIONER

## 2021-07-26 PROCEDURE — 74011636637 HC RX REV CODE- 636/637: Performed by: NURSE PRACTITIONER

## 2021-07-26 RX ORDER — BUMETANIDE 0.25 MG/ML
1 INJECTION INTRAMUSCULAR; INTRAVENOUS ONCE
Status: COMPLETED | OUTPATIENT
Start: 2021-07-26 | End: 2021-07-26

## 2021-07-26 RX ORDER — CLONIDINE 0.3 MG/24H
1 PATCH, EXTENDED RELEASE TRANSDERMAL
Status: DISCONTINUED | OUTPATIENT
Start: 2021-07-26 | End: 2021-07-27

## 2021-07-26 RX ORDER — DEXAMETHASONE SODIUM PHOSPHATE 4 MG/ML
4 INJECTION, SOLUTION INTRA-ARTICULAR; INTRALESIONAL; INTRAMUSCULAR; INTRAVENOUS; SOFT TISSUE EVERY 12 HOURS
Status: DISCONTINUED | OUTPATIENT
Start: 2021-07-26 | End: 2021-07-31

## 2021-07-26 RX ADMIN — NIMODIPINE 60 MG: 30 CAPSULE, LIQUID FILLED ORAL at 03:50

## 2021-07-26 RX ADMIN — SODIUM CHLORIDE 12.5 MG/HR: 9 INJECTION, SOLUTION INTRAVENOUS at 07:21

## 2021-07-26 RX ADMIN — SODIUM CHLORIDE 7.5 MG/HR: 9 INJECTION, SOLUTION INTRAVENOUS at 21:00

## 2021-07-26 RX ADMIN — DEXAMETHASONE SODIUM PHOSPHATE 4 MG: 4 INJECTION, SOLUTION INTRAMUSCULAR; INTRAVENOUS at 08:09

## 2021-07-26 RX ADMIN — BUMETANIDE 1 MG: 0.25 INJECTION, SOLUTION INTRAMUSCULAR; INTRAVENOUS at 18:42

## 2021-07-26 RX ADMIN — SODIUM CHLORIDE 12.5 MG/HR: 9 INJECTION, SOLUTION INTRAVENOUS at 02:23

## 2021-07-26 RX ADMIN — DEXAMETHASONE SODIUM PHOSPHATE 4 MG: 4 INJECTION, SOLUTION INTRAMUSCULAR; INTRAVENOUS at 20:57

## 2021-07-26 RX ADMIN — HYDRALAZINE HYDROCHLORIDE 100 MG: 50 TABLET, FILM COATED ORAL at 21:01

## 2021-07-26 RX ADMIN — INSULIN LISPRO 2 UNITS: 100 INJECTION, SOLUTION INTRAVENOUS; SUBCUTANEOUS at 21:02

## 2021-07-26 RX ADMIN — SODIUM CHLORIDE, POTASSIUM CHLORIDE, SODIUM LACTATE AND CALCIUM CHLORIDE 50 ML/HR: 600; 310; 30; 20 INJECTION, SOLUTION INTRAVENOUS at 10:47

## 2021-07-26 RX ADMIN — SODIUM CHLORIDE 0.5 MG/MIN: 9 INJECTION, SOLUTION INTRAVENOUS at 13:40

## 2021-07-26 RX ADMIN — SODIUM CHLORIDE 12.5 MG/HR: 9 INJECTION, SOLUTION INTRAVENOUS at 00:11

## 2021-07-26 RX ADMIN — DOCUSATE SODIUM 50 MG AND SENNOSIDES 8.6 MG 1 TABLET: 8.6; 5 TABLET, FILM COATED ORAL at 08:10

## 2021-07-26 RX ADMIN — SODIUM CHLORIDE 12.5 MG/HR: 9 INJECTION, SOLUTION INTRAVENOUS at 04:44

## 2021-07-26 RX ADMIN — NIMODIPINE 60 MG: 30 CAPSULE, LIQUID FILLED ORAL at 23:05

## 2021-07-26 RX ADMIN — NIMODIPINE 60 MG: 30 CAPSULE, LIQUID FILLED ORAL at 07:00

## 2021-07-26 RX ADMIN — LEVETIRACETAM 500 MG: 500 TABLET ORAL at 18:40

## 2021-07-26 RX ADMIN — SODIUM CHLORIDE 0.5 MG/MIN: 9 INJECTION, SOLUTION INTRAVENOUS at 01:49

## 2021-07-26 RX ADMIN — SODIUM CHLORIDE 10 MG/HR: 9 INJECTION, SOLUTION INTRAVENOUS at 17:02

## 2021-07-26 RX ADMIN — NIMODIPINE 60 MG: 30 CAPSULE, LIQUID FILLED ORAL at 19:00

## 2021-07-26 RX ADMIN — NIMODIPINE 60 MG: 30 CAPSULE, LIQUID FILLED ORAL at 15:40

## 2021-07-26 RX ADMIN — LISINOPRIL 40 MG: 20 TABLET ORAL at 08:10

## 2021-07-26 RX ADMIN — HYDRALAZINE HYDROCHLORIDE 100 MG: 50 TABLET, FILM COATED ORAL at 17:01

## 2021-07-26 RX ADMIN — SODIUM CHLORIDE 10 MG/HR: 9 INJECTION, SOLUTION INTRAVENOUS at 14:10

## 2021-07-26 RX ADMIN — SODIUM CHLORIDE 7.5 MG/HR: 9 INJECTION, SOLUTION INTRAVENOUS at 18:18

## 2021-07-26 RX ADMIN — METOPROLOL TARTRATE 100 MG: 50 TABLET, FILM COATED ORAL at 17:01

## 2021-07-26 RX ADMIN — NIMODIPINE 60 MG: 30 CAPSULE, LIQUID FILLED ORAL at 12:21

## 2021-07-26 RX ADMIN — METOPROLOL TARTRATE 100 MG: 50 TABLET, FILM COATED ORAL at 06:10

## 2021-07-26 RX ADMIN — HYDRALAZINE HYDROCHLORIDE 100 MG: 50 TABLET, FILM COATED ORAL at 08:10

## 2021-07-26 RX ADMIN — SODIUM CHLORIDE, POTASSIUM CHLORIDE, SODIUM LACTATE AND CALCIUM CHLORIDE 100 ML/HR: 600; 310; 30; 20 INJECTION, SOLUTION INTRAVENOUS at 00:03

## 2021-07-26 RX ADMIN — INSULIN LISPRO 2 UNITS: 100 INJECTION, SOLUTION INTRAVENOUS; SUBCUTANEOUS at 12:26

## 2021-07-26 RX ADMIN — SODIUM CHLORIDE 10 MG/HR: 9 INJECTION, SOLUTION INTRAVENOUS at 10:45

## 2021-07-26 RX ADMIN — LEVETIRACETAM 500 MG: 500 TABLET ORAL at 08:10

## 2021-07-26 RX ADMIN — AMLODIPINE BESYLATE 10 MG: 5 TABLET ORAL at 08:10

## 2021-07-26 RX ADMIN — INSULIN LISPRO 2 UNITS: 100 INJECTION, SOLUTION INTRAVENOUS; SUBCUTANEOUS at 17:22

## 2021-07-26 NOTE — PROGRESS NOTES
Problem: Mobility Impaired (Adult and Pediatric)  Goal: *Acute Goals and Plan of Care (Insert Text)  Description: FUNCTIONAL STATUS PRIOR TO ADMISSION: Patient was independent with all mobility and active (worked as ). He lived at home with his wife in a 1-story house. Physical Therapy Goals  Initiated 7/22/2021  1. Patient will move from supine to sit and sit to supine , scoot up and down, and roll side to side in bed with independence within 7 day(s). 2.  Patient will transfer from bed to chair and chair to bed with minimal assistance using the least restrictive device within 7 day(s). 3.  Patient will perform sit to stand with supervision/set-up within 7 day(s). 4.  Patient will ambulate with min assistance  for 50 feet with the least restrictive device within 7 day(s). 5.  Patient will ascend/descend 3 stairs with 2 handrail(s) with moderate assistance within 7 day(s). Outcome: Progressing Towards Goal  PHYSICAL THERAPY TREATMENT  Patient: Genet Mota [de-identified]64 y.o. male)  Date: 7/26/2021  Diagnosis: SAH (subarachnoid hemorrhage) (HCC) [I60.9] Cerebral arteriovenous malformation (AVM)       Precautions:    Chart, physical therapy assessment, plan of care and goals were reviewed. ASSESSMENT  Patient continues with skilled PT services and is progressing slowly towards goals - remains limited by impaired vision, dizziness/wooziness, BLE edema, impaired balance, slow/unsteady gait, decreased activity tolerance, decreased insight into deficits, and decreased safety awareness. Pt expressed frustrations about still being in the hospital but was agreeable to work with therapy with encouragement. He transferred supine>sit with SBA, sit<>stand with CGA, and ambulated with CGA. VSS throughout, though pt reported mild \"wooziness and head swimming\" which remained stable throughout session. He demonstrated significantly decreased step length and gait speed with wide AMISHA but no overt LOB noted. Ended session seated in the chair with all needs met. Recommending IPR upon discharge. Current Level of Function Impacting Discharge (mobility/balance): CGA for ambulation    Other factors to consider for discharge: independent baseline, impaired vision, decreased safety awareness, dizziness, fall risk         PLAN :  Patient continues to benefit from skilled intervention to address the above impairments. Continue treatment per established plan of care. to address goals. Recommendation for discharge: (in order for the patient to meet his/her long term goals)  Therapy 3 hours per day 5-7 days per week  If d/c home, will require assistance x1 with all OOB mobility and benefit from [facility] PT    This discharge recommendation:  Has not yet been discussed the attending provider and/or case management    IF patient discharges home will need the following DME: none       SUBJECTIVE:   Patient stated I don't even want to talk about it.  when asked if he had been getting to the chair for his meals    OBJECTIVE DATA SUMMARY:   Critical Behavior:  Neurologic State: Alert  Orientation Level: Oriented X4  Cognition: Follows commands, Appropriate decision making, Appropriate for age attention/concentration, Appropriate safety awareness  Safety/Judgement: Decreased awareness of environment, Decreased awareness of need for assistance, Decreased insight into deficits, Decreased awareness of need for safety  Functional Mobility Training:  Bed Mobility:  Supine to Sit: Stand-by assistance  Scooting: Stand-by assistance    Transfers:  Sit to Stand: Contact guard assistance  Stand to Sit: Contact guard assistance    Balance:  Sitting: Intact  Standing: Impaired  Standing - Static: Fair  Standing - Dynamic : Fair    Ambulation/Gait Training:  Distance (ft): 35 Feet (ft)  Assistive Device: Gait belt  Ambulation - Level of Assistance: Contact guard assistance  Gait Abnormalities: Ataxic; Altered arm swing;Trunk sway increased; Shuffling gait  Speed/Rita: Slow;Shuffled  Step Length: Right shortened;Left shortened    Activity Tolerance:   Fair    After treatment patient left in no apparent distress:   Sitting in chair, Call bell within reach, and Bed / chair alarm activated    COMMUNICATION/COLLABORATION:   The patients plan of care was discussed with: Occupational therapist and Registered nurse.      Karyn Luis, PT, DPT   Time Calculation: 25 mins

## 2021-07-26 NOTE — PROGRESS NOTES
Nati Saeed   1960   [id number]     Neurocritical Care Brief Progress Note:      Subjective:   No acute events noted overnight. No signs and symptoms of anxiety noted overnight. Patient denied any new neurological symptoms. Vertiginous dizziness appears to be improving.eye patch in-place. Denied any headache, nausea, vomiting, or dysphagia. Able to have bowel movements with bowel regimen. Blood pressure being optimized with multi antihypertensives include amlodipine 10 mg daily, clonidine 0.2 mg patch every 24 hour, hydralazine 100 mg 3 times daily, senna pro 40 mg daily, metoprolol 100 mg every 8 hours, and on pneumo top 60 mg every 4 hours as well as Cardene drip 12.5 mg/h (attempt to slowly wean Cardene drip) continue to monitor blood pressure. The patient will likely require secondary hypertension work-up    Patient may need nephrology vs cardiology consult for assistance with blood pressure management    We will decrease Decadron from 4 mg to 8 hours to every 12 hours    TCD's 7/25/2021 showed no evidence of vasospasm; will continue daily TCD's    Strict I/O's +1,909 this shift; the patient is having good amount of oral intake; will decrease IV fluids rate from 100 mL/h to 50 mL/hr    Last CT head without contrast obtained 7/24/2021 showed stable left prepontine hemorrhage. Low threshold for repeat head CT with worsening neurological status    A comprehensive review of systems was negative except for that written in the HPI.       Objective:     Patient Vitals for the past 8 hrs:   BP Temp Pulse Resp SpO2   07/26/21 0400 112/64 97.4 °F (36.3 °C) (!) 56 13 94 %   07/26/21 0350 112/64 -- 60 -- --   07/26/21 0300 (!) 107/45 -- (!) 54 14 95 %   07/26/21 0200 (!) 127/56 -- (!) 58 16 100 %   07/26/21 0100 (!) 112/44 -- (!) 57 14 93 %   07/26/21 0011 124/71 -- (!) 55 -- --   07/26/21 0000 124/71 98.4 °F (36.9 °C) (!) 56 15 99 %   07/25/21 2300 126/69 -- (!) 57 18 99 %   07/25/21 7695 136/64 -- 61 -- --   07/25/21 2200 126/60 -- 60 21 99 %   07/25/21 2110 (!) 121/56 -- 60 -- --   07/25/21 2100 (!) 121/56 -- (!) 56 15 --       Physical Exam:    GENERAL: Alert, cooperative, no distress, appears stated age,   LUNG: Clear to auscultation bilaterally  HEART: Regular rate and rhythm, S1, S2 normal, no murmur, click, rub or gallop  Menta Status: AOx3. Follow commands appropriately   Cranial Nerves: Visual fields full to confrontation. Continue to have diplopia at a distance. Mild disconjugate gaze present at times. EOMI. Significant left lower facial droop with stated left facial numbness noted on light touch  Reflexes: Normal/Full and symmetric. Motor strength: 5/5 x4 ext; Full proximally and distally, no involuntary movements - tremors  Sensation: Intact to pain, and light touch except left facial decreased sensation  Cerebellar: Finger-to-nose present in left upper limb and heel-to-shin intact  Gait: Deferred  Plantar responses: Down going toes bilaterally    Lab Results   Component Value Date/Time    WBC 10.5 07/25/2021 03:33 AM    HGB 14.3 07/25/2021 03:33 AM    HCT 41.5 07/25/2021 03:33 AM    PLATELET 382 27/15/1868 03:33 AM    MCV 82.5 07/25/2021 03:33 AM      Lab Results   Component Value Date/Time    Sodium 140 07/25/2021 03:33 AM    Potassium 3.9 07/25/2021 03:33 AM    Chloride 111 (H) 07/25/2021 03:33 AM    CO2 23 07/25/2021 03:33 AM    Anion gap 6 07/25/2021 03:33 AM    Glucose 129 (H) 07/25/2021 03:33 AM    BUN 17 07/25/2021 03:33 AM    Creatinine 0.78 07/25/2021 03:33 AM    BUN/Creatinine ratio 22 (H) 07/25/2021 03:33 AM    GFR est AA >60 07/25/2021 03:33 AM    GFR est non-AA >60 07/25/2021 03:33 AM    Calcium 7.5 (L) 07/25/2021 03:33 AM    Bilirubin, total 0.5 07/19/2021 09:38 PM    Alk.  phosphatase 51 07/19/2021 09:38 PM    Protein, total 6.2 (L) 07/19/2021 09:38 PM    Albumin 3.4 (L) 07/19/2021 09:38 PM    Globulin 2.8 07/19/2021 09:38 PM    A-G Ratio 1.2 07/19/2021 09:38 PM    ALT (SGPT) 48 07/19/2021 09:38 PM    AST (SGOT) 26 07/19/2021 09:38 PM         CT Results (most recent):  Results from [place] encounter on 07/20/21    CT HEAD WO CONT    Narrative  EXAM: CT HEAD WO CONT    INDICATION: Reevaluate left cerebral pontine angle bleed    COMPARISON: 1338 hours. CONTRAST: None. TECHNIQUE: Unenhanced CT of the head was performed using 5 mm images. Brain and  bone windows were generated. Coronal and sagittal reformats. CT dose reduction  was achieved through use of a standardized protocol tailored for this  examination and automatic exposure control for dose modulation. FINDINGS:    2152 hours  The ventricles and sulci are normal in size, shape and configuration. . The oval  collection in the left cerebral pontine angle has remained stable at 2.8 x 1.3  cm. The basilar cisterns are open. The bone windows demonstrate no abnormalities. The visualized portions of the  paranasal sinuses and mastoid air cells are clear. Impression  Stable left prepontine hemorrhage         MRI Results (most recent):  Results from [place] encounter on 07/20/21    MRI BRAIN W WO CONT    Narrative  EXAM:  MRI BRAIN W WO CONT    INDICATION:    further imaging eval of cerebellar AVM    COMPARISON:  CTA performed the same day. CONTRAST: 20 ml of ProHance    TECHNIQUE:  Multiplanar multisequence acquisition without and with contrast of the brain. FINDINGS:  The ventricles are normal in size and position. Tiny 1 mm foci of tracer noted  in the left cerebellum, left parietal-occipital region, left frontal lobe, left  parietal lobe again noted is a vascular malformation involving the left superior  cerebellar peduncle which is better evaluated on the recent CTA. Possible mild  meningeal enhancement is present. No definite abnormal intraparenchymal  enhancement. Scattered sulcal FLAIR hyperintensity likely reflecting known  subarachnoid hemorrhage. No definite cerebral edema is identified. Dilated  superior orbital veins are noted which is a new finding. Expected arterial  flow-voids are present. The paranasal sinuses, mastoid air cells, and middle ears are clear. The orbital  contents are within normal limits. No significant osseous or scalp lesions are  identified. Impression  1. Tiny 1 mm foci of diffusion restriction in the left cerebellum and left  cerebral hemisphere as described likely embolic in origin. 2.  Possible mild meningeal enhancement which may be secondary to meningeal  irritation from known subarachnoid hemorrhage. 3.  Dilated superior ophthalmic veins are noted which is new when compared to  prior imaging. This is of uncertain clinical significance. There appears to be  normal enhancement of the cavernous sinus bilaterally. Discussed care with Dr. Saul Esposito, intensivist, and and ICU primary nurse. Martin Coronado NP  4:32 AM     Greater than 35 minutes were spent in patient management, greater than half of which was spent in counseling and coordination of care.

## 2021-07-26 NOTE — ADT AUTH CERT NOTES
Neurology [address] - Care Day 4 (7/23/2021) by Rosette Woodruff RN       Review Status Review Entered   Completed 7/23/2021 15:57      Criteria Review      Care Day: 4 Care Date: 7/23/2021 Level of Care: ICU    Guideline Day 3    Level Of Care    ( ) * Activity level acceptable    ( ) * Complete discharge planning    Clinical Status    (X) * No infection, or status acceptable    ( ) * Isolation not needed, or status acceptable    (X) * Respiratory status acceptable    ( ) * Pain and nausea absent or adequately managed    (X) * Ventilatory status acceptable    (X) * Neurologic problems absent or stabilized    (X) * Muscle or nerve damage absent or stable    ( ) * General Discharge Criteria met    Interventions    (X) * Intake acceptable    ( ) * No inpatient interventions needed    7/23/2021 15:57:29 EDT by Haroldo osborn    * Milestone   Additional Notes   7/23/21  inpt   ICU   98.7    48    26    131/59    96% ra   WBC: 12.5 (H)   HGB: 14.2   HCT: 42.0   PLATELET: 155   Sodium: 140   Potassium: 4.1   Chloride: 111 (H)   CO2: 22   Anion gap: 7   Glucose: 160 (H)   BUN: 21 (H)   Creatinine: 0.86   BUN/Creatinine ratio: 24 (H)   Calcium: 7.9 (L)   Phosphorus: 2.4 (L)   Magnesium: 2.8 (H)      Meds:  decadron 4mg iv q8   HCTZ 25mg po qd   labetalol 10mg iv q4prn x 1   LR at 125  keppra 500mg po bid  lisinopril  40mg po qd   cardene titrated gtt  nimotop 60mg q4 po   apresoline 10mg po Tid   Lopressor 50mg po q12     Orders:  reg dit   PT/O            [facility] - Care Day 3 (7/22/2021) by Rosette Woodruff, RN       Review Status Review Entered   Completed 7/23/2021 15:54      Criteria Review      Care Day: 3 Care Date: 7/22/2021 Level of Care: ICU    Guideline Day 3    Level Of Care    ( ) * Activity level acceptable    ( ) * Complete discharge planning    Clinical Status    (X) * No infection, or status acceptable    ( ) * Isolation not needed, or status acceptable    (X) * Respiratory status acceptable 7/23/2021 15:54:22 EDT by Thu Collinsing      rr 26    96% ra    ( ) * Pain and nausea absent or adequately managed    (X) * Ventilatory status acceptable    (X) * Neurologic problems absent or stabilized    (X) * Muscle or nerve damage absent or stable    ( ) * General Discharge Criteria met    Interventions    (X) * Intake acceptable    ( ) * No inpatient interventions needed    7/23/2021 15:54:22 EDT by Thu Collinsing      :  decadron 4mg iv q8   HCTZ 25mg po qd   labetalol 10mg iv q4prn x 1   LR at 125  keppra 500mg po bid  lisinopril  40mg po qd   cardene titrated gtt  nimotop 60mg q4 po   apresoline 10mg po Tid   Lopressor 50mg po q12    * Milestone   Additional Notes   7/22/21  inpt   ICU   97.9   47   26   131/59   96% ra   WBC: 13.6 (H)   HGB: 14.4   HCT: 41.5   PLATELET: 961   Sodium: 140   Potassium: 4.1   Chloride: 112 (H)   CO2: 20 (L)   Glucose: 154 (H)   BUN: 17   Creatinine: 0.96   BUN/Creatinine ratio: 18   Calcium: 8.2 (L)   Meds:  decadron 4mg iv q8   HCTZ 25mg po qd   labetalol 10mg iv q4prn x 1   LR at 125  keppra 500mg po bid  lisinopril  40mg po qd   cardene titrated gtt  nimotop 60mg q4 po   apresoline 10mg po Tid   Lopressor 50mg po q12     Orders:  reg dit   PT/O      Critical care note   Assessment:       Hospital Problems Date Reviewed: 7/20/2021     Codes Class Noted POA     Malignant hypertension ICD-10-CM: I10   ICD-9-CM: 401.0 Acute 7/20/2021 Yes          * (Principal) Cerebral arteriovenous malformation (AVM) ICD-10-CM: Q28.2   ICD-9-CM: 747.81 Acute 7/20/2021 Yes     Overview Signed 7/20/2021  7:03 AM by Saadia Karimi MD          ([id number])- CT (Brain): There is an AVM in the superior cerebellum with multiple aneurysms. One measures 4 mm.  Another measures 4 mm within the superior cerebellum.                 Acute hypokalemia ICD-10-CM: E87.6   ICD-9-CM: 276.8 Acute 7/20/2021 Yes          [facility] ICD-10-CM: I60.9   ICD-9-CM: 430 7/20/2021 Yes              Multidisciplinary Rounds Completed:  Yes       ABCDEF Bundle/Checklist   Pain Medications: None   Target RASS: 0 - Alert & Calm - Spontaneously pays attention to caregiver   Sedation Medications: None   CAM-ICU:  Negative   Discussed Plan of Care (goals of care): Yes   Addressed Code Status: Full Code       CARDIOVASCULAR   Cardiac Gtts: Nicardipine (Cardene)   SBP Goal of: > 90 mmHg   MAP Goal of: > 65 mmHg   Transfusion Trigger (Hgb): <7 g/dL       RESPIRATORY   Vent Goals:    Head of bed > 30 degrees   Aggressive bronchopulmonary hygiene   DVT Prophylaxis (if no, list reason): SCD's or Sequential Compression Device    SPO2 Goal: > 92%   Pulmonary toilet: Incentive Spirometry        T/L/D   Tubes: None   Lines: Peripheral IV   Drains: None       SPECIAL EQUIPMENT   None       DISPOSITION   Stay in ICU         Neurointerventional Surgery Progress Note   Assessment/Plan:   64 y.o. 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    Clayton Knox 1, Mistry Grade 2   · S/P: Procedure(s): Diagnostic cerebral angiogram Diagnostic revealed            AVM in the cerebellum with multiple feeders coming off the left PCA, left            anterior choroidal and left SCA with a large single draining vein that drains          into the vein of Gaston. 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 · Day 3/21 of Nimotop to prevent delayed cerebral ischemia   · Keppra 500 mg BID for seizure ppx   · LR at 100 ml/hr to maintain euvolemia   · Strict I&O   · ECHO shows EF of 70%; Dilated left atrium.  No shunt   · q 1 neuro checks   · SBP goal <140,  Cardene/Hydralazine/Labetalol PRN   · PT/OT/SLP evals   · Assess NICOM PRN for fluid responsiveness   · Daily TCD's for vasospasm watch       2.) HTN emergency · With /120 on admission   · Started Hydralazine low dose as we come down Cardene drip

## 2021-07-26 NOTE — PROGRESS NOTES
1930: Bedside, Verbal and Written shift change report given to Shanelle Jaramillo RN (oncoming nurse) by Gene Leavitt RN (offgoing nurse). Report included the following information SBAR, Kardex, ED Summary, Procedure Summary, Intake/Output, MAR, Recent Results, Cardiac Rhythm NSR/SB, Quality Measures and Dual Neuro Assessment. 0730: Bedside, Verbal and Written shift change report given to Ari Bennett RN (oncoming nurse) by Shanelle Jaramillo RN (offgoing nurse). Report included the following information SBAR, Kardex, ED Summary, Intake/Output, MAR, Recent Results, Cardiac Rhythm NSR/SB, Alarm Parameters  and Dual Neuro Assessment.

## 2021-07-26 NOTE — PROGRESS NOTES
Problem: Self Care Deficits Care Plan (Adult)  Goal: *Acute Goals and Plan of Care (Insert Text)  Description:   FUNCTIONAL STATUS PRIOR TO ADMISSION: Patient was independent and active without use of DME, independent for basic and instrumental ADLs, working full time as a , driving. HOME SUPPORT: The patient lived with family but did not require assist.    Occupational Therapy Goals  Initiated 7/22/2021  1. Patient will perform grooming at the sink with supervision/set-up within 7 day(s). 2.  Patient will perform bathing with supervision/set-up within 7 day(s). 3.  Patient will perform upper and lower body dressing with supervision/set-up within 7 day(s). 4.  Patient will perform toilet transfers with supervision/set-up within 7 day(s). 5.  Patient will perform all aspects of toileting with supervision/set-up within 7 day(s). 6.  Patient will participate in upper extremity therapeutic exercise/activities with supervision/set-up for 10 minutes within 7 day(s). 7.  Patient will utilize energy conservation techniques during functional activities with verbal and visual cues within 7 day(s). Outcome: Progressing Towards Goal     OCCUPATIONAL THERAPY TREATMENT  Patient: Ghassan Pedraza [de-identified]64 y.o. male)  Date: 7/26/2021  Diagnosis: SAH (subarachnoid hemorrhage) ([facility]) [I60.9] Cerebral arteriovenous malformation (AVM)       Precautions:    Chart, occupational therapy assessment, plan of care, and goals were reviewed. ASSESSMENT  Patient continues with skilled OT services and is progressing towards goals however remains limited by decreased activity tolerance, safety awareness, judgment, insight into deficits, coordination, and strength noted with visual and balance activities completed this session. He continues to report diplopia with distance binocular vision, resolved with eye patch but decline to wear with OOB mobility this session.  Improved BP management with standing ADLs and activities however continues with dizziness and ataxia gait (see PT note for details). Patient with poor insight into deficits & safety requiring MAX education re: why it is unsafe for him to mobilize alone, will require continued carryover. Considering his high PLOF and current level of function, continue to recommend d/c to IPR to maximize safety & functional independence. Current Level of Function Impacting Discharge (ADLs): CGA-Min A for ADLs and limited mobility    Other factors to consider for discharge: fall risk, hands on assist for all OOB activity, PMH, high PLOF         PLAN :  Patient continues to benefit from skilled intervention to address the above impairments. Continue treatment per established plan of care to address goals. Recommend with staff: Recommend with nursing, ADLs with assist at sink, OOB to chair 3x/day and toileting via functional mobility to and from bathroom. Thank you for completing as able in order to maintain patient strength, endurance and independence. ;    Recommendation for discharge: (in order for the patient to meet his/her long term goals)  Therapy 3 hours per day 5-7 days per week    This discharge recommendation:  Has been made in collaboration with the attending provider and/or case management    IF patient discharges home will need the following DME: To be determined (TBD)--shower chair vs tub transfer bench at the least       SUBJECTIVE:   Patient stated I'm so fu*$ing tired of this.     OBJECTIVE DATA SUMMARY:   Cognitive/Behavioral Status:  Neurologic State: Alert  Orientation Level: Oriented X4  Cognition: Appropriate for age attention/concentration; Follows commands;Poor safety awareness  Perception: Appears intact  Perseveration: No perseveration noted  Safety/Judgement: Decreased awareness of need for safety;Decreased insight into deficits; Decreased awareness of need for assistance;Decreased awareness of environment    Functional Mobility and Transfers for ADLs:  Bed Mobility:  Supine to Sit: Stand-by assistance  Scooting: Stand-by assistance    Transfers:  Sit to Stand: Contact guard assistance          Balance:  Sitting: Intact  Standing: Impaired  Standing - Static: Fair  Standing - Dynamic : Fair    ADL Intervention:   Lower Body Dressing Assistance  Socks: Supervision; Compensatory technique training (in supine, knee flexion to adjust with no difficulty)    Cognitive Retraining  Safety/Judgement: Decreased awareness of need for safety;Decreased insight into deficits; Decreased awareness of need for assistance;Decreased awareness of environment    Neuro Re-Education:   Visual scanning in hallway, continued diplopia however able to accurately locate items on R side of hallway, some decreased acuity for distance on L side      Constant monitoring of dizziness required throughout activity, persisting with activity but not worsening, BPs stable throughout       Pain:  None reported    Activity Tolerance:   Fair    After treatment patient left in no apparent distress:   Sitting in chair, Call bell within reach, and Bed / chair alarm activated    COMMUNICATION/COLLABORATION:   The patients plan of care was discussed with: Physical therapist and Registered nurse. Patient was educated regarding His deficit(s) of impaired balance, activity tolerance, and insight/safety awareness as this relates to His diagnosis of SAH. He demonstrated Fair understanding as evidenced by verbal discussion with fair carryover.     REINA Sanchez, OTR/L  Time Calculation: 23 mins

## 2021-07-26 NOTE — PROGRESS NOTES
Neurointerventional Surgery Progress Note  Twila Begum AGACNP-BC  Neurocritical Care NP      Admit Date: 7/20/2021   LOS: 6 days        Daily Progress Note: 7/26/2021    S/P: POD 6 status post diagnostic cerebral angiogram    HPI: Gilmer Alcantara is a 27-year-old male with a past medical history significant for hypertension and diabetes type 2 who initially presented to [facility] ED on 7/19/2021 for acute onset of dizziness, headache, left-sided hearing loss, and left-sided weakness. Upon arrival to the ED his blood pressure was noted to be 265/120 per review of notes. A code stroke was called and CT of the head showed an acute subarachnoid hemorrhage anterior to the sarahi. CTA of the head and neck showed an AVM in the left superior cerebellar peduncle, with the dominant drainage into the vein of Gaston. No large vessel occlusion, no hemodynamically significant stenosis. Redemonstrated subarachnoid hemorrhage in the basilar cisterns. He was started on a Cardene drip and was also given IV hydralazine and labetalol. He was then transferred to [facility] for a higher level of care. NIS was consulted and patient underwent a diagnostic cerebral angiogram on 7/20/2021. The angiogram showed evidence of a cerebral AVM with multiple feeders coming of the left PCA, left anterior choroidal and left SCA with a large single draining vein that drains into the vein of Villanueva. 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 Subjective:   Patient is doing okay today. He did receive Xanax yesterday for some anxiety. He denies any current anxiety. He denies any headache, chest pain, shortness of breath, nausea, or vomiting. He does still complain of some double vision which has not worsened and dizziness with movement. He is wearing an eye patch for his double vision. He still endorses some issues with balance and coordination, left facial numbness, and left facial weakness.   He continues to require multiple oral antihypertensives and a Cardene drip/labetalol drip for blood pressure control. He was able to have a bowel movement this morning with current bowel regimen.     Current Facility-Administered Medications   Medication Dose Route Frequency Provider Last Rate Last Admin    dexamethasone (DECADRON) 4 mg/mL injection 4 mg  4 mg IntraVENous Q12H Akiko Castillo NP   4 mg at 07/26/21 0809    cloNIDine (CATAPRES) 0.3 mg/24 hr patch 1 Patch  1 Patch TransDERmal Q7D Vickey Horvath MD   1 Patch at 07/26/21 1703    metoprolol tartrate (LOPRESSOR) tablet 100 mg  100 mg Oral Q8H Herberth Morales DO   100 mg at 07/26/21 1701    hydrALAZINE (APRESOLINE) tablet 100 mg  100 mg Oral TID Herberth Morales DO   100 mg at 07/26/21 1701    senna-docusate (PERICOLACE) 8.6-50 mg per tablet 1 Tablet  1 Tablet Oral DAILY Suresh Grant NP   1 Tablet at 07/26/21 0810    polyethylene glycol (MIRALAX) packet 17 g  17 g Oral DAILY PRN Suresh Grant NP   17 g at 07/25/21 0906    bisacodyL (DULCOLAX) tablet 10 mg  10 mg Oral DAILY PRN Suresh Grant NP        labetaloL (NORMODYNE;TRANDATE) injection 10 mg  10 mg IntraVENous Q4H PRN Suresh Grant NP   10 mg at 07/24/21 1655    niCARdipine (CARDENE) 25 mg in 0.9% sodium chloride 250 mL infusion  0-15 mg/hr IntraVENous TITRATE Suresh Grant  mL/hr at 07/26/21 1702 10 mg/hr at 07/26/21 1702    amLODIPine (NORVASC) tablet 10 mg  10 mg Oral DAILY Herberth Morales DO   10 mg at 07/26/21 0810    hydrALAZINE (APRESOLINE) 20 mg/mL injection 20 mg  20 mg IntraVENous Q6H PRN Herberth Morales DO   20 mg at 07/25/21 1009    labetaloL (NORMODYNE;TRANDATE) 300 mg in 0.9% sodium chloride 150 mL (2 mg / 1 mL) infusion  0.5-2 mg/min IntraVENous TITRATE ANGELINE Valdez 15 mL/hr at 07/26/21 1340 0.5 mg/min at 07/26/21 1340    insulin lispro (HUMALOG) injection   SubCUTAneous AC&HS Dilcia Ruiz NP   2 Units at 07/26/21 1722    glucose chewable tablet 16 g 4 Tablet Oral PRN Herminio Rodriguez NP        dextrose (D50W) injection syrg 12.5-25 g  12.5-25 g IntraVENous PRN Herminio Rodriguez NP        glucagon (GLUCAGEN) injection 1 mg  1 mg IntraMUSCular PRN Herminio Rodriguez NP        ondansetron [facility]) injection 4 mg  4 mg IntraVENous Q6H PRN Juan Nielsen NP-C        naloxone [facility]) injection 0.4 mg  0.4 mg IntraVENous PRN Juan Nielsen NP-C        acetaminophen (TYLENOL) tablet 650 mg  650 mg Oral Q4H PRN Juan Nielsen NP-C   650 mg at 21    Or    acetaminophen (TYLENOL) solution 650 mg  650 mg Per NG tube Q4H PRN Juan Nielsen NP-C        Or    acetaminophen (TYLENOL) suppository 650 mg  650 mg Rectal Q4H PRN Juan Nielsen NP-C        lactated Ringers infusion  50 mL/hr IntraVENous CONTINUOUS Sara Fernandez NP 50 mL/hr at 21 1047 50 mL/hr at 21 1047    glucose chewable tablet 16 g  4 Tablet Oral PRN Juan Nielsen NP-C        niMODipine (NIMOTOP) capsule 60 mg  60 mg Oral Q4H Gabbi Gallegos NP   60 mg at 21 1540    levETIRAcetam (KEPPRA) tablet 500 mg  500 mg Oral BID Raman Gallegos Mai, NP   500 mg at 21 0810    lisinopriL (PRINIVIL, ZESTRIL) tablet 40 mg  40 mg Oral DAILY Patt Cruz NP   40 mg at 21 0810        No Known Allergies    Review of Systems:  Pertinent items are noted in the History of Present Illness. Objective:     Vital signs  Temp (24hrs), Av.8 °F (36.6 °C), Min:97.4 °F (36.3 °C), Max:98.4 °F (36.9 °C)   701 - 1900  In: 1494.2 [I.V.:1494.2]  Out: 250 [Urine:250]  1901 -  07  In: 9784.1 [P.O.:2710;  I.V.:7074.1]  Out: 2720 [Urine:2720]    Visit Vitals  /61 (BP 1 Location: Right upper arm, BP Patient Position: At rest)   Pulse (!) 57   Temp 97.6 °F (36.4 °C)   Resp 20   Ht 6' (1.829 m)   Wt 262 lb 12.6 oz (119.2 kg)   SpO2 97%   BMI 35.64 kg/m²      O2 Device: None (Room air)     Pain control  Pain Assessment  Pain Scale 1: Numeric (0 - 10)  Pain Intensity 1: 0  Pain Onset 1: 15 minutes ago  Pain Location 1: Head, Neck  Pain Orientation 1: Left  Pain Description 1: Sharp  Pain Intervention(s) 1: Medication (see MAR), MD notified (comment) Pattie Gorman NP)    PT/OT  Gait     Gait  Base of Support: Widened  Speed/Rita: Slow, Shuffled  Step Length: Right shortened, Left shortened  Gait Abnormalities: Ataxic, Altered arm swing, Trunk sway increased, Shuffling gait  Ambulation - Level of Assistance: Contact guard assistance  Distance (ft): 35 Feet (ft)  Assistive Device: Gait belt             Vitals:    07/26/21 1500 07/26/21 1530 07/26/21 1540 07/26/21 1600   BP: 130/65 134/60 134/60 131/61   Pulse: (!) 54 62 (!) 57 (!) 57   Resp: 17 19  20   Temp:       SpO2:       Weight:       Height:            Physical Exam:  GENERAL: alert, cooperative, no distress, appears stated age  LUNG: clear to auscultation bilaterally  HEART: regular rate and rhythm, S1, S2 normal, no murmur, click, rub or gallop  EXTREMITIES:  extremities normal, atraumatic, no cyanosis, nonpitting peripheral edema, Posterior tibial pulses +1 bilaterally, +2 pedal pulses bilaterally   SKIN: Skin cool to touch. Appropriate for ethnicity. Right groin site clean, dry, and intact. No hematoma, bruising, or bleeding noted. Neurologic Exam:  Mental Status:  Alert and oriented x 4. Appropriate affect, mood and behavior. Language:    Normal fluency, repetition, comprehension and naming. Cranial Nerves:        Pupils equal, round and reactive to light. Visual fields full to confrontation. Dysconjugate gaze intermittently with EOMs, otherwise intact. Mild subtle nystagmus when looking to the left, but it has improved. Decreased sensation on the left side of face. Left facial asymmetry     Tongue protrudes to midline, palate elevates symmetrically. No dysarthria. Motor:    No pronator drift.       Bulk and tone normal.      5/5 power in all extremities proximally and distally. No involuntary movements. Sensation:    Decreased sensation on the left side of face, otherwise sensation intact to light touch. No neglect. Coordination & Gait: Some dysmetria noted in RUE and LUE with FTN (right worse than left, but it has improved). HTS intact bilaterally. Gait deferred.      24 hour results:    Recent Results (from the past 24 hour(s))   GLUCOSE, POC    Collection Time: 07/25/21  9:06 PM   Result Value Ref Range    Glucose (POC) 179 (H) 65 - 117 mg/dL    Performed by Niraj Mtz    MAGNESIUM    Collection Time: 07/26/21  4:43 AM   Result Value Ref Range    Magnesium 2.7 (H) 1.6 - 2.4 mg/dL   PHOSPHORUS    Collection Time: 07/26/21  4:43 AM   Result Value Ref Range    Phosphorus 3.1 2.6 - 4.7 MG/DL   CBC W/O DIFF    Collection Time: 07/26/21  4:43 AM   Result Value Ref Range    WBC 12.1 (H) 4.1 - 11.1 K/uL    RBC 4.83 4.10 - 5.70 M/uL    HGB 13.7 12.1 - 17.0 g/dL    HCT 39.9 36.6 - 50.3 %    MCV 82.6 80.0 - 99.0 FL    MCH 28.4 26.0 - 34.0 PG    MCHC 34.3 30.0 - 36.5 g/dL    RDW 12.8 11.5 - 14.5 %    PLATELET 664 973 - 219 K/uL    MPV 10.7 8.9 - 12.9 FL    NRBC 0.0 0  WBC    ABSOLUTE NRBC 0.00 0.00 - 3.56 K/uL   METABOLIC PANEL, BASIC    Collection Time: 07/26/21  4:43 AM   Result Value Ref Range    Sodium 137 136 - 145 mmol/L    Potassium 4.0 3.5 - 5.1 mmol/L    Chloride 109 (H) 97 - 108 mmol/L    CO2 22 21 - 32 mmol/L    Anion gap 6 5 - 15 mmol/L    Glucose 149 (H) 65 - 100 mg/dL    BUN 23 (H) 6 - 20 MG/DL    Creatinine 0.91 0.70 - 1.30 MG/DL    BUN/Creatinine ratio 25 (H) 12 - 20      GFR est AA >60 >60 ml/min/1.73m2    GFR est non-AA >60 >60 ml/min/1.73m2    Calcium 7.5 (L) 8.5 - 10.1 MG/DL   GLUCOSE, POC    Collection Time: 07/26/21  7:08 AM   Result Value Ref Range    Glucose (POC) 123 (H) 65 - 117 mg/dL    Performed by Niraj Mtz    TCD INTRACRANIAL ARTERIES COMPLETE    Collection Time: 07/26/21  9:59 AM   Result Value Ref Range    Right PCA 1 PSV 33.6 cm/s    Right PCA 1 EDV 17.6 cm/s    Right Vertebral PSV 51.4 cm/s    Right Vertebral EDV 18.8 cm/s    Left PCA 1 EDV 22.5 cm/s    Left Vertebral PSV 52.6 cm/s    Left Vertebral EDV 17.0 cm/s    Basilar Artery EDV 21.6 cm/s    Right ICA dist sys 43.2 cm/s    Right ICA dist mabry 11.2 cm/s    Left ICA dist sys 40.1 cm/s    Left ICA dist mabry 11.2 cm/s    Right PCA 1 Mean Velocity 23 cm/s    Right External ICA PSV 43 cm/s    Right External ICA EDV 11 cm/s    Right External ICA Mean Velocity 22 cm/s    Left External ICA PSV 40 cm/s    Left External ICA EDV 11 cm/s    Left External ICA Mean Velocity 21 cm/s    Left PCA 1 PSV 45 cm/s    Left PCA 1 Mean Velocity 30 cm/s    Basilar Artery PSV 67 cm/s    Basilar Artery Mean Farhat 37 cm/s    Right Vertebral Mean Velocity 30 cm/s    Left Vertebral Mean Velocity 29 cm/s   GLUCOSE, POC    Collection Time: 07/26/21 12:20 PM   Result Value Ref Range    Glucose (POC) 147 (H) 65 - 117 mg/dL    Performed by Katheryn Nunez , POC    Collection Time: 07/26/21  5:19 PM   Result Value Ref Range    Glucose (POC) 147 (H) 65 - 117 mg/dL    Performed by Reno Esparza           Imaging:  CT of head on 7/19/2021 at 2104 shows  IMPRESSION  1. Acute subarachnoid hemorrhage anterior to the sarahi. CTA of the head and neck on 7/19/2021 at 2126 shows  IMPRESSION     1. Arteriovenous malformation in the left superior cerebellar peduncle, with the  dominant drainage into the vein of Gaston. 2. No large vessel occlusion, no hemodynamically significant stenosis. 3. Redemonstrated subarachnoid hemorrhage in the basilar cisterns. CT the head on 7/20/2021 at 1129 shows  IMPRESSION  1. Stable ventricular size.     2.  Questionable diffuse cerebral edema.     3.  Subarachnoid hemorrhage in the prepontine cistern and ambient cisterns as  well as sylvian fissures are decreased from the prior study.     MRI brain with and without 7/20/2021 at 2214 shows  IMPRESSION  1. Tiny 1 mm foci of diffusion restriction in the left cerebellum and left  cerebral hemisphere as described likely embolic in origin.     2.  Possible mild meningeal enhancement which may be secondary to meningeal  irritation from known subarachnoid hemorrhage.     3.  Dilated superior ophthalmic veins are noted which is new when compared to  prior imaging. This is of uncertain clinical significance. There appears to be  normal enhancement of the cavernous sinus bilaterally. CT of head on 7/23/2021 at 2233 shows     IMPRESSION  No hydrocephalus. Stable left prepontine subarachnoid hemorrhage. CT of Head on 7/24/2021 at 1357  IMPRESSION:   Left prepontine hemorrhage is slightly increased. CT of the head on 7/24/2021 at 10:21 PM shows  IMPRESSION  Stable left prepontine hemorrhage. Assessment:     Principal Problem:    Cerebral arteriovenous malformation (AVM) (7/20/2021)      Overview: ([id number])- CT (Brain): There is an AVM in the superior cerebellum with multiple aneurysms. One measures 4 mm. Another measures 4 mm within the superior cerebellum. Active Problems:    Malignant hypertension (7/20/2021)      Acute hypokalemia (7/20/2021)      Celiac artery stenosis ([address] (7/21/2021)      Vasogenic cerebral edema ([address] (7/21/2021)      Hypertensive emergency (7/21/2021)      SAH (subarachnoid hemorrhage) ([address] (7/20/2021)        Plan:   Acute SAH in the setting of cerebellar AVM with associated flow related aneurysm, [place] Knox 1, Mistry Grade 2  - s/p diagnostic cerebral angiogram on 7/20/2021  - Day 7 of 21 of Nimotop for the prevention of delayed cerebral ischemia  - continue Keppra 500 mg BID for seizure ppx  - Continue LR to 50 ml/hr to maintain euvolemia  - Repeat head CT last night showed stable left prepontine hemorrhage  - Strict I's and O's, patient +1244 mls on  I's and O's currently  - ECHO shows EF 65-70%, dilated left atrium. No shunting seen.  Mild (grade 1) left ventricular diastolic dysfunction. - continue every hour neuro checks  - SBP goal <140  - PT/OT/SLP evals   - assess NICOM PRN for fluid responsiveness   - daily TCDs, TCDs today show limited exam.  The anterior intracranial circulation is not visualized. No evidence of vasospasm in the posterior circulation. Patient is clinically stable. Continue with medical management for prevention of vasospasm   - NIS following     HTN  - SBP goal <140 as stated above  - Cardene/Labetalol drip PRN  - patient on multiple oral antihypertensives (lisinopril, metoprolol, hydralazine, amlodipine, and clonidine)  - recommend secondary HTN work-up (Discussed with the Intensivist)   - Patient had a bilateral artery/venous renal duplex on 722 which shows no evidence of significant renal artery stenosis. No evidence of significant renal parenchymal disease. Possible hemodynamically significant celiac artery stenosis. - Intensivist following    Constipation (improved)  - continue Rissa-colace daily  - Miralax PRN daily  - Dulcolax 10 mg daily PRN      Activity: Up with assistance, ambulate today  DVT ppx: SCDs  Dispo: TBD    Plan d/w Dr. Lambert Almaguer, Dr. Santiago Quiñonez, RN, and patient.        Carmel Jewell, ECTOR

## 2021-07-26 NOTE — PROGRESS NOTES
[facility]    ICU TEAM Progress Note    Name: Lizbeth Schultz   : 1960   MRN: [id number]   Date: 2021      I  Subjective:   Progress Note: 2021      Reason for ICU Admission: Cerebellar arteriovenous malformation, subarachnoid hemorrhage    Interval history:  25-year-old male with hypertension admitted to our hospital as a transfer from outside facility on  with subarachnoid hemorrhage  Overnight Events:   No acute event overnight. Still difficult to control blood pressure. Active Problem List:     Problem List  Date Reviewed: 2021        Codes Class    Celiac artery stenosis (HCC) (Chronic) ICD-10-CM: I77.4  ICD-9-CM: 999. 4 Chronic        Vasogenic cerebral edema (HCC) ICD-10-CM: G93.6  ICD-9-CM: 348. 5 Acute        Hypertensive emergency ICD-10-CM: I16.1  ICD-9-CM: 401.9 Acute        Malignant hypertension ICD-10-CM: I10  ICD-9-CM: 401.0 Acute        * (Principal) Cerebral arteriovenous malformation (AVM) ICD-10-CM: Q28.2  ICD-9-CM: 747.81 Acute    Overview Signed 2021  7:03 AM by Jamaica Cosby MD     ([id number])- CT (Brain): There is an AVM in the superior cerebellum with multiple aneurysms. One measures 4 mm. Another measures 4 mm within the superior cerebellum.              Acute hypokalemia ICD-10-CM: E87.6  ICD-9-CM: 276.8 Acute        SAH (subarachnoid hemorrhage) (HCC) ICD-10-CM: I60.9  ICD-9-CM: 430         Essential hypertension ICD-10-CM: I10  ICD-9-CM: 401.9         Type 2 diabetes mellitus without complication, without long-term current use of insulin (HCC) ICD-10-CM: E11.9  ICD-9-CM: 250.00         Arthritis of hip ICD-10-CM: M16.10  ICD-9-CM: 716.95               Past Medical History:      has a past medical history of Acute hypokalemia (2021), Celiac artery stenosis ([facility] (2021), Cerebral arteriovenous malformation (AVM) (2021), Diabetes ([address] ED (erectile dysfunction), HTN (hypertension), Hypertension, Hypertensive emergency (7/21/2021), Impaired fasting glucose, Malignant hypertension (7/20/2021), and Vasogenic cerebral edema ([facility] (7/21/2021). Past Surgical History:      has a past surgical history that includes hx tonsil and adenoidectomy. Home Medications:     Prior to Admission medications    Medication Sig Start Date End Date Taking? Authorizing Provider   lancets misc by Does Not Apply route. Provider, Historical   metFORMIN (GLUCOPHAGE) 500 mg tablet Take 1 Tab by mouth daily (with breakfast). 3/22/19   Christine Yousif MD   lisinopril (PRINIVIL, ZESTRIL) 40 mg tablet Take 1 Tab by mouth daily. 3/22/19   Christine Yousif MD   hydrALAZINE (APRESOLINE) 25 mg tablet Take 1 Tab by mouth three (3) times daily. 3/22/19   Christine Yousif MD   amLODIPine (NORVASC) 10 mg tablet Take 1 Tab by mouth daily. 3/22/19   Christine Yousif MD   oxyCODONE-acetaminophen (PERCOCET 7.5) 7.5-325 mg per tablet Take 1-2 Tabs by mouth every four (4) hours as needed for Pain. Max Daily Amount: 12 Tabs. 3/20/15   Chikis Ravi PA   traMADol (ULTRAM) 50 mg tablet Take 1 Tab by mouth every six (6) hours as needed for Pain. Max Daily Amount: 200 mg. 3/20/15   MELANI Gurrola   bisacodyl (DULCOLAX, BISACODYL,) 10 mg suppository Insert 10 mg into rectum daily. 3/20/15   MELANI Gurrola   promethazine (PHENERGAN) 25 mg tablet Take 1 Tab by mouth every six (6) hours as needed for Nausea. 3/20/15   MELANI Gurrola   aspirin (ASPIRIN) 325 mg tablet Take 1 Tab by mouth two (2) times a day. 3/20/15   MELANI Gurrola   metFORMIN (GLUCOPHAGE) 500 mg tablet Take 500 mg by mouth Daily (before breakfast). Provider, Historical   lisinopril (PRINIVIL, ZESTRIL) 40 mg tablet Take 40 mg by mouth daily (after breakfast). Provider, Historical   hydrochlorothiazide (HYDRODIURIL) 25 mg tablet Take 25 mg by mouth daily (after breakfast). Provider, Historical   amLODIPine (NORVASC) 10 mg tablet Take 10 mg by mouth daily (with breakfast). Provider, Historical   hydrALAZINE (APRESOLINE) 25 mg tablet Take 25 mg by mouth three (3) times daily. Provider, Historical       Allergies/Social/Family History:     No Known Allergies   Social History     Tobacco Use    Smoking status: Former Smoker     Packs/day: 2.00     Years: 20.00     Pack years: 40.00     Types: Cigarettes     Quit date: 2015     Years since quittin.4    Smokeless tobacco: Current User    Tobacco comment: Patient reports he is Vaping    Substance Use Topics    Alcohol use: Yes     Alcohol/week: 6.0 standard drinks     Types: 6 Cans of beer per week      Family History   Problem Relation Age of Onset    Diabetes Mother         type 2    Hypertension Mother     Heart Disease Mother         CHF    Hypertension Father     Heart Disease Father 48        MI       Review of Systems:     10 system reviewed and they are negative but as in interval history    Objective:   Vital Signs:  Visit Vitals  /66   Pulse (!) 57   Temp 97.7 °F (36.5 °C)   Resp 14   Ht 6' (1.829 m)   Wt 119.2 kg (262 lb 12.6 oz)   SpO2 95%   BMI 35.64 kg/m²      O2 Device: None (Room air) Temp (24hrs), Av.6 °F (36.4 °C), Min:96.5 °F (35.8 °C), Max:98.4 °F (36.9 °C)           Intake/Output:     Intake/Output Summary (Last 24 hours) at 2021 0924  Last data filed at 2021 0800  Gross per 24 hour   Intake 7716.5 ml   Output 925 ml   Net 6791.5 ml       Physical Exam:  General:   Alert, cooperative, no distress, appears stated age. Facial asymmetry with diplopia. Dysmetria LUE. Eyes:   Conjunctivae/corneas clear. PERRL, EOMs intact. Ears:   Normal external ear canals bilaterally. Nose:   No drainage or sinus tenderness. Mouth/Throat:  Moist mucous membranes. Dentition normal.    Neck:  Symmetrical, trachea midline, no JVD or carotid bruit. Back:    No CVA tenderness to percussion. Lungs:    Clear to auscultation bilaterally. Heart:   Regular rate and rhythm.  S1, S2 normal, without murmur, click, rub or gallop. Abdomen:    Normoactive bowel sounds. Soft, non-tender, no masses or organomegaly. Extremities:  Atraumatic, no cyanosis or edema. Vascular:  Pulses 2+ and symmetric UE/LE bilat   Skin:  Normal color, non-diaphoretic, brisk capillary refill (Less than 2 seconds). No rashes or lesions. Lymph nodes:  No Lymphadenopathy. Neurologic:  Speech normal normal strength. Face is symmetrical on the left, occasional nystagmus, occasional deviation of left eye inward         LABS AND  DATA: Personally reviewed  Recent Labs     07/26/21 0443 07/25/21  0333   WBC 12.1* 10.5   HGB 13.7 14.3   HCT 39.9 41.5    247     Recent Labs     07/26/21 0443 07/25/21  0333    140   K 4.0 3.9   * 111*   CO2 22 23   BUN 23* 17   CREA 0.91 0.78   * 129*   CA 7.5* 7.5*   MG 2.7* 2.6*   PHOS 3.1 3.1     No results for input(s): AP, TBIL, TP, ALB, GLOB, AML, LPSE in the last 72 hours. No lab exists for component: SGOT, GPT, AMYP  No results for input(s): INR, PTP, APTT, INREXT in the last 72 hours. No results for input(s): PHI, PCO2I, PO2I, FIO2I in the last 72 hours. Recent Labs     07/24/21  2109   TROIQ <0.05       Hemodynamics:   PAP:   CO:     Wedge:   CI:     CVP:    SVR:       PVR:       Ventilator Settings:  Mode Rate Tidal Volume Pressure FiO2 PEEP                    Peak airway pressure:      Minute ventilation:          MEDS: Reviewed    Chest X-Ray:  CXR Results  (Last 48 hours)    None            Assessment and Plan:   -Arteriovenous malformation left superior cerebellar peduncle:  -Acute subarachnoid hemorrhage:  -Difficult to control hypertension:      -Appreciate neurology recommendation management:   -Maintain systolic blood pressure below 140   -Day 7/21 of nimodipine   -Maintain euvolemia, patient significantly positive. Trial of diuresis today.   Discontinue IV fluid [still getting large volume with medication drips]   -Prophylactic Keppra, Daily TCD's  -On multiple antihypertensive medication,    DVT GI prophylaxis. Neurochecks every 1 hour. DISPOSITION  Stay in ICU    CRITICAL CARE CONSULTANT NOTE  I had a face to face encounter with the patient, reviewed and interpreted patient data including clinical events, labs, images, vital signs, I/O's, and examined patient. I have discussed the case and the plan and management of the patient's care with the consulting services, the bedside nurses and the respiratory therapist.      NOTE OF PERSONAL INVOLVEMENT IN CARE   This patient has a high probability of imminent, clinically significant deterioration, which requires the highest level of preparedness to intervene urgently. I participated in the decision-making and personally managed or directed the management of the following life and organ supporting interventions that required my frequent assessment to treat or prevent imminent deterioration. I personally spent 40 minutes of critical care time. This is time spent at this critically ill patient's bedside actively involved in patient care as well as the coordination of care and discussions with the patient's family. This does not include any procedural time which has been billed separately. Wili Wilson M.D.   Staff Intensivist/Pulmonologist  [facility]  7/26/2021

## 2021-07-26 NOTE — PROGRESS NOTES
0730: Bedside, Verbal and Written shift change report given to Joy Jarquin RN (oncoming nurse) by Von Hook RN (offgoing nurse). Report included the following information SBAR, Kardex, Intake/Output, MAR, Recent Results, Cardiac Rhythm NSR and Dual Neuro Assessment. 1930: Bedside, Verbal and Written shift change report given to Von Hook RN (oncoming nurse) by Joy Jarquin RN (offgoing nurse). Report included the following information SBAR, Kardex, Intake/Output, MAR, Cardiac Rhythm Kalee West and Dual Neuro Assessment.

## 2021-07-26 NOTE — PROGRESS NOTES
Transition of Care Plan   RUR- Low    DISPOSITION: pending medical progression   F/U with PCP/Specialist     Transport: Family  Patient remains in the ICU, Eye patch in place. Patient on PO  antihypertensives and a Cardene gtt. TCD's negative for vasospasm. Care management will continue to follow for transitions of care.    Tanner Lord RN,Care Management

## 2021-07-27 ENCOUNTER — APPOINTMENT (OUTPATIENT)
Dept: VASCULAR SURGERY | Age: 61
DRG: 064 | End: 2021-07-27
Attending: INTERNAL MEDICINE
Payer: COMMERCIAL

## 2021-07-27 LAB
ANION GAP SERPL CALC-SCNC: 4 MMOL/L (ref 5–15)
ANION GAP SERPL CALC-SCNC: 6 MMOL/L (ref 5–15)
BASILAR ARTERY EDV: 21.6 CM/S
BASILAR ARTERY MEAN VEL: 37 CM/S
BASILAR ARTERY PSV: 67 CM/S
BUN SERPL-MCNC: 20 MG/DL (ref 6–20)
BUN SERPL-MCNC: 21 MG/DL (ref 6–20)
BUN/CREAT SERPL: 26 (ref 12–20)
BUN/CREAT SERPL: 29 (ref 12–20)
CALCIUM SERPL-MCNC: 7.9 MG/DL (ref 8.5–10.1)
CALCIUM SERPL-MCNC: 8.2 MG/DL (ref 8.5–10.1)
CHLORIDE SERPL-SCNC: 105 MMOL/L (ref 97–108)
CHLORIDE SERPL-SCNC: 106 MMOL/L (ref 97–108)
CO2 SERPL-SCNC: 23 MMOL/L (ref 21–32)
CO2 SERPL-SCNC: 24 MMOL/L (ref 21–32)
CREAT SERPL-MCNC: 0.72 MG/DL (ref 0.7–1.3)
CREAT SERPL-MCNC: 0.78 MG/DL (ref 0.7–1.3)
ERYTHROCYTE [DISTWIDTH] IN BLOOD BY AUTOMATED COUNT: 13.5 % (ref 11.5–14.5)
GLUCOSE BLD STRIP.AUTO-MCNC: 106 MG/DL (ref 65–117)
GLUCOSE BLD STRIP.AUTO-MCNC: 146 MG/DL (ref 65–117)
GLUCOSE BLD STRIP.AUTO-MCNC: 150 MG/DL (ref 65–117)
GLUCOSE BLD STRIP.AUTO-MCNC: 98 MG/DL (ref 65–117)
GLUCOSE SERPL-MCNC: 111 MG/DL (ref 65–100)
GLUCOSE SERPL-MCNC: 135 MG/DL (ref 65–100)
HCT VFR BLD AUTO: 41.2 % (ref 36.6–50.3)
HGB BLD-MCNC: 14.4 G/DL (ref 12.1–17)
LEFT EX ICA EDV: 11 CM/S
LEFT EX ICA MEAN VEL: 21 CM/S
LEFT EX ICA PSV: 40 CM/S
LEFT ICA DIST DIAS: 11.2 CM/S
LEFT ICA DIST SYS: 40.1 CM/S
LEFT PCA 1 EDV: 22.5 CM/S
LEFT PCA 1 MEAN VEL: 30 CM/S
LEFT PCA 1 PSV: 45 CM/S
LEFT VERTEBRAL EDV TCD: 17 CM/S
LEFT VERTEBRAL MEAN VEL: 29 CM/S
LEFT VERTEBRAL PSV TCD: 52.6 CM/S
MAGNESIUM SERPL-MCNC: 2.7 MG/DL (ref 1.6–2.4)
MCH RBC QN AUTO: 29 PG (ref 26–34)
MCHC RBC AUTO-ENTMCNC: 35 G/DL (ref 30–36.5)
MCV RBC AUTO: 82.9 FL (ref 80–99)
NRBC # BLD: 0 K/UL (ref 0–0.01)
NRBC BLD-RTO: 0 PER 100 WBC
PHOSPHATE SERPL-MCNC: 3.8 MG/DL (ref 2.6–4.7)
PLATELET # BLD AUTO: 278 K/UL (ref 150–400)
PMV BLD AUTO: 10.8 FL (ref 8.9–12.9)
POTASSIUM SERPL-SCNC: 4.8 MMOL/L (ref 3.5–5.1)
POTASSIUM SERPL-SCNC: ABNORMAL MMOL/L (ref 3.5–5.1)
RBC # BLD AUTO: 4.97 M/UL (ref 4.1–5.7)
RIGHT EX ICA EDV: 11 CM/S
RIGHT EX ICA MEAN VEL: 22 CM/S
RIGHT EX ICA PSV: 43 CM/S
RIGHT ICA DIST DIAS: 11.2 CM/S
RIGHT ICA DIST SYS: 43.2 CM/S
RIGHT PCA 1 EDV: 17.6 CM/S
RIGHT PCA 1 MEAN VEL: 23 CM/S
RIGHT PCA 1 PSV: 33.6 CM/S
RIGHT VERTEBRAL EDV TCD: 18.8 CM/S
RIGHT VERTEBRAL MEAN VEL: 30 CM/S
RIGHT VERTEBRAL PSV TCD: 51.4 CM/S
SERVICE CMNT-IMP: ABNORMAL
SERVICE CMNT-IMP: ABNORMAL
SERVICE CMNT-IMP: NORMAL
SERVICE CMNT-IMP: NORMAL
SODIUM SERPL-SCNC: 133 MMOL/L (ref 136–145)
SODIUM SERPL-SCNC: 135 MMOL/L (ref 136–145)
WBC # BLD AUTO: 15.2 K/UL (ref 4.1–11.1)

## 2021-07-27 PROCEDURE — 74011250637 HC RX REV CODE- 250/637: Performed by: NURSE PRACTITIONER

## 2021-07-27 PROCEDURE — 36415 COLL VENOUS BLD VENIPUNCTURE: CPT

## 2021-07-27 PROCEDURE — 74011000250 HC RX REV CODE- 250: Performed by: INTERNAL MEDICINE

## 2021-07-27 PROCEDURE — 84100 ASSAY OF PHOSPHORUS: CPT

## 2021-07-27 PROCEDURE — 83735 ASSAY OF MAGNESIUM: CPT

## 2021-07-27 PROCEDURE — 82962 GLUCOSE BLOOD TEST: CPT

## 2021-07-27 PROCEDURE — 74011250636 HC RX REV CODE- 250/636: Performed by: NURSE PRACTITIONER

## 2021-07-27 PROCEDURE — 74011250637 HC RX REV CODE- 250/637: Performed by: INTERNAL MEDICINE

## 2021-07-27 PROCEDURE — 74011000250 HC RX REV CODE- 250: Performed by: NURSE PRACTITIONER

## 2021-07-27 PROCEDURE — 85027 COMPLETE CBC AUTOMATED: CPT

## 2021-07-27 PROCEDURE — 65610000006 HC RM INTENSIVE CARE

## 2021-07-27 PROCEDURE — 80048 BASIC METABOLIC PNL TOTAL CA: CPT

## 2021-07-27 PROCEDURE — 74011250636 HC RX REV CODE- 250/636: Performed by: ANESTHESIOLOGY

## 2021-07-27 PROCEDURE — 74011636637 HC RX REV CODE- 636/637: Performed by: NURSE PRACTITIONER

## 2021-07-27 PROCEDURE — 93886 INTRACRANIAL COMPLETE STUDY: CPT

## 2021-07-27 PROCEDURE — 74011250637 HC RX REV CODE- 250/637: Performed by: ANESTHESIOLOGY

## 2021-07-27 PROCEDURE — 99232 SBSQ HOSP IP/OBS MODERATE 35: CPT | Performed by: STUDENT IN AN ORGANIZED HEALTH CARE EDUCATION/TRAINING PROGRAM

## 2021-07-27 RX ORDER — BUMETANIDE 0.25 MG/ML
1 INJECTION INTRAMUSCULAR; INTRAVENOUS ONCE
Status: COMPLETED | OUTPATIENT
Start: 2021-07-27 | End: 2021-07-27

## 2021-07-27 RX ORDER — METOPROLOL TARTRATE 50 MG/1
100 TABLET ORAL EVERY 12 HOURS
Status: DISCONTINUED | OUTPATIENT
Start: 2021-07-27 | End: 2021-08-03

## 2021-07-27 RX ORDER — SODIUM CHLORIDE 9 MG/ML
100 INJECTION, SOLUTION INTRAVENOUS CONTINUOUS
Status: DISCONTINUED | OUTPATIENT
Start: 2021-07-27 | End: 2021-08-03

## 2021-07-27 RX ORDER — CLONIDINE HYDROCHLORIDE 0.2 MG/1
0.2 TABLET ORAL EVERY 8 HOURS
Status: DISCONTINUED | OUTPATIENT
Start: 2021-07-27 | End: 2021-07-29

## 2021-07-27 RX ORDER — HYDROCHLOROTHIAZIDE 25 MG/1
25 TABLET ORAL DAILY
Status: DISCONTINUED | OUTPATIENT
Start: 2021-07-27 | End: 2021-08-01

## 2021-07-27 RX ADMIN — SODIUM CHLORIDE 5 MG/HR: 9 INJECTION, SOLUTION INTRAVENOUS at 11:42

## 2021-07-27 RX ADMIN — NIMODIPINE 60 MG: 30 CAPSULE, LIQUID FILLED ORAL at 11:14

## 2021-07-27 RX ADMIN — NIMODIPINE 60 MG: 30 CAPSULE, LIQUID FILLED ORAL at 18:35

## 2021-07-27 RX ADMIN — SODIUM CHLORIDE 2.5 MG/HR: 9 INJECTION, SOLUTION INTRAVENOUS at 20:09

## 2021-07-27 RX ADMIN — DEXAMETHASONE SODIUM PHOSPHATE 4 MG: 4 INJECTION, SOLUTION INTRAMUSCULAR; INTRAVENOUS at 20:19

## 2021-07-27 RX ADMIN — SODIUM CHLORIDE 10 MG/HR: 9 INJECTION, SOLUTION INTRAVENOUS at 14:25

## 2021-07-27 RX ADMIN — NIMODIPINE 60 MG: 30 CAPSULE, LIQUID FILLED ORAL at 22:40

## 2021-07-27 RX ADMIN — NIMODIPINE 60 MG: 30 CAPSULE, LIQUID FILLED ORAL at 14:21

## 2021-07-27 RX ADMIN — LISINOPRIL 40 MG: 20 TABLET ORAL at 08:00

## 2021-07-27 RX ADMIN — METOPROLOL TARTRATE 100 MG: 50 TABLET, FILM COATED ORAL at 21:56

## 2021-07-27 RX ADMIN — HYDROCHLOROTHIAZIDE 25 MG: 25 TABLET ORAL at 14:21

## 2021-07-27 RX ADMIN — SODIUM CHLORIDE 75 ML/HR: 9 INJECTION, SOLUTION INTRAVENOUS at 20:20

## 2021-07-27 RX ADMIN — DEXAMETHASONE SODIUM PHOSPHATE 4 MG: 4 INJECTION, SOLUTION INTRAMUSCULAR; INTRAVENOUS at 08:02

## 2021-07-27 RX ADMIN — SODIUM CHLORIDE 5 MG/HR: 9 INJECTION, SOLUTION INTRAVENOUS at 03:55

## 2021-07-27 RX ADMIN — SODIUM CHLORIDE 5 MG/HR: 9 INJECTION, SOLUTION INTRAVENOUS at 08:05

## 2021-07-27 RX ADMIN — CLONIDINE HYDROCHLORIDE 0.2 MG: 0.2 TABLET ORAL at 21:56

## 2021-07-27 RX ADMIN — HYDRALAZINE HYDROCHLORIDE 100 MG: 50 TABLET, FILM COATED ORAL at 21:56

## 2021-07-27 RX ADMIN — BUMETANIDE 1 MG: 0.25 INJECTION, SOLUTION INTRAMUSCULAR; INTRAVENOUS at 11:16

## 2021-07-27 RX ADMIN — DOCUSATE SODIUM 50 MG AND SENNOSIDES 8.6 MG 1 TABLET: 8.6; 5 TABLET, FILM COATED ORAL at 08:00

## 2021-07-27 RX ADMIN — CLONIDINE HYDROCHLORIDE 0.2 MG: 0.2 TABLET ORAL at 14:21

## 2021-07-27 RX ADMIN — HYDRALAZINE HYDROCHLORIDE 100 MG: 50 TABLET, FILM COATED ORAL at 08:01

## 2021-07-27 RX ADMIN — LEVETIRACETAM 500 MG: 500 TABLET ORAL at 08:01

## 2021-07-27 RX ADMIN — INSULIN LISPRO 2 UNITS: 100 INJECTION, SOLUTION INTRAVENOUS; SUBCUTANEOUS at 16:04

## 2021-07-27 RX ADMIN — METOPROLOL TARTRATE 100 MG: 50 TABLET, FILM COATED ORAL at 06:46

## 2021-07-27 RX ADMIN — HYDRALAZINE HYDROCHLORIDE 20 MG: 20 INJECTION INTRAMUSCULAR; INTRAVENOUS at 02:38

## 2021-07-27 RX ADMIN — NIMODIPINE 60 MG: 30 CAPSULE, LIQUID FILLED ORAL at 02:39

## 2021-07-27 RX ADMIN — HYDRALAZINE HYDROCHLORIDE 100 MG: 50 TABLET, FILM COATED ORAL at 15:49

## 2021-07-27 RX ADMIN — LEVETIRACETAM 500 MG: 500 TABLET ORAL at 18:35

## 2021-07-27 RX ADMIN — SODIUM CHLORIDE 7.5 MG/HR: 9 INJECTION, SOLUTION INTRAVENOUS at 00:03

## 2021-07-27 RX ADMIN — NIMODIPINE 60 MG: 30 CAPSULE, LIQUID FILLED ORAL at 06:48

## 2021-07-27 RX ADMIN — AMLODIPINE BESYLATE 10 MG: 5 TABLET ORAL at 08:01

## 2021-07-27 NOTE — PROGRESS NOTES
Occupational Therapy 1014 -   07.27.2021    Chart reviewed in prep for OT treatment, imaging/testing at bedside. Will defer and f/u as able. Thank you. Matthew Stallings MS, OTR/L

## 2021-07-27 NOTE — PROGRESS NOTES
1930: Bedside, Verbal and Written shift change report given to Sundeep Prater RN (oncoming nurse) by Romi Pineda RN (offgoing nurse). Report included the following information SBAR, Kardex, ED Summary, Intake/Output, MAR, Recent Results, Cardiac Rhythm NSR/SB, Alarm Parameters  and Dual Neuro Assessment. 0730: Bedside, Verbal and Written shift change report given to Laureen Burris RN (oncoming nurse) by Sundeep Prater RN (offgoing nurse). Report included the following information SBAR, Kardex, ED Summary, Intake/Output, MAR, Recent Results, Cardiac Rhythm NSR/SB and Dual Neuro Assessment.

## 2021-07-27 NOTE — PROGRESS NOTES
Neurointerventional Surgery Progress Note  [facility]  Neurocritical Care NP      Admit Date: 7/20/2021   LOS: 7 days        Daily Progress Note: 7/27/2021    S/P: POD 7 status post diagnostic cerebral angiogram      Subjective:     No acute events overnight. Doing well today. Fluids were stopped by intensivist and patient is net negative 2L today after diuresis with bumex. IVC ultrasound shows > 50% IVC collapse and NICOM is showing that the patient is fluid responsive at __58_ %. Start gentle fluid hydration at 75/hr. Discussed with intensivist and will not further diurese at this time.     Current Facility-Administered Medications   Medication Dose Route Frequency Provider Last Rate Last Admin    hydroCHLOROthiazide (HYDRODIURIL) tablet 25 mg  25 mg Oral DAILY Vickey Horvath MD   25 mg at 07/27/21 1421    cloNIDine HCL (CATAPRES) tablet 0.2 mg  0.2 mg Oral Q8H Vickey Horvath MD   0.2 mg at 07/27/21 1421    metoprolol tartrate (LOPRESSOR) tablet 100 mg  100 mg Oral Q12H Vickey Horvath MD        dexamethasone (DECADRON) 4 mg/mL injection 4 mg  4 mg IntraVENous Q12H Evaristo Phlegm, NP   4 mg at 07/27/21 0802    hydrALAZINE (APRESOLINE) tablet 100 mg  100 mg Oral TID Herberth Morales DO   100 mg at 07/27/21 1549    senna-docusate (PERICOLACE) 8.6-50 mg per tablet 1 Tablet  1 Tablet Oral DAILY Michelle Grant NP   1 Tablet at 07/27/21 0800    polyethylene glycol (MIRALAX) packet 17 g  17 g Oral DAILY PRN Michelle Grant NP   17 g at 07/25/21 0906    bisacodyL (DULCOLAX) tablet 10 mg  10 mg Oral DAILY PRN Michelle Grant NP        labetaloL (NORMODYNE;TRANDATE) injection 10 mg  10 mg IntraVENous Q4H PRN Michelle Grant NP   10 mg at 07/24/21 1655    niCARdipine (CARDENE) 25 mg in 0.9% sodium chloride 250 mL infusion  0-15 mg/hr IntraVENous TITRATE Michelle Grant, NP 50 mL/hr at 07/27/21 1550 5 mg/hr at 07/27/21 1550    amLODIPine (NORVASC) tablet 10 mg  10 mg Oral DAILY Herberth Morales DO   10 mg at 21 0801    hydrALAZINE (APRESOLINE) 20 mg/mL injection 20 mg  20 mg IntraVENous Q6H PRN Herberth Morales DO   20 mg at 21 0238    labetaloL (NORMODYNE;TRANDATE) 300 mg in 0.9% sodium chloride 150 mL (2 mg / 1 mL) infusion  0.5-2 mg/min IntraVENous TITRATE ANGELINE Mortensen   Stopped at 21 1926    insulin lispro (HUMALOG) injection   SubCUTAneous AC&HS Ruthy Godoy NP   2 Units at 21 1604    glucose chewable tablet 16 g  4 Tablet Oral PRN Ruthy Godoy NP        dextrose (D50W) injection syrg 12.5-25 g  12.5-25 g IntraVENous PRN Ruthy Godoy NP        glucagon (GLUCAGEN) injection 1 mg  1 mg IntraMUSCular PRN Ruthy Godoy NP        ondansetron [facility] injection 4 mg  4 mg IntraVENous Q6H PRN ANGELINE Mortensen        naloxone [facility]) injection 0.4 mg  0.4 mg IntraVENous PRN ANGELINE Mortensen        acetaminophen (TYLENOL) tablet 650 mg  650 mg Oral Q4H PRN ANGELINE Mortensen   650 mg at 215    Or    acetaminophen (TYLENOL) solution 650 mg  650 mg Per NG tube Q4H PRN ANGELINE Mortensen        Or    acetaminophen (TYLENOL) suppository 650 mg  650 mg Rectal Q4H PRN ANGELINE Mortensen        glucose chewable tablet 16 g  4 Tablet Oral PRN ANGELINE Mortensen        niMODipine (NIMOTOP) capsule 60 mg  60 mg Oral Q4H Gabbi Gallegos NP   60 mg at 21 1421    levETIRAcetam (KEPPRA) tablet 500 mg  500 mg Oral BID Deisy Gallegos NP   500 mg at 21 0801    lisinopriL (PRINIVIL, ZESTRIL) tablet 40 mg  40 mg Oral DAILY José Antonio Williamson NP   40 mg at 21 0800        No Known Allergies    Review of Systems:  Pertinent items are noted in the History of Present Illness. Objective:     Vital signs  Temp (24hrs), Av.8 °F (36.6 °C), Min:97.6 °F (36.4 °C), Max:97.9 °F (36.6 °C)   701 - 1900  In: 772 [P.O.:420;  I.V.:300]  Out: 2525 [Urine:2525]  1901 - 07/27 0700  In: 6480.3 [P.O.:1000; I.V.:5480.3]  Out: 4675 [Urine:4675]    Visit Vitals  /64   Pulse (!) 58   Temp 97.9 °F (36.6 °C)   Resp 15   Ht 6' (1.829 m)   Wt 264 lb 15.9 oz (120.2 kg)   SpO2 94%   BMI 35.94 kg/m²      O2 Device: None (Room air)     Pain control  Pain Assessment  Pain Scale 1: Numeric (0 - 10)  Pain Intensity 1: 0  Pain Onset 1: 15 minutes ago  Pain Location 1: Head, Neck  Pain Orientation 1: Left  Pain Description 1: Sharp  Pain Intervention(s) 1: Medication (see MAR), MD notified (comment) Pattie Gorman NP)    PT/OT  Gait     Gait  Base of Support: Widened  Speed/Rita: Slow, Shuffled  Step Length: Right shortened, Left shortened  Gait Abnormalities: Ataxic, Altered arm swing, Trunk sway increased, Shuffling gait  Ambulation - Level of Assistance: Contact guard assistance  Distance (ft): 35 Feet (ft)  Assistive Device: Gait belt             Vitals:    07/27/21 1200 07/27/21 1400 07/27/21 1549 07/27/21 1600   BP: 129/63 121/76 131/64    Pulse: (!) 59 (!) 57 (!) 58    Resp: 21 15     Temp: 97.7 °F (36.5 °C)   97.9 °F (36.6 °C)   SpO2:       Weight:       Height:          HPI: Kala Carballo is a 35-year-old male with a past medical history significant for hypertension and diabetes type 2 who initially presented to [facility] ED on 7/19/2021 for acute onset of dizziness, headache, left-sided hearing loss, and left-sided weakness. Upon arrival to the ED his blood pressure was noted to be 265/120 per review of notes. A code stroke was called and CT of the head showed an acute subarachnoid hemorrhage anterior to the sarahi. CTA of the head and neck showed an AVM in the left superior cerebellar peduncle, with the dominant drainage into the vein of Gaston. No large vessel occlusion, no hemodynamically significant stenosis. Redemonstrated subarachnoid hemorrhage in the basilar cisterns. He was started on a Cardene drip and was also given IV hydralazine and labetalol.   He was then transferred to [facility] for a higher level of care. NIS was consulted and patient underwent a diagnostic cerebral angiogram on 7/20/2021. The angiogram showed evidence of a cerebral AVM with multiple feeders coming of the left PCA, left anterior choroidal and left SCA with a large single draining vein that drains into the vein of Villanueva. 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 Physical Exam:  GENERAL: alert, cooperative, no distress, appears stated age  LUNG: clear to auscultation bilaterally  HEART: regular rate and rhythm, S1, S2 normal, no murmur, click, rub or gallop  EXTREMITIES:  extremities normal, atraumatic, no cyanosis, nonpitting peripheral edema, Posterior tibial pulses +1 bilaterally, +2 pedal pulses bilaterally   SKIN: Skin cool to touch. Appropriate for ethnicity. Right groin site clean, dry, and intact. No hematoma, bruising, or bleeding noted. Neurologic Exam:  Mental Status:  Alert and oriented x 4. Appropriate affect, mood and behavior. Language:    Normal fluency, repetition, comprehension and naming. Cranial Nerves:        Pupils equal, round and reactive to light. Visual fields full to confrontation. Dysconjugate gaze intermittently with EOMs, otherwise intact. Mild subtle nystagmus when looking to the left, but it has improved. Decreased sensation on the left side of face. Left facial asymmetry     Tongue protrudes to midline, palate elevates symmetrically. No dysarthria. Motor:    No pronator drift. Bulk and tone normal.      5/5 power in all extremities proximally and distally. No involuntary movements. Sensation:    Decreased sensation on the left side of face, otherwise sensation intact to light touch. No neglect. Coordination & Gait: Some dysmetria noted in RUE and LUE with FTN (right worse than left, but it has improved). HTS intact bilaterally. Gait deferred.      24 hour results:    Recent Results (from the past 24 hour(s))   GLUCOSE, POC    Collection Time: 07/26/21  5:19 PM   Result Value Ref Range    Glucose (POC) 147 (H) 65 - 117 mg/dL    Performed by Katheryn Bell, POC    Collection Time: 07/26/21  8:55 PM   Result Value Ref Range    Glucose (POC) 161 (H) 65 - 117 mg/dL    Performed by Davian Clemons    CBC W/O DIFF    Collection Time: 07/27/21  4:37 AM   Result Value Ref Range    WBC 15.2 (H) 4.1 - 11.1 K/uL    RBC 4.97 4.10 - 5.70 M/uL    HGB 14.4 12.1 - 17.0 g/dL    HCT 41.2 36.6 - 50.3 %    MCV 82.9 80.0 - 99.0 FL    MCH 29.0 26.0 - 34.0 PG    MCHC 35.0 30.0 - 36.5 g/dL    RDW 13.5 11.5 - 14.5 %    PLATELET 256 845 - 113 K/uL    MPV 10.8 8.9 - 12.9 FL    NRBC 0.0 0  WBC    ABSOLUTE NRBC 0.00 0.00 - 4.83 K/uL   METABOLIC PANEL, BASIC    Collection Time: 07/27/21  4:37 AM   Result Value Ref Range    Sodium 133 (L) 136 - 145 mmol/L    Potassium HEMOLYZED,RECOLLECT REQUESTED 3.5 - 5.1 mmol/L    Chloride 106 97 - 108 mmol/L    CO2 23 21 - 32 mmol/L    Anion gap 4 (L) 5 - 15 mmol/L    Glucose 111 (H) 65 - 100 mg/dL    BUN 20 6 - 20 MG/DL    Creatinine 0.78 0.70 - 1.30 MG/DL    BUN/Creatinine ratio 26 (H) 12 - 20      GFR est AA >60 >60 ml/min/1.73m2    GFR est non-AA >60 >60 ml/min/1.73m2    Calcium 7.9 (L) 8.5 - 10.1 MG/DL   MAGNESIUM    Collection Time: 07/27/21  4:37 AM   Result Value Ref Range    Magnesium 2.7 (H) 1.6 - 2.4 mg/dL   PHOSPHORUS    Collection Time: 07/27/21  4:37 AM   Result Value Ref Range    Phosphorus 3.8 2.6 - 4.7 MG/DL   GLUCOSE, POC    Collection Time: 07/27/21  6:54 AM   Result Value Ref Range    Glucose (POC) 98 65 - 117 mg/dL    Performed by Davian Clemons    TCD INTRACRANIAL ARTERIES COMPLETE    Collection Time: 07/27/21 11:08 AM   Result Value Ref Range    Right Vertebral PSV 61.6 cm/s    Right Vertebral EDV 25.4 cm/s    Right PCA 1 PSV 45.8 cm/s    Right PCA 1 EDV 17.0 cm/s    Left Vertebral PSV 57.7 cm/s    Left Vertebral EDV 21.5 cm/s    Left PCA 1 EDV 25.8 cm/s    Basilar Artery EDV 29.3 cm/s    Right ICA dist sys 49.5 cm/s    Right ICA dist mabry 15.1 cm/s    Left ICA dist sys 46.4 cm/s    Left ICA dist mabry 13.9 cm/s    Right PCA 1 Mean Velocity 27 cm/s    Right External ICA PSV 50 cm/s    Right External ICA EDV 15 cm/s    Right External ICA Mean Velocity 27 cm/s    Left External ICA PSV 46 cm/s    Left External ICA EDV 14 cm/s    Left External ICA Mean Velocity 25 cm/s    Left PCA 1 PSV 60 cm/s    Left PCA 1 Mean Velocity 37 cm/s    Basilar Artery PSV 75 cm/s    Basilar Artery Mean Farhat 44 cm/s    Right Vertebral Mean Velocity 37 cm/s    Left Vertebral Mean Velocity 34 cm/s   GLUCOSE, POC    Collection Time: 07/27/21 11:37 AM   Result Value Ref Range    Glucose (POC) 106 65 - 117 mg/dL    Performed by [facility]fir[facility], POC    Collection Time: 07/27/21  3:54 PM   Result Value Ref Range    Glucose (POC) 146 (H) 65 - 117 mg/dL    Performed by Alexia Canada           Imaging:  CT of head on 7/19/2021 at 2104 shows  IMPRESSION  1. Acute subarachnoid hemorrhage anterior to the sarahi. CTA of the head and neck on 7/19/2021 at 2126 shows  IMPRESSION     1. Arteriovenous malformation in the left superior cerebellar peduncle, with the  dominant drainage into the vein of Gaston. 2. No large vessel occlusion, no hemodynamically significant stenosis. 3. Redemonstrated subarachnoid hemorrhage in the basilar cisterns. CT the head on 7/20/2021 at 1129 shows  IMPRESSION  1. Stable ventricular size.     2.  Questionable diffuse cerebral edema.     3.  Subarachnoid hemorrhage in the prepontine cistern and ambient cisterns as  well as sylvian fissures are decreased from the prior study. MRI brain with and without 7/20/2021 at 2214 shows  IMPRESSION  1.   Tiny 1 mm foci of diffusion restriction in the left cerebellum and left  cerebral hemisphere as described likely embolic in origin.     2.  Possible mild meningeal enhancement which may be secondary to meningeal  irritation from known subarachnoid hemorrhage.     3.  Dilated superior ophthalmic veins are noted which is new when compared to  prior imaging. This is of uncertain clinical significance. There appears to be  normal enhancement of the cavernous sinus bilaterally. CT of head on 7/23/2021 at 2233 shows     IMPRESSION  No hydrocephalus. Stable left prepontine subarachnoid hemorrhage. CT of Head on 7/24/2021 at 1357  IMPRESSION:   Left prepontine hemorrhage is slightly increased. CT of the head on 7/24/2021 at 10:21 PM shows  IMPRESSION  Stable left prepontine hemorrhage. Assessment:     Principal Problem:    Cerebral arteriovenous malformation (AVM) (7/20/2021)      Overview: ([id number])- CT (Brain): There is an AVM in the superior cerebellum with multiple aneurysms. One measures 4 mm. Another measures 4 mm within the superior cerebellum. Active Problems:    Malignant hypertension (7/20/2021)      Acute hypokalemia (7/20/2021)      Celiac artery stenosis ([address] (7/21/2021)      Vasogenic cerebral edema ([address] (7/21/2021)      Hypertensive emergency (7/21/2021)      SAH (subarachnoid hemorrhage) ([address] (7/20/2021)        Plan:   Acute SAH in the setting of cerebellar AVM with associated flow related aneurysm, [place] Knox 1, Mistry Grade 2  - s/p diagnostic cerebral angiogram on 7/20/2021  - Day 8 of 21 of Nimotop for the prevention of delayed cerebral ischemia  - continue Keppra 500 mg BID for seizure ppx  - patient is being diuresed by intensivist, fluids stopped. -2L today   - Repeat head CT last night showed stable left prepontine hemorrhage  - Strict I's and O's, patient +1244 mls on  I's and O's currently  - ECHO shows EF 65-70%, dilated left atrium. No shunting seen. Mild (grade 1) left ventricular diastolic dysfunction.   - continue every hour neuro checks  - SBP goal <140  - PT/OT/SLP evals   - assess NICOM PRN for fluid responsiveness   - daily TCDs, TCDs today show limited exam. The anterior intracranial circulation is not visualized. No evidence of vasospasm in the posterior circulation. Patient is clinically stable. Continue with medical management for prevention of vasospasm   - NIS following     HTN  - SBP goal <140 as stated above  - Cardene/Labetalol drip PRN  - patient on multiple oral antihypertensives (lisinopril, metoprolol, hydralazine, amlodipine, and clonidine)  - recommend secondary HTN work-up (Discussed with the Intensivist)   - Patient had a bilateral artery/venous renal duplex on 722 which shows no evidence of significant renal artery stenosis. No evidence of significant renal parenchymal disease. Possible hemodynamically significant celiac artery stenosis. - Intensivist following    Constipation (improved)  - continue Rissa-colace daily  - Miralax PRN daily  - Dulcolax 10 mg daily PRN      Activity: Up with assistance, ambulate today  DVT ppx: SCDs  Dispo: TBD    Plan d/w Dr. Feroz Ayon, Dr. Camden Garcia RN, and patient.        Suzie Marti, NP

## 2021-07-27 NOTE — PROGRESS NOTES
0730: Bedside shift change report given to Gaurang Braswell RN (oncoming nurse) by Yu Da Silva RN (offgoing nurse). Report included the following information SBAR, Kardex, Intake/Output, MAR, Accordion, Recent Results and Dual Neuro Assessment. Pt A&Ox4. Moves all extremities equally, no weakness or drift. PERRLA. Left facial droop with diminished sensation on left side of face. Pt reports no additional loss of sensation. No complaints of pain. Currently denies issues with double vision. BP within goal. Cardene gtt running at 5mg/hr. 1040: Up in chair. 1220: Pt assisted with ambulating in rosas. Tolerated well. Pt back in chair. Chair alarm on.     1832: NICOM assessed at bedside and shows 58.3 % change in SVI with PLR indicating patient is fluid responsive. Robbi Sarmiento NP notified. 1921: Order received from Robbi Sarmiento NP to start NS at 75mL/hr and to perform NICOM 8hrs after initiating NS infusion. 1930: Bedside shift change report given to Angelica Bennett RN (oncoming nurse) by Gaurang Braswell RN (offgoing nurse). Report included the following information SBAR, Kardex, Intake/Output, MAR, Accordion, Recent Results, Cardiac Rhythm NSR-SB and Dual Neuro Assessment.

## 2021-07-27 NOTE — PROGRESS NOTES
Spiritual Care Assessment/Progress Note  [facility]      NAME: Genet Mota      MRN: [id number]  AGE: 64 y.o.  SEX: male  Pentecostalism Affiliation: Stevehovah witness   Language: English     7/27/2021     Total Time (in minutes): 12     Spiritual Assessment begun in [address] through conversation with:         [x]Patient        [] Family    [] Friend(s)        Reason for Consult: Initial/Spiritual assessment, critical care     Spiritual beliefs: (Please include comment if needed)     [x] Identifies with a eduardo tradition:         [] Supported by a eduardo community:            [] Claims no spiritual orientation:           [] Seeking spiritual identity:                [] Adheres to an individual form of spirituality:           [] Not able to assess:                           Identified resources for coping:      [x] Prayer                               [] Music                  [] Guided Imagery     [x] Family/friends                 [] Pet visits     [] Devotional reading                         [] Unknown     [] Other:                                               Interventions offered during this visit: (See comments for more details)    Patient Interventions: Affirmation of emotions/emotional suffering, Affirmation of eduardo, Normalization of emotional/spiritual concerns, Prayer (assurance of)           Plan of Care:     [] Support spiritual and/or cultural needs    [] Support AMD and/or advance care planning process      [] Support grieving process   [] Coordinate Rites and/or Rituals    [] Coordination with community clergy   [] No spiritual needs identified at this time   [] Detailed Plan of Care below (See Comments)  [] Make referral to Music Therapy  [] Make referral to Pet Therapy     [] Make referral to Addiction services  [] Make referral to [facility]  [] Make referral to Spiritual Care Partner  [] No future visits requested        [x] Follow up upon further referrals     Comments:  for initial visit. Pt was sitting up in chair and pt's RN at bedside attending to pt. Let pt know of  availability.  provided pastoral listening, support and assurance of prayer. Please contact [phone] for further support.      [address] Hemal Farley, MACE   [phone])

## 2021-07-27 NOTE — PROGRESS NOTES
[facility]    ICU TEAM Progress Note    Name: Genet Mota   : 1960   MRN: [id number]   Date: 2021      I  Subjective:   Progress Note: 2021      Reason for ICU Admission: Cerebellar arteriovenous malformation, subarachnoid hemorrhage     Interval history:  35-year-old male with hypertension admitted to our hospital as a transfer from outside facility on  with subarachnoid hemorrhage  Overnight Events:   No acute event, did well with gentle diuresis, -600 cc however still positive about 20 L since admission. Continue to require drips to control blood pressure to goal.      Active Problem List:     Problem List  Date Reviewed: 2021        Codes Class    Celiac artery stenosis (HCC) (Chronic) ICD-10-CM: I77.4  ICD-9-CM: 447. 4 Chronic        Vasogenic cerebral edema (HCC) ICD-10-CM: G93.6  ICD-9-CM: 348. 5 Acute        Hypertensive emergency ICD-10-CM: I16.1  ICD-9-CM: 401.9 Acute        Malignant hypertension ICD-10-CM: I10  ICD-9-CM: 401.0 Acute        * (Principal) Cerebral arteriovenous malformation (AVM) ICD-10-CM: Q28.2  ICD-9-CM: 747.81 Acute    Overview Signed 2021  7:03 AM by Jimena Merino MD     ([id number])- CT (Brain): There is an AVM in the superior cerebellum with multiple aneurysms. One measures 4 mm. Another measures 4 mm within the superior cerebellum.              Acute hypokalemia ICD-10-CM: E87.6  ICD-9-CM: 276.8 Acute        SAH (subarachnoid hemorrhage) (HCC) ICD-10-CM: I60.9  ICD-9-CM: 430         Essential hypertension ICD-10-CM: I10  ICD-9-CM: 401.9         Type 2 diabetes mellitus without complication, without long-term current use of insulin (HCC) ICD-10-CM: E11.9  ICD-9-CM: 250.00         Arthritis of hip ICD-10-CM: M16.10  ICD-9-CM: 716.95               Past Medical History:      has a past medical history of Acute hypokalemia (2021), Celiac artery stenosis ([address] (2021), Cerebral arteriovenous malformation (AVM) (7/20/2021), Diabetes ([facility] [address] ED (erectile dysfunction), HTN (hypertension), Hypertension, Hypertensive emergency (7/21/2021), Impaired fasting glucose, Malignant hypertension (7/20/2021), and Vasogenic cerebral edema ([facility] [address] (7/21/2021). Past Surgical History:      has a past surgical history that includes hx tonsil and adenoidectomy. Home Medications:     Prior to Admission medications    Medication Sig Start Date End Date Taking? Authorizing Provider   lancets misc by Does Not Apply route. Provider, Historical   metFORMIN (GLUCOPHAGE) 500 mg tablet Take 1 Tab by mouth daily (with breakfast). 3/22/19   Renetta Lucero MD   lisinopril (PRINIVIL, ZESTRIL) 40 mg tablet Take 1 Tab by mouth daily. 3/22/19   Renetta Lucero MD   hydrALAZINE (APRESOLINE) 25 mg tablet Take 1 Tab by mouth three (3) times daily. 3/22/19   Renetta Lucero MD   amLODIPine (NORVASC) 10 mg tablet Take 1 Tab by mouth daily. 3/22/19   Renetta Lucero MD   oxyCODONE-acetaminophen (PERCOCET 7.5) 7.5-325 mg per tablet Take 1-2 Tabs by mouth every four (4) hours as needed for Pain. Max Daily Amount: 12 Tabs. 3/20/15   McNutt, Osvaldo Nageotte, PA   traMADol (ULTRAM) 50 mg tablet Take 1 Tab by mouth every six (6) hours as needed for Pain. Max Daily Amount: 200 mg. 3/20/15   MELANI Up   bisacodyl (DULCOLAX, BISACODYL,) 10 mg suppository Insert 10 mg into rectum daily. 3/20/15   MELANI Up   promethazine (PHENERGAN) 25 mg tablet Take 1 Tab by mouth every six (6) hours as needed for Nausea. 3/20/15   MELANI Up   aspirin (ASPIRIN) 325 mg tablet Take 1 Tab by mouth two (2) times a day. 3/20/15   MELANI Up   metFORMIN (GLUCOPHAGE) 500 mg tablet Take 500 mg by mouth Daily (before breakfast). Provider, Historical   lisinopril (PRINIVIL, ZESTRIL) 40 mg tablet Take 40 mg by mouth daily (after breakfast). Provider, Historical   hydrochlorothiazide (HYDRODIURIL) 25 mg tablet Take 25 mg by mouth daily (after breakfast). Provider, Historical   amLODIPine (NORVASC) 10 mg tablet Take 10 mg by mouth daily (with breakfast). Provider, Historical   hydrALAZINE (APRESOLINE) 25 mg tablet Take 25 mg by mouth three (3) times daily. Provider, Historical       Allergies/Social/Family History:     No Known Allergies   Social History     Tobacco Use    Smoking status: Former Smoker     Packs/day: 2.00     Years: 20.00     Pack years: 40.00     Types: Cigarettes     Quit date: 2015     Years since quittin.4    Smokeless tobacco: Current User    Tobacco comment: Patient reports he is Vaping    Substance Use Topics    Alcohol use: Yes     Alcohol/week: 6.0 standard drinks     Types: 6 Cans of beer per week      Family History   Problem Relation Age of Onset    Diabetes Mother         type 2    Hypertension Mother     Heart Disease Mother         CHF    Hypertension Father     Heart Disease Father 48        MI       Review of Systems:     I reviewed 10 system and they are negative but as in interval history    Objective:   Vital Signs:  Visit Vitals  /76 (BP 1 Location: Right upper arm, BP Patient Position: At rest)   Pulse (!) 55   Temp 97.8 °F (36.6 °C)   Resp 12   Ht 6' (1.829 m)   Wt 120.2 kg (264 lb 15.9 oz)   SpO2 94%   BMI 35.94 kg/m²      O2 Device: None (Room air) Temp (24hrs), Av.7 °F (36.5 °C), Min:97.6 °F (36.4 °C), Max:97.8 °F (36.6 °C)           Intake/Output:     Intake/Output Summary (Last 24 hours) at 2021 0914  Last data filed at 2021 0800  Gross per 24 hour   Intake 2876.5 ml   Output 4175 ml   Net -1298.5 ml       Physical Exam:    General:   Alert, cooperative, no distress, appears stated age. Facial asymmetry with diplopia. Dysmetria LUE. Eyes:   Conjunctivae/corneas clear. PERRL, EOMs intact. Ears:   Normal external ear canals bilaterally. Nose:   No drainage or sinus tenderness. Mouth/Throat:  Moist mucous membranes.  Dentition normal.    Neck:  Symmetrical, trachea midline, no JVD or carotid bruit. Back:    No CVA tenderness to percussion. Lungs:    Clear to auscultation bilaterally. Heart:   Regular rate and rhythm. S1, S2 normal, without murmur, click, rub or gallop. Abdomen:    Normoactive bowel sounds. Soft, non-tender, no masses or organomegaly. Extremities:  Atraumatic, no cyanosis positive edema   Vascular:  Pulses 2+ and symmetric UE/LE bilat   Skin:  Normal color, non-diaphoretic, brisk capillary refill (Less than 2 seconds). No rashes or lesions. Lymph nodes:  No Lymphadenopathy. Neurologic:  Speech normal normal strength. Face is symmetrical on the left, occasional nystagmus, occasional deviation of left eye inward         LABS AND  DATA: Personally reviewed  Recent Labs     07/27/21 0437 07/26/21  0443   WBC 15.2* 12.1*   HGB 14.4 13.7   HCT 41.2 39.9    263     Recent Labs     07/27/21 0437 07/26/21  0443   * 137   K HEMOLYZED,RECOLLECT REQUESTED 4.0    109*   CO2 23 22   BUN 20 23*   CREA 0.78 0.91   * 149*   CA 7.9* 7.5*   MG 2.7* 2.7*   PHOS 3.8 3.1     No results for input(s): AP, TBIL, TP, ALB, GLOB, AML, LPSE in the last 72 hours. No lab exists for component: SGOT, GPT, AMYP  No results for input(s): INR, PTP, APTT, INREXT in the last 72 hours. No results for input(s): PHI, PCO2I, PO2I, FIO2I in the last 72 hours.   Recent Labs     07/24/21 2109   TROIQ <0.05       Hemodynamics:   PAP:   CO:     Wedge:   CI:     CVP:    SVR:       PVR:       Ventilator Settings:  Mode Rate Tidal Volume Pressure FiO2 PEEP                    Peak airway pressure:      Minute ventilation:          MEDS: Reviewed    Chest X-Ray:  CXR Results  (Last 48 hours)    None          Assessment and Plan:   -Arteriovenous malformation left superior cerebellar peduncle:  -Acute subarachnoid hemorrhage:  -Difficult to control hypertension:        -Appreciate neurology recommendation management:              -Maintain systolic blood pressure below 140 -Day 8/21 of nimodipine              -Maintain euvolemia, patient -600 cc after 1 dose of Bumex yesterday. However he is positive about 20 L since admission. I will give him another dose of Bumex today. Add hydrochlorothiazide to his antihypertensive regimen daily. Monitor and replace electrolytes as indicated. -Prophylactic Keppra, Daily TCD's  -On multiple antihypertensive medication, adding hydrochlorothiazide to amlodipine, lisinopril, clonidine, metoprolol and hydralazine.     DVT GI prophylaxis. Neurochecks as ordered by neurology. DISPOSITION  Stay in ICU    CRITICAL CARE CONSULTANT NOTE  I had a face to face encounter with the patient, reviewed and interpreted patient data including clinical events, labs, images, vital signs, I/O's, and examined patient. I have discussed the case and the plan and management of the patient's care with the consulting services, the bedside nurses and the respiratory therapist.      NOTE OF PERSONAL INVOLVEMENT IN CARE   This patient has a high probability of imminent, clinically significant deterioration, which requires the highest level of preparedness to intervene urgently. I participated in the decision-making and personally managed or directed the management of the following life and organ supporting interventions that required my frequent assessment to treat or prevent imminent deterioration. I personally spent 40 minutes of critical care time. This is time spent at this critically ill patient's bedside actively involved in patient care as well as the coordination of care and discussions with the patient's family. This does not include any procedural time which has been billed separately. Jud Dhaliwal M.D.   Staff Intensivist/Pulmonologist  [facility]  7/27/2021

## 2021-07-28 ENCOUNTER — APPOINTMENT (OUTPATIENT)
Dept: CT IMAGING | Age: 61
DRG: 064 | End: 2021-07-28
Attending: NURSE PRACTITIONER
Payer: COMMERCIAL

## 2021-07-28 LAB
ANION GAP SERPL CALC-SCNC: 8 MMOL/L (ref 5–15)
BASILAR ARTERY EDV: 29.3 CM/S
BASILAR ARTERY MEAN VEL: 44 CM/S
BASILAR ARTERY PSV: 75 CM/S
BUN SERPL-MCNC: 19 MG/DL (ref 6–20)
BUN/CREAT SERPL: 26 (ref 12–20)
CALCIUM SERPL-MCNC: 7.9 MG/DL (ref 8.5–10.1)
CHLORIDE SERPL-SCNC: 107 MMOL/L (ref 97–108)
CO2 SERPL-SCNC: 23 MMOL/L (ref 21–32)
CREAT SERPL-MCNC: 0.73 MG/DL (ref 0.7–1.3)
ERYTHROCYTE [DISTWIDTH] IN BLOOD BY AUTOMATED COUNT: 13.7 % (ref 11.5–14.5)
GLUCOSE BLD STRIP.AUTO-MCNC: 100 MG/DL (ref 65–117)
GLUCOSE BLD STRIP.AUTO-MCNC: 132 MG/DL (ref 65–117)
GLUCOSE BLD STRIP.AUTO-MCNC: 134 MG/DL (ref 65–117)
GLUCOSE BLD STRIP.AUTO-MCNC: 98 MG/DL (ref 65–117)
GLUCOSE SERPL-MCNC: 131 MG/DL (ref 65–100)
HCT VFR BLD AUTO: 39.3 % (ref 36.6–50.3)
HGB BLD-MCNC: 13.7 G/DL (ref 12.1–17)
LEFT EX ICA EDV: 14 CM/S
LEFT EX ICA MEAN VEL: 25 CM/S
LEFT EX ICA PSV: 46 CM/S
LEFT ICA DIST DIAS: 13.9 CM/S
LEFT ICA DIST SYS: 46.4 CM/S
LEFT PCA 1 EDV: 25.8 CM/S
LEFT PCA 1 MEAN VEL: 37 CM/S
LEFT PCA 1 PSV: 60 CM/S
LEFT VERTEBRAL EDV TCD: 21.5 CM/S
LEFT VERTEBRAL MEAN VEL: 34 CM/S
LEFT VERTEBRAL PSV TCD: 57.7 CM/S
MAGNESIUM SERPL-MCNC: 2.4 MG/DL (ref 1.6–2.4)
MCH RBC QN AUTO: 29 PG (ref 26–34)
MCHC RBC AUTO-ENTMCNC: 34.9 G/DL (ref 30–36.5)
MCV RBC AUTO: 83.1 FL (ref 80–99)
NRBC # BLD: 0 K/UL (ref 0–0.01)
NRBC BLD-RTO: 0 PER 100 WBC
PHOSPHATE SERPL-MCNC: 3.9 MG/DL (ref 2.6–4.7)
PLATELET # BLD AUTO: 267 K/UL (ref 150–400)
PMV BLD AUTO: 10.4 FL (ref 8.9–12.9)
POTASSIUM SERPL-SCNC: 4.3 MMOL/L (ref 3.5–5.1)
RBC # BLD AUTO: 4.73 M/UL (ref 4.1–5.7)
RIGHT EX ICA EDV: 15 CM/S
RIGHT EX ICA MEAN VEL: 27 CM/S
RIGHT EX ICA PSV: 50 CM/S
RIGHT ICA DIST DIAS: 15.1 CM/S
RIGHT ICA DIST SYS: 49.5 CM/S
RIGHT PCA 1 EDV: 17 CM/S
RIGHT PCA 1 MEAN VEL: 27 CM/S
RIGHT PCA 1 PSV: 45.8 CM/S
RIGHT VERTEBRAL EDV TCD: 25.4 CM/S
RIGHT VERTEBRAL MEAN VEL: 37 CM/S
RIGHT VERTEBRAL PSV TCD: 61.6 CM/S
SERVICE CMNT-IMP: ABNORMAL
SERVICE CMNT-IMP: ABNORMAL
SERVICE CMNT-IMP: NORMAL
SERVICE CMNT-IMP: NORMAL
SODIUM SERPL-SCNC: 138 MMOL/L (ref 136–145)
WBC # BLD AUTO: 12.5 K/UL (ref 4.1–11.1)

## 2021-07-28 PROCEDURE — 74011250637 HC RX REV CODE- 250/637: Performed by: ANESTHESIOLOGY

## 2021-07-28 PROCEDURE — 99232 SBSQ HOSP IP/OBS MODERATE 35: CPT | Performed by: STUDENT IN AN ORGANIZED HEALTH CARE EDUCATION/TRAINING PROGRAM

## 2021-07-28 PROCEDURE — 74011000636 HC RX REV CODE- 636: Performed by: INTERNAL MEDICINE

## 2021-07-28 PROCEDURE — APPNB15 APP NON BILLABLE TIME 0-15 MINS: Performed by: NURSE PRACTITIONER

## 2021-07-28 PROCEDURE — 74011250637 HC RX REV CODE- 250/637: Performed by: NURSE PRACTITIONER

## 2021-07-28 PROCEDURE — 70496 CT ANGIOGRAPHY HEAD: CPT

## 2021-07-28 PROCEDURE — 74011250637 HC RX REV CODE- 250/637: Performed by: INTERNAL MEDICINE

## 2021-07-28 PROCEDURE — 84100 ASSAY OF PHOSPHORUS: CPT

## 2021-07-28 PROCEDURE — 74011250636 HC RX REV CODE- 250/636: Performed by: NURSE PRACTITIONER

## 2021-07-28 PROCEDURE — 36415 COLL VENOUS BLD VENIPUNCTURE: CPT

## 2021-07-28 PROCEDURE — 65610000006 HC RM INTENSIVE CARE

## 2021-07-28 PROCEDURE — 82962 GLUCOSE BLOOD TEST: CPT

## 2021-07-28 PROCEDURE — 80048 BASIC METABOLIC PNL TOTAL CA: CPT

## 2021-07-28 PROCEDURE — 97116 GAIT TRAINING THERAPY: CPT

## 2021-07-28 PROCEDURE — 85027 COMPLETE CBC AUTOMATED: CPT

## 2021-07-28 PROCEDURE — 83735 ASSAY OF MAGNESIUM: CPT

## 2021-07-28 RX ADMIN — LEVETIRACETAM 500 MG: 500 TABLET ORAL at 18:42

## 2021-07-28 RX ADMIN — IOPAMIDOL 100 ML: 755 INJECTION, SOLUTION INTRAVENOUS at 10:00

## 2021-07-28 RX ADMIN — SODIUM CHLORIDE 75 ML/HR: 9 INJECTION, SOLUTION INTRAVENOUS at 10:22

## 2021-07-28 RX ADMIN — NIMODIPINE 60 MG: 30 CAPSULE, LIQUID FILLED ORAL at 14:21

## 2021-07-28 RX ADMIN — CLONIDINE HYDROCHLORIDE 0.2 MG: 0.2 TABLET ORAL at 14:20

## 2021-07-28 RX ADMIN — DEXAMETHASONE SODIUM PHOSPHATE 4 MG: 4 INJECTION, SOLUTION INTRAMUSCULAR; INTRAVENOUS at 21:00

## 2021-07-28 RX ADMIN — CLONIDINE HYDROCHLORIDE 0.2 MG: 0.2 TABLET ORAL at 20:48

## 2021-07-28 RX ADMIN — HYDRALAZINE HYDROCHLORIDE 100 MG: 50 TABLET, FILM COATED ORAL at 16:14

## 2021-07-28 RX ADMIN — HYDROCHLOROTHIAZIDE 25 MG: 25 TABLET ORAL at 08:50

## 2021-07-28 RX ADMIN — NIMODIPINE 60 MG: 30 CAPSULE, LIQUID FILLED ORAL at 11:21

## 2021-07-28 RX ADMIN — NIMODIPINE 60 MG: 30 CAPSULE, LIQUID FILLED ORAL at 23:24

## 2021-07-28 RX ADMIN — LISINOPRIL 40 MG: 20 TABLET ORAL at 08:50

## 2021-07-28 RX ADMIN — HYDRALAZINE HYDROCHLORIDE 100 MG: 50 TABLET, FILM COATED ORAL at 20:48

## 2021-07-28 RX ADMIN — DOCUSATE SODIUM 50 MG AND SENNOSIDES 8.6 MG 1 TABLET: 8.6; 5 TABLET, FILM COATED ORAL at 08:50

## 2021-07-28 RX ADMIN — NIMODIPINE 60 MG: 30 CAPSULE, LIQUID FILLED ORAL at 07:00

## 2021-07-28 RX ADMIN — DEXAMETHASONE SODIUM PHOSPHATE 4 MG: 4 INJECTION, SOLUTION INTRAMUSCULAR; INTRAVENOUS at 08:50

## 2021-07-28 RX ADMIN — HYDRALAZINE HYDROCHLORIDE 100 MG: 50 TABLET, FILM COATED ORAL at 08:49

## 2021-07-28 RX ADMIN — LEVETIRACETAM 500 MG: 500 TABLET ORAL at 08:50

## 2021-07-28 RX ADMIN — NIMODIPINE 60 MG: 30 CAPSULE, LIQUID FILLED ORAL at 18:42

## 2021-07-28 RX ADMIN — AMLODIPINE BESYLATE 10 MG: 5 TABLET ORAL at 08:50

## 2021-07-28 RX ADMIN — CLONIDINE HYDROCHLORIDE 0.2 MG: 0.2 TABLET ORAL at 06:44

## 2021-07-28 RX ADMIN — NIMODIPINE 60 MG: 30 CAPSULE, LIQUID FILLED ORAL at 02:47

## 2021-07-28 NOTE — PROGRESS NOTES
[facility]    ICU TEAM Progress Note    Name: Kathleen Butts   : 1960   MRN: [id number]   Date: 2021      I  Subjective:   Progress Note: 2021      Reason for ICU Admission: Cerebellar arteriovenous malformation, subarachnoid hemorrhage     Interval history:  72-year-old male with hypertension admitted to our hospital as a transfer from outside facility on  with subarachnoid hemorrhage  Overnight Events:   : Better blood pressure control with adding clonidine orally, hold beta-blocker due to bradycardia [asymptomatic], off nicardipine drip. Did well with diuresis and he is negative about 1600 cc. Still positive about 19 L since admission and that is reflected in his weight as well. :No acute event, did well with gentle diuresis, -600 cc however still positive about 20 L since admission. Continue to require drips to control blood pressure to goal.      Active Problem List:     Problem List  Date Reviewed: 2021        Codes Class    Celiac artery stenosis (HCC) (Chronic) ICD-10-CM: I77.4  ICD-9-CM: 447. 4 Chronic        Vasogenic cerebral edema (HCC) ICD-10-CM: G93.6  ICD-9-CM: 348. 5 Acute        Hypertensive emergency ICD-10-CM: I16.1  ICD-9-CM: 401.9 Acute        Malignant hypertension ICD-10-CM: I10  ICD-9-CM: 401.0 Acute        * (Principal) Cerebral arteriovenous malformation (AVM) ICD-10-CM: Q28.2  ICD-9-CM: 747.81 Acute    Overview Signed 2021  7:03 AM by Sage An MD     ([id number])- CT (Brain): There is an AVM in the superior cerebellum with multiple aneurysms. One measures 4 mm. Another measures 4 mm within the superior cerebellum.              Acute hypokalemia ICD-10-CM: E87.6  ICD-9-CM: 276.8 Acute        SAH (subarachnoid hemorrhage) (HCC) ICD-10-CM: I60.9  ICD-9-CM: 430         Essential hypertension ICD-10-CM: I10  ICD-9-CM: 401.9         Type 2 diabetes mellitus without complication, without long-term current use of insulin ([address] ICD-10-CM: E11.9  ICD-9-CM: 250.00         Arthritis of hip ICD-10-CM: M16.10  ICD-9-CM: 716.95               Past Medical History:      has a past medical history of Acute hypokalemia (7/20/2021), Celiac artery stenosis ([facility] [address] (7/21/2021), Cerebral arteriovenous malformation (AVM) (7/20/2021), Diabetes ([facility] [address] ED (erectile dysfunction), HTN (hypertension), Hypertension, Hypertensive emergency (7/21/2021), Impaired fasting glucose, Malignant hypertension (7/20/2021), and Vasogenic cerebral edema ([facility] [address] (7/21/2021). Past Surgical History:      has a past surgical history that includes hx tonsil and adenoidectomy. Home Medications:     Prior to Admission medications    Medication Sig Start Date End Date Taking? Authorizing Provider   lancets misc by Does Not Apply route. Provider, Historical   metFORMIN (GLUCOPHAGE) 500 mg tablet Take 1 Tab by mouth daily (with breakfast). 3/22/19   Renetta Lucero MD   lisinopril (PRINIVIL, ZESTRIL) 40 mg tablet Take 1 Tab by mouth daily. 3/22/19   Renetta Lucero MD   hydrALAZINE (APRESOLINE) 25 mg tablet Take 1 Tab by mouth three (3) times daily. 3/22/19   Renetta Lucero MD   amLODIPine (NORVASC) 10 mg tablet Take 1 Tab by mouth daily. 3/22/19   Renetta Lucero MD   oxyCODONE-acetaminophen (PERCOCET 7.5) 7.5-325 mg per tablet Take 1-2 Tabs by mouth every four (4) hours as needed for Pain. Max Daily Amount: 12 Tabs. 3/20/15   McNutt, Osvaldo Nageotte, PA   traMADol (ULTRAM) 50 mg tablet Take 1 Tab by mouth every six (6) hours as needed for Pain. Max Daily Amount: 200 mg. 3/20/15   MELANI Up   bisacodyl (DULCOLAX, BISACODYL,) 10 mg suppository Insert 10 mg into rectum daily. 3/20/15   MELANI Up   promethazine (PHENERGAN) 25 mg tablet Take 1 Tab by mouth every six (6) hours as needed for Nausea. 3/20/15   MELANI Up   aspirin (ASPIRIN) 325 mg tablet Take 1 Tab by mouth two (2) times a day.  3/20/15   MELANI Up   metFORMIN (GLUCOPHAGE) 500 mg tablet Take 500 mg by mouth Daily (before breakfast). Provider, Historical   lisinopril (PRINIVIL, ZESTRIL) 40 mg tablet Take 40 mg by mouth daily (after breakfast). Provider, Historical   hydrochlorothiazide (HYDRODIURIL) 25 mg tablet Take 25 mg by mouth daily (after breakfast). Provider, Historical   amLODIPine (NORVASC) 10 mg tablet Take 10 mg by mouth daily (with breakfast). Provider, Historical   hydrALAZINE (APRESOLINE) 25 mg tablet Take 25 mg by mouth three (3) times daily. Provider, Historical       Allergies/Social/Family History:     No Known Allergies   Social History     Tobacco Use    Smoking status: Former Smoker     Packs/day: 2.00     Years: 20.00     Pack years: 40.00     Types: Cigarettes     Quit date: 2015     Years since quittin.4    Smokeless tobacco: Current User    Tobacco comment: Patient reports he is Vaping    Substance Use Topics    Alcohol use: Yes     Alcohol/week: 6.0 standard drinks     Types: 6 Cans of beer per week      Family History   Problem Relation Age of Onset    Diabetes Mother         type 2    Hypertension Mother     Heart Disease Mother         CHF    Hypertension Father     Heart Disease Father 48        MI       Review of Systems:     10 system reviewed and they are negative but as in interval history    Objective:   Vital Signs:  Visit Vitals  /73   Pulse (!) 50   Temp 97.7 °F (36.5 °C)   Resp 13   Ht 6' (1.829 m)   Wt 118 kg (260 lb 2.3 oz)   SpO2 98%   BMI 35.28 kg/m²      O2 Device: None Temp (24hrs), Av.7 °F (36.5 °C), Min:97.4 °F (36.3 °C), Max:97.9 °F (36.6 °C)           Intake/Output:     Intake/Output Summary (Last 24 hours) at 2021 0854  Last data filed at 2021 0700  Gross per 24 hour   Intake 2132.51 ml   Output 3475 ml   Net -1342.49 ml       Physical Exam:    General:   Alert, cooperative, no distress, appears stated age. Facial asymmetry with diplopia. Dysmetria LUE. Eyes:   Conjunctivae/corneas clear.  PERRL, EOMs intact. Ears:   Normal external ear canals bilaterally. Nose:   No drainage or sinus tenderness. Mouth/Throat:  Moist mucous membranes. Dentition normal.    Neck:  Symmetrical, trachea midline, no JVD or carotid bruit. Back:    No CVA tenderness to percussion. Lungs:    Clear to auscultation bilaterally. Heart:   Regular rate and rhythm. S1, S2 normal, without murmur, click, rub or gallop. Abdomen:    Normoactive bowel sounds. Soft, non-tender, no masses or organomegaly. Extremities:  Atraumatic, no cyanosis positive edema   Vascular:  Pulses 2+ and symmetric UE/LE bilat   Skin:  Normal color, non-diaphoretic, brisk capillary refill (Less than 2 seconds). No rashes or lesions. Lymph nodes:  No Lymphadenopathy. Neurologic:  Speech normal normal strength.  Face is symmetrical on the left, occasional nystagmus, occasional deviation of left eye inward         LABS AND  DATA: Personally reviewed  Recent Labs     07/28/21 0422 07/27/21 0437   WBC 12.5* 15.2*   HGB 13.7 14.4   HCT 39.3 41.2    278     Recent Labs     07/28/21 0422 07/27/21 2024 07/27/21 0437 07/27/21 0437    135*   < > 133*   K 4.3 4.8   < > HEMOLYZED,RECOLLECT REQUESTED    105   < > 106   CO2 23 24   < > 23   BUN 19 21*   < > 20   CREA 0.73 0.72   < > 0.78   * 135*   < > 111*   CA 7.9* 8.2*   < > 7.9*   MG 2.4  --   --  2.7*   PHOS 3.9  --   --  3.8    < > = values in this interval not displayed. No results for input(s): AP, TBIL, TP, ALB, GLOB, AML, LPSE in the last 72 hours. No lab exists for component: SGOT, GPT, AMYP  No results for input(s): INR, PTP, APTT, INREXT in the last 72 hours. No results for input(s): PHI, PCO2I, PO2I, FIO2I in the last 72 hours. No results for input(s): CPK, CKMB, TROIQ, BNPP in the last 72 hours.     Hemodynamics:   PAP:   CO:     Wedge:   CI:     CVP:    SVR:       PVR:       Ventilator Settings:  Mode Rate Tidal Volume Pressure FiO2 PEEP Peak airway pressure:      Minute ventilation:          MEDS: Reviewed    Chest X-Ray:  CXR Results  (Last 48 hours)    None            Assessment and Plan:   -Arteriovenous malformation left superior cerebellar peduncle:  -Acute subarachnoid hemorrhage:  -Difficult to control hypertension:        -Appreciate neurology recommendation management:              -ELOISA systolic blood pressure below 140              -Day 9/21 of nimodipine              -Maintain euvolemia, patient -1600 cc after 1 dose of Bumex yesterday. However he is positive about 19 L since admission and this is reflective on his weight gain. NICOM showed 56.4% change in SBI with PLR. Neurology resumed NS at 75. I would hold on diuresis today, I will monitor blood pressure closely and we have added chloral hydrate thiazide to his antihypertensive regimen on July 27. I still believe he is clinically overloaded as discussed above. We will continue to reassess. Blood pressure better controlled after adding oral clonidine. Place metoprolol on hold due to bradycardia              -Prophylactic Keppra, Daily TCD's  -On multiple antihypertensive medication, adding hydrochlorothiazide to amlodipine, lisinopril, clonidine and hydralazine.     DVT GI prophylaxis.  Neurochecks as ordered by neurology. DISPOSITION  Stay in ICU    CRITICAL CARE CONSULTANT NOTE  I had a face to face encounter with the patient, reviewed and interpreted patient data including clinical events, labs, images, vital signs, I/O's, and examined patient. I have discussed the case and the plan and management of the patient's care with the consulting services, the bedside nurses and the respiratory therapist.      NOTE OF PERSONAL INVOLVEMENT IN CARE   This patient has a high probability of imminent, clinically significant deterioration, which requires the highest level of preparedness to intervene urgently.  I participated in the decision-making and personally managed or directed the management of the following life and organ supporting interventions that required my frequent assessment to treat or prevent imminent deterioration. I personally spent 40 minutes of critical care time. This is time spent at this critically ill patient's bedside actively involved in patient care as well as the coordination of care and discussions with the patient's family. This does not include any procedural time which has been billed separately. Ashok Camacho M.D.   Staff Intensivist/Pulmonologist  [facility]  7/28/2021

## 2021-07-28 NOTE — PROGRESS NOTES
Problem: Mobility Impaired (Adult and Pediatric)  Goal: *Acute Goals and Plan of Care (Insert Text)  Description: FUNCTIONAL STATUS PRIOR TO ADMISSION: Patient was independent with all mobility and active (worked as ). He lived at home with his wife in a 1-story house. Physical Therapy Goals  Goals re-assessed 7/28/2021  1. Patient will move from supine to sit and sit to supine , scoot up and down, and roll side to side in bed with independence within 7 day(s). 2.  Patient will transfer from bed to chair and chair to bed with independence using the least restrictive device within 7 day(s). 3.  Patient will perform sit to stand with  mod I within 7 day(s). 4.  Patient will ambulate with CGA  for 150 feet with the least restrictive device within 7 day(s). 5.  Patient will ascend/descend 3 stairs with 2 handrail(s) with CGA within 7 day(s). Initiated 7/22/2021  1. Patient will move from supine to sit and sit to supine , scoot up and down, and roll side to side in bed with independence within 7 day(s). 2.  Patient will transfer from bed to chair and chair to bed with minimal assistance using the least restrictive device within 7 day(s). 3.  Patient will perform sit to stand with supervision/set-up within 7 day(s). 4.  Patient will ambulate with min assistance  for 50 feet with the least restrictive device within 7 day(s). 5.  Patient will ascend/descend 3 stairs with 2 handrail(s) with moderate assistance within 7 day(s).   Outcome: Progressing Towards Goal  PHYSICAL THERAPY TREATMENT: WEEKLY REASSESSMENT  Patient: Karen Ngo [facility]64 y.o. male)  Date: 7/28/2021  Primary Diagnosis: SAH (subarachnoid hemorrhage) ([facility] [I60.9]        Precautions: Fall         ASSESSMENT  Patient continues with skilled PT services and is progressing slowly towards goals - remains limited by impaired vision, dizziness/wooziness, BLE edema, impaired balance, slow/unsteady gait, decreased activity tolerance, decreased insight into deficits, and decreased safety awareness. Pt required maximal encouragement for minimal participation in mobility session but was ultimately agreeable to ambulate short distance with therapy. SBA for supine<>sit transfer and CGA for ambulation. Pt denied changes in vision this date, but did bump into obstacles on the R side on 3 occassions. When questioned, he reports he \"did it on purpose. \"  Pt reported mild dizziness/wooziness which remained constant and did not increase with activity. VSS throughout session. Recommending IPR upon discharge, if d/c home, will benefit from [place] PT and increased assistance from family as he is a fall risk. Current Level of Function Impacting Discharge (mobility/balance): CGA for ambulation with verbal cuing for safety awareness    Other factors to consider for discharge: fall risk, impaired vision, poor safety awareness, independent baseline          PLAN :  Goals have been updated based on progression since last assessment. Patient continues to benefit from skilled intervention to address the above impairments. Recommendations and Planned Interventions: bed mobility training, transfer training, gait training, therapeutic exercises, neuromuscular re-education, patient and family training/education, and therapeutic activities      Frequency/Duration: Patient will be followed by physical therapy:  5 times a week to address goals. Recommendation for discharge: (in order for the patient to meet his/her long term goals)  Recommending IPR upon discharge, if d/c home, will benefit from [place] PT and increased assistance from family as he is a fall risk. This discharge recommendation:  Has not yet been discussed the attending provider and/or case management    IF patient discharges home will need the following DME: none         SUBJECTIVE:   Patient stated You are here so late.   Why weren't you her at 8 or 9 am.    OBJECTIVE DATA SUMMARY: HISTORY:    Past Medical History:   Diagnosis Date    Acute hypokalemia 7/20/2021    Celiac artery stenosis (HCC) 7/21/2021    Cerebral arteriovenous malformation (AVM) 7/20/2021    Diabetes (HCC)     type 2    ED (erectile dysfunction)     HTN (hypertension)     Hypertension     Hypertensive emergency 7/21/2021    Impaired fasting glucose     Malignant hypertension 7/20/2021    Vasogenic cerebral edema ([address] 7/21/2021     Past Surgical History:   Procedure Laterality Date    HX TONSIL AND ADENOIDECTOMY       Home Situation  Home Environment: Private residence  Living Alone: No  Support Systems: Spouse/Significant Other/Partner, Family member(s)  Current DME Used/Available at Home: None  Tub or Shower Type: Tub/Shower combination    EXAMINATION/PRESENTATION/DECISION MAKING:   Critical Behavior:  Neurologic State: Alert  Orientation Level: Oriented X4  Cognition: Follows commands  Safety/Judgement: Decreased awareness of need for safety, Decreased insight into deficits, Decreased awareness of need for assistance, Decreased awareness of environment    Functional Mobility:  Bed Mobility:     Supine to Sit: Stand-by assistance  Sit to Supine: Stand-by assistance  Scooting: Stand-by assistance  Transfers:  Sit to Stand: Contact guard assistance  Stand to Sit: Contact guard assistance                       Balance:   Sitting: Intact  Standing: Impaired  Standing - Static: Fair  Standing - Dynamic : Fair  Ambulation/Gait Training:  Distance (ft): 45 Feet (ft)  Assistive Device: Gait belt  Ambulation - Level of Assistance: Contact guard assistance        Gait Abnormalities: Ataxic; Altered arm swing;Trunk sway increased; Shuffling gait              Speed/Rita: Slow;Shuffled  Step Length: Right shortened;Left shortened       Activity Tolerance:   Fair and Poor    After treatment patient left in no apparent distress:   Supine in bed and Call bell within reach    COMMUNICATION/EDUCATION:   The patients plan of care was discussed with: Occupational therapist and Registered nurse. Fall prevention education was provided and the patient/caregiver indicated understanding., Patient/family have participated as able in goal setting and plan of care. , and Patient/family agree to work toward stated goals and plan of care.     Thank you for this referral.  Ernesto Vila, PT, DPT   Time Calculation: 13 mins

## 2021-07-28 NOTE — ADT AUTH CERT NOTES
Neurology [address] - Care Day 7 (7/26/2021) by Veronique Vital RN       Review Status Review Entered   Completed 7/26/2021 16:48      Criteria Review      Care Day: 7 Care Date: 7/26/2021 Level of Care: ICU    Guideline Day 3    Level Of Care    ( ) * Activity level acceptable    ( ) * Complete discharge planning    Clinical Status    (X) * No infection, or status acceptable    ( ) * Isolation not needed, or status acceptable    (X) * Respiratory status acceptable    ( ) * Pain and nausea absent or adequately managed    (X) * Ventilatory status acceptable    (X) * Neurologic problems absent or stabilized    (X) * Muscle or nerve damage absent or stable    ( ) * General Discharge Criteria met    Interventions    (X) * Intake acceptable    ( ) * No inpatient interventions needed    7/26/2021 16:48:55 EDT by Natasha Maddox      see below    * Milestone   Additional Notes   7/26/21  inpt ICU   97.7   49   20   107/45   94%  ra   WBC: 12.1 (H)   HGB: 13.7   HCT: 39.9   PLATELET: 066   Sodium: 137   Potassium: 4.0   Chloride: 109 (H)   Glucose: 149 (H)   BUN: 23 (H)   Creatinine: 0.91   BUN/Creatinine ratio: 25 (H)   Calcium: 7.5 (L)   Magnesium: 2.7 (H)      Meds:  decadron 4mg iv q12   HCTZ 25mg po qd   labetalol 10mg iv q4prn x 2   LR at 50  keppra 500mg po bid  lisinopril  40mg po qd   cardene titrated gtt  nimotop 60mg q4 po   apresoline 10mg po Tid   Lopressor 100mg po q12  norvasc 10mg po qd     Orders:  reg dit   PT/OT      Critical care note   Assessment and Plan:   -Arteriovenous malformation left superior cerebellar peduncle:   -Acute subarachnoid hemorrhage:   -Difficult to control hypertension:           -Appreciate neurology recommendation management:               -Maintain systolic blood pressure below 140               -Day 7/21 of nimodipine               -Maintain euvolemia, patient significantly positive.  No evidence of volume overload clinically.  LR down to 50 cc an hour               -Prophylactic Keppra, Daily TCD's   -On multiple antihypertensive medication,       DVT GI prophylaxis.  Neurochecks every 1 hour.       DISPOSITION   Stay in ICU             Neurocritical Care Brief Progress Note:           Subjective:   No acute events noted overnight.  No signs and symptoms of anxiety noted overnight.  Patient denied any new neurological symptoms.  Vertiginous dizziness appears to be improving.eye patch in-place.  Denied any headache, nausea, vomiting, or dysphagia.  Able to have bowel movements with bowel regimen.  Blood pressure being optimized with multi antihypertensives include amlodipine 10 mg daily, clonidine 0.2 mg patch every 24 hour, hydralazine 100 mg 3 times daily, senna pro 40 mg daily, metoprolol 100 mg every 8 hours, and on pneumo top 60 mg every 4 hours as well as Cardene drip 12.5 mg/h (attempt to slowly wean Cardene drip) continue to monitor blood pressure.       The patient will likely require secondary hypertension work-up       Patient may need nephrology vs cardiology consult for assistance with blood pressure management       We will decrease Decadron from 4 mg to 8 hours to every 12 hours       TCD's 7/25/2021 showed no evidence of vasospasm; will continue daily TCD's       Strict I/O's +1,909 this shift; the patient is having good amount of oral intake; will decrease IV fluids rate from 100 mL/h to 50 mL/hr       Last CT head without contrast obtained 7/24/2021 showed stable left prepontine hemorrhage.       Low threshold for repeat head CT with worsening neurological status       A comprehensive review of systems was negative except for that written in the HPI.                    Neurology [address] Day 6 (7/25/2021) by Delon Hahn RN       Review Status Review Entered   Completed 7/26/2021 16:41      Criteria Review      Care Day: 6 Care Date: 7/25/2021 Level of Care: ICU    Guideline Day 3    Level Of Care    ( ) * Activity level acceptable    ( ) * Complete discharge planning Clinical Status    (X) * No infection, or status acceptable    ( ) * Isolation not needed, or status acceptable    (X) * Respiratory status acceptable    ( ) * Pain and nausea absent or adequately managed    (X) * Ventilatory status acceptable    (X) * Neurologic problems absent or stabilized    (X) * Muscle or nerve damage absent or stable    ( ) * General Discharge Criteria met    Interventions    (X) * Intake acceptable    ( ) * No inpatient interventions needed    7/26/2021 16:41:12 EDT by Nola Rueda see below    * Milestone   Additional Notes   7/25/21  inpt ICU   96.5  50  21 108/51  99% ra   WBC: 10.5   HGB: 14.3   HCT: 41.5   PLATELET: 053   Sodium: 140   Potassium: 3.9   Chloride: 111 (H)   Glucose: 129 (H)   BUN: 17   Creatinine: 0.78   BUN/Creatinine ratio: 22 (H)   Calcium: 7.5 (L)   Magnesium: 2.6 (H)   Meds:  decadron 4mg iv q8   HCTZ 25mg po qd   labetalol 10mg iv q4prn x 1   LR at 125  keppra 500mg po bid  lisinopril  40mg po qd   cardene titrated gtt  nimotop 60mg q4 po   apresoline 10mg po Tid   Lopressor 50mg po q12     Orders:  reg dit   PT/OT      Critical care note   Diagnosis:       Principal Problem:     Cerebral arteriovenous malformation (AVM)     Left Prepontine Hemorrhage        Problem List:     Celiac artery stenosis     Hypertensive emergency     Vasogenic cerebral edema     SAH (subarachnoid hemorrhage)     Type 2 diabetes mellitus without complication, without long-term current use of insulin       ICU Comprehensive Plan of Care:       1. Neurological System   TCDs   Nimodipine   Keppra   Decadron   Q1 hr neurochecks   Spontaneous Awakening Trial: N/A   Sedation: N/A   Analgesia: Acetaminophen       2. Cardiovascular System   Clonidine   Hydralazine   Lisinopril   Metoprolol   SBP Goal of: < 140 mmHg   MAP Goal of: > 65 mmHg   Labetalol & Nicardipine (Cardene) - For above SBP/MAP goals   Transfusion Trigger (Hgb): <7 g/dL   Keep K > 4; Mg > 2        3.  Respiratory System Head of bed > 30 degrees   Aggressive bronchopulmonary hygiene   Incentive spirometry   Spontaneous Breathing Trial: N/A   Pulmonary toilet: Incentive Spirometry    SpO2 Goal: > 92%   DVT Prophylaxis: SCD's or Sequential Compression Device        4. Renal/GI/Endocrine System   IVFs: LR   Ulcer Prophylaxis: Not at this time    Bowel Regimen: Docusate (Colace)   Feeding:  Yes    Blood Sugar Goal 120-180 - Glycemic Control: Insulin       5. Infectious Disease   Indwelling Catheter:   Tubes: None   Lines: Peripheral IV   Drains: None       6. PT/OT: PT consulted and on board, OT consulted and on board and Speech therapy consulted and on board        7. Goals of Care Discussion with family Yes        8. Plan of Care/Code Status: Full Code       9. Discussed Care Plan with Bedside RN       10. Documentation of Current Medications             Neurointerventional Surgery Progress Note   Assessment:       Principal Problem:     Cerebral arteriovenous malformation (AVM) (7/20/2021)       Overview: ([id number])- CT (Brain):       There is an AVM in the superior cerebellum with multiple aneurysms.     One measures 4 mm.  Another measures 4 mm within the superior cerebellum.       Active Problems:     Malignant hypertension (7/20/2021)         Acute hypokalemia (7/20/2021)         Celiac artery stenosis ([address] (7/21/2021)         Vasogenic cerebral edema (HCC) (7/21/2021)         Hypertensive emergency (7/21/2021)         SAH (subarachnoid hemorrhage) ([address] (7/20/2021)               Plan:   Acute SAH in the setting of cerebellar AVM with associated flow related aneurysm, [place] 1, Mistry Grade 2   - s/p diagnostic cerebral angiogram on 7/20/2021   - Day 6 of 21 of Nimotop for the prevention of delayed cerebral ischemia   - continue Keppra 500 mg BID for seizure ppx   - decreased LR to 100 ml/hr to maintain euvolemia   - Repeat head CT last night showed stable left prepontine hemorrhage   - Strict I's and O's, patient +838 mls on  I's and O's currently   - ECHO shows EF 65-70%, dilated left atrium. No shunting seen. Mild (grade 1) left ventricular diastolic dysfunction.    - continue every hour neuro checks   - SBP goal <140, Cardene/Labetalol PRN   - PT/OT/SLP evals    - assess NICOM PRN for fluid responsiveness    - daily TCDs, TCDs today show limited exam.  Anterior intracranial circulation is not visualized.  No evidence of vasospasm in the posterior circulation.   - NIS following        HTN   - SBP goal <140 as stated above   - Cardene/Labetalol PRN       Constipation   - continue Rissa-colace daily   - Miralax  PRN daily   - Dulcolax 10 mg daily PRN       Anxiety   - Possible numbness around lips and fingertips maybe related to anxiety   - ordered 0.5 mg of Xanax once        Activity: Up with assistance    DVT ppx: SCDs   Dispo: TBD         Neurology [address] - Care Day 5 (7/24/2021) by Jaja Jain RN       Review Status Review Entered   Completed 7/26/2021 16:29      Criteria Review      Care Day: 5 Care Date: 7/24/2021 Level of Care: ICU    Guideline Day 3    Level Of Care    ( ) * Activity level acceptable    ( ) * Complete discharge planning    Clinical Status    (X) * No infection, or status acceptable    ( ) * Isolation not needed, or status acceptable    (X) * Respiratory status acceptable    ( ) * Pain and nausea absent or adequately managed    (X) * Ventilatory status acceptable    (X) * Neurologic problems absent or stabilized    (X) * Muscle or nerve damage absent or stable    ( ) * General Discharge Criteria met    Interventions    (X) * Intake acceptable    ( ) * No inpatient interventions needed    7/26/2021 16:29:54 EDT by Jacklyn Wilde see below    * Milestone   Additional Notes   7/24/21  inpt ICU   97.4   45   22   168/97   97% ra   WBC: 10.5   HGB: 14.3   PLATELET: 149   Sodium: 140   Potassium: 3.9   Chloride: 111 (H)   CO2: 23   Anion gap: 6   Glucose: 129 (H)   BUN: 17   Creatinine: 0.78   BUN/Creatinine ratio: 22 (H)   Calcium: 7.5 (L)   Phosphorus: 3.1   Magnesium: 2.6 (H)   Ct of the  head     IMPRESSION   Stable left prepontine hemorrhage   Meds:  decadron 4mg iv q8   HCTZ 25mg po qd   labetalol 10mg iv q4prn x 1   LR at 125  keppra 500mg po bid  lisinopril  40mg po qd   cardene titrated gtt  nimotop 60mg q4 po   apresoline 10mg po Tid   Lopressor 50mg po q12     Orders:  reg dit   PT/OT         Critical care note   ICU Comprehensive Plan of Care:       1. Neurological System   TCDs   Nimodipine   Keppra   Decadron   Q1 hr neurochecks   Spontaneous Awakening Trial: N/A   Sedation: N/A   Analgesia: Acetaminophen       2. Cardiovascular System   Clonidine   Hydralazine   Lisinopril   Metoprolol   SBP Goal of: < 140 mmHg   MAP Goal of: > 65 mmHg   Nicardipine (Cardene) - For above SBP/MAP goals   Transfusion Trigger (Hgb): <7 g/dL   Keep K > 4; Mg > 2        3. Respiratory System   Head of bed > 30 degrees   Aggressive bronchopulmonary hygiene   Incentive spirometry   Spontaneous Breathing Trial: N/A   Pulmonary toilet: Incentive Spirometry    SpO2 Goal: > 92%   DVT Prophylaxis: SCD's or Sequential Compression Device        4. Renal/GI/Endocrine System   IVFs: LR   Ulcer Prophylaxis: Not at this time    Bowel Regimen: Docusate (Colace)   Feeding:  Yes    Blood Sugar Goal 120-180 - Glycemic Control: Insulin       5. Infectious Disease   Indwelling Catheter:   Tubes: None   Lines: Peripheral IV   Drains: None       6. PT/OT: PT consulted and on board, OT consulted and on board and Speech therapy consulted and on board        7. Goals of Care Discussion with family Yes        8. Plan of Care/Code Status: Full Code       9. Discussed Care Plan with Bedside RN       10.  Documentation of Current Medications       Subjective:       Progress Note:    7/24/2021    6:47 AM        Reason for ICU Admission:    Cerebral arteriovenous malformation (AVM)        Overnight Events:    7/24 -- On Cardene            Neurocritical Care Brief Progress Note:       51-year-old male with SAH with cerebellar AVM with multiple feeders coming off the left PCA, left anterior choroidal and left SCA with a large single draining vein that drains into the vein of G[place]. 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     Nursing states that he was having difficulty keeping BP under 140 today. Patient on cardene gtt, prn medications, as well as multiple oral medications.  Also patient has been agitated today and was complaining of chest pain earlier today. States he has chest pain when BP goes too low. Is upset about taking so much BP medication. Had [address] this afternoon that showed left prepontine hemorrhage is slightly increased. Did complain of left neck pain extending from the left chest to neck and across head this evening. States the pain is similar to when he first came in. Pain is 3/10. States he is tired of being here and is probably causing himself to get workup. Reassured patient that this is not his fault.             Physical Exam:   Gen: NAD, calm, cooperative   Neuro: A&Ox4. Follows commands. Speech clear. Affect normal. PERRL, 3 mm bilaterally. Visual fields full to confrontation. However, has stated double vision at a distance. Mild disconjugate gaze present at times. EOMI. Mild end-gaze nystagmus when looking to the left. Occasional twitching of the right eyelid.  Significant left lower facial droop with stated left facial numbness noted on light touch. Stated decreased hearing on the left.  Beni spontaneously. Strength 5/5 in UE and LE BL. Negative drift. Bulk and tone normal. No involuntary movements. Gait deferred. Again no ataxia not noted on my exam.    Skin: Warm, dry, color appropriate for ethnicity.        PLAN: Completed EKG showing nonspecific T-wave abnormality in inferior leads. Troponin negative. Repeat CTH tonight pending to monitor slight increase in hemorrhage on scan earlier today.  Intensivist added labetalol gtt to assist in BP control.

## 2021-07-28 NOTE — PROGRESS NOTES
[facility] Report included the following information SBAR, Kardex, Procedure Summary, Intake/Output, MAR and Recent Results. 2140: Updated NP on new PO metoprolol order and bradycardia. Order received to give PO metoprolol. 6421: NICOM complete.

## 2021-07-28 NOTE — PROGRESS NOTES
Neurocritical Care Brief Progress Note:    TCDs reviewed, no evidence of vasospasm    Was diuresed during day shift.  I/O record reviewed, noted to be - 1667 ml for 24 hour total    - NICOM assessed, 56.4% change in SVI with PLR indicating pt is fluid responsive   - Cont IVF 75 ml/hr   - Goal is to maintain euvolemia    Jamie Caro NP  Neurocritical Care Nurse Practitioner  [phone]

## 2021-07-28 NOTE — PROGRESS NOTES
0730: Bedside and Verbal shift change report given to Bria Fortune RN (oncoming nurse) by Patricia Lane RN (offgoing nurse). Report included the following information SBAR, Kardex, Intake/Output, MAR, Accordion, Recent Results, Med Rec Status, Cardiac Rhythm Sinus Edis Horde, Alarm Parameters  and Dual Neuro Assessment.

## 2021-07-28 NOTE — PROGRESS NOTES
Bedside shift change report received from [address]. Report included the following information SBAR, Kardex, Procedure Summary, Intake/Output, MAR and Recent Results. 2140: Updated NP on new PO metoprolol order and bradycardia. Order received to give PO metoprolol.

## 2021-07-28 NOTE — ADVANCED PRACTICE NURSE
Patient seen and examined this morning. Patient has new pronator drift on exam this morning. He has been net negative with NICOM of 56% change in SVR indicating that he is fluid responsive. IVC also with >50% collapse. Concern for vasospasm as patient is day 9 post bleed. Will order CTA head/neck today to evaluate. Full note to follow.      Alejandro Newell, ACNP   NIS

## 2021-07-28 NOTE — PROGRESS NOTES
Occupational Therapy [phone] -   [phone]    Chart reviewed in prep for OT treatment - patient declined completing any ADLs but did get up and ambulate with PT. OT session aborted. Matthew Francois MS, OTR/L  Time spent with patient: 12 minutes

## 2021-07-28 NOTE — PROGRESS NOTES
Neurointerventional Surgery Progress Note  Rayna Vaughn [facility]  Neurocritical Care NP      Admit Date: 7/20/2021   LOS: 8 days        Daily Progress Note: 7/28/2021    S/P: POD 8 status post diagnostic cerebral angiogram      Subjective:     No acute events overnight. Doing well today. Tolerated gentle hydration overnight 75 cc/hr. Patient with -1.6 L over the last 24 hours and IVC showing > 50% collapse along with 58% SVC change with PLR indicating that patient is intravascularly depleted.      No further diuresis today per intensivist.     Current Facility-Administered Medications   Medication Dose Route Frequency Provider Last Rate Last Admin    hydroCHLOROthiazide (HYDRODIURIL) tablet 25 mg  25 mg Oral DAILY Vickey Horvath MD   25 mg at 07/28/21 0850    cloNIDine HCL (CATAPRES) tablet 0.2 mg  0.2 mg Oral Q8H Vickey Horvath MD   0.2 mg at 07/28/21 0644    [Held by provider] metoprolol tartrate (LOPRESSOR) tablet 100 mg  100 mg Oral Q12H Vickey Horvath MD   100 mg at 07/27/21 2156    0.9% sodium chloride infusion  75 mL/hr IntraVENous CONTINUOUS Vinnie Banner JAE NP 75 mL/hr at 07/28/21 1022 75 mL/hr at 07/28/21 1022    dexamethasone (DECADRON) 4 mg/mL injection 4 mg  4 mg IntraVENous Q12H Emir Riojas NP   4 mg at 07/28/21 0850    hydrALAZINE (APRESOLINE) tablet 100 mg  100 mg Oral TID Herberth Morales DO   100 mg at 07/28/21 0849    senna-docusate (PERICOLACE) 8.6-50 mg per tablet 1 Tablet  1 Tablet Oral DAILY Shanika Grant NP   1 Tablet at 07/28/21 0850    polyethylene glycol (MIRALAX) packet 17 g  17 g Oral DAILY PRN Shanika Grant NP   17 g at 07/25/21 0906    bisacodyL (DULCOLAX) tablet 10 mg  10 mg Oral DAILY PRN Shanika Grant NP        labetaloL (NORMODYNE;TRANDATE) injection 10 mg  10 mg IntraVENous Q4H PRN Shanika Grant ECTOR   10 mg at 07/24/21 1655    niCARdipine (CARDENE) 25 mg in 0.9% sodium chloride 250 mL infusion  0-15 mg/hr IntraVENous TITRATE Jean Paul Contreras NP   Stopped at 21 2338    amLODIPine (NORVASC) tablet 10 mg  10 mg Oral DAILY Herberth Morales DO   10 mg at 21 0850    hydrALAZINE (APRESOLINE) 20 mg/mL injection 20 mg  20 mg IntraVENous Q6H PRN Herberth Morales DO   20 mg at 21 0238    labetaloL (NORMODYNE;TRANDATE) 300 mg in 0.9% sodium chloride 150 mL (2 mg / 1 mL) infusion  0.5-2 mg/min IntraVENous TITRATE Cleatus Payment, NP-C   Stopped at 21 1926    insulin lispro (HUMALOG) injection   SubCUTAneous AC&HS Orvel Forward, NP   2 Units at 21 1604    glucose chewable tablet 16 g  4 Tablet Oral PRN Orvel Forward, NP        dextrose (D50W) injection syrg 12.5-25 g  12.5-25 g IntraVENous PRN Orvel Forward, NP        glucagon (GLUCAGEN) injection 1 mg  1 mg IntraMUSCular PRN Orvel Forward, NP        ondansetron [facility]) injection 4 mg  4 mg IntraVENous Q6H PRN Cleatus Payment, NP-C        naloxone [facility]) injection 0.4 mg  0.4 mg IntraVENous PRN Cleatus Payment, NP-C        acetaminophen (TYLENOL) tablet 650 mg  650 mg Oral Q4H PRN Cleatus Payment, NP-C   650 mg at 21    Or    acetaminophen (TYLENOL) solution 650 mg  650 mg Per NG tube Q4H PRN Cleatus Payment, NP-C        Or    acetaminophen (TYLENOL) suppository 650 mg  650 mg Rectal Q4H PRN Cleatus Payment, NP-C        glucose chewable tablet 16 g  4 Tablet Oral PRN Cleatus Payment, NP-C        niMODipine (NIMOTOP) capsule 60 mg  60 mg Oral Q4H Gabbi Gallegos NP   60 mg at 21 1121    levETIRAcetam (KEPPRA) tablet 500 mg  500 mg Oral BID Eusebio Gallegos NP   500 mg at 21 0850    lisinopriL (PRINIVIL, ZESTRIL) tablet 40 mg  40 mg Oral DAILY Susan Martinez NP   40 mg at 21 0850        No Known Allergies    Review of Systems:  Pertinent items are noted in the History of Present Illness.     Objective:     Vital signs  Temp (24hrs), Av.6 °F (36.4 °C), Min:97.4 °F (36.3 °C), Max:97.9 °F (36.6 °C)   07/28 0701 - 07/28 1900  In: 425 [P.O.:200; I.V.:225]  Out: 400 [Urine:400]  07/26 1901 - 07/28 0700  In: 3674 [P.O.:1020; I.V.:2654]  Out: 7400 [Urine:7400]         Vitals:    07/28/21 0849 07/28/21 0900 07/28/21 1016 07/28/21 1121   BP: 126/73 135/69 135/69 (!) 113/57   Pulse: (!) 50 (!) 54 62 61   Resp:  15 18    Temp:       SpO2:       Weight:       Height:          HPI: Alli Nolasco is a 79-year-old male with a past medical history significant for hypertension and diabetes type 2 who initially presented to [facility] ED on 7/19/2021 for acute onset of dizziness, headache, left-sided hearing loss, and left-sided weakness. Upon arrival to the ED his blood pressure was noted to be 265/120 per review of notes. A code stroke was called and CT of the head showed an acute subarachnoid hemorrhage anterior to the sarahi. CTA of the head and neck showed an AVM in the left superior cerebellar peduncle, with the dominant drainage into the vein of Gaston. No large vessel occlusion, no hemodynamically significant stenosis. Redemonstrated subarachnoid hemorrhage in the basilar cisterns. He was started on a Cardene drip and was also given IV hydralazine and labetalol. He was then transferred to [address] [place] [address] for a higher level of care. NIS was consulted and patient underwent a diagnostic cerebral angiogram on 7/20/2021. The angiogram showed evidence of a cerebral AVM with multiple feeders coming of the left PCA, left anterior choroidal and left SCA with a large single draining vein that drains into the vein of Villanueva. 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      Physical Exam:  GENERAL: alert, cooperative, no distress, appears stated age  LUNG: clear to auscultation bilaterally  HEART: regular rate and rhythm, S1, S2 normal, no murmur, click, rub or gallop  EXTREMITIES:  extremities normal, atraumatic, no cyanosis, nonpitting peripheral edema, Posterior tibial pulses +1 bilaterally, +2 pedal pulses bilaterally   SKIN: Skin cool to touch. Appropriate for ethnicity. Right groin site clean, dry, and intact. No hematoma, bruising, or bleeding noted. Neurologic Exam:  Mental Status:  Alert and oriented x 4. Appropriate affect, mood and behavior. Language:    Normal fluency, repetition, comprehension and naming. Cranial Nerves:        Pupils equal, round and reactive to light. Visual fields full to confrontation. Dysconjugate gaze intermittently with EOMs, otherwise intact. Mild subtle nystagmus when looking to the left, but it has improved. Decreased sensation on the left side of face. Left facial asymmetry     Tongue protrudes to midline, palate elevates symmetrically. No dysarthria. Motor:    + pronator drift on the left upper extremity     Bulk and tone normal.      5/5 power in all extremities proximally and distally. No involuntary movements. Sensation:    Decreased sensation on the left side of face, otherwise sensation intact to light touch. No neglect. Coordination & Gait: Some dysmetria noted in RUE and LUE with FTN (right worse than left, but it has improved). HTS intact bilaterally. Gait deferred.      24 hour results:    Recent Results (from the past 24 hour(s))   GLUCOSE, POC    Collection Time: 07/27/21  3:54 PM   Result Value Ref Range    Glucose (POC) 146 (H) 65 - 117 mg/dL    Performed by Irwin Puente , [facility]    Collection Time: 07/27/21  8:24 PM   Result Value Ref Range    Sodium 135 (L) 136 - 145 mmol/L    Potassium 4.8 3.5 - 5.1 mmol/L    Chloride 105 97 - 108 mmol/L    CO2 24 21 - 32 mmol/L    Anion gap 6 5 - 15 mmol/L    Glucose 135 (H) 65 - 100 mg/dL    BUN 21 (H) 6 - 20 MG/DL    Creatinine 0.72 0.70 - 1.30 MG/DL    BUN/Creatinine ratio 29 (H) 12 - 20      GFR est AA >60 >60 ml/min/1.73m2    GFR est non-AA >60 >60 ml/min/1.73m2    Calcium 8.2 (L) 8.5 - 10.1 MG/DL   GLUCOSE, POC    Collection Time: 07/27/21 9:59 PM   Result Value Ref Range    Glucose (POC) 150 (H) 65 - 117 mg/dL    Performed by Bren Baum    MAGNESIUM    Collection Time: 07/28/21  4:22 AM   Result Value Ref Range    Magnesium 2.4 1.6 - 2.4 mg/dL   PHOSPHORUS    Collection Time: 07/28/21  4:22 AM   Result Value Ref Range    Phosphorus 3.9 2.6 - 4.7 MG/DL   CBC W/O DIFF    Collection Time: 07/28/21  4:22 AM   Result Value Ref Range    WBC 12.5 (H) 4.1 - 11.1 K/uL    RBC 4.73 4.10 - 5.70 M/uL    HGB 13.7 12.1 - 17.0 g/dL    HCT 39.3 36.6 - 50.3 %    MCV 83.1 80.0 - 99.0 FL    MCH 29.0 26.0 - 34.0 PG    MCHC 34.9 30.0 - 36.5 g/dL    RDW 13.7 11.5 - 14.5 %    PLATELET 130 401 - 161 K/uL    MPV 10.4 8.9 - 12.9 FL    NRBC 0.0 0  WBC    ABSOLUTE NRBC 0.00 0.00 - 9.83 K/uL   METABOLIC PANEL, BASIC    Collection Time: 07/28/21  4:22 AM   Result Value Ref Range    Sodium 138 136 - 145 mmol/L    Potassium 4.3 3.5 - 5.1 mmol/L    Chloride 107 97 - 108 mmol/L    CO2 23 21 - 32 mmol/L    Anion gap 8 5 - 15 mmol/L    Glucose 131 (H) 65 - 100 mg/dL    BUN 19 6 - 20 MG/DL    Creatinine 0.73 0.70 - 1.30 MG/DL    BUN/Creatinine ratio 26 (H) 12 - 20      GFR est AA >60 >60 ml/min/1.73m2    GFR est non-AA >60 >60 ml/min/1.73m2    Calcium 7.9 (L) 8.5 - 10.1 MG/DL   GLUCOSE, POC    Collection Time: 07/28/21  6:44 AM   Result Value Ref Range    Glucose (POC) 100 65 - 117 mg/dL    Performed by [address], POC    Collection Time: 07/28/21 11:25 AM   Result Value Ref Range    Glucose (POC) 98 65 - 117 mg/dL    Performed by Kashif Lizama           Imaging:  CT of head on 7/19/2021 at 2104 shows  IMPRESSION  1. Acute subarachnoid hemorrhage anterior to the sarahi. CTA of the head and neck on 7/19/2021 at 2126 shows  IMPRESSION     1. Arteriovenous malformation in the left superior cerebellar peduncle, with the  dominant drainage into the vein of Gaston. 2. No large vessel occlusion, no hemodynamically significant stenosis.   3. Redemonstrated subarachnoid hemorrhage in the basilar cisterns. CT the head on 7/20/2021 at 1129 shows  IMPRESSION  1. Stable ventricular size.     2.  Questionable diffuse cerebral edema.     3.  Subarachnoid hemorrhage in the prepontine cistern and ambient cisterns as  well as sylvian fissures are decreased from the prior study. MRI brain with and without 7/20/2021 at 2214 shows  IMPRESSION  1. Tiny 1 mm foci of diffusion restriction in the left cerebellum and left  cerebral hemisphere as described likely embolic in origin.     2.  Possible mild meningeal enhancement which may be secondary to meningeal  irritation from known subarachnoid hemorrhage.     3.  Dilated superior ophthalmic veins are noted which is new when compared to  prior imaging. This is of uncertain clinical significance. There appears to be  normal enhancement of the cavernous sinus bilaterally. CT of head on 7/23/2021 at 2233 shows     IMPRESSION  No hydrocephalus. Stable left prepontine subarachnoid hemorrhage. CT of Head on 7/24/2021 at 1357  IMPRESSION:   Left prepontine hemorrhage is slightly increased. CT of the head on 7/24/2021 at 10:21 PM shows  IMPRESSION  Stable left prepontine hemorrhage. Assessment:     Principal Problem:    Cerebral arteriovenous malformation (AVM) (7/20/2021)      Overview: ([id number])- CT (Brain): There is an AVM in the superior cerebellum with multiple aneurysms. One measures 4 mm. Another measures 4 mm within the superior cerebellum.     Active Problems:    Malignant hypertension (7/20/2021)      Acute hypokalemia (7/20/2021)      Celiac artery stenosis ([address] (7/21/2021)      Vasogenic cerebral edema ([address] (7/21/2021)      Hypertensive emergency (7/21/2021)      SAH (subarachnoid hemorrhage) ([address] (7/20/2021)        Plan:   Acute SAH in the setting of cerebellar AVM with associated flow related aneurysm, [place] Knox 1, Mistry Grade 2  - s/p diagnostic cerebral angiogram on 7/20/2021  - Day 9 of 21 of [facility] for the prevention of delayed cerebral ischemia  - continue Keppra 500 mg BID for seizure ppx  - patient is being diuresed by intensivist, fluids stopped. -2L today   - Repeat head CT last night showed stable left prepontine hemorrhage  - Strict I's and O's, patient +1244 mls on  I's and O's currently  - ECHO shows EF 65-70%, dilated left atrium. No shunting seen. Mild (grade 1) left ventricular diastolic dysfunction. - continue every hour neuro checks  - SBP goal <140  - PT/OT/SLP evals   - assess NICOM PRN for fluid responsiveness   - daily TCDs, CTA head/neck done today which shows no significant vasospasm    HTN  - SBP goal <140 as stated above  - Cardene/Labetalol drip PRN  - patient on multiple oral antihypertensives (lisinopril, metoprolol, hydralazine, amlodipine, and clonidine)  - Patient had a bilateral artery/venous renal duplex on 722 which shows no evidence of significant renal artery stenosis. - Intensivist following    Constipation (improved)  - continue Rissa-colace daily  - Miralax PRN daily  - Dulcolax 10 mg daily PRN      Activity: Up with assistance, ambulate today  DVT ppx: SCDs  Dispo: TBD    Plan d/w Dr. Graciela Lopez, Dr. Ariel Leblanc RN, and patient.        Jorge Van NP

## 2021-07-29 ENCOUNTER — APPOINTMENT (OUTPATIENT)
Dept: VASCULAR SURGERY | Age: 61
DRG: 064 | End: 2021-07-29
Attending: NURSE PRACTITIONER
Payer: COMMERCIAL

## 2021-07-29 LAB
ANION GAP SERPL CALC-SCNC: 6 MMOL/L (ref 5–15)
BUN SERPL-MCNC: 20 MG/DL (ref 6–20)
BUN/CREAT SERPL: 23 (ref 12–20)
CALCIUM SERPL-MCNC: 8.5 MG/DL (ref 8.5–10.1)
CHLORIDE SERPL-SCNC: 104 MMOL/L (ref 97–108)
CO2 SERPL-SCNC: 25 MMOL/L (ref 21–32)
CREAT SERPL-MCNC: 0.87 MG/DL (ref 0.7–1.3)
ERYTHROCYTE [DISTWIDTH] IN BLOOD BY AUTOMATED COUNT: 13.7 % (ref 11.5–14.5)
GLUCOSE BLD STRIP.AUTO-MCNC: 108 MG/DL (ref 65–117)
GLUCOSE BLD STRIP.AUTO-MCNC: 118 MG/DL (ref 65–117)
GLUCOSE BLD STRIP.AUTO-MCNC: 149 MG/DL (ref 65–117)
GLUCOSE BLD STRIP.AUTO-MCNC: 171 MG/DL (ref 65–117)
GLUCOSE SERPL-MCNC: 115 MG/DL (ref 65–100)
HCT VFR BLD AUTO: 42.1 % (ref 36.6–50.3)
HGB BLD-MCNC: 14.3 G/DL (ref 12.1–17)
MAGNESIUM SERPL-MCNC: 2.4 MG/DL (ref 1.6–2.4)
MCH RBC QN AUTO: 28.4 PG (ref 26–34)
MCHC RBC AUTO-ENTMCNC: 34 G/DL (ref 30–36.5)
MCV RBC AUTO: 83.5 FL (ref 80–99)
NRBC # BLD: 0 K/UL (ref 0–0.01)
NRBC BLD-RTO: 0 PER 100 WBC
PHOSPHATE SERPL-MCNC: 3.7 MG/DL (ref 2.6–4.7)
PLATELET # BLD AUTO: 262 K/UL (ref 150–400)
PMV BLD AUTO: 10.3 FL (ref 8.9–12.9)
POTASSIUM SERPL-SCNC: 4.4 MMOL/L (ref 3.5–5.1)
RBC # BLD AUTO: 5.04 M/UL (ref 4.1–5.7)
SERVICE CMNT-IMP: ABNORMAL
SERVICE CMNT-IMP: NORMAL
SODIUM SERPL-SCNC: 135 MMOL/L (ref 136–145)
WBC # BLD AUTO: 12 K/UL (ref 4.1–11.1)

## 2021-07-29 PROCEDURE — 74011250636 HC RX REV CODE- 250/636: Performed by: ANESTHESIOLOGY

## 2021-07-29 PROCEDURE — 65610000006 HC RM INTENSIVE CARE

## 2021-07-29 PROCEDURE — 80048 BASIC METABOLIC PNL TOTAL CA: CPT

## 2021-07-29 PROCEDURE — 97116 GAIT TRAINING THERAPY: CPT

## 2021-07-29 PROCEDURE — 74011250637 HC RX REV CODE- 250/637: Performed by: INTERNAL MEDICINE

## 2021-07-29 PROCEDURE — 85027 COMPLETE CBC AUTOMATED: CPT

## 2021-07-29 PROCEDURE — 74011250636 HC RX REV CODE- 250/636: Performed by: NURSE PRACTITIONER

## 2021-07-29 PROCEDURE — 74011250637 HC RX REV CODE- 250/637: Performed by: ANESTHESIOLOGY

## 2021-07-29 PROCEDURE — 74011250637 HC RX REV CODE- 250/637: Performed by: NURSE PRACTITIONER

## 2021-07-29 PROCEDURE — 84100 ASSAY OF PHOSPHORUS: CPT

## 2021-07-29 PROCEDURE — 74011636637 HC RX REV CODE- 636/637: Performed by: NURSE PRACTITIONER

## 2021-07-29 PROCEDURE — 82962 GLUCOSE BLOOD TEST: CPT

## 2021-07-29 PROCEDURE — 36415 COLL VENOUS BLD VENIPUNCTURE: CPT

## 2021-07-29 PROCEDURE — 74011000250 HC RX REV CODE- 250: Performed by: NURSE PRACTITIONER

## 2021-07-29 PROCEDURE — 83735 ASSAY OF MAGNESIUM: CPT

## 2021-07-29 PROCEDURE — 93886 INTRACRANIAL COMPLETE STUDY: CPT

## 2021-07-29 PROCEDURE — 74011250637 HC RX REV CODE- 250/637: Performed by: HEALTH CARE PROVIDER

## 2021-07-29 RX ADMIN — NIMODIPINE 60 MG: 30 CAPSULE, LIQUID FILLED ORAL at 03:59

## 2021-07-29 RX ADMIN — HYDROCHLOROTHIAZIDE 25 MG: 25 TABLET ORAL at 08:29

## 2021-07-29 RX ADMIN — DEXAMETHASONE SODIUM PHOSPHATE 4 MG: 4 INJECTION, SOLUTION INTRAMUSCULAR; INTRAVENOUS at 08:29

## 2021-07-29 RX ADMIN — CLONIDINE HYDROCHLORIDE 0.3 MG: 0.2 TABLET ORAL at 21:10

## 2021-07-29 RX ADMIN — HYDRALAZINE HYDROCHLORIDE 20 MG: 20 INJECTION INTRAMUSCULAR; INTRAVENOUS at 20:09

## 2021-07-29 RX ADMIN — SODIUM CHLORIDE 75 ML/HR: 9 INJECTION, SOLUTION INTRAVENOUS at 11:03

## 2021-07-29 RX ADMIN — NIMODIPINE 60 MG: 30 CAPSULE, LIQUID FILLED ORAL at 15:31

## 2021-07-29 RX ADMIN — LISINOPRIL 40 MG: 20 TABLET ORAL at 08:30

## 2021-07-29 RX ADMIN — LEVETIRACETAM 500 MG: 500 TABLET ORAL at 08:29

## 2021-07-29 RX ADMIN — LABETALOL HYDROCHLORIDE 10 MG: 5 INJECTION INTRAVENOUS at 15:17

## 2021-07-29 RX ADMIN — AMLODIPINE BESYLATE 10 MG: 5 TABLET ORAL at 08:29

## 2021-07-29 RX ADMIN — HYDRALAZINE HYDROCHLORIDE 100 MG: 50 TABLET, FILM COATED ORAL at 08:29

## 2021-07-29 RX ADMIN — CLONIDINE HYDROCHLORIDE 0.3 MG: 0.2 TABLET ORAL at 13:31

## 2021-07-29 RX ADMIN — CLONIDINE HYDROCHLORIDE 0.2 MG: 0.2 TABLET ORAL at 06:59

## 2021-07-29 RX ADMIN — INSULIN LISPRO 2 UNITS: 100 INJECTION, SOLUTION INTRAVENOUS; SUBCUTANEOUS at 11:08

## 2021-07-29 RX ADMIN — HYDRALAZINE HYDROCHLORIDE 100 MG: 50 TABLET, FILM COATED ORAL at 21:10

## 2021-07-29 RX ADMIN — SODIUM CHLORIDE 100 ML/HR: 9 INJECTION, SOLUTION INTRAVENOUS at 22:31

## 2021-07-29 RX ADMIN — LEVETIRACETAM 500 MG: 500 TABLET ORAL at 18:14

## 2021-07-29 RX ADMIN — NIMODIPINE 60 MG: 30 CAPSULE, LIQUID FILLED ORAL at 11:02

## 2021-07-29 RX ADMIN — NIMODIPINE 60 MG: 30 CAPSULE, LIQUID FILLED ORAL at 22:32

## 2021-07-29 RX ADMIN — DEXAMETHASONE SODIUM PHOSPHATE 4 MG: 4 INJECTION, SOLUTION INTRAMUSCULAR; INTRAVENOUS at 20:09

## 2021-07-29 RX ADMIN — NIMODIPINE 60 MG: 30 CAPSULE, LIQUID FILLED ORAL at 18:14

## 2021-07-29 RX ADMIN — HYDRALAZINE HYDROCHLORIDE 100 MG: 50 TABLET, FILM COATED ORAL at 15:31

## 2021-07-29 RX ADMIN — INSULIN LISPRO 2 UNITS: 100 INJECTION, SOLUTION INTRAVENOUS; SUBCUTANEOUS at 17:00

## 2021-07-29 RX ADMIN — NIMODIPINE 60 MG: 30 CAPSULE, LIQUID FILLED ORAL at 07:00

## 2021-07-29 RX ADMIN — DOCUSATE SODIUM 50 MG AND SENNOSIDES 8.6 MG 1 TABLET: 8.6; 5 TABLET, FILM COATED ORAL at 08:30

## 2021-07-29 NOTE — PROGRESS NOTES
Problem: Mobility Impaired (Adult and Pediatric)  Goal: *Acute Goals and Plan of Care (Insert Text)  Description: FUNCTIONAL STATUS PRIOR TO ADMISSION: Patient was independent with all mobility and active (worked as ). He lived at home with his wife in a 1-story house. Physical Therapy Goals  Goals re-assessed 7/28/2021  1. Patient will move from supine to sit and sit to supine , scoot up and down, and roll side to side in bed with independence within 7 day(s). 2.  Patient will transfer from bed to chair and chair to bed with independence using the least restrictive device within 7 day(s). 3.  Patient will perform sit to stand with  mod I within 7 day(s). 4.  Patient will ambulate with CGA  for 150 feet with the least restrictive device within 7 day(s). 5.  Patient will ascend/descend 3 stairs with 2 handrail(s) with CGA within 7 day(s). Initiated 7/22/2021  1. Patient will move from supine to sit and sit to supine , scoot up and down, and roll side to side in bed with independence within 7 day(s). 2.  Patient will transfer from bed to chair and chair to bed with minimal assistance using the least restrictive device within 7 day(s). 3.  Patient will perform sit to stand with supervision/set-up within 7 day(s). 4.  Patient will ambulate with min assistance  for 50 feet with the least restrictive device within 7 day(s). 5.  Patient will ascend/descend 3 stairs with 2 handrail(s) with moderate assistance within 7 day(s). Outcome: Not Met  PHYSICAL THERAPY TREATMENT  Patient: Daisy White [de-identified]64 y.o. male)  Date: 7/29/2021  Diagnosis: SAH (subarachnoid hemorrhage) ([facility]) [I60.9] Cerebral arteriovenous malformation (AVM)       Precautions:    Chart, physical therapy assessment, plan of care and goals were reviewed.     ASSESSMENT  Patient continues with skilled PT services and is progressing slowly towards goals - remains limited by impaired vision, dizziness/wooziness, BLE edema, impaired balance, slow/unsteady gait, decreased activity tolerance, decreased insight into deficits, and decreased safety awareness. Pt received up ad cesario (although extensively and repetitively educated not to get up without staff) wrapped in many lines/leads with poor safety awareness and ability to manage environment. Pt pleasant and agreeable to mobilize with therapy this date. Pt reported mild dizziness/wooziness which remained constant and did not increase with activity. VSS throughout session. Recommending IPR upon discharge, if d/c home, will benefit from [place] PT and increased assistance from family as he is a fall risk. Current Level of Function Impacting Discharge (mobility/balance): CGA    Other factors to consider for discharge: poor safety awareness, fall risk, indep baseline          PLAN :  Patient continues to benefit from skilled intervention to address the above impairments. Continue treatment per established plan of care. to address goals. Recommendation for discharge: (in order for the patient to meet his/her long term goals)  Recommending IPR upon discharge, if d/c home, will benefit from [place] PT and increased assistance from family as he is a fall risk. This discharge recommendation:  Has not yet been discussed the attending provider and/or case management    IF patient discharges home will need the following DME: none       SUBJECTIVE:   Patient stated [facility] far do you want me to walk?     OBJECTIVE DATA SUMMARY:   Critical Behavior:  Neurologic State: Alert  Orientation Level: Oriented X4  Cognition: Follows commands  Safety/Judgement: Decreased awareness of need for safety, Decreased insight into deficits, Decreased awareness of need for assistance, Decreased awareness of environment  Functional Mobility Training:  Bed Mobility:                    Transfers:  Sit to Stand: Stand-by assistance  Stand to Sit: Stand-by assistance                             Balance:  Sitting: Intact  Standing: Impaired; Without support  Standing - Static: Good  Standing - Dynamic : Good;Fair  Ambulation/Gait Training:  Distance (ft): 45 Feet (ft)  Assistive Device: Gait belt  Ambulation - Level of Assistance: Contact guard assistance        Gait Abnormalities: Ataxic; Altered arm swing;Trunk sway increased; Shuffling gait              Speed/Rita: Slow;Shuffled  Step Length: Right shortened;Left shortened    Activity Tolerance:   Fair    After treatment patient left in no apparent distress:   Sitting EOB with call bell in reach and RN updated     COMMUNICATION/COLLABORATION:   The patients plan of care was discussed with: Occupational therapist and Registered nurse.      Mckenna Mosqueda, PT, DPT   Time Calculation: 16 mins

## 2021-07-29 NOTE — PROGRESS NOTES
0000: Bedside and Verbal shift change report given to MEETA Langston RN (oncoming nurse) by KAIN PERLA (offgoing nurse). Report included the following information SBAR, Kardex, Intake/Output, MAR, Accordion, Recent Results, Cardiac Rhythm SB, Alarm Parameters  and Dual Neuro Assessment. 0730: Bedside and Verbal shift change report given to MEETA Vega (oncoming nurse) by Yessica Langston RN (offgoing nurse). Report included the following information SBAR, Kardex, Intake/Output, MAR, Accordion, Recent Results, Cardiac Rhythm SB, Alarm Parameters  and Dual Neuro Assessment.

## 2021-07-29 NOTE — PROGRESS NOTES
[facility]    ICU TEAM Progress Note    Name: Jane Donaldson   : 1960   MRN: [id number]   Date: 2021      I  Subjective:   Progress Note: 2021      Reason for ICU Admission: Cerebellar arteriovenous malformation, subarachnoid hemorrhage     Interval history:  70-year-old male with hypertension admitted to our hospital as a transfer from outside facility on  with subarachnoid hemorrhage  Overnight Events:   : No acute overnight events  : Better blood pressure control with adding clonidine orally, hold beta-blocker due to bradycardia [asymptomatic], off nicardipine drip. Did well with diuresis and he is negative about 1600 cc. Still positive about 19 L since admission and that is reflected in his weight as well. :No acute event, did well with gentle diuresis, -600 cc however still positive about 20 L since admission. Continue to require drips to control blood pressure to goal.      Active Problem List:     Problem List  Date Reviewed: 2021        Codes Class    Celiac artery stenosis (HCC) (Chronic) ICD-10-CM: I77.4  ICD-9-CM: 447. 4 Chronic        Vasogenic cerebral edema (HCC) ICD-10-CM: G93.6  ICD-9-CM: 348. 5 Acute        Hypertensive emergency ICD-10-CM: I16.1  ICD-9-CM: 401.9 Acute        Malignant hypertension ICD-10-CM: I10  ICD-9-CM: 401.0 Acute        * (Principal) Cerebral arteriovenous malformation (AVM) ICD-10-CM: Q28.2  ICD-9-CM: 747.81 Acute    Overview Signed 2021  7:03 AM by Srinivas Keating MD     ([id number])- CT (Brain): There is an AVM in the superior cerebellum with multiple aneurysms. One measures 4 mm. Another measures 4 mm within the superior cerebellum.              Acute hypokalemia ICD-10-CM: E87.6  ICD-9-CM: 276.8 Acute        SAH (subarachnoid hemorrhage) (HCC) ICD-10-CM: I60.9  ICD-9-CM: 430         Essential hypertension ICD-10-CM: I10  ICD-9-CM: 401.9         Type 2 diabetes mellitus without complication, without long-term current use of insulin (HCC) ICD-10-CM: E11.9  ICD-9-CM: 250.00         Arthritis of hip ICD-10-CM: M16.10  ICD-9-CM: 716.95               Past Medical History:      has a past medical history of Acute hypokalemia (7/20/2021), Celiac artery stenosis ([facility] [address] (7/21/2021), Cerebral arteriovenous malformation (AVM) (7/20/2021), Diabetes ([facility] [address] ED (erectile dysfunction), HTN (hypertension), Hypertension, Hypertensive emergency (7/21/2021), Impaired fasting glucose, Malignant hypertension (7/20/2021), and Vasogenic cerebral edema ([facility] [address] (7/21/2021). Past Surgical History:      has a past surgical history that includes hx tonsil and adenoidectomy. Home Medications:     Prior to Admission medications    Medication Sig Start Date End Date Taking? Authorizing Provider   lancets misc by Does Not Apply route. Provider, Historical   metFORMIN (GLUCOPHAGE) 500 mg tablet Take 1 Tab by mouth daily (with breakfast). 3/22/19   Kim Kelly MD   lisinopril (PRINIVIL, ZESTRIL) 40 mg tablet Take 1 Tab by mouth daily. 3/22/19   Kim Kelly MD   hydrALAZINE (APRESOLINE) 25 mg tablet Take 1 Tab by mouth three (3) times daily. 3/22/19   Kim Kelly MD   amLODIPine (NORVASC) 10 mg tablet Take 1 Tab by mouth daily. 3/22/19   Kim Kelly MD   oxyCODONE-acetaminophen (PERCOCET 7.5) 7.5-325 mg per tablet Take 1-2 Tabs by mouth every four (4) hours as needed for Pain. Max Daily Amount: 12 Tabs. 3/20/15   Kei Ravi PA   traMADol (ULTRAM) 50 mg tablet Take 1 Tab by mouth every six (6) hours as needed for Pain. Max Daily Amount: 200 mg. 3/20/15   MELANI Potter   bisacodyl (DULCOLAX, BISACODYL,) 10 mg suppository Insert 10 mg into rectum daily. 3/20/15   MELANI Potter   promethazine (PHENERGAN) 25 mg tablet Take 1 Tab by mouth every six (6) hours as needed for Nausea. 3/20/15   MELANI Potter   aspirin (ASPIRIN) 325 mg tablet Take 1 Tab by mouth two (2) times a day.  3/20/15   MELANI Potter metFORMIN (GLUCOPHAGE) 500 mg tablet Take 500 mg by mouth Daily (before breakfast). Provider, Historical   lisinopril (PRINIVIL, ZESTRIL) 40 mg tablet Take 40 mg by mouth daily (after breakfast). Provider, Historical   hydrochlorothiazide (HYDRODIURIL) 25 mg tablet Take 25 mg by mouth daily (after breakfast). Provider, Historical   amLODIPine (NORVASC) 10 mg tablet Take 10 mg by mouth daily (with breakfast). Provider, Historical   hydrALAZINE (APRESOLINE) 25 mg tablet Take 25 mg by mouth three (3) times daily. Provider, Historical       Allergies/Social/Family History:     No Known Allergies   Social History     Tobacco Use    Smoking status: Former Smoker     Packs/day: 2.00     Years: 20.00     Pack years: 40.00     Types: Cigarettes     Quit date: 2015     Years since quittin.4    Smokeless tobacco: Current User    Tobacco comment: Patient reports he is Vaping    Substance Use Topics    Alcohol use: Yes     Alcohol/week: 6.0 standard drinks     Types: 6 Cans of beer per week      Family History   Problem Relation Age of Onset    Diabetes Mother         type 2    Hypertension Mother     Heart Disease Mother         CHF    Hypertension Father     Heart Disease Father 48        MI       Review of Systems:     10 system reviewed and they are negative but as in interval history    Objective:   Vital Signs:  Visit Vitals  BP (!) 143/70   Pulse (!) 51   Temp 97.4 °F (36.3 °C)   Resp 15   Ht 6' (1.829 m)   Wt 118.8 kg (261 lb 14.5 oz)   SpO2 99%   BMI 35.52 kg/m²      O2 Device: None (Room air) Temp (24hrs), Av.4 °F (36.3 °C), Min:97 °F (36.1 °C), Max:97.6 °F (36.4 °C)           Intake/Output:     Intake/Output Summary (Last 24 hours) at 2021 9156  Last data filed at 2021 0500  Gross per 24 hour   Intake 3100 ml   Output 2025 ml   Net 1075 ml       Physical Exam:    General:   Alert, cooperative, no distress, appears stated age. Facial asymmetry with diplopia. Dysmetria LUE.                   Neck:  Symmetrical, trachea midline, no JVD or carotid bruit. Back:    No CVA tenderness to percussion. Lungs:    Clear to auscultation bilaterally. Heart:   Regular rate and rhythm. S1, S2 normal, without murmur, click, rub or gallop. Abdomen:    Normoactive bowel sounds. Soft, non-tender, no masses or organomegaly. Extremities:  Atraumatic, no cyanosis positive edema               Neurologic:  Speech normal normal strength.  Face is symmetrical on the left, occasional nystagmus, occasional deviation of left eye inward         LABS AND  DATA: Personally reviewed  Recent Labs     07/29/21  0658 07/28/21  0422   WBC 12.0* 12.5*   HGB 14.3 13.7   HCT 42.1 39.3    267     Recent Labs     07/29/21 0658 07/28/21 0422   * 138   K 4.4 4.3    107   CO2 25 23   BUN 20 19   CREA 0.87 0.73   * 131*   CA 8.5 7.9*   MG 2.4 2.4   PHOS 3.7 3.9     No results for input(s): AP, TBIL, TP, ALB, GLOB, AML, LPSE in the last 72 hours. No lab exists for component: SGOT, GPT, AMYP  No results for input(s): INR, PTP, APTT, INREXT, INREXT in the last 72 hours. No results for input(s): PHI, PCO2I, PO2I, FIO2I in the last 72 hours. No results for input(s): CPK, CKMB, TROIQ, BNPP in the last 72 hours.     Hemodynamics:   PAP:   CO:     Wedge:   CI:     CVP:    SVR:       PVR:       Ventilator Settings:  Mode Rate Tidal Volume Pressure FiO2 PEEP                    Peak airway pressure:      Minute ventilation:          MEDS: Reviewed    Chest X-Ray:  CXR Results  (Last 48 hours)    None            Assessment and Plan:   -Arteriovenous malformation left superior cerebellar peduncle:  -Acute subarachnoid hemorrhage:  -Difficult to control hypertension:        -Appreciate neurology recommendation management:              -[facility] systolic blood pressure below 140              -Day 10/21 of nimodipine              -Maintain euvolemia  -Blood pressure better controlled after adding oral clonidine. Place metoprolol               on hold due to bradycardia  -Prophylactic Keppra, Daily TCD's  -On multiple antihypertensive medication, adding hydrochlorothiazide to amlodipine, lisinopril, clonidine and hydralazine.     DVT GI prophylaxis.  Neurochecks as ordered by neurology. DISPOSITION  Stay in ICU    CRITICAL CARE CONSULTANT NOTE  I had a face to face encounter with the patient, reviewed and interpreted patient data including clinical events, labs, images, vital signs, I/O's, and examined patient. I have discussed the case and the plan and management of the patient's care with the consulting services, the bedside nurses and the respiratory therapist.      NOTE OF PERSONAL INVOLVEMENT IN CARE   This patient has a high probability of imminent, clinically significant deterioration, which requires the highest level of preparedness to intervene urgently. I participated in the decision-making and personally managed or directed the management of the following life and organ supporting interventions that required my frequent assessment to treat or prevent imminent deterioration. I personally spent 30 minutes of critical care time. This is time spent at this critically ill patient's bedside actively involved in patient care as well as the coordination of care and discussions with the patient's family. This does not include any procedural time which has been billed separately.     [facility] Critical Care  7/29/2021

## 2021-07-29 NOTE — PROGRESS NOTES
Neurointerventional Surgery Progress Note  [facility]  [facility]      Admit Date: 7/20/2021   LOS: 9 days        Daily Progress Note: 7/29/2021    S/P: POD 9 status post diagnostic cerebral angiogram      Subjective:     No acute events overnight. Doing well today. Continuing with gentle hydration. Ultrasound of IVC still shows >50% collapse this morning. I/O shows +825 over the past 24 hours and NICOM will be done this afternoon.        Current Facility-Administered Medications   Medication Dose Route Frequency Provider Last Rate Last Admin    hydroCHLOROthiazide (HYDRODIURIL) tablet 25 mg  25 mg Oral DAILY Vickey Horvath MD   25 mg at 07/29/21 0829    cloNIDine HCL (CATAPRES) tablet 0.2 mg  0.2 mg Oral Q8H Vickey Horvath MD   0.2 mg at 07/29/21 0659    [Held by provider] metoprolol tartrate (LOPRESSOR) tablet 100 mg  100 mg Oral Q12H Vickey Horvath MD   100 mg at 07/27/21 2156    0.9% sodium chloride infusion  75 mL/hr IntraVENous CONTINUOUS Dewanda Bryant ROWE NP 75 mL/hr at 07/29/21 1103 75 mL/hr at 07/29/21 1103    dexamethasone (DECADRON) 4 mg/mL injection 4 mg  4 mg IntraVENous Q12H Mart Carver NP   4 mg at 07/29/21 0829    hydrALAZINE (APRESOLINE) tablet 100 mg  100 mg Oral TID Herberth Morales DO   100 mg at 07/29/21 0829    senna-docusate (PERICOLACE) 8.6-50 mg per tablet 1 Tablet  1 Tablet Oral DAILY Modesto Grant NP   1 Tablet at 07/29/21 0830    polyethylene glycol (MIRALAX) packet 17 g  17 g Oral DAILY PRN Modesto Grant NP   17 g at 07/25/21 0906    bisacodyL (DULCOLAX) tablet 10 mg  10 mg Oral DAILY PRN Modesto Grant NP        labetaloL (NORMODYNE;TRANDATE) injection 10 mg  10 mg IntraVENous Q4H PRN Modesto Grant NP   10 mg at 07/24/21 1655    niCARdipine (CARDENE) 25 mg in 0.9% sodium chloride 250 mL infusion  0-15 mg/hr IntraVENous TITRATE Modesto Grant NP   Stopped at 07/27/21 9616    amLODIPine (NORVASC) tablet 10 mg 10 mg Oral DAILY Herberth Morales DO   10 mg at 21 0829    hydrALAZINE (APRESOLINE) 20 mg/mL injection 20 mg  20 mg IntraVENous Q6H PRN Herberth Morales DO   20 mg at 21 0238    labetaloL (NORMODYNE;TRANDATE) 300 mg in 0.9% sodium chloride 150 mL (2 mg / 1 mL) infusion  0.5-2 mg/min IntraVENous TITRATE ANGELINE Mortensen   Stopped at 21 1926    insulin lispro (HUMALOG) injection   SubCUTAneous AC&HS Ruthy Godoy NP   2 Units at 21 1108    glucose chewable tablet 16 g  4 Tablet Oral PRN Ruthy Godoy NP        dextrose (D50W) injection syrg 12.5-25 g  12.5-25 g IntraVENous PRN Ruthy Godoy NP        glucagon (GLUCAGEN) injection 1 mg  1 mg IntraMUSCular PRN Ruthy Godoy NP        ondansetron [facility]) injection 4 mg  4 mg IntraVENous Q6H PRN ANGELINE Mortensen        naloxone [facility]) injection 0.4 mg  0.4 mg IntraVENous PRN ANGELINE Mortensen        acetaminophen (TYLENOL) tablet 650 mg  650 mg Oral Q4H PRN ANGELINE Mortensen   650 mg at 21    Or    acetaminophen (TYLENOL) solution 650 mg  650 mg Per NG tube Q4H PRN ANGELINE Mortensen        Or    acetaminophen (TYLENOL) suppository 650 mg  650 mg Rectal Q4H PRN ANGELINE Mortensen        glucose chewable tablet 16 g  4 Tablet Oral PRN ANGELINE Mortensen        niMODipine (NIMOTOP) capsule 60 mg  60 mg Oral Q4H Gabbi Gallegos NP   60 mg at 21 1102    levETIRAcetam (KEPPRA) tablet 500 mg  500 mg Oral BID Tayla Gallegos NP   500 mg at 21 0077    lisinopriL (PRINIVIL, ZESTRIL) tablet 40 mg  40 mg Oral DAILY José Antonio Williamson NP   40 mg at 21 0830        No Known Allergies    Review of Systems:  Pertinent items are noted in the History of Present Illness.     Objective:     Vital signs  Temp (24hrs), Av.4 °F (36.3 °C), Min:97 °F (36.1 °C), Max:97.6 °F (36.4 °C)   701 - 1900  In: 300 [I.V.:300]  Out: -   1901 - 700  In: 9467 [P.O.:1600; I.V.:2450]  Out: 3000 [Urine:3000]         Vitals:    07/29/21 0800 07/29/21 0829 07/29/21 0900 07/29/21 1102   BP: 132/76 132/76 (!) 143/70 (!) 104/45   Pulse: (!) 41 (!) 53 (!) 51    Resp: 14  15    Temp: 97.4 °F (36.3 °C)      SpO2: 99%      Weight:       Height:          HPI: Ramona Patel is a 60-year-old male with a past medical history significant for hypertension and diabetes type 2 who initially presented to [facility] ED on 7/19/2021 for acute onset of dizziness, headache, left-sided hearing loss, and left-sided weakness. Upon arrival to the ED his blood pressure was noted to be 265/120 per review of notes. A code stroke was called and CT of the head showed an acute subarachnoid hemorrhage anterior to the sarahi. CTA of the head and neck showed an AVM in the left superior cerebellar peduncle, with the dominant drainage into the vein of Gaston. No large vessel occlusion, no hemodynamically significant stenosis. Redemonstrated subarachnoid hemorrhage in the basilar cisterns. He was started on a Cardene drip and was also given IV hydralazine and labetalol. He was then transferred to [address] [place] [address] for a higher level of care. NIS was consulted and patient underwent a diagnostic cerebral angiogram on 7/20/2021. The angiogram showed evidence of a cerebral AVM with multiple feeders coming of the left PCA, left anterior choroidal and left SCA with a large single draining vein that drains into the vein of Villanueva. 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 Physical Exam:  GENERAL: alert, cooperative, no distress, appears stated age  LUNG: clear to auscultation bilaterally  HEART: regular rate and rhythm, S1, S2 normal, no murmur, click, rub or gallop  EXTREMITIES:  extremities normal, atraumatic, no cyanosis, nonpitting peripheral edema, Posterior tibial pulses +1 bilaterally, +2 pedal pulses bilaterally   SKIN: Skin cool to touch. Appropriate for ethnicity.  Right groin site clean, dry, and intact. No hematoma, bruising, or bleeding noted. Neurologic Exam:  Mental Status:  Alert and oriented x 4. Appropriate affect, mood and behavior. Language:    Normal fluency, repetition, comprehension and naming. Cranial Nerves:        Pupils equal, round and reactive to light. Visual fields full to confrontation. Dysconjugate gaze intermittently with EOMs, otherwise intact. Mild subtle nystagmus when looking to the left, but it has improved. Decreased sensation on the left side of face. Left facial asymmetry     Tongue protrudes to midline, palate elevates symmetrically. No dysarthria. Motor:    + pronator drift on the left upper extremity     Bulk and tone normal.      5/5 power in all extremities proximally and distally. No involuntary movements. Sensation:    Decreased sensation on the left side of face, otherwise sensation intact to light touch. No neglect. Coordination & Gait: Some dysmetria noted in RUE and DONALE with FTN (right worse than left, but it has improved). HTS intact bilaterally. Gait deferred.      24 hour results:    Recent Results (from the past 24 hour(s))   GLUCOSE, POC    Collection Time: 07/28/21  4:14 PM   Result Value Ref Range    Glucose (POC) 134 (H) 65 - 117 mg/dL    Performed by Mckenzie Acevedo    GLUCOSE, POC    Collection Time: 07/28/21 11:23 PM   Result Value Ref Range    Glucose (POC) 132 (H) 65 - 117 mg/dL    Performed by Héctor Sanford    MAGNESIUM    Collection Time: 07/29/21  6:58 AM   Result Value Ref Range    Magnesium 2.4 1.6 - 2.4 mg/dL   PHOSPHORUS    Collection Time: 07/29/21  6:58 AM   Result Value Ref Range    Phosphorus 3.7 2.6 - 4.7 MG/DL   CBC W/O DIFF    Collection Time: 07/29/21  6:58 AM   Result Value Ref Range    WBC 12.0 (H) 4.1 - 11.1 K/uL    RBC 5.04 4.10 - 5.70 M/uL    HGB 14.3 12.1 - 17.0 g/dL    HCT 42.1 36.6 - 50.3 %    MCV 83.5 80.0 - 99.0 FL    MCH 28.4 26.0 - 34.0 PG    MCHC 34.0 30.0 - 36.5 g/dL    RDW 13.7 11.5 - 14.5 %    PLATELET 020 180 - 374 K/uL    MPV 10.3 8.9 - 12.9 FL    NRBC 0.0 0  WBC    ABSOLUTE NRBC 0.00 0.00 - 7.74 K/uL   METABOLIC PANEL, BASIC    Collection Time: 07/29/21  6:58 AM   Result Value Ref Range    Sodium 135 (L) 136 - 145 mmol/L    Potassium 4.4 3.5 - 5.1 mmol/L    Chloride 104 97 - 108 mmol/L    CO2 25 21 - 32 mmol/L    Anion gap 6 5 - 15 mmol/L    Glucose 115 (H) 65 - 100 mg/dL    BUN 20 6 - 20 MG/DL    Creatinine 0.87 0.70 - 1.30 MG/DL    BUN/Creatinine ratio 23 (H) 12 - 20      GFR est AA >60 >60 ml/min/1.73m2    GFR est non-AA >60 >60 ml/min/1.73m2    Calcium 8.5 8.5 - 10.1 MG/DL   GLUCOSE, POC    Collection Time: 07/29/21  6:58 AM   Result Value Ref Range    Glucose (POC) 108 65 - 117 mg/dL    Performed by [id number], POC    Collection Time: 07/29/21 11:04 AM   Result Value Ref Range    Glucose (POC) 149 (H) 65 - 117 mg/dL    Performed by Martin Smith    TCD INTRACRANIAL ARTERIES COMPLETE    Collection Time: 07/29/21 11:16 AM   Result Value Ref Range    Right PCA 1 PSV 77.7 cm/s    Right PCA 1 EDV 32.6 cm/s    Right Vertebral PSV 86.3 cm/s    Right Vertebral EDV 32.5 cm/s    Left Vertebral PSV 86.3 cm/s    Left Vertebral EDV 27.0 cm/s    Basilar Artery EDV 31 cm/s    Right ICA dist sys 69.0 cm/s    Right ICA dist mabry 18.5 cm/s    Left ICA dist sys 60.2 cm/s    Left ICA dist mabry 16.4 cm/s    Right PCA 1 Mean Velocity 48 cm/s    Right External ICA PSV 69 cm/s    Right External ICA EDV 19 cm/s    Right External ICA Mean Velocity 36 cm/s    Left External ICA PSV 60 cm/s    Left External ICA EDV 16 cm/s    Left External ICA Mean Velocity 31 cm/s    Left PCA 1 PSV 67 cm/s    Left PCA 1 EDV 31 cm/s    Left PCA 1 Mean Velocity 43 cm/s    Basilar Artery PSV 78 cm/s    Basilar Artery Mean Farhat 47 cm/s    Right Vertebral Mean Velocity 51 cm/s    Left Vertebral Mean Velocity 47 cm/s          Imaging:  CT of head on 7/19/2021 at 2104 shows  IMPRESSION  1. Acute subarachnoid hemorrhage anterior to the sarahi. CTA of the head and neck on 7/19/2021 at 2126 shows  IMPRESSION     1. Arteriovenous malformation in the left superior cerebellar peduncle, with the  dominant drainage into the vein of Gaston. 2. No large vessel occlusion, no hemodynamically significant stenosis. 3. Redemonstrated subarachnoid hemorrhage in the basilar cisterns. CT the head on 7/20/2021 at 1129 shows  IMPRESSION  1. Stable ventricular size.     2.  Questionable diffuse cerebral edema.     3.  Subarachnoid hemorrhage in the prepontine cistern and ambient cisterns as  well as sylvian fissures are decreased from the prior study. MRI brain with and without 7/20/2021 at 2214 shows  IMPRESSION  1. Tiny 1 mm foci of diffusion restriction in the left cerebellum and left  cerebral hemisphere as described likely embolic in origin.     2.  Possible mild meningeal enhancement which may be secondary to meningeal  irritation from known subarachnoid hemorrhage.     3.  Dilated superior ophthalmic veins are noted which is new when compared to  prior imaging. This is of uncertain clinical significance. There appears to be  normal enhancement of the cavernous sinus bilaterally. CT of head on 7/23/2021 at 2233 shows     IMPRESSION  No hydrocephalus. Stable left prepontine subarachnoid hemorrhage. CT of Head on 7/24/2021 at 1357  IMPRESSION:   Left prepontine hemorrhage is slightly increased. CT of the head on 7/24/2021 at 10:21 PM shows  IMPRESSION  Stable left prepontine hemorrhage. Assessment:     Principal Problem:    Cerebral arteriovenous malformation (AVM) (7/20/2021)      Overview: ([id number])- CT (Brain): There is an AVM in the superior cerebellum with multiple aneurysms. One measures 4 mm. Another measures 4 mm within the superior cerebellum.     Active Problems:    Malignant hypertension (7/20/2021)      Acute hypokalemia (7/20/2021) Celiac artery stenosis ([facility] [address] (7/21/2021)      Vasogenic cerebral edema ([facility] [address] (7/21/2021)      Hypertensive emergency (7/21/2021)      SAH (subarachnoid hemorrhage) ([facility] [address] (7/20/2021)        Plan:   Acute SAH in the setting of cerebellar AVM with associated flow related aneurysm, [place] 1, Mistry Grade 2  - s/p diagnostic cerebral angiogram on 7/20/2021  - Day 9 of 21 of Nimotop for the prevention of delayed cerebral ischemia  - continue Keppra 500 mg BID for seizure ppx  - patient is being diuresed by intensivist, fluids stopped. -2L today   - Repeat head CT last night showed stable left prepontine hemorrhage  - Strict I's and O's, patient +1244 mls on  I's and O's currently  - ECHO shows EF 65-70%, dilated left atrium. No shunting seen. Mild (grade 1) left ventricular diastolic dysfunction. - continue every hour neuro checks  - SBP goal <140  - PT/OT/SLP evals   - assess NICOM PRN for fluid responsiveness   - daily TCDs, CTA head/neck done today which shows no significant vasospasm    HTN  - SBP goal <140 as stated above  - Cardene/Labetalol drip PRN  - patient on multiple oral antihypertensives (lisinopril, metoprolol, hydralazine, amlodipine, and clonidine)  - Patient had a bilateral artery/venous renal duplex on 7/22 which shows no evidence of significant renal   artery stenosis. - Intensivist following    Constipation (improved)  - continue Rissa-colace daily  - Miralax PRN daily  - Dulcolax 10 mg daily PRN      Activity: Up with assistance, ambulate today  DVT ppx: SCDs  Dispo: TBD    Plan d/w Dr. Kia Delcid, KAIN, and patient.        Solitario Martinez, ECTOR

## 2021-07-29 NOTE — PROGRESS NOTES
Occupational Therapy 1503 -   [phone]    Chart reviewed in prep for OT treatment, RN cleared patient to be seen. Received in bed and initially agreeable to therapy - reporting he had completed ADLs but agreeable to therapeutic exercise. Therapist left to gather exercise equipment and upon return, patient declining participation. Session deferred. Will f/u later as able and appropriate. Thank you. Matthew Hillman MS, OTR/L

## 2021-07-29 NOTE — PROGRESS NOTES
0730: Bedside and Verbal shift change report given to Clary Clemente RN (oncoming nurse) by Naomi Pfeiffer RN (offgoing nurse). Report included the following information SBAR, Kardex, Intake/Output, MAR, Accordion, Recent Results, Med Rec Status, Cardiac Rhythm Sinus [place], Alarm Parameters  and Dual Neuro Assessment. 1400: NICOM assessed at bedside and shows 42.9 % change in SVI with PLR indicating patient is fluid responsive. 1930: Bedside and Verbal shift change report given to Siri Moya RN (oncoming nurse) by Clary Clemente RN (offgoing nurse). Report included the following information SBAR, Kardex, Intake/Output, MAR, Accordion, Recent Results, Med Rec Status, Cardiac Rhythm NSR, Alarm Parameters  and Dual Neuro Assessment.

## 2021-07-30 ENCOUNTER — APPOINTMENT (OUTPATIENT)
Dept: MRI IMAGING | Age: 61
DRG: 064 | End: 2021-07-30
Attending: NURSE PRACTITIONER
Payer: COMMERCIAL

## 2021-07-30 ENCOUNTER — APPOINTMENT (OUTPATIENT)
Dept: VASCULAR SURGERY | Age: 61
DRG: 064 | End: 2021-07-30
Attending: NURSE PRACTITIONER
Payer: COMMERCIAL

## 2021-07-30 LAB
ANION GAP SERPL CALC-SCNC: 5 MMOL/L (ref 5–15)
BASILAR ARTERY EDV: 31 CM/S
BASILAR ARTERY MEAN VEL: 47 CM/S
BASILAR ARTERY PSV: 78 CM/S
BUN SERPL-MCNC: 18 MG/DL (ref 6–20)
BUN/CREAT SERPL: 21 (ref 12–20)
CALCIUM SERPL-MCNC: 8.5 MG/DL (ref 8.5–10.1)
CHLORIDE SERPL-SCNC: 101 MMOL/L (ref 97–108)
CO2 SERPL-SCNC: 28 MMOL/L (ref 21–32)
CREAT SERPL-MCNC: 0.84 MG/DL (ref 0.7–1.3)
ERYTHROCYTE [DISTWIDTH] IN BLOOD BY AUTOMATED COUNT: 13.9 % (ref 11.5–14.5)
GLUCOSE BLD STRIP.AUTO-MCNC: 122 MG/DL (ref 65–117)
GLUCOSE BLD STRIP.AUTO-MCNC: 123 MG/DL (ref 65–117)
GLUCOSE BLD STRIP.AUTO-MCNC: 126 MG/DL (ref 65–117)
GLUCOSE BLD STRIP.AUTO-MCNC: 148 MG/DL (ref 65–117)
GLUCOSE SERPL-MCNC: 125 MG/DL (ref 65–100)
HCT VFR BLD AUTO: 43.3 % (ref 36.6–50.3)
HGB BLD-MCNC: 14.2 G/DL (ref 12.1–17)
LEFT EX ICA EDV: 16 CM/S
LEFT EX ICA MEAN VEL: 31 CM/S
LEFT EX ICA PSV: 60 CM/S
LEFT ICA DIST DIAS: 16.4 CM/S
LEFT ICA DIST SYS: 60.2 CM/S
LEFT PCA 1 EDV: 31 CM/S
LEFT PCA 1 MEAN VEL: 43 CM/S
LEFT PCA 1 PSV: 67 CM/S
LEFT VERTEBRAL EDV TCD: 27 CM/S
LEFT VERTEBRAL MEAN VEL: 47 CM/S
LEFT VERTEBRAL PSV TCD: 86.3 CM/S
MAGNESIUM SERPL-MCNC: 2.4 MG/DL (ref 1.6–2.4)
MCH RBC QN AUTO: 28.3 PG (ref 26–34)
MCHC RBC AUTO-ENTMCNC: 32.8 G/DL (ref 30–36.5)
MCV RBC AUTO: 86.3 FL (ref 80–99)
NRBC # BLD: 0 K/UL (ref 0–0.01)
NRBC BLD-RTO: 0 PER 100 WBC
PHOSPHATE SERPL-MCNC: 3.4 MG/DL (ref 2.6–4.7)
PLATELET # BLD AUTO: 232 K/UL (ref 150–400)
PMV BLD AUTO: 10.4 FL (ref 8.9–12.9)
POTASSIUM SERPL-SCNC: 4 MMOL/L (ref 3.5–5.1)
RBC # BLD AUTO: 5.02 M/UL (ref 4.1–5.7)
RIGHT EX ICA EDV: 19 CM/S
RIGHT EX ICA MEAN VEL: 36 CM/S
RIGHT EX ICA PSV: 69 CM/S
RIGHT ICA DIST DIAS: 18.5 CM/S
RIGHT ICA DIST SYS: 69 CM/S
RIGHT PCA 1 EDV: 32.6 CM/S
RIGHT PCA 1 MEAN VEL: 48 CM/S
RIGHT PCA 1 PSV: 77.7 CM/S
RIGHT VERTEBRAL EDV TCD: 32.5 CM/S
RIGHT VERTEBRAL MEAN VEL: 51 CM/S
RIGHT VERTEBRAL PSV TCD: 86.3 CM/S
SERVICE CMNT-IMP: ABNORMAL
SODIUM SERPL-SCNC: 134 MMOL/L (ref 136–145)
WBC # BLD AUTO: 10.1 K/UL (ref 4.1–11.1)

## 2021-07-30 PROCEDURE — 74011250637 HC RX REV CODE- 250/637: Performed by: NURSE PRACTITIONER

## 2021-07-30 PROCEDURE — 70547 MR ANGIOGRAPHY NECK W/O DYE: CPT

## 2021-07-30 PROCEDURE — 74011250637 HC RX REV CODE- 250/637: Performed by: ANESTHESIOLOGY

## 2021-07-30 PROCEDURE — 70544 MR ANGIOGRAPHY HEAD W/O DYE: CPT

## 2021-07-30 PROCEDURE — 85027 COMPLETE CBC AUTOMATED: CPT

## 2021-07-30 PROCEDURE — 83735 ASSAY OF MAGNESIUM: CPT

## 2021-07-30 PROCEDURE — 65610000006 HC RM INTENSIVE CARE

## 2021-07-30 PROCEDURE — 84100 ASSAY OF PHOSPHORUS: CPT

## 2021-07-30 PROCEDURE — 80048 BASIC METABOLIC PNL TOTAL CA: CPT

## 2021-07-30 PROCEDURE — 74011250637 HC RX REV CODE- 250/637: Performed by: INTERNAL MEDICINE

## 2021-07-30 PROCEDURE — 74011250636 HC RX REV CODE- 250/636: Performed by: ANESTHESIOLOGY

## 2021-07-30 PROCEDURE — 82962 GLUCOSE BLOOD TEST: CPT

## 2021-07-30 PROCEDURE — 74011250637 HC RX REV CODE- 250/637: Performed by: HEALTH CARE PROVIDER

## 2021-07-30 PROCEDURE — 93886 INTRACRANIAL COMPLETE STUDY: CPT

## 2021-07-30 PROCEDURE — 70551 MRI BRAIN STEM W/O DYE: CPT

## 2021-07-30 PROCEDURE — 74011250636 HC RX REV CODE- 250/636: Performed by: NURSE PRACTITIONER

## 2021-07-30 PROCEDURE — 99232 SBSQ HOSP IP/OBS MODERATE 35: CPT | Performed by: STUDENT IN AN ORGANIZED HEALTH CARE EDUCATION/TRAINING PROGRAM

## 2021-07-30 PROCEDURE — 36415 COLL VENOUS BLD VENIPUNCTURE: CPT

## 2021-07-30 RX ORDER — ALPRAZOLAM 0.5 MG/1
0.5 TABLET ORAL ONCE
Status: COMPLETED | OUTPATIENT
Start: 2021-07-30 | End: 2021-07-30

## 2021-07-30 RX ADMIN — NIMODIPINE 60 MG: 30 CAPSULE, LIQUID FILLED ORAL at 18:08

## 2021-07-30 RX ADMIN — NIMODIPINE 60 MG: 30 CAPSULE, LIQUID FILLED ORAL at 15:20

## 2021-07-30 RX ADMIN — DEXAMETHASONE SODIUM PHOSPHATE 4 MG: 4 INJECTION, SOLUTION INTRAMUSCULAR; INTRAVENOUS at 21:34

## 2021-07-30 RX ADMIN — NIMODIPINE 60 MG: 30 CAPSULE, LIQUID FILLED ORAL at 03:00

## 2021-07-30 RX ADMIN — SODIUM CHLORIDE 100 ML/HR: 9 INJECTION, SOLUTION INTRAVENOUS at 19:26

## 2021-07-30 RX ADMIN — LISINOPRIL 40 MG: 20 TABLET ORAL at 08:55

## 2021-07-30 RX ADMIN — LABETALOL HYDROCHLORIDE 10 MG: 5 INJECTION INTRAVENOUS at 05:21

## 2021-07-30 RX ADMIN — HYDRALAZINE HYDROCHLORIDE 20 MG: 20 INJECTION INTRAMUSCULAR; INTRAVENOUS at 04:08

## 2021-07-30 RX ADMIN — LEVETIRACETAM 500 MG: 500 TABLET ORAL at 08:56

## 2021-07-30 RX ADMIN — CLONIDINE HYDROCHLORIDE 0.3 MG: 0.2 TABLET ORAL at 13:25

## 2021-07-30 RX ADMIN — SODIUM CHLORIDE 100 ML/HR: 9 INJECTION, SOLUTION INTRAVENOUS at 08:07

## 2021-07-30 RX ADMIN — DEXAMETHASONE SODIUM PHOSPHATE 4 MG: 4 INJECTION, SOLUTION INTRAMUSCULAR; INTRAVENOUS at 08:55

## 2021-07-30 RX ADMIN — NIMODIPINE 60 MG: 30 CAPSULE, LIQUID FILLED ORAL at 07:06

## 2021-07-30 RX ADMIN — NIMODIPINE 60 MG: 30 CAPSULE, LIQUID FILLED ORAL at 11:17

## 2021-07-30 RX ADMIN — DOCUSATE SODIUM 50 MG AND SENNOSIDES 8.6 MG 1 TABLET: 8.6; 5 TABLET, FILM COATED ORAL at 08:55

## 2021-07-30 RX ADMIN — HYDRALAZINE HYDROCHLORIDE 100 MG: 50 TABLET, FILM COATED ORAL at 15:21

## 2021-07-30 RX ADMIN — CLONIDINE HYDROCHLORIDE 0.3 MG: 0.2 TABLET ORAL at 06:10

## 2021-07-30 RX ADMIN — HYDRALAZINE HYDROCHLORIDE 100 MG: 50 TABLET, FILM COATED ORAL at 21:34

## 2021-07-30 RX ADMIN — HYDROCHLOROTHIAZIDE 25 MG: 25 TABLET ORAL at 08:55

## 2021-07-30 RX ADMIN — NIMODIPINE 60 MG: 30 CAPSULE, LIQUID FILLED ORAL at 23:04

## 2021-07-30 RX ADMIN — HYDRALAZINE HYDROCHLORIDE 100 MG: 50 TABLET, FILM COATED ORAL at 08:56

## 2021-07-30 RX ADMIN — LEVETIRACETAM 500 MG: 500 TABLET ORAL at 18:08

## 2021-07-30 RX ADMIN — ALPRAZOLAM 0.5 MG: 0.5 TABLET ORAL at 14:02

## 2021-07-30 RX ADMIN — AMLODIPINE BESYLATE 10 MG: 5 TABLET ORAL at 08:56

## 2021-07-30 RX ADMIN — CLONIDINE HYDROCHLORIDE 0.3 MG: 0.2 TABLET ORAL at 21:34

## 2021-07-30 NOTE — PROGRESS NOTES
Neurocritical Care Brief Progress Note:    57-year-old male with SAH with cerebellar AVM with multiple feeders coming off the left PCA, left anterior choroidal and left SCA with a large single draining vein that drains into the vein of Gaston. 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 CTA H/N 7/28 stable, no evidence of significant vasospasm. Physical Exam:  Gen: NAD, calm, cooperative. Has eyepatch over left eye. Neuro: A&Ox4. Follows commands. Speech clear. Affect normal. PERRL, 3 mm bilaterally. Visual fields full to confrontation. However, has stated double vision at a distance. Mild disconjugate gaze present at times. EOMI. Left end-gaze nystagmus not noted tonight. Significant left lower facial droop with stated left facial numbness noted on light touch. Maryln Solar Beni spontaneously. Strength 5/5 in UE and LE BL. Negative drift. Bulk and tone normal. No involuntary movements. Gait deferred. Again no ataxia not noted on my exam.   Skin: Warm, dry, color appropriate for ethnicity. PLAN:Continue current plan per NIS. Being mildly diuresed by Intensivist. -1084 on I/O. On IVF 0.9% /hr.      Zack Bloch, NP  Neurocritical Care Nurse Practitioner  [phone]

## 2021-07-30 NOTE — PROGRESS NOTES
Patient will be day 13-14 LOS in ICU post [facility] on Monday, August 2nd and can be transferred to neuroscience telemetry unit if remains stable. Will then also dc daily TCD's. MRI brain and MRA h/n are stable.  Discussed with Dr. Kathy Wray and ICU team

## 2021-07-30 NOTE — PROGRESS NOTES
Neurointerventional Surgery Progress Note  Edwina Joseph [facility]  Neurocritical Care NP      Admit Date: 7/20/2021   LOS: 10 days        Daily Progress Note: 7/30/2021    S/P: POD 10 status post diagnostic cerebral angiogram      Subjective:     No acute events overnight. Doing well today. Continuing with gentle hydration. Ultrasound of IVC still shows >50% collapse this morning. I/O shows +17,207.9 since admission and NICOM as needed for fluid responsiveness. Off Cardene drip. Remains on multiple antihypertensives. Planned to initiate Prinzide (HCTZ and Lisinopril so patient will have one less medication to worry about) discussed with intensivist Dr. Cecelia Palacios. However, the hospital doesn't carry the formulary per pharmacy. Patient with slight worsening speech abnormalities and left facial numbness extended to the frontal head. CTA obtained 7/28/2021 showed hypoattenuation in the left sarahi and cerebellar likely due to acute ischemia. Will obtain MRI head without contrast and MRA head and neck.   Discussed with Dr. Keely Ram Facility-Administered Medications   Medication Dose Route Frequency Provider Last Rate Last Admin    cloNIDine HCL (CATAPRES) tablet 0.3 mg  0.3 mg Oral Q8H Madelyn Hurtado DO   0.3 mg at 07/30/21 0610    hydroCHLOROthiazide (HYDRODIURIL) tablet 25 mg  25 mg Oral DAILY Vickey Horvath MD   25 mg at 07/29/21 0829    [Held by provider] metoprolol tartrate (LOPRESSOR) tablet 100 mg  100 mg Oral Q12H Vickey Horvath MD   100 mg at 07/27/21 2156    0.9% sodium chloride infusion  100 mL/hr IntraVENous CONTINUOUS Fredna Sandra ROWE  mL/hr at 07/29/21 2231 100 mL/hr at 07/29/21 2231    dexamethasone (DECADRON) 4 mg/mL injection 4 mg  4 mg IntraVENous Q12H Yohana Santiago NP   4 mg at 07/29/21 2009    hydrALAZINE (APRESOLINE) tablet 100 mg  100 mg Oral TID Herberth Morales DO   100 mg at 07/29/21 2110    senna-docusate (PERICOLACE) 8.6-50 mg per tablet 1 Tablet 1 Tablet Oral DAILY Aarti Grant NP   1 Tablet at 07/29/21 0830    polyethylene glycol (MIRALAX) packet 17 g  17 g Oral DAILY PRN Aarti Grant NP   17 g at 07/25/21 0906    bisacodyL (DULCOLAX) tablet 10 mg  10 mg Oral DAILY PRN Aarti Grant NP        labetaloL (NORMODYNE;TRANDATE) injection 10 mg  10 mg IntraVENous Q4H PRN Aarti Grant NP   10 mg at 07/30/21 0521    niCARdipine (CARDENE) 25 mg in 0.9% sodium chloride 250 mL infusion  0-15 mg/hr IntraVENous TITRATE Aarti Grant NP   Stopped at 07/27/21 2338    amLODIPine (NORVASC) tablet 10 mg  10 mg Oral DAILY Herberth Morales DO   10 mg at 07/29/21 0829    hydrALAZINE (APRESOLINE) 20 mg/mL injection 20 mg  20 mg IntraVENous Q6H PRN Herberth Morales DO   20 mg at 07/30/21 0408    insulin lispro (HUMALOG) injection   SubCUTAneous AC&HS Leora Madison NP   2 Units at 07/29/21 1700    glucose chewable tablet 16 g  4 Tablet Oral PRN Leora Madison NP        dextrose (D50W) injection syrg 12.5-25 g  12.5-25 g IntraVENous PRN Leora Madison NP        glucagon (GLUCAGEN) injection 1 mg  1 mg IntraMUSCular PRN Leora Madison NP        ondansetron [facility]) injection 4 mg  4 mg IntraVENous Q6H PRN ANGELINE Agudelo        naloxone [facility]) injection 0.4 mg  0.4 mg IntraVENous PRN ANGELINE Agudelo        acetaminophen (TYLENOL) tablet 650 mg  650 mg Oral Q4H PRN ANGELINE Agudelo   650 mg at 07/24/21 2055    Or    acetaminophen (TYLENOL) solution 650 mg  650 mg Per NG tube Q4H PRN ANGELINE Agudelo        Or    acetaminophen (TYLENOL) suppository 650 mg  650 mg Rectal Q4H PRN Earney Birkenhead, NP-C        niMODipine (NIMOTOP) capsule 60 mg  60 mg Oral Q4H Shoshana Gallegos NP   60 mg at 07/30/21 0706    levETIRAcetam (KEPPRA) tablet 500 mg  500 mg Oral BID Shoshana Gallegos NP   500 mg at 07/29/21 1814    lisinopriL (PRINIVIL, ZESTRIL) tablet 40 mg  40 mg Oral DAILY Jordan Jackson NP   40 mg at 21 0830        No Known Allergies    Review of Systems:  Pertinent items are noted in the History of Present Illness. Objective:     Vital signs  Temp (24hrs), Av.8 °F (36.6 °C), Min:97.1 °F (36.2 °C), Max:98.7 °F (37.1 °C)   No intake/output data recorded.  1901 -  0700  In: 3614.6 [P.O.:800; I.V.:2814.6]  Out: 5450 [OWXSJ:[id number]]         Vitals:    21 0500 21 0530 21 0610 21 0705   BP: (!) 149/78 134/64 (!) 143/69 136/72   Pulse: 64 63 61 (!) 48   Resp: 16 17 16 15   Temp:       SpO2:       Weight:       Height:          HPI: Mirza Gamboa is a 57-year-old male with a past medical history significant for hypertension and diabetes type 2 who initially presented to [facility] ED on 2021 for acute onset of dizziness, headache, left-sided hearing loss, and left-sided weakness. Upon arrival to the ED his blood pressure was noted to be 265/120 per review of notes. A code stroke was called and CT of the head showed an acute subarachnoid hemorrhage anterior to the sarahi. CTA of the head and neck showed an AVM in the left superior cerebellar peduncle, with the dominant drainage into the vein of Gaston. No large vessel occlusion, no hemodynamically significant stenosis. Redemonstrated subarachnoid hemorrhage in the basilar cisterns. He was started on a Cardene drip and was also given IV hydralazine and labetalol. He was then transferred to [facility] for a higher level of care. NIS was consulted and patient underwent a diagnostic cerebral angiogram on 2021. The angiogram showed evidence of a cerebral AVM with multiple feeders coming of the left PCA, left anterior choroidal and left SCA with a large single draining vein that drains into the vein of Villanueva. 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      Physical Exam:  GENERAL: alert, cooperative, no distress, appears stated age  LUNG: clear to auscultation bilaterally  HEART: regular rate and rhythm, S1, S2 normal, no murmur, click, rub or gallop  EXTREMITIES:  extremities normal, atraumatic, no cyanosis, nonpitting peripheral edema, Posterior tibial pulses +1 bilaterally, +2 pedal pulses bilaterally   SKIN: Skin cool to touch. Appropriate for ethnicity. Right groin site clean, dry, and intact. No hematoma, bruising, or bleeding noted. Neurologic Exam:  Mental Status:  Alert and oriented x 4. Appropriate affect, mood and behavior. Language:    Dysarthric with intermittent stuttering, repetition, comprehension and naming. Cranial Nerves:        Pupils equal, round and reactive to light. Visual fields full to confrontation. Diplopia                                      Dysconjugate gaze intermittently with EOMs, otherwise     intact. Mild subtle nystagmus when looking to the left, but it has improved. Decreased sensation on the left side of face. Left facial asymmetry     Tongue protrudes to midline, palate elevates symmetrically. Motor:    + pronator drift on the left upper extremity     Bulk and tone normal.      5/5 power in all extremities proximally and distally. No involuntary movements. Sensation:    Decreased sensation on the left side of face, otherwise sensation intact to light touch. No neglect. Coordination & Gait: Some dysmetria noted in RUE and LUE with FTN (right worse than left, but it has improved). HTS intact bilaterally. Gait deferred.      24 hour results:    Recent Results (from the past 24 hour(s))   GLUCOSE, POC    Collection Time: 07/29/21 11:04 AM   Result Value Ref Range    Glucose (POC) 149 (H) 65 - 117 mg/dL    Performed by Gino Mclaughlin    TCD INTRACRANIAL ARTERIES COMPLETE    Collection Time: 07/29/21 11:16 AM   Result Value Ref Range    Right PCA 1 PSV 77.7 cm/s    Right PCA 1 EDV 32.6 cm/s    Right Vertebral PSV 86.3 cm/s    Right Vertebral EDV 32.5 cm/s    Left Vertebral PSV 86.3 cm/s    Left Vertebral EDV 27.0 cm/s    Basilar Artery EDV 31 cm/s    Right ICA dist sys 69.0 cm/s    Right ICA dist mabry 18.5 cm/s    Left ICA dist sys 60.2 cm/s    Left ICA dist mabry 16.4 cm/s    Right PCA 1 Mean Velocity 48 cm/s    Right External ICA PSV 69 cm/s    Right External ICA EDV 19 cm/s    Right External ICA Mean Velocity 36 cm/s    Left External ICA PSV 60 cm/s    Left External ICA EDV 16 cm/s    Left External ICA Mean Velocity 31 cm/s    Left PCA 1 PSV 67 cm/s    Left PCA 1 EDV 31 cm/s    Left PCA 1 Mean Velocity 43 cm/s    Basilar Artery PSV 78 cm/s    Basilar Artery Mean Farhat 47 cm/s    Right Vertebral Mean Velocity 51 cm/s    Left Vertebral Mean Velocity 47 cm/s   GLUCOSE, POC    Collection Time: 07/29/21  4:56 PM   Result Value Ref Range    Glucose (POC) 171 (H) 65 - 117 mg/dL    Performed by Jorge Abreu    GLUCOSE, POC    Collection Time: 07/29/21  9:08 PM   Result Value Ref Range    Glucose (POC) 118 (H) 65 - 117 mg/dL    Performed by [place]    MAGNESIUM    Collection Time: 07/30/21  4:26 AM   Result Value Ref Range    Magnesium 2.4 1.6 - 2.4 mg/dL   PHOSPHORUS    Collection Time: 07/30/21  4:26 AM   Result Value Ref Range    Phosphorus 3.4 2.6 - 4.7 MG/DL   CBC W/O DIFF    Collection Time: 07/30/21  4:26 AM   Result Value Ref Range    WBC 10.1 4.1 - 11.1 K/uL    RBC 5.02 4.10 - 5.70 M/uL    HGB 14.2 12.1 - 17.0 g/dL    HCT 43.3 36.6 - 50.3 %    MCV 86.3 80.0 - 99.0 FL    MCH 28.3 26.0 - 34.0 PG    MCHC 32.8 30.0 - 36.5 g/dL    RDW 13.9 11.5 - 14.5 %    PLATELET 489 404 - 740 K/uL    MPV 10.4 8.9 - 12.9 FL    NRBC 0.0 0  WBC    ABSOLUTE NRBC 0.00 0.00 - 0.01 K/uL   GLUCOSE, POC    Collection Time: 07/30/21  7:04 AM   Result Value Ref Range    Glucose (POC) 126 (H) 65 - 117 mg/dL    Performed by [facility]           Imaging:  CT of head on 7/19/2021 at 2104 shows  IMPRESSION  1. Acute subarachnoid hemorrhage anterior to the sarahi. CTA of the head and neck on 7/19/2021 at 2126 shows  IMPRESSION     1.  Arteriovenous malformation in the left superior cerebellar peduncle, with the  dominant drainage into the vein of Gaston. 2. No large vessel occlusion, no hemodynamically significant stenosis. 3. Redemonstrated subarachnoid hemorrhage in the basilar cisterns. CT the head on 7/20/2021 at 1129 shows  IMPRESSION  1. Stable ventricular size.     2.  Questionable diffuse cerebral edema.     3.  Subarachnoid hemorrhage in the prepontine cistern and ambient cisterns as  well as sylvian fissures are decreased from the prior study. MRI brain with and without 7/20/2021 at 2214 shows  IMPRESSION  1. Tiny 1 mm foci of diffusion restriction in the left cerebellum and left  cerebral hemisphere as described likely embolic in origin.     2.  Possible mild meningeal enhancement which may be secondary to meningeal  irritation from known subarachnoid hemorrhage.     3.  Dilated superior ophthalmic veins are noted which is new when compared to  prior imaging. This is of uncertain clinical significance. There appears to be  normal enhancement of the cavernous sinus bilaterally. CT of head on 7/23/2021 at 2233 shows     IMPRESSION  No hydrocephalus. Stable left prepontine subarachnoid hemorrhage. CT of Head on 7/24/2021 at 1357  IMPRESSION:   Left prepontine hemorrhage is slightly increased. CT of the head on 7/24/2021 at 10:21 PM shows  IMPRESSION  Stable left prepontine hemorrhage. Assessment:     Principal Problem:    Cerebral arteriovenous malformation (AVM) (7/20/2021)      Overview: ([id number])- CT (Brain): There is an AVM in the superior cerebellum with multiple aneurysms. One measures 4 mm. Another measures 4 mm within the superior cerebellum.     Active Problems:    Malignant hypertension (7/20/2021)      Acute hypokalemia (7/20/2021)      Celiac artery stenosis ([address] (7/21/2021)      Vasogenic cerebral edema ([address] (7/21/2021)      Hypertensive emergency (7/21/2021)      SAH (subarachnoid hemorrhage) ([address] (7/20/2021)        Plan: Acute SAH in the setting of cerebellar AVM with associated flow related aneurysm, [place] Knox 1, Mistry Grade 2  - s/p diagnostic cerebral angiogram on 7/20/2021  - Day 10 of 21 of Nimotop for the prevention of delayed cerebral ischemia  - continue Keppra 500 mg BID for seizure ppx  - Repeat head CTA 07/28/21 showed Left cerebellar vascular malformation is again noted with unchanged hemorrhage in the left prepontine cistern. Small focal hypoattenuation in the left sarahi and left cerebellar peduncle likely acute ischemic changes. - Strict I's and O's: assess NICOM PRN for fluid responsiveness   - ECHO shows EF 65-70%, dilated left atrium. No shunting seen. - continue every hour neuro checks  - SBP goal <140  - PT/OT/SLP evals   - Daily TCDs; very limited exam with the bl MCA, JOSSELYN and terminal ICA not visualized due to lack of ultrasound windows. Visualized PCA, vertebral and basilar arteries appear patent and without evidence of vasospasm    HTN  - SBP goal <140 as stated above  - Cardene/Labetalol drip PRN  - patient on multiple oral antihypertensives (lisinopril + hydralazine (can switch to Prinzide), metoprolol (on hold)  amlodipine, and clonidine)  - Patient had a bilateral artery/venous renal duplex on 7/22 which shows no evidence of significant renal artery stenosis. - Intensivist following    Constipation (improved)  - continue Rissa-colace daily  - Miralax PRN daily  - Dulcolax 10 mg daily PRN      Activity: Up with assistance, ambulate today  DVT ppx: SCDs  Dispo: TBD    Plan d/w Dr. Ashleigh Yao RN, and patient.        Kari Roland NP

## 2021-07-30 NOTE — PROGRESS NOTES
[facility]    ICU TEAM Progress Note    Name: Mark Benavidez   : 1960   MRN: [id number]   Date: 2021      I  Subjective:   Progress Note: 2021      Reason for ICU Admission: Cerebellar arteriovenous malformation, subarachnoid hemorrhage     Interval history:  57-year-old male with hypertension admitted to our hospital as a transfer from outside facility on  with subarachnoid hemorrhage  Overnight Events:   : No acute overnight events  : No acute overnight events  : Better blood pressure control with adding clonidine orally, hold beta-blocker due to bradycardia [asymptomatic], off nicardipine drip. Did well with diuresis and he is negative about 1600 cc. Still positive about 19 L since admission and that is reflected in his weight as well. :No acute event, did well with gentle diuresis, -600 cc however still positive about 20 L since admission. Continue to require drips to control blood pressure to goal.      Active Problem List:     Problem List  Date Reviewed: 2021        Codes Class    Celiac artery stenosis (HCC) (Chronic) ICD-10-CM: I77.4  ICD-9-CM: 447. 4 Chronic        Vasogenic cerebral edema (HCC) ICD-10-CM: G93.6  ICD-9-CM: 348. 5 Acute        Hypertensive emergency ICD-10-CM: I16.1  ICD-9-CM: 401.9 Acute        Malignant hypertension ICD-10-CM: I10  ICD-9-CM: 401.0 Acute        * (Principal) Cerebral arteriovenous malformation (AVM) ICD-10-CM: Q28.2  ICD-9-CM: 747.81 Acute    Overview Signed 2021  7:03 AM by Celeste Sevilla MD     ([id number])- CT (Brain): There is an AVM in the superior cerebellum with multiple aneurysms. One measures 4 mm. Another measures 4 mm within the superior cerebellum.              Acute hypokalemia ICD-10-CM: E87.6  ICD-9-CM: 276.8 Acute        SAH (subarachnoid hemorrhage) (HCC) ICD-10-CM: I60.9  ICD-9-CM: 430         Essential hypertension ICD-10-CM: I10  ICD-9-CM: 401.9         Type 2 diabetes mellitus without complication, without long-term current use of insulin ([facility] ICD-10-CM: E11.9  ICD-9-CM: 250.00         Arthritis of hip ICD-10-CM: M16.10  ICD-9-CM: 716.95               Past Medical History:      has a past medical history of Acute hypokalemia (7/20/2021), Celiac artery stenosis ([facility] [address] (7/21/2021), Cerebral arteriovenous malformation (AVM) (7/20/2021), Diabetes ([facility] [address] ED (erectile dysfunction), HTN (hypertension), Hypertension, Hypertensive emergency (7/21/2021), Impaired fasting glucose, Malignant hypertension (7/20/2021), and Vasogenic cerebral edema ([facility] [address] (7/21/2021). Past Surgical History:      has a past surgical history that includes hx tonsil and adenoidectomy. Home Medications:     Prior to Admission medications    Medication Sig Start Date End Date Taking? Authorizing Provider   lancets misc by Does Not Apply route. Provider, Historical   metFORMIN (GLUCOPHAGE) 500 mg tablet Take 1 Tab by mouth daily (with breakfast). 3/22/19   Krystal Platt MD   lisinopril (PRINIVIL, ZESTRIL) 40 mg tablet Take 1 Tab by mouth daily. 3/22/19   Krystal Platt MD   hydrALAZINE (APRESOLINE) 25 mg tablet Take 1 Tab by mouth three (3) times daily. 3/22/19   Krystal Platt MD   amLODIPine (NORVASC) 10 mg tablet Take 1 Tab by mouth daily. 3/22/19   Krystal Platt MD   oxyCODONE-acetaminophen (PERCOCET 7.5) 7.5-325 mg per tablet Take 1-2 Tabs by mouth every four (4) hours as needed for Pain. Max Daily Amount: 12 Tabs. 3/20/15   Elvis Ravi PA   traMADol (ULTRAM) 50 mg tablet Take 1 Tab by mouth every six (6) hours as needed for Pain. Max Daily Amount: 200 mg. 3/20/15   MELANI Lopez   bisacodyl (DULCOLAX, BISACODYL,) 10 mg suppository Insert 10 mg into rectum daily. 3/20/15   MELANI Lopez   promethazine (PHENERGAN) 25 mg tablet Take 1 Tab by mouth every six (6) hours as needed for Nausea. 3/20/15   MELANI Lopez   aspirin (ASPIRIN) 325 mg tablet Take 1 Tab by mouth two (2) times a day. 3/20/15   MELANI Saha   metFORMIN (GLUCOPHAGE) 500 mg tablet Take 500 mg by mouth Daily (before breakfast). Provider, Historical   lisinopril (PRINIVIL, ZESTRIL) 40 mg tablet Take 40 mg by mouth daily (after breakfast). Provider, Historical   hydrochlorothiazide (HYDRODIURIL) 25 mg tablet Take 25 mg by mouth daily (after breakfast). Provider, Historical   amLODIPine (NORVASC) 10 mg tablet Take 10 mg by mouth daily (with breakfast). Provider, Historical   hydrALAZINE (APRESOLINE) 25 mg tablet Take 25 mg by mouth three (3) times daily. Provider, Historical       Allergies/Social/Family History:     No Known Allergies   Social History     Tobacco Use    Smoking status: Former Smoker     Packs/day: 2.00     Years: 20.00     Pack years: 40.00     Types: Cigarettes     Quit date: 2015     Years since quittin.4    Smokeless tobacco: Current User    Tobacco comment: Patient reports he is Vaping    Substance Use Topics    Alcohol use:  Yes     Alcohol/week: 6.0 standard drinks     Types: 6 Cans of beer per week      Family History   Problem Relation Age of Onset    Diabetes Mother         type 2    Hypertension Mother     Heart Disease Mother         CHF    Hypertension Father     Heart Disease Father 48        MI       Review of Systems:     10 system reviewed and they are negative but as in interval history    Objective:   Vital Signs:  Visit Vitals  /79 (BP 1 Location: Left upper arm, BP Patient Position: Supine)   Pulse (!) 48   Temp 98 °F (36.7 °C)   Resp 10   Ht 6' (1.829 m)   Wt 114.8 kg (253 lb 1.4 oz)   SpO2 97%   BMI 34.32 kg/m²      O2 Device: None (Room air) Temp (24hrs), Av.9 °F (36.6 °C), Min:97.1 °F (36.2 °C), Max:98.7 °F (37.1 °C)           Intake/Output:     Intake/Output Summary (Last 24 hours) at 2021 0942  Last data filed at 2021 0800  Gross per 24 hour   Intake 2139.58 ml   Output 4900 ml   Net -2760.42 ml       Physical Exam:    General:  Alert, cooperative, no distress, appears stated age. Facial asymmetry with diplopia. Dysmetria LUE. Neck:  Symmetrical, trachea midline, no JVD or carotid bruit. Back:    No CVA tenderness to percussion. Lungs:    Clear to auscultation bilaterally. Heart:   Regular rate and rhythm. S1, S2 normal, without murmur, click, rub or gallop. Abdomen:    Normoactive bowel sounds. Soft, non-tender, no masses or organomegaly. Extremities:  Atraumatic, no cyanosis positive edema               Neurologic:  Speech normal normal strength.  Face is symmetrical on the left, occasional nystagmus, occasional deviation of left eye inward         LABS AND  DATA: Personally reviewed  Recent Labs     07/30/21 0426 07/29/21 0658   WBC 10.1 12.0*   HGB 14.2 14.3   HCT 43.3 42.1    262     Recent Labs     07/30/21 0426 07/29/21 0658 07/28/21 0422 07/28/21  0422   NA  --  135*  --  138   K  --  4.4  --  4.3   CL  --  104  --  107   CO2  --  25  --  23   BUN  --  20  --  19   CREA  --  0.87  --  0.73   GLU  --  115*  --  131*   CA  --  8.5  --  7.9*   MG 2.4 2.4   < > 2.4   PHOS 3.4 3.7   < > 3.9    < > = values in this interval not displayed. No results for input(s): AP, TBIL, TP, ALB, GLOB, AML, LPSE in the last 72 hours. No lab exists for component: SGOT, GPT, AMYP  No results for input(s): INR, PTP, APTT, INREXT, INREXT in the last 72 hours. No results for input(s): PHI, PCO2I, PO2I, FIO2I in the last 72 hours. No results for input(s): CPK, CKMB, TROIQ, BNPP in the last 72 hours.     Hemodynamics:   PAP:   CO:     Wedge:   CI:     CVP:    SVR:       PVR:       Ventilator Settings:  Mode Rate Tidal Volume Pressure FiO2 PEEP                    Peak airway pressure:      Minute ventilation:          MEDS: Reviewed    Chest X-Ray:  CXR Results  (Last 48 hours)    None            Assessment and Plan:   -Arteriovenous malformation left superior cerebellar peduncle:  -Acute subarachnoid hemorrhage:  -Difficult to control hypertension:        -Appreciate neurology recommendation management:              -BKKOXURS systolic blood pressure below 140              -Day 11/21 of nimodipine              -Maintain euvolemia  -Blood pressure better controlled after adding oral clonidine. Place metoprolol               on hold due to bradycardia  -Prophylactic Keppra, Daily TCD's  -On multiple antihypertensive medication, adding hydrochlorothiazide to amlodipine, lisinopril, clonidine and hydralazine.     DVT GI prophylaxis.  Neurochecks as ordered by neurology. DISPOSITION  Stay in ICU    CRITICAL CARE CONSULTANT NOTE  I had a face to face encounter with the patient, reviewed and interpreted patient data including clinical events, labs, images, vital signs, I/O's, and examined patient. I have discussed the case and the plan and management of the patient's care with the consulting services, the bedside nurses and the respiratory therapist.      NOTE OF PERSONAL INVOLVEMENT IN CARE   This patient has a high probability of imminent, clinically significant deterioration, which requires the highest level of preparedness to intervene urgently. I participated in the decision-making and personally managed or directed the management of the following life and organ supporting interventions that required my frequent assessment to treat or prevent imminent deterioration. I personally spent 30 minutes of critical care time. This is time spent at this critically ill patient's bedside actively involved in patient care as well as the coordination of care and discussions with the patient's family. This does not include any procedural time which has been billed separately.     [address] [facility]  7/30/2021

## 2021-07-30 NOTE — PROGRESS NOTES
1930: Bedside shift change report given to [address] (oncoming nurse) by Nemo Lay (offgoing nurse). Report included the following information SBAR, Intake/Output, MAR, Recent Results, Cardiac Rhythm sinus juan and Alarm Parameters . 0730: Bedside shift change report given to [address] (oncoming nurse) by Cathryn Guzmán RN (offgoing nurse). Report included the following information SBAR, Intake/Output, MAR, Recent Results, Cardiac Rhythm sinus juan, Alarm Parameters  and Dual Neuro Assessment. SHIFT SUMMARY  Q2 neuros- Patient remained alert and oriented x4 overnight. MARTÍNEZ equally, pupils equal round and reactive. Patient has left sided facial droop, speech is clear and cohesive, with a  Slight stutter patient has at baseline. Patient one time complained of 'tightness' around left eye, refused pain medication and reported pain went away on own. PRN medications for SBP >140 given overnight; hydralazine 20mg IV given twice and labetolol 10mg IV given once. Patient had adequate urine output overnight, total of 2300ml.

## 2021-07-30 NOTE — PROGRESS NOTES
Bedside and Verbal shift change report given to [address] (oncoming nurse) by Bharat Reeder RN (offgoing nurse). Report included the following information SBAR, Kardex, Intake/Output, Recent Results, Cardiac Rhythm SB and Dual Neuro Assessment. Shift summary    Alert and oriented x 4 noted stutter patient stated that he has always stuttered  and it is not new for him. Skin warm to touch. Denied any pain at present. Voiding clear yellow urine in urinal  No c/o voiced     0.5 mg xanax  PO given for MRI. Tolerated MRI well    OOB  In recliner. Watching TV on interval. Offered no c/o   Returned to bed,   Resting NS infusing @100/ hr. Talking on phone to wife. Bedside and Verbal shift change report given to Paty Mora  (oncoming nurse) by [address] (offgoing nurse). Report included the following information SBAR, Kardex, Intake/Output, Cardiac Rhythm SR/SB and Dual Neuro Assessment.

## 2021-07-30 NOTE — PROGRESS NOTES
Physical Therapy Note:  Arrived for PT intervention. Patient currently ROSSANA to MRI. Will defer and follow up Monday per POC. For the weekend, recommend patient to complete as able in order to maintain strength, endurance and independence with nursing: OOB to chair 3x/day with CGA and ambulating with a gait belt. PT to follow-up with patient after the weekend.

## 2021-07-30 NOTE — PROGRESS NOTES
Transition of Care Plan   RUR- Low    DISPOSITION: pending medical progression, rehab vs HH with family assistance   F/U with PCP/Specialist     Transport: TBD  Patient admitted with [address] Patient [facility] multiple HTN [facility] Wife Jannet Espinosa [phone]. Care management will continue to follow for transitions of care.    Stephie Cummings RN,Care Management

## 2021-07-31 ENCOUNTER — APPOINTMENT (OUTPATIENT)
Dept: VASCULAR SURGERY | Age: 61
DRG: 064 | End: 2021-07-31
Attending: NURSE PRACTITIONER
Payer: COMMERCIAL

## 2021-07-31 LAB
ANION GAP SERPL CALC-SCNC: 4 MMOL/L (ref 5–15)
BUN SERPL-MCNC: 16 MG/DL (ref 6–20)
BUN/CREAT SERPL: 23 (ref 12–20)
CALCIUM SERPL-MCNC: 7.9 MG/DL (ref 8.5–10.1)
CHLORIDE SERPL-SCNC: 102 MMOL/L (ref 97–108)
CO2 SERPL-SCNC: 28 MMOL/L (ref 21–32)
CREAT SERPL-MCNC: 0.7 MG/DL (ref 0.7–1.3)
ERYTHROCYTE [DISTWIDTH] IN BLOOD BY AUTOMATED COUNT: 13.6 % (ref 11.5–14.5)
GLUCOSE BLD STRIP.AUTO-MCNC: 113 MG/DL (ref 65–117)
GLUCOSE BLD STRIP.AUTO-MCNC: 116 MG/DL (ref 65–117)
GLUCOSE BLD STRIP.AUTO-MCNC: 188 MG/DL (ref 65–117)
GLUCOSE BLD STRIP.AUTO-MCNC: 88 MG/DL (ref 65–117)
GLUCOSE SERPL-MCNC: 114 MG/DL (ref 65–100)
HCT VFR BLD AUTO: 37.7 % (ref 36.6–50.3)
HGB BLD-MCNC: 13 G/DL (ref 12.1–17)
MCH RBC QN AUTO: 28.5 PG (ref 26–34)
MCHC RBC AUTO-ENTMCNC: 34.5 G/DL (ref 30–36.5)
MCV RBC AUTO: 82.7 FL (ref 80–99)
NRBC # BLD: 0 K/UL (ref 0–0.01)
NRBC BLD-RTO: 0 PER 100 WBC
OSMOLALITY SERPL: 280 MOSM/KG H2O
OSMOLALITY UR: 455 MOSM/KG H2O
PLATELET # BLD AUTO: 244 K/UL (ref 150–400)
PMV BLD AUTO: 10.2 FL (ref 8.9–12.9)
POTASSIUM SERPL-SCNC: 4.1 MMOL/L (ref 3.5–5.1)
RBC # BLD AUTO: 4.56 M/UL (ref 4.1–5.7)
SERVICE CMNT-IMP: ABNORMAL
SERVICE CMNT-IMP: NORMAL
SODIUM SERPL-SCNC: 134 MMOL/L (ref 136–145)
SODIUM UR-SCNC: 116 MMOL/L
WBC # BLD AUTO: 10.9 K/UL (ref 4.1–11.1)

## 2021-07-31 PROCEDURE — 74011250636 HC RX REV CODE- 250/636: Performed by: ANESTHESIOLOGY

## 2021-07-31 PROCEDURE — 74011000250 HC RX REV CODE- 250: Performed by: NURSE PRACTITIONER

## 2021-07-31 PROCEDURE — 65610000006 HC RM INTENSIVE CARE

## 2021-07-31 PROCEDURE — 74011250636 HC RX REV CODE- 250/636: Performed by: NURSE PRACTITIONER

## 2021-07-31 PROCEDURE — 74011250637 HC RX REV CODE- 250/637: Performed by: INTERNAL MEDICINE

## 2021-07-31 PROCEDURE — APPSS60 APP SPLIT SHARED TIME 46-60 MINUTES: Performed by: NURSE PRACTITIONER

## 2021-07-31 PROCEDURE — 83935 ASSAY OF URINE OSMOLALITY: CPT

## 2021-07-31 PROCEDURE — 74011000250 HC RX REV CODE- 250: Performed by: INTERNAL MEDICINE

## 2021-07-31 PROCEDURE — 85027 COMPLETE CBC AUTOMATED: CPT

## 2021-07-31 PROCEDURE — 74011250637 HC RX REV CODE- 250/637: Performed by: NURSE PRACTITIONER

## 2021-07-31 PROCEDURE — 99233 SBSQ HOSP IP/OBS HIGH 50: CPT | Performed by: RADIOLOGY

## 2021-07-31 PROCEDURE — 80048 BASIC METABOLIC PNL TOTAL CA: CPT

## 2021-07-31 PROCEDURE — 74011250637 HC RX REV CODE- 250/637: Performed by: HEALTH CARE PROVIDER

## 2021-07-31 PROCEDURE — 83930 ASSAY OF BLOOD OSMOLALITY: CPT

## 2021-07-31 PROCEDURE — 74011250637 HC RX REV CODE- 250/637: Performed by: ANESTHESIOLOGY

## 2021-07-31 PROCEDURE — 36415 COLL VENOUS BLD VENIPUNCTURE: CPT

## 2021-07-31 PROCEDURE — 84300 ASSAY OF URINE SODIUM: CPT

## 2021-07-31 PROCEDURE — 82962 GLUCOSE BLOOD TEST: CPT

## 2021-07-31 PROCEDURE — 93886 INTRACRANIAL COMPLETE STUDY: CPT

## 2021-07-31 PROCEDURE — 74011636637 HC RX REV CODE- 636/637: Performed by: NURSE PRACTITIONER

## 2021-07-31 RX ORDER — DEXAMETHASONE SODIUM PHOSPHATE 4 MG/ML
2 INJECTION, SOLUTION INTRA-ARTICULAR; INTRALESIONAL; INTRAMUSCULAR; INTRAVENOUS; SOFT TISSUE EVERY 12 HOURS
Status: DISCONTINUED | OUTPATIENT
Start: 2021-07-31 | End: 2021-08-03

## 2021-07-31 RX ORDER — MINOXIDIL 2.5 MG/1
5 TABLET ORAL DAILY
Status: DISCONTINUED | OUTPATIENT
Start: 2021-07-31 | End: 2021-08-01

## 2021-07-31 RX ORDER — CLONIDINE HYDROCHLORIDE 0.2 MG/1
0.4 TABLET ORAL EVERY 8 HOURS
Status: DISCONTINUED | OUTPATIENT
Start: 2021-07-31 | End: 2021-08-01

## 2021-07-31 RX ORDER — LIDOCAINE HYDROCHLORIDE 20 MG/ML
0.2 INJECTION, SOLUTION EPIDURAL; INFILTRATION; INTRACAUDAL; PERINEURAL ONCE
Status: COMPLETED | OUTPATIENT
Start: 2021-07-31 | End: 2021-07-31

## 2021-07-31 RX ORDER — LIDOCAINE HYDROCHLORIDE 20 MG/ML
JELLY TOPICAL ONCE
Status: COMPLETED | OUTPATIENT
Start: 2021-07-31 | End: 2021-07-31

## 2021-07-31 RX ADMIN — HYDRALAZINE HYDROCHLORIDE 20 MG: 20 INJECTION INTRAMUSCULAR; INTRAVENOUS at 16:12

## 2021-07-31 RX ADMIN — LEVETIRACETAM 500 MG: 500 TABLET ORAL at 17:45

## 2021-07-31 RX ADMIN — NIMODIPINE 60 MG: 30 CAPSULE, LIQUID FILLED ORAL at 12:00

## 2021-07-31 RX ADMIN — LIDOCAINE HYDROCHLORIDE 4 MG: 20 INJECTION, SOLUTION EPIDURAL; INFILTRATION; INTRACAUDAL; PERINEURAL at 22:34

## 2021-07-31 RX ADMIN — HYDRALAZINE HYDROCHLORIDE 100 MG: 50 TABLET, FILM COATED ORAL at 08:58

## 2021-07-31 RX ADMIN — CLONIDINE HYDROCHLORIDE 0.3 MG: 0.2 TABLET ORAL at 15:31

## 2021-07-31 RX ADMIN — HYDRALAZINE HYDROCHLORIDE 100 MG: 50 TABLET, FILM COATED ORAL at 15:31

## 2021-07-31 RX ADMIN — HYDRALAZINE HYDROCHLORIDE 100 MG: 50 TABLET, FILM COATED ORAL at 21:48

## 2021-07-31 RX ADMIN — CLONIDINE HYDROCHLORIDE 0.4 MG: 0.2 TABLET ORAL at 21:48

## 2021-07-31 RX ADMIN — NIMODIPINE 60 MG: 30 CAPSULE, LIQUID FILLED ORAL at 06:45

## 2021-07-31 RX ADMIN — DEXAMETHASONE SODIUM PHOSPHATE 2 MG: 4 INJECTION, SOLUTION INTRAMUSCULAR; INTRAVENOUS at 20:36

## 2021-07-31 RX ADMIN — MINOXIDIL 5 MG: 2.5 TABLET ORAL at 18:43

## 2021-07-31 RX ADMIN — HYDROCHLOROTHIAZIDE 25 MG: 25 TABLET ORAL at 08:58

## 2021-07-31 RX ADMIN — DEXAMETHASONE SODIUM PHOSPHATE 4 MG: 4 INJECTION, SOLUTION INTRAMUSCULAR; INTRAVENOUS at 08:58

## 2021-07-31 RX ADMIN — NIMODIPINE 60 MG: 30 CAPSULE, LIQUID FILLED ORAL at 19:00

## 2021-07-31 RX ADMIN — CLONIDINE HYDROCHLORIDE 0.3 MG: 0.2 TABLET ORAL at 06:45

## 2021-07-31 RX ADMIN — NIMODIPINE 60 MG: 30 CAPSULE, LIQUID FILLED ORAL at 02:28

## 2021-07-31 RX ADMIN — INSULIN LISPRO 2 UNITS: 100 INJECTION, SOLUTION INTRAVENOUS; SUBCUTANEOUS at 17:44

## 2021-07-31 RX ADMIN — NIMODIPINE 60 MG: 30 CAPSULE, LIQUID FILLED ORAL at 15:31

## 2021-07-31 RX ADMIN — LEVETIRACETAM 500 MG: 500 TABLET ORAL at 08:58

## 2021-07-31 RX ADMIN — LISINOPRIL 40 MG: 20 TABLET ORAL at 08:58

## 2021-07-31 RX ADMIN — SODIUM CHLORIDE 150 ML/HR: 9 INJECTION, SOLUTION INTRAVENOUS at 20:38

## 2021-07-31 RX ADMIN — LIDOCAINE HYDROCHLORIDE: 20 JELLY TOPICAL at 22:34

## 2021-07-31 RX ADMIN — HYDRALAZINE HYDROCHLORIDE 20 MG: 20 INJECTION INTRAMUSCULAR; INTRAVENOUS at 12:06

## 2021-07-31 RX ADMIN — DOCUSATE SODIUM 50 MG AND SENNOSIDES 8.6 MG 1 TABLET: 8.6; 5 TABLET, FILM COATED ORAL at 08:58

## 2021-07-31 RX ADMIN — AMLODIPINE BESYLATE 10 MG: 5 TABLET ORAL at 08:58

## 2021-07-31 RX ADMIN — SODIUM CHLORIDE 150 ML/HR: 9 INJECTION, SOLUTION INTRAVENOUS at 12:06

## 2021-07-31 NOTE — PROGRESS NOTES
Bedside and Verbal shift change report given to [address] (oncoming nurse) by Roberts Lombard RN (offgoing nurse). Report included the following information SBAR, Kardex, Procedure Summary, Intake/Output, MAR, Recent Results, Med Rec Status, Alarm Parameters  and Dual Neuro Assessment. 1000: OOB to chair. 1145: Returned to bed    1220: Performed NICOM, per Dr. Celeste Bustamante. Patient fluid response SVI of 40.6%. 1500: Fabiano Partida NP attempted to perform ultrasound guided IV placement due to patients compromised vascular system. Patient declined and refused to be stuck. 1900: Patient continuously needing hypertensive medications and PRNs. Patient only has one working IV. See Heath, Neuro NP spoke with patient about condition and importance of blood pressure control with his condition. Patient willing to get midline. Bedside and Verbal shift change report given to Paty Mora (oncoming nurse) by Jane Larios RN (offgoing nurse). Report included the following information SBAR, Kardex, Procedure Summary, Intake/Output, MAR, Recent Results, Med Rec Status, Alarm Parameters  and Dual Neuro Assessment.

## 2021-07-31 NOTE — PROGRESS NOTES
Neurocritical Care Brief Progress Note:    78-year-old male with SAH with cerebellar AVM with multiple feeders coming off the left PCA, left anterior choroidal and left SCA with a large single draining vein that drains into the vein of Gaston. 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 Patient went for MRI brain/MRA  h & n earlier today due to worsening of speech and increased left facial numbness. Scans were stable. Physical Exam:  Gen: NAD, calm, cooperative. Has eyepatch over left eye. Neuro: A&Ox4. Follows commands. Speech clear. Affect normal. PERRL, 3 mm bilaterally. Visual fields full to confrontation. However, has stated double vision at a distance. Mild disconjugate gaze present at times. EOMI. Occasional twitching of right eye note again tonight. Significant left lower facial droop with stated left facial numbness noted to left check and forehead on light touch. Natacha Sida Beni spontaneously. Strength 5/5 in UE and LE BL. Negative drift. Bulk and tone normal. No involuntary movements. Gait deferred. No ataxia. Skin: Warm, dry, color appropriate for ethnicity. PLAN:Continue current plan per NIS. Repeat Nicom. Urine studies in the am.     Addendum: 12:11 am. NICOM was 34.4%. Will increase IVF to   ml/hr.  Urine studies to be completed in am.     Ashlyn Keith NP  Neurocritical Care Nurse Practitioner  [phone]

## 2021-07-31 NOTE — PROGRESS NOTES
Neurointerventional Surgery Progress Note  Villa Campbell AGACNP-BC  Neurocritical Care NP      Admit Date: 7/20/2021   LOS: 11 days        Daily Progress Note: 7/31/2021    S/P: POD 11 status post diagnostic cerebral angiogram    HPI: Anatoliy Delgado is a 27-year-old male with a past medical history significant for hypertension and diabetes type 2 who initially presented to [facility] ED on 7/19/2021 for acute onset of dizziness, headache, left-sided hearing loss, and left-sided weakness. Upon arrival to the ED his blood pressure was noted to be 265/120 per review of notes. A code stroke was called and CT of the head showed an acute subarachnoid hemorrhage anterior to the sarahi. CTA of the head and neck showed an AVM in the left superior cerebellar peduncle, with the dominant drainage into the vein of Gaston. No large vessel occlusion, no hemodynamically significant stenosis. Redemonstrated subarachnoid hemorrhage in the basilar cisterns. He was started on a Cardene drip and was also given IV hydralazine and labetalol. He was then transferred to [facility] for a higher level of care. NIS was consulted and patient underwent a diagnostic cerebral angiogram on 7/20/2021. The angiogram showed evidence of a cerebral AVM with multiple feeders coming of the left PCA, left anterior choroidal and left SCA with a large single draining vein that drains into the vein of Villanueva. 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 Subjective:   IV fluids were increased to 150 mLs per hour overnight to maintain euvolemia. Urine studies were completed earlier this morning due to dumping of urine. He is alert. Following commands. He does report unchanged double vision, dizziness with movement, left facial weakness and left facial numbness, and some issues with balance. He does stutter at baseline, but denies any other speech difficulty.   He denies any headache, chest pain, shortness of breath, nausea, tingling, difficulty swallowing, or coordination issues. He reports that he has been ambulating well.        Current Facility-Administered Medications   Medication Dose Route Frequency Provider Last Rate Last Admin    cloNIDine HCL (CATAPRES) tablet 0.3 mg  0.3 mg Oral Q8H Wilian Ok, DO   0.3 mg at 07/31/21 0645    hydroCHLOROthiazide (HYDRODIURIL) tablet 25 mg  25 mg Oral DAILY Vickey Horvath MD   25 mg at 07/31/21 0858    [Held by provider] metoprolol tartrate (LOPRESSOR) tablet 100 mg  100 mg Oral Q12H Vickey Horvath MD   100 mg at 07/27/21 2156    0.9% sodium chloride infusion  150 mL/hr IntraVENous CONTINUOUS Chely SemenECTOR 150 mL/hr at 07/31/21 0012 150 mL/hr at 07/31/21 0012    dexamethasone (DECADRON) 4 mg/mL injection 4 mg  4 mg IntraVENous Q12H Emir Riojas NP   4 mg at 07/31/21 0858    hydrALAZINE (APRESOLINE) tablet 100 mg  100 mg Oral TID Herberth Morales DO   100 mg at 07/31/21 0858    senna-docusate (PERICOLACE) 8.6-50 mg per tablet 1 Tablet  1 Tablet Oral DAILY Shanika Grant NP   1 Tablet at 07/31/21 0858    polyethylene glycol (MIRALAX) packet 17 g  17 g Oral DAILY PRN Gabe Morrison NP   17 g at 07/25/21 0906    bisacodyL (DULCOLAX) tablet 10 mg  10 mg Oral DAILY PRN Shanika Grant NP        labetaloL (NORMODYNE;TRANDATE) injection 10 mg  10 mg IntraVENous Q4H PRN Shanika Grant NP   10 mg at 07/30/21 0521    niCARdipine (CARDENE) 25 mg in 0.9% sodium chloride 250 mL infusion  0-15 mg/hr IntraVENous TITRATE Shanika Grant NP   Stopped at 07/27/21 2338    amLODIPine (NORVASC) tablet 10 mg  10 mg Oral DAILY Herberth Morales DO   10 mg at 07/31/21 0858    hydrALAZINE (APRESOLINE) 20 mg/mL injection 20 mg  20 mg IntraVENous Q6H PRN Herberth Morales DO   20 mg at 07/30/21 0408    insulin lispro (HUMALOG) injection   SubCUTAneous AC&HS Sera Esquivel NP   2 Units at 07/29/21 1700    glucose chewable tablet 16 g  4 Tablet Oral PRN Sera Esquivel NP       [employer] dextrose (D50W) injection syrg 12.5-25 g  12.5-25 g IntraVENous PRN Jyoti Anthony NP        glucagon (GLUCAGEN) injection 1 mg  1 mg IntraMUSCular PRN Jyoti Anthony NP        ondansetron [facility]) injection 4 mg  4 mg IntraVENous Q6H PRN Sarah Flores NP-WILLY        naloxone [facility]) injection 0.4 mg  0.4 mg IntraVENous PRN ANGELINE Morales        acetaminophen (TYLENOL) tablet 650 mg  650 mg Oral Q4H PRN Sarah Flores NP-C   650 mg at 21    Or    acetaminophen (TYLENOL) solution 650 mg  650 mg Per NG tube Q4H PRN ANGELINE Morales        Or    acetaminophen (TYLENOL) suppository 650 mg  650 mg Rectal Q4H PRN ANGELINE Morales        niMODipine (NIMOTOP) capsule 60 mg  60 mg Oral Q4H Gabbi Gallegos NP   60 mg at 21 0645    levETIRAcetam (KEPPRA) tablet 500 mg  500 mg Oral BID Tamara Gallegos NP   500 mg at 21 0858    lisinopriL (PRINIVIL, ZESTRIL) tablet 40 mg  40 mg Oral DAILY Eden Gallegos NP   40 mg at 21 1695        No Known Allergies    Review of Systems:  Pertinent items are noted in the History of Present Illness. Objective:     Vital signs  Temp (24hrs), Av.9 °F (36.6 °C), Min:97.4 °F (36.3 °C), Max:98.2 °F (36.8 °C)   No intake/output data recorded. 1901 -  0700  In: 4240 [P.O.:300;  I.V.:3940]  Out: 7900 [Urine:7900]    Visit Vitals  BP (!) 121/59   Pulse (!) 50   Temp 98.2 °F (36.8 °C)   Resp 13   Ht 6' (1.829 m)   Wt 252 lb 3.3 oz (114.4 kg)   SpO2 98%   BMI 34.21 kg/m²      O2 Device: None (Room air)     Pain control  Pain Assessment  Pain Scale 1: Numeric (0 - 10)  Pain Intensity 1: 0  Pain Onset 1:  (denied)  Pain Location 1: Head, Neck  Pain Orientation 1: Left  Pain Description 1: Sharp  Pain Intervention(s) 1: Medication (see MAR), MD notified (comment) Keon Cuellar NP)    PT/OT  Gait     Gait  Base of Support: Widened  Speed/Rita: Slow, Shuffled  Step Length: Right shortened, Left shortened  Gait Abnormalities: Ataxic, Altered arm swing, Trunk sway increased, Shuffling gait  Ambulation - Level of Assistance: Contact guard assistance  Distance (ft): 45 Feet (ft)  Assistive Device: Gait belt             Vitals:    07/31/21 0400 07/31/21 0500 07/31/21 0600 07/31/21 0700   BP: (!) 130/58 131/65 (!) 142/73 (!) 121/59   Pulse: (!) 54 (!) 53 (!) 49 (!) 50   Resp: 10 12 14 13   Temp: 98.2 °F (36.8 °C)      SpO2: 98%      Weight:  252 lb 3.3 oz (114.4 kg)     Height:            Physical Exam:  GENERAL: alert, cooperative, no distress, appears stated age  LUNG: clear to auscultation bilaterally  HEART: regular rate and rhythm, S1, S2 normal, no murmur, click, rub or gallop  EXTREMITIES:  extremities normal, atraumatic, no cyanosis, trace peripheral edema in BUE, +1 pitting edema in BLE, Posterior tibial pulses +1 bilaterally, +2 pedal pulses bilaterally   SKIN: Skin cool to touch. Appropriate for ethnicity. Right groin site clean, dry, and intact. No hematoma, bruising, or bleeding noted. Neurologic Exam:  Mental Status:  Alert and oriented x 4. Appropriate affect, mood and behavior. Language:    Normal fluency, repetition, comprehension and naming. Cranial Nerves:        Pupils equal, round and reactive to light. Visual fields full to confrontation. Dysconjugate gaze intermittently with EOMs, otherwise intact. Mild subtle nystagmus when looking to the left, but it has improved. Decreased sensation on the left side of face. Left facial asymmetry     Tongue protrudes to midline, palate elevates symmetrically. No dysarthria. Motor:    No pronator drift. Bulk and tone normal.      5/5 power in all extremities proximally and distally. No involuntary movements. Sensation:    Decreased sensation on the left side of face, otherwise sensation intact to light touch. No neglect.      Coordination & Gait: Some mild dysmetria noted in RUE and LUE with FTN. HTS intact bilaterally. Gait deferred. 24 hour results:    Recent Results (from the past 24 hour(s))   METABOLIC PANEL, BASIC    Collection Time: 07/30/21 10:57 AM   Result Value Ref Range    Sodium 134 (L) 136 - 145 mmol/L    Potassium 4.0 3.5 - 5.1 mmol/L    Chloride 101 97 - 108 mmol/L    CO2 28 21 - 32 mmol/L    Anion gap 5 5 - 15 mmol/L    Glucose 125 (H) 65 - 100 mg/dL    BUN 18 6 - 20 MG/DL    Creatinine 0.84 0.70 - 1.30 MG/DL    BUN/Creatinine ratio 21 (H) 12 - 20      GFR est AA >60 >60 ml/min/1.73m2    GFR est non-AA >60 >60 ml/min/1.73m2    Calcium 8.5 8.5 - 10.1 MG/DL   GLUCOSE, POC    Collection Time: 07/30/21 11:16 AM   Result Value Ref Range    Glucose (POC) 122 (H) 65 - 117 mg/dL    Performed by Immediately    GLUCOSE, POC    Collection Time: 07/30/21  4:59 PM   Result Value Ref Range    Glucose (POC) 148 (H) 65 - 117 mg/dL    Performed by Immediately    GLUCOSE, POC    Collection Time: 07/30/21  9:33 PM   Result Value Ref Range    Glucose (POC) 123 (H) 65 - 117 mg/dL    Performed by Cecilio Rae    SODIUM, UR, RANDOM    Collection Time: 07/31/21  5:13 AM   Result Value Ref Range    Sodium,urine random 116 MMOL/L   OSMOLALITY, UR    Collection Time: 07/31/21  5:13 AM   Result Value Ref Range    Osmolality,urine 455 MOSM/kg H2O   GLUCOSE, POC    Collection Time: 07/31/21  6:44 AM   Result Value Ref Range    Glucose (POC) 116 65 - 117 mg/dL    Performed by [facility]KA           Imaging:  CT of head on 7/19/2021 at 2104 shows  IMPRESSION  1. Acute subarachnoid hemorrhage anterior to the sarahi. CTA of the head and neck on 7/19/2021 at 2126 shows  IMPRESSION     1. Arteriovenous malformation in the left superior cerebellar peduncle, with the  dominant drainage into the vein of Gaston. 2. No large vessel occlusion, no hemodynamically significant stenosis. 3. Redemonstrated subarachnoid hemorrhage in the basilar cisterns.     CT the head on 7/20/2021 at 1125 shows  IMPRESSION  1. Stable ventricular size.     2.  Questionable diffuse cerebral edema.     3.  Subarachnoid hemorrhage in the prepontine cistern and ambient cisterns as  well as sylvian fissures are decreased from the prior study. MRI brain with and without 7/20/2021 at 2214 shows  IMPRESSION  1. Tiny 1 mm foci of diffusion restriction in the left cerebellum and left  cerebral hemisphere as described likely embolic in origin.     2.  Possible mild meningeal enhancement which may be secondary to meningeal  irritation from known subarachnoid hemorrhage.     3.  Dilated superior ophthalmic veins are noted which is new when compared to  prior imaging. This is of uncertain clinical significance. There appears to be  normal enhancement of the cavernous sinus bilaterally. CT of head on 7/23/2021 at 2233 shows     IMPRESSION  No hydrocephalus. Stable left prepontine subarachnoid hemorrhage. CT of Head on 7/24/2021 at 1357  IMPRESSION:   Left prepontine hemorrhage is slightly increased. CT of the head on 7/24/2021 at 10:21 PM shows  IMPRESSION  Stable left prepontine hemorrhage. CTA of Head and neck on 7/28/2021 at 1001 shows  IMPRESSION     No evidence for significant vasospasm. Diffuse mild atherosclerosis. Left  cerebellar vascular malformation is again noted with unchanged hemorrhage in the  left prepontine cistern. Small focal hypoattenuation in the left sarahi and left  cerebellar peduncle likely acute ischemic changes.     Small right pleural effusion  Assessment:     Principal Problem:    Cerebral arteriovenous malformation (AVM) (7/20/2021)      Overview: ([id number])- CT (Brain): There is an AVM in the superior cerebellum with multiple aneurysms. One measures 4 mm. Another measures 4 mm within the superior cerebellum.     Active Problems:    Malignant hypertension (7/20/2021)      Acute hypokalemia (7/20/2021)      Celiac artery stenosis ([address] (7/21/2021)      V[facility] edema [facility] (7/21/2021)      Hypertensive emergency (7/21/2021)      SAH (subarachnoid hemorrhage) ([facility] (7/20/2021)        Plan:   Acute SAH in the setting of cerebellar AVM with associated flow related aneurysm, [place] Knox 1, Mistry Grade 2  - s/p diagnostic cerebral angiogram on 7/20/2021  - Day 12 of 21 of Nimotop for the prevention of delayed cerebral ischemia  - continue Keppra 500 mg BID for seizure ppx  - Continue LR to 50 ml/hr to maintain euvolemia  - CTA on 7/28 showed no evidence for significant vasospasm diffuse mild atherosclerosis. Left cerebellar vascular malformation is again noted with unchanged hemorrhage in the left prepontine cistern. Small focal hypoattenuation in the left sarahi and left cerebellar peduncle likely acute ischemic changes. - Strict I's and O's, patient -2560 mls on  I's and O's overnight, continue NS at 150 ml/hr to maintain euvolemia, check NICOM today, patient was fluid responsive overnight   - on Decadron 4 mg every 12 hours   - ECHO shows EF 65-70%, dilated left atrium. No shunting seen. Mild (grade 1) left ventricular diastolic dysfunction. - ok with neuro checks every 2 hours   - SBP goal <140, on scheduled amlodipine, clonidine, hydralazine, HCTZ, and lisinopril, Cardene PRN  - PT/OT/SLP evals   - assess NICOM PRN for fluid responsiveness   - daily TCDs, TCDs yesterday showed very limited exam with the bilateral MCA, JOSSELYN and terminal ICA not visualized due to lack of ultrasound windows.  TCDs today pending   - NIS following     Polyuria  - patient has been previously diuresed for fluid overload   - patient noted to be dumping urine overnight, possibly autodiuresing with recent fluid overload status   - urine random sodium 116, urine osmolality 455 this morning   - noted sodium slightly low at 134 yesterday, and previous value 135  - will recheck BMP today, may consider renal consultation for further opinion of any ADH abnormalities in the setting of SAH      HTN  - SBP goal <140 as stated above  - patient on scheduled oral meds as stated above, Cardene PRN  - Patient had a bilateral artery/venous renal duplex on 722 which shows no evidence of significant renal artery stenosis. No evidence of significant renal parenchymal disease. Possible hemodynamically significant celiac artery stenosis. - Intensivist following    Constipation   - continue Rissa-colace daily  - Miralax PRN daily  - Dulcolax 10 mg daily PRN      Activity: Up with assistance, ambulate daily  DVT ppx: SCDs  Dispo: TBD    Plan d/w Dr. Fredy Rasheed, Dr. Keyona Wilcox, RN, and patient.        Martha Hunt NP

## 2021-07-31 NOTE — PROGRESS NOTES
1930: Bedside shift change report given to [address] (oncoming nurse) by Dewey Gallegos (offgoing nurse). Report included the following information SBAR, Intake/Output, MAR, Recent Results, Cardiac Rhythm sinus juan and Alarm Parameters . 2330: Conducted NICOM, patient fluid responsive SVI of 34.4%. Received orders to increase fluids to 150ml/hr    0300: ECTOR Bernal assessed patient for central line placement and attempted to get ultrasound guided IV unsuccessfully. 0730: Bedside shift change report given to [address] (oncoming nurse) by Neno Celaya RN (offgoing nurse). Report included the following information SBAR, Intake/Output, MAR, Recent Results, Cardiac Rhythm NSR, Alarm Parameters  and Dual Neuro Assessment. SHIFT SUMMARY  Q 2 neuro, patient a/ox4, MARTÍNEZ equally, maintains L sided facial droopage and baseline stutter unchanged. Patient needs new IV access but after multiple attempts, team was unsuccessful. Nurse unsuccessful in getting AM labs after one stick and patient refused to be stuck again- informed ECTOR Bernal.

## 2021-07-31 NOTE — PROGRESS NOTES
[facility]    ICU TEAM Progress Note    Name: Claudine Wilks   : 1960   MRN: [id number]   Date: 2021      I  Subjective:   Progress Note: 2021      Reason for ICU Admission: Cerebellar arteriovenous malformation, subarachnoid hemorrhage     Interval history:  71-year-old male with hypertension admitted to our hospital as a transfer from outside facility on  with subarachnoid hemorrhage  Overnight Events:   : No overnight events  : No acute overnight events  : No acute overnight events  : Better blood pressure control with adding clonidine orally, hold beta-blocker due to bradycardia [asymptomatic], off nicardipine drip. Did well with diuresis and he is negative about 1600 cc. Still positive about 19 L since admission and that is reflected in his weight as well. :No acute event, did well with gentle diuresis, -600 cc however still positive about 20 L since admission. Continue to require drips to control blood pressure to goal.      Active Problem List:     Problem List  Date Reviewed: 2021        Codes Class    Celiac artery stenosis (HCC) (Chronic) ICD-10-CM: I77.4  ICD-9-CM: 447. 4 Chronic        Vasogenic cerebral edema (HCC) ICD-10-CM: G93.6  ICD-9-CM: 348. 5 Acute        Hypertensive emergency ICD-10-CM: I16.1  ICD-9-CM: 401.9 Acute        Malignant hypertension ICD-10-CM: I10  ICD-9-CM: 401.0 Acute        * (Principal) Cerebral arteriovenous malformation (AVM) ICD-10-CM: Q28.2  ICD-9-CM: 747.81 Acute    Overview Signed 2021  7:03 AM by Gerhard Lora MD     ([id number])- CT (Brain): There is an AVM in the superior cerebellum with multiple aneurysms. One measures 4 mm. Another measures 4 mm within the superior cerebellum.              Acute hypokalemia ICD-10-CM: E87.6  ICD-9-CM: 276.8 Acute        SAH (subarachnoid hemorrhage) (HCC) ICD-10-CM: I60.9  ICD-9-CM: 430         Essential hypertension ICD-10-CM: I10  ICD-9-CM: 401.9 Type 2 diabetes mellitus without complication, without long-term current use of insulin (HCC) ICD-10-CM: E11.9  ICD-9-CM: 250.00         Arthritis of hip ICD-10-CM: M16.10  ICD-9-CM: 716.95               Past Medical History:      has a past medical history of Acute hypokalemia (7/20/2021), Celiac artery stenosis ([facility] [address] (7/21/2021), Cerebral arteriovenous malformation (AVM) (7/20/2021), Diabetes ([facility] [address] ED (erectile dysfunction), HTN (hypertension), Hypertension, Hypertensive emergency (7/21/2021), Impaired fasting glucose, Malignant hypertension (7/20/2021), and [facility] (7/21/[facility]). Past Surgical History:      has a past surgical history that includes hx tonsil and adenoidectomy. Home Medications:     Prior to Admission medications    Medication Sig Start Date End Date Taking? Authorizing Provider   lancets misc by Does Not Apply route. Provider, Historical   metFORMIN (GLUCOPHAGE) 500 mg tablet Take 1 Tab by mouth daily (with breakfast). 3/22/19   Adriana Younger MD   lisinopril (PRINIVIL, ZESTRIL) 40 mg tablet Take 1 Tab by mouth daily. 3/22/19   Adriana Younger MD   hydrALAZINE (APRESOLINE) 25 mg tablet Take 1 Tab by mouth three (3) times daily. 3/22/19   Adriana Younger MD   amLODIPine (NORVASC) 10 mg tablet Take 1 Tab by mouth daily. 3/22/19   Adriana Younger MD   oxyCODONE-acetaminophen (PERCOCET 7.5) 7.5-325 mg per tablet Take 1-2 Tabs by mouth every four (4) hours as needed for Pain. Max Daily Amount: 12 Tabs. 3/20/15   Leslie Ravi PA   traMADol (ULTRAM) 50 mg tablet Take 1 Tab by mouth every six (6) hours as needed for Pain. Max Daily Amount: 200 mg. 3/20/15   MELANI Zhang   bisacodyl (DULCOLAX, BISACODYL,) 10 mg suppository Insert 10 mg into rectum daily. 3/20/15   MELANI Zhang   promethazine (PHENERGAN) 25 mg tablet Take 1 Tab by mouth every six (6) hours as needed for Nausea.  3/20/15   MELANI Zhang   aspirin (ASPIRIN) 325 mg tablet Take 1 Tab by mouth two (2) times a day. 3/20/15   Denise Dang, PA   metFORMIN (GLUCOPHAGE) 500 mg tablet Take 500 mg by mouth Daily (before breakfast). Provider, Historical   lisinopril (PRINIVIL, ZESTRIL) 40 mg tablet Take 40 mg by mouth daily (after breakfast). Provider, Historical   hydrochlorothiazide (HYDRODIURIL) 25 mg tablet Take 25 mg by mouth daily (after breakfast). Provider, Historical   amLODIPine (NORVASC) 10 mg tablet Take 10 mg by mouth daily (with breakfast). Provider, Historical   hydrALAZINE (APRESOLINE) 25 mg tablet Take 25 mg by mouth three (3) times daily. Provider, Historical       Allergies/Social/Family History:     No Known Allergies   Social History     Tobacco Use    Smoking status: Former Smoker     Packs/day: 2.00     Years: 20.00     Pack years: 40.00     Types: Cigarettes     Quit date: 2015     Years since quittin.4    Smokeless tobacco: Current User    Tobacco comment: Patient reports he is Vaping    Substance Use Topics    Alcohol use:  Yes     Alcohol/week: 6.0 standard drinks     Types: 6 Cans of beer per week      Family History   Problem Relation Age of Onset    Diabetes Mother         type 2    Hypertension Mother     Heart Disease Mother         CHF    Hypertension Father     Heart Disease Father 48        MI       Review of Systems:     10 system reviewed and they are negative but as in interval history    Objective:   Vital Signs:  Visit Vitals  BP (!) 121/59   Pulse (!) 50   Temp 98.2 °F (36.8 °C)   Resp 13   Ht 6' (1.829 m)   Wt 114.4 kg (252 lb 3.3 oz)   SpO2 98%   BMI 34.21 kg/m²      O2 Device: None (Room air) Temp (24hrs), Av.9 °F (36.6 °C), Min:97.4 °F (36.3 °C), Max:98.2 °F (36.8 °C)           Intake/Output:     Intake/Output Summary (Last 24 hours) at 2021 0935  Last data filed at 2021 0700  Gross per 24 hour   Intake 2940 ml   Output 4925 ml   Net -1985 ml       Physical Exam:    General:   Alert, cooperative, no distress, appears stated age. Facial asymmetry with diplopia. Dysmetria LUE. Neck:  Symmetrical, trachea midline, no JVD or carotid bruit. Back:    No CVA tenderness to percussion. Lungs:    Clear to auscultation bilaterally. Heart:   Regular rate and rhythm. S1, S2 normal, without murmur, click, rub or gallop. Abdomen:    Normoactive bowel sounds. Soft, non-tender, no masses or organomegaly. Extremities:  Atraumatic, no cyanosis positive edema               Neurologic:  Speech normal normal strength.  Face is symmetrical on the left, occasional nystagmus, occasional deviation of left eye inward         LABS AND  DATA: Personally reviewed  Recent Labs     07/30/21  0426 07/29/21  0658   WBC 10.1 12.0*   HGB 14.2 14.3   HCT 43.3 42.1    262     Recent Labs     07/30/21  1057 07/30/21  0426 07/29/21  0658   *  --  135*   K 4.0  --  4.4     --  104   CO2 28  --  25   BUN 18  --  20   CREA 0.84  --  0.87   *  --  115*   CA 8.5  --  8.5   MG  --  2.4 2.4   PHOS  --  3.4 3.7     No results for input(s): AP, TBIL, TP, ALB, GLOB, AML, LPSE in the last 72 hours. No lab exists for component: SGOT, GPT, AMYP  No results for input(s): INR, PTP, APTT, INREXT, INREXT in the last 72 hours. No results for input(s): PHI, PCO2I, PO2I, FIO2I in the last 72 hours. No results for input(s): CPK, CKMB, TROIQ, BNPP in the last 72 hours.     Hemodynamics:   PAP:   CO:     Wedge:   CI:     CVP:    SVR:       PVR:       Ventilator Settings:  Mode Rate Tidal Volume Pressure FiO2 PEEP                    Peak airway pressure:      Minute ventilation:          MEDS: Reviewed    Chest X-Ray:  CXR Results  (Last 48 hours)    None            Assessment and Plan:   -Arteriovenous malformation left superior cerebellar peduncle:  -Acute subarachnoid hemorrhage:  -Difficult to control hypertension:        -Appreciate neurology recommendation management:              -Maintain systolic blood pressure below 140              -Day 12/21 of nimodipine              -Maintain euvolemia  -Blood pressure better controlled after adding oral clonidine. Place metoprolol               on hold due to bradycardia  -Prophylactic Keppra, Daily TCD's  -On multiple antihypertensive medication, adding hydrochlorothiazide to amlodipine, lisinopril, clonidine and hydralazine.     DVT GI prophylaxis.  Neurochecks as ordered by neurology. DISPOSITION  Stay in ICU    CRITICAL CARE CONSULTANT NOTE  I had a face to face encounter with the patient, reviewed and interpreted patient data including clinical events, labs, images, vital signs, I/O's, and examined patient. I have discussed the case and the plan and management of the patient's care with the consulting services, the bedside nurses and the respiratory therapist.      NOTE OF PERSONAL INVOLVEMENT IN CARE   This patient has a high probability of imminent, clinically significant deterioration, which requires the highest level of preparedness to intervene urgently. I participated in the decision-making and personally managed or directed the management of the following life and organ supporting interventions that required my frequent assessment to treat or prevent imminent deterioration. I personally spent 30 minutes of critical care time. This is time spent at this critically ill patient's bedside actively involved in patient care as well as the coordination of care and discussions with the patient's family. This does not include any procedural time which has been billed separately.     [facility]  7/31/2021

## 2021-07-31 NOTE — PROGRESS NOTES
Exam stable, minimal frontal headache  NICOM today, ? +/-.   Still at risk for spasm if depleted intravascular  IVC ultrasound at bedside to solve any clinical discordance

## 2021-08-01 ENCOUNTER — APPOINTMENT (OUTPATIENT)
Dept: VASCULAR SURGERY | Age: 61
DRG: 064 | End: 2021-08-01
Attending: NURSE PRACTITIONER
Payer: COMMERCIAL

## 2021-08-01 PROBLEM — I67.848 CEREBRAL VASOSPASM: Status: ACTIVE | Noted: 2021-08-01

## 2021-08-01 LAB
ANION GAP SERPL CALC-SCNC: 3 MMOL/L (ref 5–15)
BUN SERPL-MCNC: 17 MG/DL (ref 6–20)
BUN/CREAT SERPL: 21 (ref 12–20)
CALCIUM SERPL-MCNC: 8 MG/DL (ref 8.5–10.1)
CHLORIDE SERPL-SCNC: 103 MMOL/L (ref 97–108)
CO2 SERPL-SCNC: 28 MMOL/L (ref 21–32)
CREAT SERPL-MCNC: 0.81 MG/DL (ref 0.7–1.3)
ERYTHROCYTE [DISTWIDTH] IN BLOOD BY AUTOMATED COUNT: 13.5 % (ref 11.5–14.5)
GLUCOSE BLD STRIP.AUTO-MCNC: 102 MG/DL (ref 65–117)
GLUCOSE BLD STRIP.AUTO-MCNC: 139 MG/DL (ref 65–117)
GLUCOSE BLD STRIP.AUTO-MCNC: 158 MG/DL (ref 65–117)
GLUCOSE BLD STRIP.AUTO-MCNC: 158 MG/DL (ref 65–117)
GLUCOSE SERPL-MCNC: 139 MG/DL (ref 65–100)
HCT VFR BLD AUTO: 39.7 % (ref 36.6–50.3)
HGB BLD-MCNC: 13.8 G/DL (ref 12.1–17)
MAGNESIUM SERPL-MCNC: 2.1 MG/DL (ref 1.6–2.4)
MCH RBC QN AUTO: 28.9 PG (ref 26–34)
MCHC RBC AUTO-ENTMCNC: 34.8 G/DL (ref 30–36.5)
MCV RBC AUTO: 83.2 FL (ref 80–99)
NRBC # BLD: 0 K/UL (ref 0–0.01)
NRBC BLD-RTO: 0 PER 100 WBC
PHOSPHATE SERPL-MCNC: 3.2 MG/DL (ref 2.6–4.7)
PLATELET # BLD AUTO: 256 K/UL (ref 150–400)
PMV BLD AUTO: 10.5 FL (ref 8.9–12.9)
POTASSIUM SERPL-SCNC: 4.1 MMOL/L (ref 3.5–5.1)
RBC # BLD AUTO: 4.77 M/UL (ref 4.1–5.7)
SERVICE CMNT-IMP: ABNORMAL
SERVICE CMNT-IMP: NORMAL
SODIUM SERPL-SCNC: 134 MMOL/L (ref 136–145)
WBC # BLD AUTO: 10.7 K/UL (ref 4.1–11.1)

## 2021-08-01 PROCEDURE — 85027 COMPLETE CBC AUTOMATED: CPT

## 2021-08-01 PROCEDURE — 93886 INTRACRANIAL COMPLETE STUDY: CPT

## 2021-08-01 PROCEDURE — 74011250637 HC RX REV CODE- 250/637: Performed by: NURSE PRACTITIONER

## 2021-08-01 PROCEDURE — 74011250637 HC RX REV CODE- 250/637: Performed by: HEALTH CARE PROVIDER

## 2021-08-01 PROCEDURE — 74011250637 HC RX REV CODE- 250/637: Performed by: INTERNAL MEDICINE

## 2021-08-01 PROCEDURE — 36415 COLL VENOUS BLD VENIPUNCTURE: CPT

## 2021-08-01 PROCEDURE — APPSS30 APP SPLIT SHARED TIME 16-30 MINUTES: Performed by: NURSE PRACTITIONER

## 2021-08-01 PROCEDURE — 65610000006 HC RM INTENSIVE CARE

## 2021-08-01 PROCEDURE — 74011636637 HC RX REV CODE- 636/637: Performed by: NURSE PRACTITIONER

## 2021-08-01 PROCEDURE — 83735 ASSAY OF MAGNESIUM: CPT

## 2021-08-01 PROCEDURE — 74011250636 HC RX REV CODE- 250/636: Performed by: NURSE PRACTITIONER

## 2021-08-01 PROCEDURE — 74011250637 HC RX REV CODE- 250/637: Performed by: ANESTHESIOLOGY

## 2021-08-01 PROCEDURE — 84100 ASSAY OF PHOSPHORUS: CPT

## 2021-08-01 PROCEDURE — 82962 GLUCOSE BLOOD TEST: CPT

## 2021-08-01 PROCEDURE — 80048 BASIC METABOLIC PNL TOTAL CA: CPT

## 2021-08-01 PROCEDURE — 99233 SBSQ HOSP IP/OBS HIGH 50: CPT | Performed by: RADIOLOGY

## 2021-08-01 RX ORDER — MINOXIDIL 2.5 MG/1
5 TABLET ORAL 2 TIMES DAILY
Status: DISCONTINUED | OUTPATIENT
Start: 2021-08-01 | End: 2021-08-03

## 2021-08-01 RX ORDER — HYDRALAZINE HYDROCHLORIDE 50 MG/1
50 TABLET, FILM COATED ORAL 3 TIMES DAILY
Status: DISCONTINUED | OUTPATIENT
Start: 2021-08-01 | End: 2021-08-04

## 2021-08-01 RX ORDER — CHLORTHALIDONE 25 MG/1
25 TABLET ORAL DAILY
Status: DISCONTINUED | OUTPATIENT
Start: 2021-08-02 | End: 2021-08-10 | Stop reason: HOSPADM

## 2021-08-01 RX ADMIN — LEVETIRACETAM 500 MG: 500 TABLET ORAL at 18:45

## 2021-08-01 RX ADMIN — SODIUM CHLORIDE 500 ML: 9 INJECTION, SOLUTION INTRAVENOUS at 00:23

## 2021-08-01 RX ADMIN — CLONIDINE HYDROCHLORIDE 0.4 MG: 0.2 TABLET ORAL at 07:04

## 2021-08-01 RX ADMIN — SODIUM CHLORIDE 150 ML/HR: 9 INJECTION, SOLUTION INTRAVENOUS at 11:20

## 2021-08-01 RX ADMIN — HYDRALAZINE HYDROCHLORIDE 50 MG: 50 TABLET, FILM COATED ORAL at 16:42

## 2021-08-01 RX ADMIN — LISINOPRIL 40 MG: 20 TABLET ORAL at 08:10

## 2021-08-01 RX ADMIN — MINOXIDIL 5 MG: 2.5 TABLET ORAL at 18:45

## 2021-08-01 RX ADMIN — NIMODIPINE 60 MG: 30 CAPSULE, LIQUID FILLED ORAL at 20:17

## 2021-08-01 RX ADMIN — ACETAMINOPHEN 650 MG: 325 TABLET ORAL at 07:04

## 2021-08-01 RX ADMIN — DEXAMETHASONE SODIUM PHOSPHATE 2 MG: 4 INJECTION, SOLUTION INTRAMUSCULAR; INTRAVENOUS at 20:17

## 2021-08-01 RX ADMIN — CLONIDINE HYDROCHLORIDE 0.3 MG: 0.2 TABLET ORAL at 14:32

## 2021-08-01 RX ADMIN — CLONIDINE HYDROCHLORIDE 0.3 MG: 0.2 TABLET ORAL at 23:11

## 2021-08-01 RX ADMIN — MINOXIDIL 5 MG: 2.5 TABLET ORAL at 08:10

## 2021-08-01 RX ADMIN — NIMODIPINE 60 MG: 30 CAPSULE, LIQUID FILLED ORAL at 08:10

## 2021-08-01 RX ADMIN — NIMODIPINE 60 MG: 30 CAPSULE, LIQUID FILLED ORAL at 16:00

## 2021-08-01 RX ADMIN — NIMODIPINE 60 MG: 30 CAPSULE, LIQUID FILLED ORAL at 00:21

## 2021-08-01 RX ADMIN — INSULIN LISPRO 2 UNITS: 100 INJECTION, SOLUTION INTRAVENOUS; SUBCUTANEOUS at 16:30

## 2021-08-01 RX ADMIN — NIMODIPINE 60 MG: 30 CAPSULE, LIQUID FILLED ORAL at 03:42

## 2021-08-01 RX ADMIN — AMLODIPINE BESYLATE 10 MG: 5 TABLET ORAL at 08:10

## 2021-08-01 RX ADMIN — SODIUM CHLORIDE 150 ML/HR: 9 INJECTION, SOLUTION INTRAVENOUS at 03:40

## 2021-08-01 RX ADMIN — HYDRALAZINE HYDROCHLORIDE 100 MG: 50 TABLET, FILM COATED ORAL at 08:10

## 2021-08-01 RX ADMIN — LEVETIRACETAM 500 MG: 500 TABLET ORAL at 08:10

## 2021-08-01 RX ADMIN — NIMODIPINE 60 MG: 30 CAPSULE, LIQUID FILLED ORAL at 11:23

## 2021-08-01 RX ADMIN — HYDROCHLOROTHIAZIDE 25 MG: 25 TABLET ORAL at 08:10

## 2021-08-01 RX ADMIN — DOCUSATE SODIUM 50 MG AND SENNOSIDES 8.6 MG 1 TABLET: 8.6; 5 TABLET, FILM COATED ORAL at 08:10

## 2021-08-01 RX ADMIN — DEXAMETHASONE SODIUM PHOSPHATE 2 MG: 4 INJECTION, SOLUTION INTRAMUSCULAR; INTRAVENOUS at 08:10

## 2021-08-01 RX ADMIN — HYDRALAZINE HYDROCHLORIDE 50 MG: 50 TABLET, FILM COATED ORAL at 23:11

## 2021-08-01 NOTE — PROGRESS NOTES
[facility]    ICU TEAM Progress Note    Name: Jl Baca   : 1960   MRN: [id number]   Date: 2021      I  Subjective:   Progress Note: 2021      Reason for ICU Admission: Cerebellar arteriovenous malformation, subarachnoid hemorrhage     Interval history:  58-year-old male with hypertension admitted to our hospital as a transfer from outside facility on  with subarachnoid hemorrhage  Overnight Events:   : No acute event, off nicardipine, minoxidil was added. : No overnight events  : No acute overnight events  : No acute overnight events  : Better blood pressure control with adding clonidine orally, hold beta-blocker due to bradycardia [asymptomatic], off nicardipine drip. Did well with diuresis and he is negative about 1600 cc. Still positive about 19 L since admission and that is reflected in his weight as well. :No acute event, did well with gentle diuresis, -600 cc however still positive about 20 L since admission.  Continue to require drips to control blood pressure to goal.      Active Problem List:     Problem List  Date Reviewed: 2021        Codes Class    Celiac artery stenosis (HCC) (Chronic) ICD-10-CM: I77.4  ICD-9-CM: 447. 4 Chronic        Vasogenic cerebral edema (HCC) ICD-10-CM: G93.6  ICD-9-CM: 348. 5 Acute        Hypertensive emergency ICD-10-CM: I16.1  ICD-9-CM: 401.9 Acute        Malignant hypertension ICD-10-CM: I10  ICD-9-CM: 401.0 Acute        * (Principal) Cerebral arteriovenous malformation (AVM) ICD-10-CM: Q28.2  ICD-9-CM: 747.81 Acute    Overview Signed 2021  7:03 AM by Alejandra Lara MD     ([id number])- CT (Brain): There is an AVM in the superior cerebellum with multiple aneurysms. One measures 4 mm. Another measures 4 mm within the superior cerebellum.              Acute hypokalemia ICD-10-CM: E87.6  ICD-9-CM: 276.8 Acute        SAH (subarachnoid hemorrhage) ([facility] [address] ICD-10-CM: I60.9  ICD-9-CM: 561 Essential hypertension ICD-10-CM: I10  ICD-9-CM: 401.9         Type 2 diabetes mellitus without complication, without long-term current use of insulin (HCC) ICD-10-CM: E11.9  ICD-9-CM: 250.00         Arthritis of hip ICD-10-CM: M16.10  ICD-9-CM: 716.95               Past Medical History:      has a past medical history of Acute hypokalemia (7/20/2021), Celiac artery stenosis ([facility] [address] (7/21/2021), Cerebral arteriovenous malformation (AVM) (7/20/2021), Diabetes ([facility] [address] ED (erectile dysfunction), HTN (hypertension), Hypertension, Hypertensive emergency (7/21/2021), Impaired fasting glucose, Malignant hypertension (7/20/2021), and Vasogenic cerebral edema ([facility] [address] (7/21/2021). Past Surgical History:      has a past surgical history that includes hx tonsil and adenoidectomy. Home Medications:     Prior to Admission medications    Medication Sig Start Date End Date Taking? Authorizing Provider   lancets misc by Does Not Apply route. Provider, Historical   metFORMIN (GLUCOPHAGE) 500 mg tablet Take 1 Tab by mouth daily (with breakfast). 3/22/19   Hemanth Chase MD   lisinopril (PRINIVIL, ZESTRIL) 40 mg tablet Take 1 Tab by mouth daily. 3/22/19   Hemanth Chase MD   hydrALAZINE (APRESOLINE) 25 mg tablet Take 1 Tab by mouth three (3) times daily. 3/22/19   Hemanth Chase MD   amLODIPine (NORVASC) 10 mg tablet Take 1 Tab by mouth daily. 3/22/19   Hemanth Chase MD   oxyCODONE-acetaminophen (PERCOCET 7.5) 7.5-325 mg per tablet Take 1-2 Tabs by mouth every four (4) hours as needed for Pain. Max Daily Amount: 12 Tabs. 3/20/15   Grace Ravi PA   traMADol (ULTRAM) 50 mg tablet Take 1 Tab by mouth every six (6) hours as needed for Pain. Max Daily Amount: 200 mg. 3/20/15   MELANI Sandhu   bisacodyl (DULCOLAX, BISACODYL,) 10 mg suppository Insert 10 mg into rectum daily. 3/20/15   MELANI Sandhu   promethazine (PHENERGAN) 25 mg tablet Take 1 Tab by mouth every six (6) hours as needed for Nausea.  3/20/15   Trav MELANI Tovar   aspirin (ASPIRIN) 325 mg tablet Take 1 Tab by mouth two (2) times a day. 3/20/15   MELANI Summers   metFORMIN (GLUCOPHAGE) 500 mg tablet Take 500 mg by mouth Daily (before breakfast). Provider, Historical   lisinopril (PRINIVIL, ZESTRIL) 40 mg tablet Take 40 mg by mouth daily (after breakfast). Provider, Historical   hydrochlorothiazide (HYDRODIURIL) 25 mg tablet Take 25 mg by mouth daily (after breakfast). Provider, Historical   amLODIPine (NORVASC) 10 mg tablet Take 10 mg by mouth daily (with breakfast). Provider, Historical   hydrALAZINE (APRESOLINE) 25 mg tablet Take 25 mg by mouth three (3) times daily. Provider, Historical       Allergies/Social/Family History:     No Known Allergies   Social History     Tobacco Use    Smoking status: Former Smoker     Packs/day: 2.00     Years: 20.00     Pack years: 40.00     Types: Cigarettes     Quit date: 2015     Years since quittin.4    Smokeless tobacco: Current User    Tobacco comment: Patient reports he is Vaping    Substance Use Topics    Alcohol use:  Yes     Alcohol/week: 6.0 standard drinks     Types: 6 Cans of beer per week      Family History   Problem Relation Age of Onset    Diabetes Mother         type 2    Hypertension Mother     Heart Disease Mother         CHF    Hypertension Father     Heart Disease Father 48        MI       Review of Systems:     10 system reviewed and are negative but as in interval history    Objective:   Vital Signs:  Visit Vitals  /65   Pulse (!) 55   Temp 97.9 °F (36.6 °C)   Resp 15   Ht 6' (1.829 m)   Wt 113.6 kg (250 lb 7.1 oz)   SpO2 99%   BMI 33.97 kg/m²      O2 Device: None (Room air) Temp (24hrs), Av.8 °F (36.6 °C), Min:97.3 °F (36.3 °C), Max:98 °F (36.7 °C)           Intake/Output:     Intake/Output Summary (Last 24 hours) at 2021 1029  Last data filed at 2021 0800  Gross per 24 hour   Intake 3620 ml   Output 4150 ml   Net -530 ml       Physical Exam:    General:   Alert, cooperative, no distress, appears stated age. Facial asymmetry with diplopia. Dysmetria LUE. Neck:  Symmetrical, trachea midline, no JVD or carotid bruit. Back:    No CVA tenderness to percussion. Lungs:    Clear to auscultation bilaterally. Heart:   Regular rate and rhythm. S1, S2 normal, without murmur, click, rub or gallop. Abdomen:    Normoactive bowel sounds. Soft, non-tender, no masses or organomegaly. Extremities:  Atraumatic, no cyanosis positive edema   Neurologic:  Speech normal normal strength.  Left facial droop, negative drift, at times I could appreciate disconjugate gaze         LABS AND  DATA: Personally reviewed  Recent Labs     08/01/21  0351 07/31/21  2254   WBC 10.7 10.9   HGB 13.8 13.0   HCT 39.7 37.7    244     Recent Labs     08/01/21  0351 07/31/21  2254 07/30/21  1057 07/30/21  0426   * 134*   < >  --    K 4.1 4.1   < >  --     102   < >  --    CO2 28 28   < >  --    BUN 17 16   < >  --    CREA 0.81 0.70   < >  --    * 114*   < >  --    CA 8.0* 7.9*   < >  --    MG 2.1  --   --  2.4   PHOS 3.2  --   --  3.4    < > = values in this interval not displayed. No results for input(s): AP, TBIL, TP, ALB, GLOB, AML, LPSE in the last 72 hours. No lab exists for component: SGOT, GPT, AMYP  No results for input(s): INR, PTP, APTT, INREXT in the last 72 hours. No results for input(s): PHI, PCO2I, PO2I, FIO2I in the last 72 hours. No results for input(s): CPK, CKMB, TROIQ, BNPP in the last 72 hours.     Hemodynamics:   PAP:   CO:     Wedge:   CI:     CVP:    SVR:       PVR:       Ventilator Settings:  Mode Rate Tidal Volume Pressure FiO2 PEEP                    Peak airway pressure:      Minute ventilation:          MEDS: Reviewed    Chest X-Ray:  CXR Results  (Last 48 hours)    None            Assessment and Plan:   -Arteriovenous malformation left superior cerebellar peduncle:  -Acute subarachnoid hemorrhage:  -Difficult to control hypertension:        -Appreciate neurology recommendation management:              -[id number] systolic blood pressure below 140              -Day 13/21 of nimodipine              -Maintain euvolemia [starting from July 25, cumulatively patient is now even balance, however remains about 14 L positive since admission] he is now on 150 cc of normal saline. Serum osmolality on the 31st was within normal.  -Blood pressure better controlled he is on clonidine and minoxidil lisinopril amlodipine hydrochlorothiazide   -Prophylactic Keppra,  TCD's per neurology     DVT GI prophylaxis.  Neurochecks as ordered by neurology [no acute 4-hour]. DISPOSITION  Stay in ICU    CRITICAL CARE CONSULTANT NOTE  I had a face to face encounter with the patient, reviewed and interpreted patient data including clinical events, labs, images, vital signs, I/O's, and examined patient. I have discussed the case and the plan and management of the patient's care with the consulting services, the bedside nurses and the respiratory therapist.      NOTE OF PERSONAL INVOLVEMENT IN CARE   This patient has a high probability of imminent, clinically significant deterioration, which requires the highest level of preparedness to intervene urgently. I participated in the decision-making and personally managed or directed the management of the following life and organ supporting interventions that required my frequent assessment to treat or prevent imminent deterioration. I personally spent 40 minutes of critical care time. This is time spent at this critically ill patient's bedside actively involved in patient care as well as the coordination of care and discussions with the patient's family. This does not include any procedural time which has been billed separately. Jose Torres M.D.   Staff Intensivist/Pulmonologist  [facility]  8/1/2021

## 2021-08-01 NOTE — PROGRESS NOTES
SAH DAY #14   No events overnight  Intermittently complains of bilateral hands, feet and left facial \"numbness\" that may correlate with anxiety per NP  Left facial and left CN6 palsy stable  Improved BP (713-065F systolic) requiring multiple meds  We discussed his AVM and hemorrhage again today. He seems to have good insight now. TCDs reviewed  NICOM today please. Goal euvolemia. Not quite out of the spasm window yet. I examined this patient with Ari Carrasco NP.   See separate note

## 2021-08-01 NOTE — PROGRESS NOTES
Neurocritical Care Brief Progress Note:    42-year-old male with SAH with cerebellar AVM with multiple feeders coming off the left PCA, left anterior choroidal and left SCA with a large single draining vein that drains into the vein of Gaston. 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 Nursing has not been able to draw labs today. Midline was placed by Intensivist and labs sent. Physical Exam:  Gen: NAD, calm, cooperative. Has eyepatch over left eye. Neuro: A&Ox4. Follows commands. Speech clear. Affect normal. PERRL, 3 mm bilaterally. Visual fields full to confrontation. However, has stated double vision at a distance. Mild disconjugate gaze present at times. EOMI. Occasional twitching of right eye note again tonight. Significant left lower facial droop with stated left facial numbness noted to left check and forehead on light touch. Alissa Brittle Beni spontaneously. Strength 5/5 in UE and LE BL. Negative drift. Bulk and tone normal. No involuntary movements. Gait deferred. No ataxia. Skin: Warm, dry, color appropriate for ethnicity. PLAN:Continue current plan per NIS. Patient in need of sleep. Will allow q 4 hour neuro checks at night.        Erica Hyatt NP  Neurocritical Care Nurse Practitioner  [phone]

## 2021-08-01 NOTE — PROGRESS NOTES
1930: Bedside shift change report given to Paty Samm Mora (oncoming nurse) by Shazia Santa RN (offgoing nurse). Report included the following information SBAR, Intake/Output, MAR, Recent Results, Cardiac Rhythm Sinus juan, Alarm Parameters  and Dual Neuro Assessment. 2300: Midline placed at bedside by NP Gabe    0030: Patient's -110. Spoke to ECTOR Duggan to confirm administration of scheduled Nimotop. Received orders to transfuse 500ml NS bolus. 0730: Bedside shift [facility] by Bharat Reeder RN (offgoing nurse). Report included the following information SBAR, Intake/Output, MAR, Recent Results, Cardiac Rhythm sinus juan and Alarm Parameters . SHIFT SUMMARY  ECTOR Duggan changed neuro exams to be Q4 overnight when patient sleeping. Patient alert and oriented, MARTÍNEZ equally, strong. Patient still has L sided facial drooping and speech is clear with baseline stuttering. Patient c/o headache, 3 out of 10, treated with PRN 650mg Tylenol.

## 2021-08-01 NOTE — PROGRESS NOTES
Neurointerventional Surgery Progress Note  Vishnu Linsey, AGACNP-BC  Neurocritical Care NP      Admit Date: 7/20/2021   LOS: 12 days        Daily Progress Note: 8/1/2021    S/P: POD 12 status post diagnostic cerebral angiogram    HPI: Jl Baca is a 58-year-old male with a past medical history significant for hypertension and diabetes type 2 who initially presented to [facility] ED on 7/19/2021 for acute onset of dizziness, headache, left-sided hearing loss, and left-sided weakness. Upon arrival to the ED his blood pressure was noted to be 265/120 per review of notes. A code stroke was called and CT of the head showed an acute subarachnoid hemorrhage anterior to the sarahi. CTA of the head and neck showed an AVM in the left superior cerebellar peduncle, with the dominant drainage into the vein of Gaston. No large vessel occlusion, no hemodynamically significant stenosis. Redemonstrated subarachnoid hemorrhage in the basilar cisterns. He was started on a Cardene drip and was also given IV hydralazine and labetalol. He was then transferred to [facility] for a higher level of care. NIS was consulted and patient underwent a diagnostic cerebral angiogram on 7/20/2021. The angiogram showed evidence of a cerebral AVM with multiple feeders coming of the left PCA, left anterior choroidal and left SCA with a large single draining vein that drains into the vein of Villanueva. 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 Subjective:   Patient had midline catheter placed last night for better IV access. His BP is stable this morning. His systolic blood pressure did drop to the low 100s overnight and he received a normal saline bolus per review of notes and RN report. He did not require reinitiation of Cardene drip overnight per report. He does complain of a 3 out of 10 pressure like headache behind his eyes that started this morning. He did receive some Tylenol for his symptoms.   He denies any nausea, vomiting, tingling, chest pain, shortness of breath, speech difficulty, or coordination issues. He does still endorse double vision, dizziness with movement, left facial numbness, and left facial weakness that is unchanged. He does have some issues with his balance, but it has improved. He does have stuttering with his speech at baseline.        Current Facility-Administered Medications   Medication Dose Route Frequency Provider Last Rate Last Admin    cloNIDine HCL (CATAPRES) tablet 0.3 mg  0.3 mg Oral Q8H Hilario Saul MD   0.3 mg at 08/01/21 1432    [START ON 8/2/2021] chlorthalidone (HYGROTON) tablet 25 mg  25 mg Oral DAILY Hilario Saul MD        minoxidiL (LONITEN) tablet 5 mg  5 mg Oral BID Hilario Saul MD        hydrALAZINE (APRESOLINE) tablet 50 mg  50 mg Oral TID Hilario Saul MD        dexamethasone (DECADRON) 4 mg/mL injection 2 mg  2 mg IntraVENous Q12H Nasir Grant NP   2 mg at 08/01/21 0810    [Held by provider] metoprolol tartrate (LOPRESSOR) tablet 100 mg  100 mg Oral Q12H Vickey Horvath MD   100 mg at 07/27/21 2156    0.9% sodium chloride infusion  150 mL/hr IntraVENous CONTINUOUS David Lino  mL/hr at 08/01/21 1120 150 mL/hr at 08/01/21 1120    senna-docusate (PERICOLACE) 8.6-50 mg per tablet 1 Tablet  1 Tablet Oral DAILY Nasir Grant NP   1 Tablet at 08/01/21 0810    polyethylene glycol (MIRALAX) packet 17 g  17 g Oral DAILY PRN Quyen Gillis NP   17 g at 07/25/21 0906    bisacodyL (DULCOLAX) tablet 10 mg  10 mg Oral DAILY PRN Nasir Grant NP        labetaloL (NORMODYNE;TRANDATE) injection 10 mg  10 mg IntraVENous Q4H PRN Nasir Grant NP   10 mg at 07/30/21 0521    niCARdipine (CARDENE) 25 mg in 0.9% sodium chloride 250 mL infusion  0-15 mg/hr IntraVENous TITRATE Nasir Grant NP   Stopped at 07/27/21 2338    amLODIPine (NORVASC) tablet 10 mg  10 mg Oral DAILY Herberth Morales DO   10 mg at 08/01/21 0810    hydrALAZINE (APRESOLINE) 20 mg/mL injection 20 mg  20 mg IntraVENous Q6H PRN Herberth Morales DO   20 mg at 21 1612    insulin lispro (HUMALOG) injection   SubCUTAneous AC&HS Saadia Clarke NP   2 Units at 21 1744    glucose chewable tablet 16 g  4 Tablet Oral PRN Saadia Clarke NP        dextrose (D50W) injection syrg 12.5-25 g  12.5-25 g IntraVENous PRN Saadia Clarke NP        glucagon (GLUCAGEN) injection 1 mg  1 mg IntraMUSCular PRN Saadia Clarke NP        ondansetron [facility]) injection 4 mg  4 mg IntraVENous Q6H PRN ANGELINE Munson        naloxone [facility]) injection 0.4 mg  0.4 mg IntraVENous PRN ANGELINE Munson        acetaminophen (TYLENOL) tablet 650 mg  650 mg Oral Q4H PRN ANGELINE Munson   650 mg at 21 5154    Or    acetaminophen (TYLENOL) solution 650 mg  650 mg Per NG tube Q4H PRN ANGELINE Munson        Or    acetaminophen (TYLENOL) suppository 650 mg  650 mg Rectal Q4H PRN ANGELINE Munson        niMODipine (NIMOTOP) capsule 60 mg  60 mg Oral Q4H Gabbi Gallegos NP   60 mg at 21 1123    levETIRAcetam (KEPPRA) tablet 500 mg  500 mg Oral BID Lack, Mercy Mortimer, NP   500 mg at 21 0810    lisinopriL (PRINIVIL, ZESTRIL) tablet 40 mg  40 mg Oral DAILY Aure Cassidy NP   40 mg at 21 0810        No Known Allergies    Review of Systems:  Pertinent items are noted in the History of Present Illness. Objective:     Vital signs  Temp (24hrs), Av.9 °F (36.6 °C), Min:97.7 °F (36.5 °C), Max:98 °F (36.7 °C)   701 - 1900  In: 1524 [P.O.:240; I.V.:1050]  Out: 4605 [Urine:1375]  1901 - 08/01 0700  In: 5910 [P.O.:420;  I.V.:5490]  Out: 7400 [Urine:7400]    Visit Vitals  BP (!) 114/51   Pulse 63   Temp 98 °F (36.7 °C)   Resp 17   Ht 6' (1.829 m)   Wt 250 lb 7.1 oz (113.6 kg)   SpO2 99%   BMI 33.97 kg/m²      O2 Device: None (Room air)     Pain control  Pain Assessment  Pain Scale 1: Numeric (0 - 10)  Pain Intensity 1: 0  Pain Onset 1:  (denied)  Pain Location 1: Head  Pain Orientation 1: Left  Pain Description 1: Pressure  Pain Intervention(s) 1: Medication (see MAR)    PT/OT  Gait     Gait  Base of Support: Widened  Speed/Rita: Slow, Shuffled  Step Length: Right shortened, Left shortened  Gait Abnormalities: Ataxic, Altered arm swing, Trunk sway increased, Shuffling gait  Ambulation - Level of Assistance: Contact guard assistance  Distance (ft): 45 Feet (ft)  Assistive Device: Gait belt             Vitals:    08/01/21 1200 08/01/21 1300 08/01/21 1400 08/01/21 1433   BP: (!) 130/57 (!) 123/52 (!) 94/51 (!) 114/51   Pulse: 65 68 61 63   Resp: 18 17 14 17   Temp: 98 °F (36.7 °C)      SpO2: 99%      Weight:       Height:            Physical Exam:  GENERAL: alert, cooperative, no distress, appears stated age  LUNG: clear to auscultation bilaterally  HEART: regular rate and rhythm, S1, S2 normal, no murmur, click, rub or gallop  EXTREMITIES:  extremities normal, atraumatic, no cyanosis, trace peripheral edema in BUE, +1 pitting edema in BLE, Posterior tibial pulses +1 bilaterally, +2 pedal pulses bilaterally   SKIN: Skin cool to touch. Appropriate for ethnicity. Right groin site clean, dry, and intact. No hematoma, bruising, or bleeding noted. Neurologic Exam:  Mental Status:  Alert and oriented x 4. Appropriate affect, mood and behavior. Language:    Normal fluency, repetition, comprehension and naming. Cranial Nerves:        Pupils equal, round and reactive to light. Visual fields full to confrontation. Dysconjugate gaze intermittently with EOMs, otherwise intact. Has some subtle issues with looking completely to the left with left eye. Mild subtle nystagmus when looking to the left, but it has improved. Decreased sensation on the left side of face. Left facial asymmetry     Tongue protrudes to midline, palate elevates symmetrically.       No dysarthria. Motor:    No pronator drift. Bulk and tone normal.      5/5 power in all extremities proximally and distally. No involuntary movements. Sensation:    Decreased sensation on the left side of face, otherwise sensation intact to light touch. No neglect. Coordination & Gait: Intermittent mild dysmetria with FTN bilaterally, but it has significantly improved. HTS intact bilaterally. Gait deferred.      24 hour results:    Recent Results (from the past 24 hour(s))   GLUCOSE, POC    Collection Time: 07/31/21  4:21 PM   Result Value Ref Range    Glucose (POC) 188 (H) 65 - 117 mg/dL    Performed by Price Singh RN    GLUCOSE, POC    Collection Time: 07/31/21  9:47 PM   Result Value Ref Range    Glucose (POC) 88 65 - 117 mg/dL    Performed by Ashely Oakes, SERUM/PLASMA    Collection Time: 07/31/21 10:54 PM   Result Value Ref Range    Osmolality, serum/plasma 280 mOsm/kg H2O   CBC W/O DIFF    Collection Time: 07/31/21 10:54 PM   Result Value Ref Range    WBC 10.9 4.1 - 11.1 K/uL    RBC 4.56 4.10 - 5.70 M/uL    HGB 13.0 12.1 - 17.0 g/dL    HCT 37.7 36.6 - 50.3 %    MCV 82.7 80.0 - 99.0 FL    MCH 28.5 26.0 - 34.0 PG    MCHC 34.5 30.0 - 36.5 g/dL    RDW 13.6 11.5 - 14.5 %    PLATELET 685 438 - 084 K/uL    MPV 10.2 8.9 - 12.9 FL    NRBC 0.0 0  WBC    ABSOLUTE NRBC 0.00 0.00 - 9.69 K/uL   METABOLIC PANEL, BASIC    Collection Time: 07/31/21 10:54 PM   Result Value Ref Range    Sodium 134 (L) 136 - 145 mmol/L    Potassium 4.1 3.5 - 5.1 mmol/L    Chloride 102 97 - 108 mmol/L    CO2 28 21 - 32 mmol/L    Anion gap 4 (L) 5 - 15 mmol/L    Glucose 114 (H) 65 - 100 mg/dL    BUN 16 6 - 20 MG/DL    Creatinine 0.70 0.70 - 1.30 MG/DL    BUN/Creatinine ratio 23 (H) 12 - 20      GFR est AA >60 >60 ml/min/1.73m2    GFR est non-AA >60 >60 ml/min/1.73m2    Calcium 7.9 (L) 8.5 - 10.1 MG/DL   MAGNESIUM    Collection Time: 08/01/21  3:51 AM   Result Value Ref Range    Magnesium 2.1 1.6 - 2.4 mg/dL   PHOSPHORUS    Collection Time: 08/01/21  3:51 AM   Result Value Ref Range    Phosphorus 3.2 2.6 - 4.7 MG/DL   CBC W/O DIFF    Collection Time: 08/01/21  3:51 AM   Result Value Ref Range    WBC 10.7 4.1 - 11.1 K/uL    RBC 4.77 4.10 - 5.70 M/uL    HGB 13.8 12.1 - 17.0 g/dL    HCT 39.7 36.6 - 50.3 %    MCV 83.2 80.0 - 99.0 FL    MCH 28.9 26.0 - 34.0 PG    MCHC 34.8 30.0 - 36.5 g/dL    RDW 13.5 11.5 - 14.5 %    PLATELET 234 426 - 289 K/uL    MPV 10.5 8.9 - 12.9 FL    NRBC 0.0 0  WBC    ABSOLUTE NRBC 0.00 0.00 - 7.64 K/uL   METABOLIC PANEL, BASIC    Collection Time: 08/01/21  3:51 AM   Result Value Ref Range    Sodium 134 (L) 136 - 145 mmol/L    Potassium 4.1 3.5 - 5.1 mmol/L    Chloride 103 97 - 108 mmol/L    CO2 28 21 - 32 mmol/L    Anion gap 3 (L) 5 - 15 mmol/L    Glucose 139 (H) 65 - 100 mg/dL    BUN 17 6 - 20 MG/DL    Creatinine 0.81 0.70 - 1.30 MG/DL    BUN/Creatinine ratio 21 (H) 12 - 20      GFR est AA >60 >60 ml/min/1.73m2    GFR est non-AA >60 >60 ml/min/1.73m2    Calcium 8.0 (L) 8.5 - 10.1 MG/DL   GLUCOSE, POC    Collection Time: 08/01/21  6:59 AM   Result Value Ref Range    Glucose (POC) 102 65 - 117 mg/dL    Performed by Priscella Jenelle    TCD INTRACRANIAL ARTERIES COMPLETE    Collection Time: 08/01/21  9:46 AM   Result Value Ref Range    Right PCA 1 PSV 61.1 cm/s    Right PCA 1 EDV 20.0 cm/s    Right Vertebral PSV 67.2 cm/s    Right Vertebral EDV 27.9 cm/s    Left PCA 1 EDV 27.0 cm/s    Left Vertebral PSV 66.3 cm/s    Left Vertebral EDV 20.6 cm/s    Basilar Artery EDV 31.5 cm/s    Right ICA dist sys 52.3 cm/s    Right ICA dist mabry 14.9 cm/s    Left ICA dist sys 52.3 cm/s    Left ICA dist mabry 18.5 cm/s    Right PCA 1 Mean Velocity 34 cm/s    Right Vertebral Mean Velocity 41 cm/s    Left Vertebral Mean Velocity 36 cm/s    Right External ICA PSV 52 cm/s    Right External ICA EDV 15 cm/s    Right External ICA Mean Velocity 27 cm/s    Left External ICA PSV 52 cm/s    Left External ICA EDV 19 cm/s Left External ICA Mean Velocity 30 cm/s    Left PCA 1 PSV 65 cm/s    Left PCA 1 Mean Velocity 40 cm/s    Basilar Artery PSV 78 cm/s    Basilar Artery Mean Farhat 47 cm/s   GLUCOSE, POC    Collection Time: 08/01/21 11:19 AM   Result Value Ref Range    Glucose (POC) 139 (H) 65 - 117 mg/dL    Performed by Torey Wiggins RN           Imaging:  CT of head on 7/19/2021 at 2104 shows  IMPRESSION  1. Acute subarachnoid hemorrhage anterior to the sarahi. CTA of the head and neck on 7/19/2021 at 2126 shows  IMPRESSION     1. Arteriovenous malformation in the left superior cerebellar peduncle, with the  dominant drainage into the vein of Gatson. 2. No large vessel occlusion, no hemodynamically significant stenosis. 3. Redemonstrated subarachnoid hemorrhage in the basilar cisterns. CT the head on 7/20/2021 at 1129 shows  IMPRESSION  1. Stable ventricular size.     2.  Questionable diffuse cerebral edema.     3.  Subarachnoid hemorrhage in the prepontine cistern and ambient cisterns as  well as sylvian fissures are decreased from the prior study. MRI brain with and without 7/20/2021 at 2214 shows  IMPRESSION  1. Tiny 1 mm foci of diffusion restriction in the left cerebellum and left  cerebral hemisphere as described likely embolic in origin.     2.  Possible mild meningeal enhancement which may be secondary to meningeal  irritation from known subarachnoid hemorrhage.     3.  Dilated superior ophthalmic veins are noted which is new when compared to  prior imaging. This is of uncertain clinical significance. There appears to be  normal enhancement of the cavernous sinus bilaterally. CT of head on 7/23/2021 at 2233 shows     IMPRESSION  No hydrocephalus. Stable left prepontine subarachnoid hemorrhage. CT of Head on 7/24/2021 at 1357  IMPRESSION:   Left prepontine hemorrhage is slightly increased. CT of the head on 7/24/2021 at 10:21 PM shows  IMPRESSION  Stable left prepontine hemorrhage.     CTA of Head and neck on 7/28/2021 at 1001 shows  IMPRESSION     No evidence for significant vasospasm. Diffuse mild atherosclerosis. Left  cerebellar vascular malformation is again noted with unchanged hemorrhage in the  left prepontine cistern. Small focal hypoattenuation in the left sarahi and left  cerebellar peduncle likely acute ischemic changes.     Small right pleural effusion  Assessment:     Principal Problem:    Cerebral arteriovenous malformation (AVM) (7/20/2021)      Overview: ([id number])- CT (Brain): There is an AVM in the superior cerebellum with multiple aneurysms. One measures 4 mm. Another measures 4 mm within the superior cerebellum. Active Problems:    Malignant hypertension (7/20/2021)      Acute hypokalemia (7/20/2021)      Celiac artery stenosis ([address] (7/21/2021)      Vasogenic cerebral edema ([address] (7/21/2021)      Hypertensive emergency (7/21/2021)      SAH (subarachnoid hemorrhage) ([address] (7/20/2021)      Cerebral vasospasm (8/1/2021)        Plan:   Acute SAH in the setting of cerebellar AVM with associated flow related aneurysm, [place] Knox 1, Mistry Grade 2  - s/p diagnostic cerebral angiogram on 7/20/2021  - Day 13 of 21 of Nimotop for the prevention of delayed cerebral ischemia  - continue Keppra 500 mg BID for seizure ppx  - Continue NS at 150 ml/hr to maintain euvolemia   - CTA on 7/28 showed no evidence for significant vasospasm diffuse mild atherosclerosis. Left cerebellar vascular malformation is again noted with unchanged hemorrhage in the left prepontine cistern. Small focal hypoattenuation in the left sarahi and left cerebellar peduncle likely acute ischemic changes. - Strict I's and O's, patient -85 mls on  I's and O's, +15L since admission, continue NS at 150 ml/hr to maintain euvolemia,   - continue Decadron 2 mg every 12 hours   - ECHO shows EF 65-70%, dilated left atrium. No shunting seen. Mild (grade 1) left ventricular diastolic dysfunction.   - ok with neuro checks every 2 hours during the day and every 4 hours at night.   - SBP goal <140, on scheduled amlodipine, clonidine, hydralazine, HCTZ, minoxidil, and lisinopril, Cardene PRN  - PT/OT/SLP evals   - assess NICOM PRN for fluid responsiveness   - daily TCDs, TCDs patient exam is limited by inadequate ultrasound window at the temples. Anterior circulation cannot be evaluated. Limited visualization of the posterior circulation shows no evidence of vasospasm in the visualized arterial segments. Continue to monitor clinically   - NIS following     Polyuria  - patient has been previously diuresed for fluid overload   - patient with intermittently polyuria   - urine random sodium 116, urine osmolality 455 this morning, serum osmolality 280  - serum sodium persistently low at 134   - renal consulted for further opinion of any ADH abnormalities in the setting of SAH ( I spoke with Dr. Laurie Chou regarding polyuria and he will see patient)      HTN  - SBP goal <140 as stated above  - patient on scheduled oral meds as stated above, Cardene PRN  - Patient had a bilateral artery/venous renal duplex on 722 which shows no evidence of significant renal artery stenosis. No evidence of significant renal parenchymal disease. Possible hemodynamically significant celiac artery stenosis. - Renal consulted for further opinion if additional secondary HTN work-up is needed in patient on several BP meds to control BP  - Intensivist following    Constipation (resolved)  - continue Rissa-colace daily  - Miralax PRN daily  - Dulcolax 10 mg daily PRN      Activity: Up with assistance, ambulate daily  DVT ppx: SCDs  Dispo: TBD    Plan d/w Dr. Warden Reddy, Dr. Paula Lynch, RN, and patient.        Carter Lambert, ECTOR

## 2021-08-01 NOTE — CONSULTS
[facility]   [address] [facility], [facility] [place]  Phone: [phone] NOTE     Patient: Zachery Aaron MRN: [id number]  PCP: Kavitha Chang MD   :     1960  Age:   64 y.o. Sex:  male      Referring physician: Darren Carrasco MD  Reason for consultation: 64 y.o. male with SAH (subarachnoid hemorrhage) ([facility] [address] [S93.8] complicated by SHILO   Admission Date: 2021 12:52 AM  LOS: 12 days      ASSESSMENT and PLAN :     1 Resistant HTN  2 Cerebella AVM  And Subarachonoid hemorrhage   3 H/o HTN  4 Celliac artery stenosis in smoker   5 Ployuria    Plan   1 Will change HCTZ to chlorthalidione   2 Given Bradycardia decrease clonidine to 0.3 TID   3 Decrease Hydralazine to 50 TID and increase Minoxidil to 5 BID   4 Secondary HTN work up labs ordered for tomorrow am   5 Can add Spiranolactone tomorrow after labs has been send   6 May need RENal artery CTA if BP remains an issues given his extensive smoking h/o and already noted celeia artery stenosis on Renal Doppler     Care Plan discussed with:  Nurse and pt      Thank you for consulting [facility] in the care of your patient. Subjective:   HPI: Zachery Aaron is a 64 y.o.  male who has been admitted to the hospital for  AVM and subarachnoid hemorrhage. He had been managed by neurio radiology. He was fluid overloaded and his fluid status has stabilized as per CCM attending. Given hi s Bp issues, [facility] and hence this consult is done. Pt has H/o HTN and has been on 3 meds prior to this admission. His BP was tillr unning high as per pt. He has 75 pack smoking h/o. He is on  cc per hr and nimodine for prevention of vasospasm . Will adjust Bp meds keeping this in mind.  I dont think he has Salt wasting syndrome as he is volume up and not down     Past Medical Hx:   Past Medical History:   Diagnosis Date    Acute hypokalemia 7/20/2021    Celiac artery stenosis ([facility] [address] 7/21/2021    Cerebral arteriovenous malformation (AVM) 7/20/2021    Diabetes ([facility] [address]     type 2    ED (erectile dysfunction)     HTN (hypertension)     Hypertension     Hypertensive emergency 7/21/2021    Impaired fasting glucose     Malignant hypertension 7/20/2021    Vasogenic cerebral edema ([facility] [address] 7/21/2021        Past Surgical Hx:     Past Surgical History:   Procedure Laterality Date    HX TONSIL AND ADENOIDECTOMY           No Known Allergies    Social Hx:  reports that he quit smoking about 6 years ago. His smoking use included cigarettes. He has a 40.00 pack-year smoking history. He uses smokeless tobacco. He reports current alcohol use of about 6.0 standard drinks of alcohol per week. He reports that he does not use drugs. Family History   Problem Relation Age of Onset    Diabetes Mother         type 2    Hypertension Mother     Heart Disease Mother         CHF    Hypertension Father     Heart Disease Father 48        MI       Review of Systems:  A thorough twelve point review of system was performed today. Pertinent positives and negatives are mentioned in the HPI. The reminder of the ROS is negative and noncontributory. Objective:    Vitals:    Vitals:    08/01/21 1000 08/01/21 1100 08/01/21 1200 08/01/21 1300   BP: (!) 120/59 (!) 103/51 (!) 130/57 (!) 123/52   Pulse: 62 61 65 68   Resp: 17 16 18 17   Temp:   98 °F (36.7 °C)    SpO2:   99%    Weight:       Height:         I&O's:  07/31 0701 - 08/01 0700  In: 7799 [P.O.:420;  I.V.:3950]  Out: 4150 [Urine:4150]  Visit Vitals  BP (!) 123/52   Pulse 68   Temp 98 °F (36.7 °C)   Resp 17   Ht 6' (1.829 m)   Wt 113.6 kg (250 lb 7.1 oz)   SpO2 99%   BMI 33.97 kg/m²       Physical Exam:  General:  No apparent Distress  HEENT: PERRL,  No Pallor , No Icterus  Neck: Supple,no mass palpable  Lungs : CTA  CVS: RRR, S1 S2 normal, No murmur   Abdomen: Soft, NT, BS +  Extremities: 1+ Edema  Skin: No rash or lesions. MS: No joint swelling, erythema, warmth  Neurologic: non focal, AAO x 3  Psych: normal affect    Laboratory Results:    Recent Labs     08/01/21  0351 07/31/21 2254 07/30/21  1057 07/30/21  0426   * 134* 134*  --    K 4.1 4.1 4.0  --     102 101  --    CO2 28 28 28  --    * 114* 125*  --    BUN 17 16 18  --    CREA 0.81 0.70 0.84  --    CA 8.0* 7.9* 8.5  --    MG 2.1  --   --  2.4   PHOS 3.2  --   --  3.4     Recent Labs     08/01/21 0351 07/31/21 2254 07/30/21  0426   WBC 10.7 10.9 10.1   HGB 13.8 13.0 14.2   HCT 39.7 37.7 43.3    244 232     No results found for: [facility] [place]  Lab Results   Component Value Date/Time    Culture result: MRSA NOT PRESENT 03/11/2015 12:00 PM    Culture result:  03/11/2015 12:00 PM         Screening of patient nares for MRSA is for surveillance purposes and, if positive, to facilitate isolation considerations in high risk settings. It is not intended for automatic decolonization interventions per se as regimens are not sufficiently effective to warrant routine use.      Recent Results (from the past 24 hour(s))   GLUCOSE, POC    Collection Time: 07/31/21  4:21 PM   Result Value Ref Range    Glucose (POC) 188 (H) 65 - 117 mg/dL    Performed by Meredith Junior RN    GLUCOSE, POC    Collection Time: 07/31/21  9:47 PM   Result Value Ref Range    Glucose (POC) 88 65 - 117 mg/dL    Performed by Christine Garrett, SERUM/PLASMA    Collection Time: 07/31/21 10:54 PM   Result Value Ref Range    Osmolality, serum/plasma 280 mOsm/kg H2O   CBC W/O DIFF    Collection Time: 07/31/21 10:54 PM   Result Value Ref Range    WBC 10.9 4.1 - 11.1 K/uL    RBC 4.56 4.10 - 5.70 M/uL    HGB 13.0 12.1 - 17.0 g/dL    HCT 37.7 36.6 - 50.3 %    MCV 82.7 80.0 - 99.0 FL    MCH 28.5 26.0 - 34.0 PG    MCHC 34.5 30.0 - 36.5 g/dL    RDW 13.6 11.5 - 14.5 %    PLATELET 064 768 - 175 K/uL    MPV 10.2 8.9 - 12.9 FL    NRBC 0.0 0  WBC    ABSOLUTE NRBC 0.00 0.00 - 4.30 K/uL   METABOLIC PANEL, BASIC    Collection Time: 07/31/21 10:54 PM   Result Value Ref Range    Sodium 134 (L) 136 - 145 mmol/L    Potassium 4.1 3.5 - 5.1 mmol/L    Chloride 102 97 - 108 mmol/L    CO2 28 21 - 32 mmol/L    Anion gap 4 (L) 5 - 15 mmol/L    Glucose 114 (H) 65 - 100 mg/dL    BUN 16 6 - 20 MG/DL    Creatinine 0.70 0.70 - 1.30 MG/DL    BUN/Creatinine ratio 23 (H) 12 - 20      GFR est AA >60 >60 ml/min/1.73m2    GFR est non-AA >60 >60 ml/min/1.73m2    Calcium 7.9 (L) 8.5 - 10.1 MG/DL   MAGNESIUM    Collection Time: 08/01/21  3:51 AM   Result Value Ref Range    Magnesium 2.1 1.6 - 2.4 mg/dL   PHOSPHORUS    Collection Time: 08/01/21  3:51 AM   Result Value Ref Range    Phosphorus 3.2 2.6 - 4.7 MG/DL   CBC W/O DIFF    Collection Time: 08/01/21  3:51 AM   Result Value Ref Range    WBC 10.7 4.1 - 11.1 K/uL    RBC 4.77 4.10 - 5.70 M/uL    HGB 13.8 12.1 - 17.0 g/dL    HCT 39.7 36.6 - 50.3 %    MCV 83.2 80.0 - 99.0 FL    MCH 28.9 26.0 - 34.0 PG    MCHC 34.8 30.0 - 36.5 g/dL    RDW 13.5 11.5 - 14.5 %    PLATELET 985 299 - 279 K/uL    MPV 10.5 8.9 - 12.9 FL    NRBC 0.0 0  WBC    ABSOLUTE NRBC 0.00 0.00 - 0.97 K/uL   METABOLIC PANEL, BASIC    Collection Time: 08/01/21  3:51 AM   Result Value Ref Range    Sodium 134 (L) 136 - 145 mmol/L    Potassium 4.1 3.5 - 5.1 mmol/L    Chloride 103 97 - 108 mmol/L    CO2 28 21 - 32 mmol/L    Anion gap 3 (L) 5 - 15 mmol/L    Glucose 139 (H) 65 - 100 mg/dL    BUN 17 6 - 20 MG/DL    Creatinine 0.81 0.70 - 1.30 MG/DL    BUN/Creatinine ratio 21 (H) 12 - 20      GFR est AA >60 >60 ml/min/1.73m2    GFR est non-AA >60 >60 ml/min/1.73m2    Calcium 8.0 (L) 8.5 - 10.1 MG/DL   GLUCOSE, POC    Collection Time: 08/01/21  6:59 AM   Result Value Ref Range    Glucose (POC) 102 65 - 117 mg/dL    Performed by Emma Roa    TCD INTRACRANIAL ARTERIES COMPLETE    Collection Time: 08/01/21  9:46 AM   Result Value Ref Range    Right PCA 1 PSV 61.1 cm/s    Right PCA 1 EDV 20.0 cm/s    Right Vertebral PSV 67.2 cm/s Right Vertebral EDV 27.9 cm/s    Left PCA 1 EDV 27.0 cm/s    Left Vertebral PSV 66.3 cm/s    Left Vertebral EDV 20.6 cm/s    Basilar Artery EDV 31.5 cm/s    Right ICA dist sys 52.3 cm/s    Right ICA dist mabry 14.9 cm/s    Left ICA dist sys 52.3 cm/s    Left ICA dist mabry 18.5 cm/s    Right PCA 1 Mean Velocity 34 cm/s    Right Vertebral Mean Velocity 41 cm/s    Left Vertebral Mean Velocity 36 cm/s    Right External ICA PSV 52 cm/s    Right External ICA EDV 15 cm/s    Right External ICA Mean Velocity 27 cm/s    Left External ICA PSV 52 cm/s    Left External ICA EDV 19 cm/s    Left External ICA Mean Velocity 30 cm/s    Left PCA 1 PSV 65 cm/s    Left PCA 1 Mean Velocity 40 cm/s    Basilar Artery PSV 78 cm/s    Basilar Artery Mean Farhat 47 cm/s   GLUCOSE, POC    Collection Time: 08/01/21 11:19 AM   Result Value Ref Range    Glucose (POC) 139 (H) 65 - 117 mg/dL    Performed by Perla Alford RN          Urine dipstick:   Lab Results   Component Value Date/Time    Color YELLOW/STRAW 03/11/2015 12:33 PM    Appearance CLEAR 03/11/2015 12:33 PM    Specific gravity 1.019 03/11/2015 12:33 PM    pH (UA) 6.0 03/11/2015 12:33 PM    Protein NEGATIVE  03/11/2015 12:33 PM    Glucose NEGATIVE  03/11/2015 12:33 PM    Ketone NEGATIVE  03/11/2015 12:33 PM    Bilirubin NEGATIVE  03/11/2015 12:33 PM    Urobilinogen 1.0 03/11/2015 12:33 PM    Nitrites NEGATIVE  03/11/2015 12:33 PM    Leukocyte Esterase NEGATIVE  03/11/2015 12:33 PM    Epithelial cells FEW 03/11/2015 12:33 PM    Bacteria NEGATIVE  03/11/2015 12:33 PM    WBC 0-4 03/11/2015 12:33 PM    RBC 0-5 03/11/2015 12:33 PM       I have reviewed the following: All pertinent labs, microbiology data, radiology imaging for my assessment     Medications list Personally Reviewed   [x]      Yes     []               No       Medications:  Prior to Admission medications    Medication Sig Start Date End Date Taking? Authorizing Provider   lancets misc by Does Not Apply route.     Provider, Historical   metFORMIN (GLUCOPHAGE) 500 mg tablet Take 1 Tab by mouth daily (with breakfast). 3/22/19   Macho Sharp MD   lisinopril (PRINIVIL, ZESTRIL) 40 mg tablet Take 1 Tab by mouth daily. 3/22/19   Macho Sharp MD   hydrALAZINE (APRESOLINE) 25 mg tablet Take 1 Tab by mouth three (3) times daily. 3/22/19   Macho Sharp MD   amLODIPine (NORVASC) 10 mg tablet Take 1 Tab by mouth daily. 3/22/19   Macho Sharp MD   oxyCODONE-acetaminophen (PERCOCET 7.5) 7.5-325 mg per tablet Take 1-2 Tabs by mouth every four (4) hours as needed for Pain. Max Daily Amount: 12 Tabs. 3/20/15   Fabio Ravi PA   traMADol (ULTRAM) 50 mg tablet Take 1 Tab by mouth every six (6) hours as needed for Pain. Max Daily Amount: 200 mg. 3/20/15   MELANI Rodriguez   bisacodyl (DULCOLAX, BISACODYL,) 10 mg suppository Insert 10 mg into rectum daily. 3/20/15   MELANI Rodriguez   promethazine (PHENERGAN) 25 mg tablet Take 1 Tab by mouth every six (6) hours as needed for Nausea. 3/20/15   MELANI Rodriguez   aspirin (ASPIRIN) 325 mg tablet Take 1 Tab by mouth two (2) times a day. 3/20/15   MELANI Rodriguez   metFORMIN (GLUCOPHAGE) 500 mg tablet Take 500 mg by mouth Daily (before breakfast). Provider, Historical   lisinopril (PRINIVIL, ZESTRIL) 40 mg tablet Take 40 mg by mouth daily (after breakfast). Provider, Historical   hydrochlorothiazide (HYDRODIURIL) 25 mg tablet Take 25 mg by mouth daily (after breakfast). Provider, Historical   amLODIPine (NORVASC) 10 mg tablet Take 10 mg by mouth daily (with breakfast). Provider, Historical   hydrALAZINE (APRESOLINE) 25 mg tablet Take 25 mg by mouth three (3) times daily. Provider, Historical        Thank you for allowing us to participate in the care of this patient. We will follow patient.  Please dont hesitate to call with any questions    Dayne Schroeder MD  [facility] for [address], [address] [facility]  Phone - [phone]   Fax - [phone]  [email or web address]

## 2021-08-01 NOTE — PROGRESS NOTES
TCD's today show limited study. On the right: The MCA, JOSSELYN and terminal ICA were not visualized due to lack of a temporal window. Only a portion of the right PCA was visualized through the left temporal and appears patent and without evidence of vasospasm. The vertebral is patent and without evidence of vasospasm. On the left: Limiting study. The MCA, JOSSELYN and terminal ICA were not visualized due to lack of a temporal window. The PCA appears patent and without evidence of vasospasm. The vertebral and basilar are patent and without evidence of vasospasm. Per RN the patient is fluid responsive on NICOM today. He is currently +400 mls on I's and O's. Continue with normal saline at 150 ml/hr. It appears urine output has slowed down this afternoon after reviewing output. RN informed me that patient is refusing his labs and refusing to have a new IV placed in order to be started back on Cardene to meet BP parameters. I went and spoke with the patient and informed him that we are trying to control his blood pressure very strictly due to his AVM with associated aneurysm. He is willing to have a midline catheter in place if he needs more IV access as long as he can have some numbing medication prior to having the catheter placed. I also discussed with him that he is on several oral BP medications to stabilize his BP. His clonidine was increased today and minoxidil was also added by the intensivist for BP control. I spoke with the [facility]  Dr. Bryan Talley regarding patient on multiple oral medications. I recommended consulting renal for further recommendations regarding if patient needs additional secondary HTN work up. Since patient refused lab work today, I do not have BMP results or serum osmolality for today. However, RN can try try to obtain these results once patient has more IV access.     I decreased Decadron to 2 mg every 12 hours as this could be a contributing factor to patient's increased blood pressure. Discussed with the Intensivist, Nephrology consult ordered in the AM by the Intensivist.     Discussed with Dr. Vonnie Silva RN, and the patient.      Johanna Linares [facility]  Neurocritical care NP

## 2021-08-02 ENCOUNTER — APPOINTMENT (OUTPATIENT)
Dept: VASCULAR SURGERY | Age: 61
DRG: 064 | End: 2021-08-02
Attending: RADIOLOGY
Payer: COMMERCIAL

## 2021-08-02 LAB
ANION GAP SERPL CALC-SCNC: 5 MMOL/L (ref 5–15)
BASILAR ARTERY EDV: 31 CM/S
BASILAR ARTERY EDV: 31.3 CM/S
BASILAR ARTERY EDV: 31.5 CM/S
BASILAR ARTERY EDV: 33.1 CM/S
BASILAR ARTERY MEAN VEL: 46 CM/S
BASILAR ARTERY MEAN VEL: 47 CM/S
BASILAR ARTERY MEAN VEL: 51 CM/S
BASILAR ARTERY MEAN VEL: 51 CM/S
BASILAR ARTERY PSV: 77 CM/S
BASILAR ARTERY PSV: 78 CM/S
BASILAR ARTERY PSV: 88 CM/S
BASILAR ARTERY PSV: 91 CM/S
BUN SERPL-MCNC: 25 MG/DL (ref 6–20)
BUN/CREAT SERPL: 27 (ref 12–20)
CALCIUM SERPL-MCNC: 7.7 MG/DL (ref 8.5–10.1)
CHLORIDE SERPL-SCNC: 105 MMOL/L (ref 97–108)
CO2 SERPL-SCNC: 27 MMOL/L (ref 21–32)
CORTIS AM PEAK SERPL-MCNC: 0.6 UG/DL (ref 4.3–22.45)
CREAT SERPL-MCNC: 0.91 MG/DL (ref 0.7–1.3)
ERYTHROCYTE [DISTWIDTH] IN BLOOD BY AUTOMATED COUNT: 12.7 % (ref 11.5–14.5)
GLUCOSE BLD STRIP.AUTO-MCNC: 105 MG/DL (ref 65–117)
GLUCOSE BLD STRIP.AUTO-MCNC: 109 MG/DL (ref 65–117)
GLUCOSE BLD STRIP.AUTO-MCNC: 120 MG/DL (ref 65–117)
GLUCOSE BLD STRIP.AUTO-MCNC: 213 MG/DL (ref 65–117)
GLUCOSE SERPL-MCNC: 106 MG/DL (ref 65–100)
HCT VFR BLD AUTO: 36.7 % (ref 36.6–50.3)
HGB BLD-MCNC: 12.7 G/DL (ref 12.1–17)
LEFT EX ICA EDV: 17 CM/S
LEFT EX ICA EDV: 19 CM/S
LEFT EX ICA EDV: 19 CM/S
LEFT EX ICA MEAN VEL: 30 CM/S
LEFT EX ICA MEAN VEL: 30 CM/S
LEFT EX ICA MEAN VEL: 31 CM/S
LEFT EX ICA PSV: 52 CM/S
LEFT EX ICA PSV: 54 CM/S
LEFT EX ICA PSV: 56 CM/S
LEFT ICA DIST DIAS: 17.1 CM/S
LEFT ICA DIST DIAS: 17.1 CM/S
LEFT ICA DIST DIAS: 18.5 CM/S
LEFT ICA DIST DIAS: 19.3 CM/S
LEFT ICA DIST SYS: 47.9 CM/S
LEFT ICA DIST SYS: 52.3 CM/S
LEFT ICA DIST SYS: 54.2 CM/S
LEFT ICA DIST SYS: 55.9 CM/S
LEFT PCA 1 EDV: 22.7 CM/S
LEFT PCA 1 EDV: 25.5 CM/S
LEFT PCA 1 EDV: 27 CM/S
LEFT PCA 1 EDV: 30.2 CM/S
LEFT PCA 1 MEAN VEL: 32 CM/S
LEFT PCA 1 MEAN VEL: 36 CM/S
LEFT PCA 1 MEAN VEL: 40 CM/S
LEFT PCA 1 MEAN VEL: 43 CM/S
LEFT PCA 1 PSV: 51 CM/S
LEFT PCA 1 PSV: 58 CM/S
LEFT PCA 1 PSV: 65 CM/S
LEFT PCA 1 PSV: 70 CM/S
LEFT VERTEBRAL EDV TCD: 20.6 CM/S
LEFT VERTEBRAL EDV TCD: 24 CM/S
LEFT VERTEBRAL EDV TCD: 24.1 CM/S
LEFT VERTEBRAL EDV TCD: 25.1 CM/S
LEFT VERTEBRAL MEAN VEL: 36 CM/S
LEFT VERTEBRAL MEAN VEL: 36 CM/S
LEFT VERTEBRAL MEAN VEL: 37 CM/S
LEFT VERTEBRAL MEAN VEL: 43 CM/S
LEFT VERTEBRAL PSV TCD: 63.9 CM/S
LEFT VERTEBRAL PSV TCD: 64.1 CM/S
LEFT VERTEBRAL PSV TCD: 66.3 CM/S
LEFT VERTEBRAL PSV TCD: 79.8 CM/S
Lab: 17 CM/S
Lab: 27 CM/S
Lab: 48 CM/S
MAGNESIUM SERPL-MCNC: 2.2 MG/DL (ref 1.6–2.4)
MCH RBC QN AUTO: 28.6 PG (ref 26–34)
MCHC RBC AUTO-ENTMCNC: 34.6 G/DL (ref 30–36.5)
MCV RBC AUTO: 82.7 FL (ref 80–99)
NRBC # BLD: 0 K/UL (ref 0–0.01)
NRBC BLD-RTO: 0 PER 100 WBC
PHOSPHATE SERPL-MCNC: 3.8 MG/DL (ref 2.6–4.7)
PLATELET # BLD AUTO: 234 K/UL (ref 150–400)
PMV BLD AUTO: 11 FL (ref 8.9–12.9)
POTASSIUM SERPL-SCNC: 4 MMOL/L (ref 3.5–5.1)
RBC # BLD AUTO: 4.44 M/UL (ref 4.1–5.7)
RIGHT EX ICA EDV: 15 CM/S
RIGHT EX ICA EDV: 17 CM/S
RIGHT EX ICA EDV: 17 CM/S
RIGHT EX ICA EDV: 18 CM/S
RIGHT EX ICA MEAN VEL: 27 CM/S
RIGHT EX ICA MEAN VEL: 28 CM/S
RIGHT EX ICA MEAN VEL: 31 CM/S
RIGHT EX ICA MEAN VEL: 33 CM/S
RIGHT EX ICA PSV: 50 CM/S
RIGHT EX ICA PSV: 52 CM/S
RIGHT EX ICA PSV: 58 CM/S
RIGHT EX ICA PSV: 64 CM/S
RIGHT ICA DIST DIAS: 14.9 CM/S
RIGHT ICA DIST DIAS: 17.1 CM/S
RIGHT ICA DIST DIAS: 17.1 CM/S
RIGHT ICA DIST DIAS: 18 CM/S
RIGHT ICA DIST SYS: 49.5 CM/S
RIGHT ICA DIST SYS: 52.3 CM/S
RIGHT ICA DIST SYS: 58.1 CM/S
RIGHT ICA DIST SYS: 64 CM/S
RIGHT PCA 1 EDV: 20 CM/S
RIGHT PCA 1 EDV: 29.9 CM/S
RIGHT PCA 1 EDV: 30.2 CM/S
RIGHT PCA 1 EDV: 31.7 CM/S
RIGHT PCA 1 MEAN VEL: 34 CM/S
RIGHT PCA 1 MEAN VEL: 40 CM/S
RIGHT PCA 1 MEAN VEL: 42 CM/S
RIGHT PCA 1 MEAN VEL: 50 CM/S
RIGHT PCA 1 PSV: 60.6 CM/S
RIGHT PCA 1 PSV: 61.1 CM/S
RIGHT PCA 1 PSV: 61.5 CM/S
RIGHT PCA 1 PSV: 89.8 CM/S
RIGHT VERTEBRAL EDV TCD: 21.4 CM/S
RIGHT VERTEBRAL EDV TCD: 27.9 CM/S
RIGHT VERTEBRAL EDV TCD: 28.3 CM/S
RIGHT VERTEBRAL EDV TCD: 34.4 CM/S
RIGHT VERTEBRAL MEAN VEL: 31 CM/S
RIGHT VERTEBRAL MEAN VEL: 41 CM/S
RIGHT VERTEBRAL MEAN VEL: 42 CM/S
RIGHT VERTEBRAL MEAN VEL: 49 CM/S
RIGHT VERTEBRAL PSV TCD: 49.8 CM/S
RIGHT VERTEBRAL PSV TCD: 67.2 CM/S
RIGHT VERTEBRAL PSV TCD: 70.3 CM/S
RIGHT VERTEBRAL PSV TCD: 79.8 CM/S
SERVICE CMNT-IMP: ABNORMAL
SERVICE CMNT-IMP: ABNORMAL
SERVICE CMNT-IMP: NORMAL
SERVICE CMNT-IMP: NORMAL
SODIUM SERPL-SCNC: 137 MMOL/L (ref 136–145)
TSH SERPL DL<=0.05 MIU/L-ACNC: 6.77 UIU/ML (ref 0.36–3.74)
WBC # BLD AUTO: 11.1 K/UL (ref 4.1–11.1)

## 2021-08-02 PROCEDURE — 74011250637 HC RX REV CODE- 250/637: Performed by: INTERNAL MEDICINE

## 2021-08-02 PROCEDURE — 83835 ASSAY OF METANEPHRINES: CPT

## 2021-08-02 PROCEDURE — 65660000001 HC RM ICU INTERMED STEPDOWN

## 2021-08-02 PROCEDURE — 74011250637 HC RX REV CODE- 250/637: Performed by: NURSE PRACTITIONER

## 2021-08-02 PROCEDURE — 82088 ASSAY OF ALDOSTERONE: CPT

## 2021-08-02 PROCEDURE — 93886 INTRACRANIAL COMPLETE STUDY: CPT

## 2021-08-02 PROCEDURE — 36415 COLL VENOUS BLD VENIPUNCTURE: CPT

## 2021-08-02 PROCEDURE — 84244 ASSAY OF RENIN: CPT

## 2021-08-02 PROCEDURE — 74011636637 HC RX REV CODE- 636/637: Performed by: NURSE PRACTITIONER

## 2021-08-02 PROCEDURE — 84443 ASSAY THYROID STIM HORMONE: CPT

## 2021-08-02 PROCEDURE — 80048 BASIC METABOLIC PNL TOTAL CA: CPT

## 2021-08-02 PROCEDURE — 74011250636 HC RX REV CODE- 250/636: Performed by: NURSE PRACTITIONER

## 2021-08-02 PROCEDURE — 84100 ASSAY OF PHOSPHORUS: CPT

## 2021-08-02 PROCEDURE — 85027 COMPLETE CBC AUTOMATED: CPT

## 2021-08-02 PROCEDURE — 82384 ASSAY THREE CATECHOLAMINES: CPT

## 2021-08-02 PROCEDURE — 99232 SBSQ HOSP IP/OBS MODERATE 35: CPT | Performed by: STUDENT IN AN ORGANIZED HEALTH CARE EDUCATION/TRAINING PROGRAM

## 2021-08-02 PROCEDURE — 82962 GLUCOSE BLOOD TEST: CPT

## 2021-08-02 PROCEDURE — APPSS45 APP SPLIT SHARED TIME 31-45 MINUTES: Performed by: NURSE PRACTITIONER

## 2021-08-02 PROCEDURE — 82533 TOTAL CORTISOL: CPT

## 2021-08-02 PROCEDURE — 83735 ASSAY OF MAGNESIUM: CPT

## 2021-08-02 RX ORDER — LISINOPRIL 20 MG/1
40 TABLET ORAL
Status: DISCONTINUED | OUTPATIENT
Start: 2021-08-02 | End: 2021-08-10 | Stop reason: HOSPADM

## 2021-08-02 RX ADMIN — NIMODIPINE 60 MG: 30 CAPSULE, LIQUID FILLED ORAL at 20:00

## 2021-08-02 RX ADMIN — NIMODIPINE 60 MG: 30 CAPSULE, LIQUID FILLED ORAL at 16:41

## 2021-08-02 RX ADMIN — DEXAMETHASONE SODIUM PHOSPHATE 2 MG: 4 INJECTION, SOLUTION INTRAMUSCULAR; INTRAVENOUS at 21:00

## 2021-08-02 RX ADMIN — HYDRALAZINE HYDROCHLORIDE 50 MG: 50 TABLET, FILM COATED ORAL at 22:00

## 2021-08-02 RX ADMIN — NIMODIPINE 60 MG: 30 CAPSULE, LIQUID FILLED ORAL at 04:29

## 2021-08-02 RX ADMIN — DEXAMETHASONE SODIUM PHOSPHATE 2 MG: 4 INJECTION, SOLUTION INTRAMUSCULAR; INTRAVENOUS at 08:46

## 2021-08-02 RX ADMIN — SODIUM CHLORIDE 150 ML/HR: 9 INJECTION, SOLUTION INTRAVENOUS at 08:54

## 2021-08-02 RX ADMIN — HYDRALAZINE HYDROCHLORIDE 50 MG: 50 TABLET, FILM COATED ORAL at 08:45

## 2021-08-02 RX ADMIN — LISINOPRIL 40 MG: 20 TABLET ORAL at 22:00

## 2021-08-02 RX ADMIN — NIMODIPINE 60 MG: 30 CAPSULE, LIQUID FILLED ORAL at 23:15

## 2021-08-02 RX ADMIN — DOCUSATE SODIUM 50 MG AND SENNOSIDES 8.6 MG 1 TABLET: 8.6; 5 TABLET, FILM COATED ORAL at 08:46

## 2021-08-02 RX ADMIN — CLONIDINE HYDROCHLORIDE 0.3 MG: 0.2 TABLET ORAL at 07:05

## 2021-08-02 RX ADMIN — NIMODIPINE 60 MG: 30 CAPSULE, LIQUID FILLED ORAL at 08:45

## 2021-08-02 RX ADMIN — LEVETIRACETAM 500 MG: 500 TABLET ORAL at 18:13

## 2021-08-02 RX ADMIN — NIMODIPINE 60 MG: 30 CAPSULE, LIQUID FILLED ORAL at 11:35

## 2021-08-02 RX ADMIN — NIMODIPINE 60 MG: 30 CAPSULE, LIQUID FILLED ORAL at 00:00

## 2021-08-02 RX ADMIN — HYDRALAZINE HYDROCHLORIDE 50 MG: 50 TABLET, FILM COATED ORAL at 16:41

## 2021-08-02 RX ADMIN — INSULIN LISPRO 2 UNITS: 100 INJECTION, SOLUTION INTRAVENOUS; SUBCUTANEOUS at 22:05

## 2021-08-02 RX ADMIN — CLONIDINE HYDROCHLORIDE 0.3 MG: 0.2 TABLET ORAL at 22:00

## 2021-08-02 RX ADMIN — CHLORTHALIDONE 25 MG: 25 TABLET ORAL at 08:45

## 2021-08-02 RX ADMIN — CLONIDINE HYDROCHLORIDE 0.3 MG: 0.2 TABLET ORAL at 14:21

## 2021-08-02 RX ADMIN — SODIUM CHLORIDE 100 ML/HR: 9 INJECTION, SOLUTION INTRAVENOUS at 14:48

## 2021-08-02 RX ADMIN — LEVETIRACETAM 500 MG: 500 TABLET ORAL at 08:45

## 2021-08-02 NOTE — PROGRESS NOTES
[facility]   [address] [facility], [address] [place] [address], [facility]  Phone: [phone]   [phone]       Nephrology Progress Note  Robel Barber     1960     [id number]  Date of Admission : 7/20/2021 08/02/21    CC: Follow up for secondary HTN       Assessment and Plan   Resistant HTN:  - exclude secondary causes   - quite possibly this may be  APO-L1 related or ENac channel mutation   - BP at goal and adjusting meds   - stopped Amlodipine while getting Nimotop   - expect good response to Chlorthalidone. But given his risk for vasospasm, will watch his volume status closely   - changed Lisinopril to bedtime, continue Hydralazine and clonidine   - hold Minoxidil for now   - f/u results of secondary w/u. CT renal angiogram if and when he goes for Cerebral angiogram     Cerebellar AVM w/ SAH   - per Endovascular Neuro Sx     Chronic heavy smoker    Elevated TSH.   - Check FT3,FT4        Interval History:  Seen and examined. BP improving. No headache. Review of Systems: A comprehensive review of systems was negative except for that written in the HPI.     Current Medications:   Current Facility-Administered Medications   Medication Dose Route Frequency    lisinopriL (PRINIVIL, ZESTRIL) tablet 40 mg  40 mg Oral QHS    cloNIDine HCL (CATAPRES) tablet 0.3 mg  0.3 mg Oral Q8H    chlorthalidone (HYGROTON) tablet 25 mg  25 mg Oral DAILY    [Held by provider] minoxidiL (LONITEN) tablet 5 mg  5 mg Oral BID    hydrALAZINE (APRESOLINE) tablet 50 mg  50 mg Oral TID    dexamethasone (DECADRON) 4 mg/mL injection 2 mg  2 mg IntraVENous Q12H    [Held by provider] metoprolol tartrate (LOPRESSOR) tablet 100 mg  100 mg Oral Q12H    0.9% sodium chloride infusion  150 mL/hr IntraVENous CONTINUOUS    senna-docusate (PERICOLACE) 8.6-50 mg per tablet 1 Tablet  1 Tablet Oral DAILY    polyethylene glycol (MIRALAX) packet 17 g  17 g Oral DAILY PRN    bisacodyL (DULCOLAX) tablet 10 mg  10 mg Oral DAILY PRN    labetaloL (NORMODYNE;TRANDATE) injection 10 mg  10 mg IntraVENous Q4H PRN    niCARdipine (CARDENE) 25 mg in 0.9% sodium chloride 250 mL infusion  0-15 mg/hr IntraVENous TITRATE    hydrALAZINE (APRESOLINE) 20 mg/mL injection 20 mg  20 mg IntraVENous Q6H PRN    insulin lispro (HUMALOG) injection   SubCUTAneous AC&HS    glucose chewable tablet 16 g  4 Tablet Oral PRN    dextrose (D50W) injection syrg 12.5-25 g  12.5-25 g IntraVENous PRN    glucagon (GLUCAGEN) injection 1 mg  1 mg IntraMUSCular PRN    ondansetron (ZOFRAN) injection 4 mg  4 mg IntraVENous Q6H PRN    naloxone (NARCAN) injection 0.4 mg  0.4 mg IntraVENous PRN    acetaminophen (TYLENOL) tablet 650 mg  650 mg Oral Q4H PRN    Or    acetaminophen (TYLENOL) solution 650 mg  650 mg Per NG tube Q4H PRN    Or    acetaminophen (TYLENOL) suppository 650 mg  650 mg Rectal Q4H PRN    niMODipine (NIMOTOP) capsule 60 mg  60 mg Oral Q4H    levETIRAcetam (KEPPRA) tablet 500 mg  500 mg Oral BID      No Known Allergies    Objective:  Vitals:    Vitals:    08/02/21 0500 08/02/21 0600 08/02/21 0700 08/02/21 0800   BP: (!) 109/57 113/65 (!) 115/53 (!) 102/56   Pulse: 64 (!) 54 (!) 55 (!) 53   Resp: 16 11 14 14   Temp:       SpO2:       Weight: 117 kg (257 lb 15 oz)      Height:         Intake and Output:  No intake/output data recorded. 07/31 1901 - 08/02 0700  In: 6180 [P.O.:1780;  I.V.:4400]  Out: 4825 [Urine:4825]    Physical Examination:    General: NAD,Conversant   Neck:  Supple, no mass  Resp:  Lungs CTA B/L, no wheezing , normal respiratory effort  CV:  RRR,  no murmur or rub,trace LE edema  GI:  Soft, NT, + Bowel sounds, no hepatosplenomegaly  Neurologic:  Non focal  Psych:             AAO x 3 appropriate affect   Skin:  No Rash  :  No peña     []    High complexity decision making was performed  []    Patient is at high-risk of decompensation with multiple organ involvement    Lab Data Personally Reviewed: I have reviewed all the pertinent labs, microbiology data and radiology studies during assessment. Recent Labs     08/02/21  0720 08/01/21  0351 07/31/21  2254 07/30/21  1057    134* 134* 134*   K 4.0 4.1 4.1 4.0    103 102 101   CO2 27 28 28 28   * 139* 114* 125*   BUN 25* 17 16 18   CREA 0.91 0.81 0.70 0.84   CA 7.7* 8.0* 7.9* 8.5   MG  --  2.1  --   --    PHOS  --  3.2  --   --      Recent Labs     08/02/21  0720 08/01/21  0351 07/31/21  2254   WBC 11.1 10.7 10.9   HGB 12.7 13.8 13.0   HCT 36.7 39.7 37.7    256 244     No results found for: SDES  Lab Results   Component Value Date/Time    Culture result: MRSA NOT PRESENT 03/11/2015 12:00 PM    Culture result:  03/11/2015 12:00 PM         Screening of patient nares for MRSA is for surveillance purposes and, if positive, to facilitate isolation considerations in high risk settings. It is not intended for automatic decolonization interventions per se as regimens are not sufficiently effective to warrant routine use.      Recent Results (from the past 24 hour(s))   TCD INTRACRANIAL ARTERIES COMPLETE    Collection Time: 08/01/21  9:46 AM   Result Value Ref Range    Right PCA 1 PSV 61.1 cm/s    Right PCA 1 EDV 20.0 cm/s    Right Vertebral PSV 67.2 cm/s    Right Vertebral EDV 27.9 cm/s    Left PCA 1 EDV 27.0 cm/s    Left Vertebral PSV 66.3 cm/s    Left Vertebral EDV 20.6 cm/s    Basilar Artery EDV 31.5 cm/s    Right ICA dist sys 52.3 cm/s    Right ICA dist mabry 14.9 cm/s    Left ICA dist sys 52.3 cm/s    Left ICA dist mabry 18.5 cm/s    Right PCA 1 Mean Velocity 34 cm/s    Right Vertebral Mean Velocity 41 cm/s    Left Vertebral Mean Velocity 36 cm/s    Right External ICA PSV 52 cm/s    Right External ICA EDV 15 cm/s    Right External ICA Mean Velocity 27 cm/s    Left External ICA PSV 52 cm/s    Left External ICA EDV 19 cm/s    Left External ICA Mean Velocity 30 cm/s    Left PCA 1 PSV 65 cm/s    Left PCA 1 Mean Velocity 40 cm/s    Basilar Artery PSV 78 cm/s    Basilar Artery Mean Farhat 47 cm/s   GLUCOSE, POC    Collection Time: 08/01/21 11:19 AM   Result Value Ref Range    Glucose (POC) 139 (H) 65 - 117 mg/dL    Performed by Preet Thapa RN    GLUCOSE, POC    Collection Time: 08/01/21  4:44 PM   Result Value Ref Range    Glucose (POC) 158 (H) 65 - 117 mg/dL    Performed by Preet Thapa RN    GLUCOSE, POC    Collection Time: 08/01/21 11:13 PM   Result Value Ref Range    Glucose (POC) 158 (H) 65 - 117 mg/dL    Performed by [id number], POC    Collection Time: 08/02/21  7:19 AM   Result Value Ref Range    Glucose (POC) 109 65 - 117 mg/dL    Performed by BISI BATRES    CBC W/O DIFF    Collection Time: 08/02/21  7:20 AM   Result Value Ref Range    WBC 11.1 4.1 - 11.1 K/uL    RBC 4.44 4.10 - 5.70 M/uL    HGB 12.7 12.1 - 17.0 g/dL    HCT 36.7 36.6 - 50.3 %    MCV 82.7 80.0 - 99.0 FL    MCH 28.6 26.0 - 34.0 PG    MCHC 34.6 30.0 - 36.5 g/dL    RDW 12.7 11.5 - 14.5 %    PLATELET 554 206 - 442 K/uL    MPV 11.0 8.9 - 12.9 FL    NRBC 0.0 0  WBC    ABSOLUTE NRBC 0.00 0.00 - 5.82 K/uL   METABOLIC PANEL, BASIC    Collection Time: 08/02/21  7:20 AM   Result Value Ref Range    Sodium 137 136 - 145 mmol/L    Potassium 4.0 3.5 - 5.1 mmol/L    Chloride 105 97 - 108 mmol/L    CO2 27 21 - 32 mmol/L    Anion gap 5 5 - 15 mmol/L    Glucose 106 (H) 65 - 100 mg/dL    BUN 25 (H) 6 - 20 MG/DL    Creatinine 0.91 0.70 - 1.30 MG/DL    BUN/Creatinine ratio 27 (H) 12 - 20      GFR est AA >60 >60 ml/min/1.73m2    GFR est non-AA >60 >60 ml/min/1.73m2    Calcium 7.7 (L) 8.5 - 10.1 MG/DL   TSH 3RD GENERATION    Collection Time: 08/02/21  7:20 AM   Result Value Ref Range    TSH 6.77 (H) 0.36 - 3.74 uIU/mL   TCD INTRACRANIAL ARTERIES COMPLETE    Collection Time: 08/02/21  8:15 AM   Result Value Ref Range    Right PCA 1 PSV 89.8 cm/s    Right PCA 1 EDV 30.2 cm/s    Right Vertebral PSV 79.8 cm/s    Right Vertebral EDV 34.4 cm/s    Left PCA 1 EDV 30.2 cm/s    Left Vertebral PSV 79.8 cm/s    Left Vertebral EDV 24.1 cm/s    Basilar Artery EDV 33.1 cm/s    Right ICA dist sys 58.1 cm/s    Right ICA dist mabry 18.0 cm/s    Left ICA dist sys 54.2 cm/s    Left ICA dist mabry 19.3 cm/s           Total time spent with patient:  xxx   min. Care Plan discussed with:  Patient     Family      RN      Consulting Physician [facility],         I have reviewed the flowsheets. Chart and Pertinent Notes have been reviewed. No change in PMH ,family and social history from Consult note.       Neeta Grey MD

## 2021-08-02 NOTE — PROGRESS NOTES
1930: Bedside and Verbal shift change report given to MEETA Langston RN (oncoming nurse) by Lilliana Haq. Creta Lanes (offgoing nurse). Report included the following information SBAR, Kardex, Intake/Output, MAR, Accordion, Recent Results, Cardiac Rhythm NSR/SB, Alarm Parameters  and Dual Neuro Assessment. 0730 : Bedside and Verbal shift change report given to TORSTEN Miranda RN (oncoming nurse) by Lilliana Haq. KAIN Langston (offgoing nurse). Report included the following information SBAR, Kardex, Intake/Output, MAR, Accordion, Recent Results, Cardiac Rhythm NSR/SB, Alarm Parameters  and Dual Neuro Assessment.

## 2021-08-02 NOTE — PROGRESS NOTES
Rounded on the patient overnight with no new neurological symptoms noted overnight. Continues to have facial numbness with intermittent extremities numbness and tingling and diplopia with use of eye patch. Blood pressure appears to be running on the low side. Patient on IV fluids infusing at 150 mL/hr. BLEE noted and +1,555 daily total I/O. Will likely decrease to 100 ml/hr. Nephrology is on board and will defer blood pressure adjustment to nephrology. Secondary hypertension work-up labs ordered by nephrology with further recommendation outlined in the notes. Daily TCD's showed no vasospasm. Pending TCD's for this morning.

## 2021-08-02 NOTE — PROGRESS NOTES
1930: Bedside and Verbal shift change report given to MEETA Langston RN (oncoming nurse) by TORSTEN Miranda RN (offgoing nurse). Report included the following information SBAR, Kardex, Intake/Output, MAR, Accordion, Recent Results, Cardiac Rhythm NSR, Alarm Parameters  and Dual Neuro Assessment.

## 2021-08-02 NOTE — PROGRESS NOTES
[facility]    ICU TEAM Progress Note    Name: Yazmin Reeves   : 1960   MRN: [id number]   Date: 2021      I  Subjective:   Progress Note: 2021      Reason for ICU Admission: Cerebellar arteriovenous malformation, subarachnoid hemorrhage     Interval history:  70-year-old male with hypertension admitted to our hospital as a transfer from outside facility on  with subarachnoid hemorrhage  Overnight Events:   : No acute event. Blood pressure better controlled. : No acute event, off nicardipine, minoxidil was added. : No overnight events  : No acute overnight events  : No acute overnight events  : Better blood pressure control with adding clonidine orally, hold beta-blocker due to bradycardia [asymptomatic], off nicardipine drip.  Did well with diuresis and he is negative about 1600 cc.  Still positive about 19 L since admission and that is reflected in his weight as well. :No acute event, did well with gentle diuresis, -600 cc however still positive about 20 L since admission.  Continue to require drips to control blood pressure to goal.      Active Problem List:     Problem List  Date Reviewed: 2021        Codes Class    Celiac artery stenosis (HCC) (Chronic) ICD-10-CM: I77.4  ICD-9-CM: 447. 4 Chronic        Vasogenic cerebral edema (HCC) ICD-10-CM: G93.6  ICD-9-CM: 348. 5 Acute        Hypertensive emergency ICD-10-CM: I16.1  ICD-9-CM: 401.9 Acute        Malignant hypertension ICD-10-CM: I10  ICD-9-CM: 401.0 Acute        * (Principal) Cerebral arteriovenous malformation (AVM) ICD-10-CM: Q28.2  ICD-9-CM: 747.81 Acute    Overview Signed 2021  7:03 AM by Juan Lopez MD     ([id number])- CT (Brain): There is an AVM in the superior cerebellum with multiple aneurysms. One measures 4 mm. Another measures 4 mm within the superior cerebellum.              Acute hypokalemia ICD-10-CM: E87.6  ICD-9-CM: 276.8 Acute        Cerebral vasospasm ICD-10-CM: F87.235  ICD-9-CM: 435.9         SAH (subarachnoid hemorrhage) (HCC) ICD-10-CM: I60.9  ICD-9-CM: 200         Essential hypertension ICD-10-CM: I10  ICD-9-CM: 401.9         Type 2 diabetes mellitus without complication, without long-term current use of insulin (HCC) ICD-10-CM: E11.9  ICD-9-CM: 250.00         Arthritis of hip ICD-10-CM: M16.10  ICD-9-CM: 716.95               Past Medical History:      has a past medical history of Acute hypokalemia (7/20/2021), Celiac artery stenosis ([facility] [address] (7/21/2021), Cerebral arteriovenous malformation (AVM) (7/20/2021), Diabetes ([facility] [address] ED (erectile dysfunction), HTN (hypertension), Hypertension, Hypertensive emergency (7/21/2021), Impaired fasting glucose, Malignant hypertension (7/20/2021), and Vasogenic cerebral edema ([facility] [address] (7/21/2021). Past Surgical History:      has a past surgical history that includes hx tonsil and adenoidectomy. Home Medications:     Prior to Admission medications    Medication Sig Start Date End Date Taking? Authorizing Provider   lancets misc by Does Not Apply route. Provider, Historical   metFORMIN (GLUCOPHAGE) 500 mg tablet Take 1 Tab by mouth daily (with breakfast). 3/22/19   Christine Yousif MD   lisinopril (PRINIVIL, ZESTRIL) 40 mg tablet Take 1 Tab by mouth daily. 3/22/19   Christine Yousif MD   hydrALAZINE (APRESOLINE) 25 mg tablet Take 1 Tab by mouth three (3) times daily. 3/22/19   Christine Yousif MD   amLODIPine (NORVASC) 10 mg tablet Take 1 Tab by mouth daily. 3/22/19   Christine Yousif MD   oxyCODONE-acetaminophen (PERCOCET 7.5) 7.5-325 mg per tablet Take 1-2 Tabs by mouth every four (4) hours as needed for Pain. Max Daily Amount: 12 Tabs. 3/20/15   Chikis Ravi PA   traMADol (ULTRAM) 50 mg tablet Take 1 Tab by mouth every six (6) hours as needed for Pain. Max Daily Amount: 200 mg. 3/20/15   MELANI Gurrola   bisacodyl (DULCOLAX, BISACODYL,) 10 mg suppository Insert 10 mg into rectum daily.  3/20/15   MELANI Gurrola promethazine (PHENERGAN) 25 mg tablet Take 1 Tab by mouth every six (6) hours as needed for Nausea. 3/20/15   MELANI Roach   aspirin (ASPIRIN) 325 mg tablet Take 1 Tab by mouth two (2) times a day. 3/20/15   MELANI Roach   metFORMIN (GLUCOPHAGE) 500 mg tablet Take 500 mg by mouth Daily (before breakfast). Provider, Historical   lisinopril (PRINIVIL, ZESTRIL) 40 mg tablet Take 40 mg by mouth daily (after breakfast). Provider, Historical   hydrochlorothiazide (HYDRODIURIL) 25 mg tablet Take 25 mg by mouth daily (after breakfast). Provider, Historical   amLODIPine (NORVASC) 10 mg tablet Take 10 mg by mouth daily (with breakfast). Provider, Historical   hydrALAZINE (APRESOLINE) 25 mg tablet Take 25 mg by mouth three (3) times daily. Provider, Historical       Allergies/Social/Family History:     No Known Allergies   Social History     Tobacco Use    Smoking status: Former Smoker     Packs/day: 2.00     Years: 20.00     Pack years: 40.00     Types: Cigarettes     Quit date: 2015     Years since quittin.4    Smokeless tobacco: Current User    Tobacco comment: Patient reports he is Vaping    Substance Use Topics    Alcohol use:  Yes     Alcohol/week: 6.0 standard drinks     Types: 6 Cans of beer per week      Family History   Problem Relation Age of Onset    Diabetes Mother         type 2    Hypertension Mother     Heart Disease Mother         CHF    Hypertension Father     Heart Disease Father 48        MI       Review of Systems:     10 system reviewed and they are negative but as in interval history    Objective:   Vital Signs:  Visit Vitals  BP (!) 102/56   Pulse (!) 53   Temp 98 °F (36.7 °C)   Resp 14   Ht 6' (1.829 m)   Wt 117 kg (257 lb 15 oz)   SpO2 95%   BMI 34.98 kg/m²      O2 Device: None (Room air) Temp (24hrs), Av.7 °F (36.5 °C), Min:96.8 °F (36 °C), Max:98.1 °F (36.7 °C)           Intake/Output:     Intake/Output Summary (Last 24 hours) at 2021 1115  Last data filed at 8/2/2021 0500  Gross per 24 hour   Intake 3190 ml   Output 1850 ml   Net 1340 ml       Physical Exam:    General:   Alert, cooperative, no distress, appears stated age. Facial asymmetry with diplopia. Dysmetria LUE. Neck:  Symmetrical, trachea midline, no JVD or carotid bruit. Back:    No CVA tenderness to percussion. Lungs:    Clear to auscultation bilaterally. Heart:   Regular rate and rhythm. S1, S2 normal, without murmur, click, rub or gallop. Abdomen:    Normoactive bowel sounds. Soft, non-tender, no masses or organomegaly. Extremities:  Atraumatic, no cyanosis positive edema   Neurologic:  Speech normal normal strength.  Left facial droop, negative drift, at times I could appreciate disconjugate gaze         LABS AND  DATA: Personally reviewed  Recent Labs     08/02/21  0720 08/01/21  0351   WBC 11.1 10.7   HGB 12.7 13.8   HCT 36.7 39.7    256     Recent Labs     08/02/21  0720 08/01/21  0351    134*   K 4.0 4.1    103   CO2 27 28   BUN 25* 17   CREA 0.91 0.81   * 139*   CA 7.7* 8.0*   MG 2.2 2.1   PHOS 3.8 3.2     No results for input(s): AP, TBIL, TP, ALB, GLOB, AML, LPSE in the last 72 hours. No lab exists for component: SGOT, GPT, AMYP  No results for input(s): INR, PTP, APTT, INREXT in the last 72 hours. No results for input(s): PHI, PCO2I, PO2I, FIO2I in the last 72 hours. No results for input(s): CPK, CKMB, TROIQ, BNPP in the last 72 hours.     Hemodynamics:   PAP:   CO:     Wedge:   CI:     CVP:    SVR:       PVR:       Ventilator Settings:  Mode Rate Tidal Volume Pressure FiO2 PEEP                    Peak airway pressure:      Minute ventilation:          MEDS: Reviewed    Chest X-Ray:  CXR Results  (Last 48 hours)    None            Assessment and Plan:   -Arteriovenous malformation left superior cerebellar peduncle:  -Acute subarachnoid hemorrhage:  -Difficult to control hypertension:        -Appreciate neurology recommendation management:              -JYWYCYLN systolic blood pressure below 140              -Day 14/21 of nimodipine              -Maintain euvolemia [starting from July 25, cumulatively patient is now even balance, however remains about 14 L positive since admission] he is now on 150 cc of normal saline. Serum osmolality on the 31st was within normal.  -Blood pressure better appreciate nephrology input and recommendation. Minoxidil placed on hold today, chlorthalidone started yesterday.  -Prophylactic Keppra,  TCD's per neurology     DVT GI prophylaxis.  Neurochecks as ordered by neurology [now q 2 hour]. DISPOSITION  Discussed with neurology team.  They recommended transfer to [facility]    CRITICAL CARE CONSULTANT NOTE  I had a face to face encounter with the patient, reviewed and interpreted patient data including clinical events, labs, images, vital signs, I/O's, and examined patient. I have discussed the case and the plan and management of the patient's care with the consulting services, the bedside nurses and the respiratory therapist.      NOTE OF PERSONAL INVOLVEMENT IN CARE   This patient has a high probability of imminent, clinically significant deterioration, which requires the highest level of preparedness to intervene urgently. I participated in the decision-making and personally managed or directed the management of the following life and organ supporting interventions that required my frequent assessment to treat or prevent imminent deterioration. Roc Puente M.D.   Staff Intensivist/Pulmonologist  [facility]  8/2/2021

## 2021-08-02 NOTE — PROGRESS NOTES
[facility]  Hospitalist Group                                                                                          Hospitalist Progress Note  Saul Aldana MD  Answering service: [phone] from in house phone        Date of Service:  2021  NAME:  Mark Benavidez  :  1960  MRN:  [id number]      Chief Complaint:         Subjective:         Review of System   As per subjective, all other systems are reviewed and are negative. Vital Signs:    Last 24hrs VS reviewed since prior progress note. Most recent are:  Visit Vitals  BP (!) 102/56 (BP 1 Location: Right upper arm, BP Patient Position: At rest)   Pulse (!) 53   Temp 98.3 °F (36.8 °C)   Resp 14   Ht 6' (1.829 m)   Wt 117 kg (257 lb 15 oz)   SpO2 95%   BMI 34.98 kg/m²         Intake/Output Summary (Last 24 hours) at 2021 1639  Last data filed at 2021 1449  Gross per 24 hour   Intake 2440 ml   Output 2575 ml   Net -135 ml        Physical Examination      General: Not in any distress  Resp: CTA Bilaterally   CV: S1+S2, No MGR  Abdomen: Soft, non tender  Neuro: Awake, alert, following commands, mild left lateral rectus palsy   Musculoskeletal: No Edema present, no wounds present          Data Review:   Labs and other clinical data reviewed      Labs:     Recent Labs     21  0720 21  0351   WBC 11.1 10.7   HGB 12.7 13.8   HCT 36.7 39.7    256     Recent Labs     21  0720 21  0351 21  2254    134* 134*   K 4.0 4.1 4.1    103 102   CO2 27 28 28   BUN 25* 17 16   CREA 0.91 0.81 0.70   * 139* 114*   CA 7.7* 8.0* 7.9*   MG 2.2 2.1  --    PHOS 3.8 3.2  --      No results for input(s): ALT, AP, TBIL, TBILI, TP, ALB, GLOB, GGT, AML, LPSE in the last 72 hours. No lab exists for component: SGOT, GPT, AMYP, HLPSE  No results for input(s): INR, PTP, APTT, INREXT in the last 72 hours. No results for input(s): FE, TIBC, PSAT, FERR in the last 72 hours.    No results found for: FOL, RBCF   No results for input(s): PH, PCO2, PO2 in the last 72 hours. No results for input(s): CPK, CKNDX, TROIQ in the last 72 hours.     No lab exists for component: CPKMB  Lab Results   Component Value Date/Time    Cholesterol, total 125 07/20/2021 04:11 AM    HDL Cholesterol 68 07/20/2021 04:11 AM    LDL, calculated 25.2 07/20/2021 04:11 AM    Triglyceride 159 (H) 07/20/2021 04:11 AM    CHOL/HDL Ratio 1.8 07/20/2021 04:11 AM     Lab Results   Component Value Date/Time    Glucose (POC) 105 08/02/2021 11:24 AM    Glucose (POC) 109 08/02/2021 07:19 AM    Glucose (POC) 158 (H) 08/01/2021 11:13 PM    Glucose (POC) 158 (H) 08/01/2021 04:44 PM    Glucose (POC) 139 (H) 08/01/2021 11:19 AM     Lab Results   Component Value Date/Time    Color YELLOW/STRAW 03/11/2015 12:33 PM    Appearance CLEAR 03/11/2015 12:33 PM    Specific gravity 1.019 03/11/2015 12:33 PM    pH (UA) 6.0 03/11/2015 12:33 PM    Protein NEGATIVE  03/11/2015 12:33 PM    Glucose NEGATIVE  03/11/2015 12:33 PM    Ketone NEGATIVE  03/11/2015 12:33 PM    Bilirubin NEGATIVE  03/11/2015 12:33 PM    Urobilinogen 1.0 03/11/2015 12:33 PM    Nitrites NEGATIVE  03/11/2015 12:33 PM    Leukocyte Esterase NEGATIVE  03/11/2015 12:33 PM    Epithelial cells FEW 03/11/2015 12:33 PM    Bacteria NEGATIVE  03/11/2015 12:33 PM    WBC 0-4 03/11/2015 12:33 PM    RBC 0-5 03/11/2015 12:33 PM         Medications Reviewed:     Current Facility-Administered Medications   Medication Dose Route Frequency    lisinopriL (PRINIVIL, ZESTRIL) tablet 40 mg  40 mg Oral QHS    cloNIDine HCL (CATAPRES) tablet 0.3 mg  0.3 mg Oral Q8H    chlorthalidone (HYGROTON) tablet 25 mg  25 mg Oral DAILY    [Held by provider] minoxidiL (LONITEN) tablet 5 mg  5 mg Oral BID    hydrALAZINE (APRESOLINE) tablet 50 mg  50 mg Oral TID    dexamethasone (DECADRON) 4 mg/mL injection 2 mg  2 mg IntraVENous Q12H    [Held by provider] metoprolol tartrate (LOPRESSOR) tablet 100 mg  100 mg Oral Q12H  0.9% sodium chloride infusion  100 mL/hr IntraVENous CONTINUOUS    senna-docusate (PERICOLACE) 8.6-50 mg per tablet 1 Tablet  1 Tablet Oral DAILY    polyethylene glycol (MIRALAX) packet 17 g  17 g Oral DAILY PRN    bisacodyL (DULCOLAX) tablet 10 mg  10 mg Oral DAILY PRN    labetaloL (NORMODYNE;TRANDATE) injection 10 mg  10 mg IntraVENous Q4H PRN    hydrALAZINE (APRESOLINE) 20 mg/mL injection 20 mg  20 mg IntraVENous Q6H PRN    insulin lispro (HUMALOG) injection   SubCUTAneous AC&HS    glucose chewable tablet 16 g  4 Tablet Oral PRN    dextrose (D50W) injection syrg 12.5-25 g  12.5-25 g IntraVENous PRN    glucagon (GLUCAGEN) injection 1 mg  1 mg IntraMUSCular PRN    ondansetron (ZOFRAN) injection 4 mg  4 mg IntraVENous Q6H PRN    naloxone (NARCAN) injection 0.4 mg  0.4 mg IntraVENous PRN    acetaminophen (TYLENOL) tablet 650 mg  650 mg Oral Q4H PRN    Or    acetaminophen (TYLENOL) solution 650 mg  650 mg Per NG tube Q4H PRN    Or    acetaminophen (TYLENOL) suppository 650 mg  650 mg Rectal Q4H PRN    niMODipine (NIMOTOP) capsule 60 mg  60 mg Oral Q4H    levETIRAcetam (KEPPRA) tablet 500 mg  500 mg Oral BID         Assessment & Plan: This is 70-year-old gentleman with past medical history of hypertension,  currently getting transferred out of ICU after [address] and hypertensive emergency treatment. He presented from [facility] with headache and dizziness, found to have elevated blood pressure. CT of the head showed an acute subarachnoid hemorrhage anterior to the sarahi. CTA of the head and neck showed an AVM in the left superior cerebellar peduncle, with the dominant drainage into the vein of [place]. No large vessel occlusion, no hemodynamically significant stenosis. Redemonstrated subarachnoid hemorrhage in the basilar cisterns. Patient was initially started on IV antihypertensives, now switched to oral antihypertensives. He was also started on Nimodipine.   Today is day 14/21 nimodipine. Neuro intervention team is on board, recommended no surgical intervention, recommends outpatient radiation therapy. Acute SAH in the setting of cerebellar AVM with associated flow related aneurysm  Mild left lateral rectus palsy  Hypertension  Hyperglycemia secondary to steroid use      Continue nimodipine 60 mg every 4 hour. Continue Keppra  Continue Decadron 4 mg twice daily  Neuro intervention team on board, recommendations appreciated  Patient's blood pressure is improving, nephrology on board, recommendations appreciated  Continue chlorthalidone, clonidine, hydralazine, metoprolol, minoxidil. Hemoglobin A1c 5.6, continue sliding scale of insulin.       Full code  DVT prophylaxis: DANG Hooker MD

## 2021-08-02 NOTE — PROGRESS NOTES
Neurointerventional Surgery Progress Note  Ena Moseley AGACNP-BC  Neurocritical Care NP      Admit Date: 7/20/2021   LOS: 13 days        Daily Progress Note: 8/2/2021    S/P: POD 13 status post diagnostic cerebral angiogram    HPI: Ghassan Pedraza is a 72-year-old male with a past medical history significant for hypertension and diabetes type 2 who initially presented to [facility] ED on 7/19/2021 for acute onset of dizziness, headache, left-sided hearing loss, and left-sided weakness. Upon arrival to the ED his blood pressure was noted to be 265/120 per review of notes. A code stroke was called and CT of the head showed an acute subarachnoid hemorrhage anterior to the sarahi. CTA of the head and neck showed an AVM in the left superior cerebellar peduncle, with the dominant drainage into the vein of Gaston. No large vessel occlusion, no hemodynamically significant stenosis. Redemonstrated subarachnoid hemorrhage in the basilar cisterns. He was started on a Cardene drip and was also given IV hydralazine and labetalol. He was then transferred to [facility] for a higher level of care. NIS was consulted and patient underwent a diagnostic cerebral angiogram on 7/20/2021. The angiogram showed evidence of a cerebral AVM with multiple feeders coming of the left PCA, left anterior choroidal and left SCA with a large single draining vein that drains into the vein of Villanueva. 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 Subjective:   Patient is doing well this morning. BP is stable. He denies any headache, new vision changes, chest pain, shortness of breath, coordination issues, tingling, nausea, or vomiting. He still complains of some double vision, left facial numbness, and left facial weakness that is unchanged. He still complains of some dizziness with movement and balance issues, but it has improved. He does have a stutter with his speech at baseline, but no new speech difficulties.   He complains of some intermittent numbness in his feet, but he believes this is secondary to swelling in his feet.      Current Facility-Administered Medications   Medication Dose Route Frequency Provider Last Rate Last Admin    lisinopriL (PRINIVIL, ZESTRIL) tablet 40 mg  40 mg Oral QHS Piyush Melvin MD        cloNIDine HCL (CATAPRES) tablet 0.3 mg  0.3 mg Oral Q8H Amelia Haley MD   0.3 mg at 08/02/21 1646    chlorthalidone (HYGROTON) tablet 25 mg  25 mg Oral DAILY Amelia Haley MD   25 mg at 08/02/21 0845    [Held by provider] minoxidiL (LONITEN) tablet 5 mg  5 mg Oral BID Amelia Haley MD   5 mg at 08/01/21 1845    hydrALAZINE (APRESOLINE) tablet 50 mg  50 mg Oral TID Amelia Haley MD   50 mg at 08/02/21 0845    dexamethasone (DECADRON) 4 mg/mL injection 2 mg  2 mg IntraVENous Q12H Mamie Grant NP   2 mg at 08/02/21 0846    [Held by provider] metoprolol tartrate (LOPRESSOR) tablet 100 mg  100 mg Oral Q12H Vickey Horvath MD   100 mg at 07/27/21 2156    0.9% sodium chloride infusion  150 mL/hr IntraVENous CONTINUOUS Tiffanie Hernandez  mL/hr at 08/02/21 0854 150 mL/hr at 08/02/21 0854    senna-docusate (PERICOLACE) 8.6-50 mg per tablet 1 Tablet  1 Tablet Oral DAILY Mamie Grant NP   1 Tablet at 08/02/21 0846    polyethylene glycol (MIRALAX) packet 17 g  17 g Oral DAILY PRN Brenda Posadas NP   17 g at 07/25/21 0906    bisacodyL (DULCOLAX) tablet 10 mg  10 mg Oral DAILY PRN Mamie Grant NP        labetaloL (NORMODYNE;TRANDATE) injection 10 mg  10 mg IntraVENous Q4H PRN Mamie Grant NP   10 mg at 07/30/21 0521    hydrALAZINE (APRESOLINE) 20 mg/mL injection 20 mg  20 mg IntraVENous Q6H PRN Herberth Morales DO   20 mg at 07/31/21 1612    insulin lispro (HUMALOG) injection   SubCUTAneous AC&HS Jon Hoffmann NP   2 Units at 08/01/21 1630    glucose chewable tablet 16 g  4 Tablet Oral PRN Jon Hoffmann NP        dextrose (D50W) injection syrg 12.5-25 g  12.5-25 g IntraVENous PRN Daphne Pompa NP        glucagon (GLUCAGEN) injection 1 mg  1 mg IntraMUSCular PRN Daphne Pompa NP        ondansetron [facility]) injection 4 mg  4 mg IntraVENous Q6H PRN Giselle Amadode, NP-C        naloxone [facility]) injection 0.4 mg  0.4 mg IntraVENous PRN Giselle Ronde, NP-C        acetaminophen (TYLENOL) tablet 650 mg  650 mg Oral Q4H PRN Giselle Ronde, NP-C   650 mg at 21 2736    Or    acetaminophen (TYLENOL) solution 650 mg  650 mg Per NG tube Q4H PRN Giselle Ronde, NP-C        Or    acetaminophen (TYLENOL) suppository 650 mg  650 mg Rectal Q4H PRN Giselle Ronde, NP-C        niMODipine (NIMOTOP) capsule 60 mg  60 mg Oral Q4H Andre Grant NP   60 mg at 21 0845    levETIRAcetam (KEPPRA) tablet 500 mg  500 mg Oral BID Luly Gallegos NP   500 mg at 21 0845        No Known Allergies    Review of Systems:  Pertinent items are noted in the History of Present Illness. Objective:     Vital signs  Temp (24hrs), Av.7 °F (36.5 °C), Min:96.8 °F (36 °C), Max:98.1 °F (36.7 °C)   No intake/output data recorded.  1901 -  0700  In: 6180 [P.O.:1780;  I.V.:4400]  Out: 4825 [Urine:4825]    Visit Vitals  BP (!) 102/56   Pulse (!) 53   Temp 98 °F (36.7 °C)   Resp 14   Ht 6' (1.829 m)   Wt 257 lb 15 oz (117 kg)   SpO2 95%   BMI 34.98 kg/m²      O2 Device: None (Room air)     Pain control  Pain Assessment  Pain Scale 1: Numeric (0 - 10)  Pain Intensity 1: 0  Pain Onset 1:  (denied)  Pain Location 1: Head  Pain Orientation 1: Left  Pain Description 1: Pressure  Pain Intervention(s) 1: Medication (see MAR)    PT/OT  Gait     Gait  Base of Support: Widened  Speed/Rita: Slow, Shuffled  Step Length: Right shortened, Left shortened  Gait Abnormalities: Ataxic, Altered arm swing, Trunk sway increased, Shuffling gait  Ambulation - Level of Assistance: Contact guard assistance  Distance (ft): 45 Feet (ft)  Assistive Device: Gait belt             Vitals:    08/02/21 0500 08/02/21 0600 08/02/21 0700 08/02/21 0800   BP: (!) 109/57 113/65 (!) 115/53 (!) 102/56   Pulse: 64 (!) 54 (!) 55 (!) 53   Resp: 16 11 14 14   Temp:    98 °F (36.7 °C)   SpO2:       Weight: 257 lb 15 oz (117 kg)      Height:            Physical Exam:  GENERAL: alert, cooperative, no distress, appears stated age  LUNG: clear to auscultation bilaterally  HEART: regular rate and rhythm, S1, S2 normal, no murmur, click, rub or gallop  EXTREMITIES:  extremities normal, atraumatic, no cyanosis, trace peripheral edema in BUE, +2 pitting edema in BLE, Posterior tibial pulses +1 bilaterally, +2 pedal pulses bilaterally   SKIN: Skin cool to touch. Appropriate for ethnicity. Right groin site clean, dry, and intact. No hematoma, bruising, or bleeding noted. Neurologic Exam:  Mental Status:  Alert and oriented x 4. Appropriate affect, mood and behavior. Language:    Normal fluency, repetition, comprehension and naming. Cranial Nerves:        Pupils equal, round and reactive to light. Visual fields full to confrontation. Dysconjugate gaze intermittently with EOMs, otherwise intact. Has some subtle issues with looking completely to the left with left eye. Mild subtle nystagmus when looking to the left, but it has improved. Decreased sensation on the left side of face. Left facial asymmetry     Tongue protrudes to midline, palate elevates symmetrically. No dysarthria. Motor:    No pronator drift. Bulk and tone normal.      5/5 power in all extremities proximally and distally. No involuntary movements. Sensation:    Decreased sensation on the left side of face, otherwise sensation intact to light touch. No neglect. Coordination & Gait: No significant ataxia with finger-to-nose and heel-to-shin bilaterally. Gait deferred.      24 hour results:    Recent Results (from the past 24 hour(s))   GLUCOSE, POC    Collection Time: 08/01/21  4:44 PM   Result Value Ref Range    Glucose (POC) 158 (H) 65 - 117 mg/dL    Performed by Bee Drummond RN    GLUCOSE, POC    Collection Time: 08/01/21 11:13 PM   Result Value Ref Range    Glucose (POC) 158 (H) 65 - 117 mg/dL    Performed by [id number], POC    Collection Time: 08/02/21  7:19 AM   Result Value Ref Range    Glucose (POC) 109 65 - 117 mg/dL    Performed by [address] [place] [address] [id number]    Collection Time: 08/02/21  7:20 AM   Result Value Ref Range    Magnesium 2.2 1.6 - 2.4 mg/dL   PHOSPHORUS    Collection Time: 08/02/21  7:20 AM   Result Value Ref Range    Phosphorus 3.8 2.6 - 4.7 MG/DL   CBC W/O DIFF    Collection Time: 08/02/21  7:20 AM   Result Value Ref Range    WBC 11.1 4.1 - 11.1 K/uL    RBC 4.44 4.10 - 5.70 M/uL    HGB 12.7 12.1 - 17.0 g/dL    HCT 36.7 36.6 - 50.3 %    MCV 82.7 80.0 - 99.0 FL    MCH 28.6 26.0 - 34.0 PG    MCHC 34.6 30.0 - 36.5 g/dL    RDW 12.7 11.5 - 14.5 %    PLATELET 473 154 - 033 K/uL    MPV 11.0 8.9 - 12.9 FL    NRBC 0.0 0  WBC    ABSOLUTE NRBC 0.00 0.00 - 6.56 K/uL   METABOLIC PANEL, BASIC    Collection Time: 08/02/21  7:20 AM   Result Value Ref Range    Sodium 137 136 - 145 mmol/L    Potassium 4.0 3.5 - 5.1 mmol/L    Chloride 105 97 - 108 mmol/L    CO2 27 21 - 32 mmol/L    Anion gap 5 5 - 15 mmol/L    Glucose 106 (H) 65 - 100 mg/dL    BUN 25 (H) 6 - 20 MG/DL    Creatinine 0.91 0.70 - 1.30 MG/DL    BUN/Creatinine ratio 27 (H) 12 - 20      GFR est AA >60 >60 ml/min/1.73m2    GFR est non-AA >60 >60 ml/min/1.73m2    Calcium 7.7 (L) 8.5 - 10.1 MG/DL   CORTISOL, AM    Collection Time: 08/02/21  7:20 AM   Result Value Ref Range    Cortisol, a.m. 0.6 (L) 4.30 - 22.45 ug/dL   TSH 3RD GENERATION    Collection Time: 08/02/21  7:20 AM   Result Value Ref Range    TSH 6.77 (H) 0.36 - 3.74 uIU/mL   TCD INTRACRANIAL ARTERIES COMPLETE    Collection Time: 08/02/21  8:15 AM   Result Value Ref Range    Right PCA 1 PSV 89.8 cm/s    Right PCA 1 EDV 30.2 cm/s    Right Vertebral PSV 79.8 cm/s    Right Vertebral EDV 34.4 cm/s    Left PCA 1 EDV 30.2 cm/s    Left Vertebral PSV 79.8 cm/s    Left Vertebral EDV 24.1 cm/s    Basilar Artery EDV 33.1 cm/s    Right ICA dist sys 58.1 cm/s    Right ICA dist mabry 18.0 cm/s    Left ICA dist sys 54.2 cm/s    Left ICA dist mabry 19.3 cm/s    Right PCA 1 Mean Velocity 50 cm/s    Right External ICA PSV 58 cm/s    Right External ICA EDV 18 cm/s    Right External ICA Mean Velocity 31 cm/s    Left External ICA PSV 54 cm/s    Left External ICA EDV 19 cm/s    Left External ICA Mean Velocity 31 cm/s    Left PCA 1 PSV 70 cm/s    Left PCA 1 Mean Velocity 43 cm/s    Basilar Artery PSV 88 cm/s    Basilar Artery Mean Farhat 51 cm/s    Right Vertebral Mean Velocity 49 cm/s    Left Vertebral Mean Velocity 43 cm/s          Imaging:  CT of head on 7/19/2021 at 2104 shows  IMPRESSION  1. Acute subarachnoid hemorrhage anterior to the sarahi. CTA of the head and neck on 7/19/2021 at 2126 shows  IMPRESSION     1. Arteriovenous malformation in the left superior cerebellar peduncle, with the  dominant drainage into the vein of Gaston. 2. No large vessel occlusion, no hemodynamically significant stenosis. 3. Redemonstrated subarachnoid hemorrhage in the basilar cisterns. CT the head on 7/20/2021 at 1129 shows  IMPRESSION  1. Stable ventricular size.     2.  Questionable diffuse cerebral edema.     3.  Subarachnoid hemorrhage in the prepontine cistern and ambient cisterns as  well as sylvian fissures are decreased from the prior study. MRI brain with and without 7/20/2021 at 2214 shows  IMPRESSION  1.   Tiny 1 mm foci of diffusion restriction in the left cerebellum and left  cerebral hemisphere as described likely embolic in origin.     2.  Possible mild meningeal enhancement which may be secondary to meningeal  irritation from known subarachnoid hemorrhage.     3.  Dilated superior ophthalmic veins are noted which is new when compared to  prior imaging. This is of uncertain clinical significance. There appears to be  normal enhancement of the cavernous sinus bilaterally. CT of head on 7/23/2021 at 2233 shows     IMPRESSION  No hydrocephalus. Stable left prepontine subarachnoid hemorrhage. CT of Head on 7/24/2021 at 1357  IMPRESSION:   Left prepontine hemorrhage is slightly increased. CT of the head on 7/24/2021 at 10:21 PM shows  IMPRESSION  Stable left prepontine hemorrhage. CTA of Head and neck on 7/28/2021 at 1001 shows  IMPRESSION     No evidence for significant vasospasm. Diffuse mild atherosclerosis. Left  cerebellar vascular malformation is again noted with unchanged hemorrhage in the  left prepontine cistern. Small focal hypoattenuation in the left sarahi and left  cerebellar peduncle likely acute ischemic changes.     Small right pleural effusion. Assessment:     Principal Problem:    Cerebral arteriovenous malformation (AVM) (7/20/2021)      Overview: ([id number])- CT (Brain): There is an AVM in the superior cerebellum with multiple aneurysms. One measures 4 mm. Another measures 4 mm within the superior cerebellum. Active Problems:    Malignant hypertension (7/20/2021)      Acute hypokalemia (7/20/2021)      Celiac artery stenosis ([address] (7/21/2021)      Vasogenic cerebral edema ([address] (7/21/2021)      Hypertensive emergency (7/21/2021)      SAH (subarachnoid hemorrhage) ([address] (7/20/2021)      Cerebral vasospasm (8/1/2021)        Plan:   Acute SAH in the setting of cerebellar AVM with associated flow related aneurysm, [place] Knox 1, Mistry Grade 2  - s/p diagnostic cerebral angiogram on 7/20/2021  - Day 14 of 21 of Nimotop for the prevention of delayed cerebral ischemia  - continue Keppra 500 mg BID for seizure ppx  - Continue NS at 150 ml/hr to maintain euvolemia   - CTA on 7/28 showed no evidence for significant vasospasm diffuse mild atherosclerosis.  Left cerebellar vascular malformation is again noted with unchanged hemorrhage in the left prepontine cistern. Small focal hypoattenuation in the left sarahi and left cerebellar peduncle likely acute ischemic changes. - Strict I's and O's, patient +1455 mls on  I's and O's, +15L since admission, decrease IVF to 100 ml/hr to maintain euvolemia   - continue Decadron 2 mg every 12 hours   - ECHO shows EF 65-70%, dilated left atrium. No shunting seen. Mild (grade 1) left ventricular diastolic dysfunction. - ok with neuro checks every 2 hours during the day and every 4 hours at night.   - SBP goal <140, on scheduled chlorthalidone, clonidine, hydralazine, and lisinopril, renal was consulted yesterday for additional work-up for possible secondary hypertension causes and some adjustments were made on patient's BP medications yesterday.  - PT/OT/SLP evals     - assess NICOM PRN for fluid responsiveness   - daily TCDs, TCD's today show exam is limited by inadequate ultrasound window of the temples. The anterior circulation cannot be evaluated. No evidence of vasospasm in the posterior circulation in the visualized arterial segments. Continue to monitor clinically.  - NIS following     Polyuria  - patient has been previously diuresed for fluid overload   - patient with intermittent polyuria, possibly autodiuresing   - urine random sodium 116, urine osmolality 455 this morning, serum osmolality 280  - serum sodium normal today at 137   - Nephrology consulted for further opinion of any ADH abnormalities in the setting of SAH and per review of notes there is no concern for cerebral salt wasting syndrome.  - Strict I's and O's, continue to monitor    HTN  - SBP goal <140 as stated above  - patient on scheduled oral meds as stated above  - Patient had a bilateral artery/venous renal duplex on 7/22 which shows no evidence of significant renal artery stenosis. No evidence of significant renal parenchymal disease. Possible hemodynamically significant celiac artery stenosis.   - Nephrology consulted for additional secondary HTN work-up, defer management to nephrology  - Intensivist and Renal following    Constipation (resolved)  - continue Rissa-colace daily  - Miralax PRN daily  - Dulcolax 10 mg daily PRN      Activity: Up with assistance, ambulate daily  DVT ppx: SCDs  Dispo: TBD    Plan d/w Dr. Feroz Ayon, Dr. Camden Garcia, RN, and patient. Okay to transfer to [facility] intermediate care.     Graeme Escoto NP

## 2021-08-03 PROBLEM — I10 ESSENTIAL HYPERTENSION: Status: RESOLVED | Noted: 2019-03-22 | Resolved: 2021-08-03

## 2021-08-03 LAB
ANION GAP SERPL CALC-SCNC: 5 MMOL/L (ref 5–15)
BUN SERPL-MCNC: 19 MG/DL (ref 6–20)
BUN/CREAT SERPL: 28 (ref 12–20)
CALCIUM SERPL-MCNC: 8.1 MG/DL (ref 8.5–10.1)
CHLORIDE SERPL-SCNC: 105 MMOL/L (ref 97–108)
CO2 SERPL-SCNC: 26 MMOL/L (ref 21–32)
CREAT SERPL-MCNC: 0.68 MG/DL (ref 0.7–1.3)
ERYTHROCYTE [DISTWIDTH] IN BLOOD BY AUTOMATED COUNT: 13 % (ref 11.5–14.5)
GLUCOSE BLD STRIP.AUTO-MCNC: 105 MG/DL (ref 65–117)
GLUCOSE BLD STRIP.AUTO-MCNC: 120 MG/DL (ref 65–117)
GLUCOSE BLD STRIP.AUTO-MCNC: 131 MG/DL (ref 65–117)
GLUCOSE BLD STRIP.AUTO-MCNC: 96 MG/DL (ref 65–117)
GLUCOSE SERPL-MCNC: 114 MG/DL (ref 65–100)
HCT VFR BLD AUTO: 36.4 % (ref 36.6–50.3)
HGB BLD-MCNC: 12.5 G/DL (ref 12.1–17)
MAGNESIUM SERPL-MCNC: 2.1 MG/DL (ref 1.6–2.4)
MCH RBC QN AUTO: 28.4 PG (ref 26–34)
MCHC RBC AUTO-ENTMCNC: 34.3 G/DL (ref 30–36.5)
MCV RBC AUTO: 82.7 FL (ref 80–99)
NRBC # BLD: 0 K/UL (ref 0–0.01)
NRBC BLD-RTO: 0 PER 100 WBC
PHOSPHATE SERPL-MCNC: 3.5 MG/DL (ref 2.6–4.7)
PLATELET # BLD AUTO: 222 K/UL (ref 150–400)
PMV BLD AUTO: 10.9 FL (ref 8.9–12.9)
POTASSIUM SERPL-SCNC: 4.1 MMOL/L (ref 3.5–5.1)
RBC # BLD AUTO: 4.4 M/UL (ref 4.1–5.7)
SERVICE CMNT-IMP: ABNORMAL
SERVICE CMNT-IMP: ABNORMAL
SERVICE CMNT-IMP: NORMAL
SERVICE CMNT-IMP: NORMAL
SODIUM SERPL-SCNC: 136 MMOL/L (ref 136–145)
WBC # BLD AUTO: 10.4 K/UL (ref 4.1–11.1)

## 2021-08-03 PROCEDURE — 82962 GLUCOSE BLOOD TEST: CPT

## 2021-08-03 PROCEDURE — 65660000001 HC RM ICU INTERMED STEPDOWN

## 2021-08-03 PROCEDURE — 94760 N-INVAS EAR/PLS OXIMETRY 1: CPT

## 2021-08-03 PROCEDURE — 74011250637 HC RX REV CODE- 250/637: Performed by: NURSE PRACTITIONER

## 2021-08-03 PROCEDURE — 74011250637 HC RX REV CODE- 250/637: Performed by: INTERNAL MEDICINE

## 2021-08-03 PROCEDURE — 99232 SBSQ HOSP IP/OBS MODERATE 35: CPT | Performed by: STUDENT IN AN ORGANIZED HEALTH CARE EDUCATION/TRAINING PROGRAM

## 2021-08-03 PROCEDURE — 74011000250 HC RX REV CODE- 250: Performed by: INTERNAL MEDICINE

## 2021-08-03 PROCEDURE — 74011250636 HC RX REV CODE- 250/636: Performed by: NURSE PRACTITIONER

## 2021-08-03 PROCEDURE — 80048 BASIC METABOLIC PNL TOTAL CA: CPT

## 2021-08-03 PROCEDURE — 36415 COLL VENOUS BLD VENIPUNCTURE: CPT

## 2021-08-03 PROCEDURE — 85027 COMPLETE CBC AUTOMATED: CPT

## 2021-08-03 PROCEDURE — 84100 ASSAY OF PHOSPHORUS: CPT

## 2021-08-03 PROCEDURE — 83735 ASSAY OF MAGNESIUM: CPT

## 2021-08-03 RX ORDER — BUMETANIDE 0.25 MG/ML
1 INJECTION INTRAMUSCULAR; INTRAVENOUS ONCE
Status: COMPLETED | OUTPATIENT
Start: 2021-08-03 | End: 2021-08-03

## 2021-08-03 RX ORDER — CLONIDINE HYDROCHLORIDE 0.2 MG/1
0.2 TABLET ORAL EVERY 8 HOURS
Status: DISCONTINUED | OUTPATIENT
Start: 2021-08-03 | End: 2021-08-07

## 2021-08-03 RX ADMIN — CLONIDINE HYDROCHLORIDE 0.3 MG: 0.2 TABLET ORAL at 05:08

## 2021-08-03 RX ADMIN — HYDRALAZINE HYDROCHLORIDE 50 MG: 50 TABLET, FILM COATED ORAL at 22:22

## 2021-08-03 RX ADMIN — LISINOPRIL 40 MG: 20 TABLET ORAL at 22:22

## 2021-08-03 RX ADMIN — HYDRALAZINE HYDROCHLORIDE 50 MG: 50 TABLET, FILM COATED ORAL at 16:06

## 2021-08-03 RX ADMIN — DOCUSATE SODIUM 50 MG AND SENNOSIDES 8.6 MG 1 TABLET: 8.6; 5 TABLET, FILM COATED ORAL at 08:23

## 2021-08-03 RX ADMIN — NIMODIPINE 60 MG: 30 CAPSULE, LIQUID FILLED ORAL at 20:28

## 2021-08-03 RX ADMIN — CHLORTHALIDONE 25 MG: 25 TABLET ORAL at 08:23

## 2021-08-03 RX ADMIN — CLONIDINE HYDROCHLORIDE 0.2 MG: 0.2 TABLET ORAL at 22:22

## 2021-08-03 RX ADMIN — CLONIDINE HYDROCHLORIDE 0.2 MG: 0.2 TABLET ORAL at 14:48

## 2021-08-03 RX ADMIN — NIMODIPINE 60 MG: 30 CAPSULE, LIQUID FILLED ORAL at 08:23

## 2021-08-03 RX ADMIN — DEXAMETHASONE SODIUM PHOSPHATE 2 MG: 4 INJECTION, SOLUTION INTRAMUSCULAR; INTRAVENOUS at 08:23

## 2021-08-03 RX ADMIN — NIMODIPINE 60 MG: 30 CAPSULE, LIQUID FILLED ORAL at 04:59

## 2021-08-03 RX ADMIN — HYDRALAZINE HYDROCHLORIDE 50 MG: 50 TABLET, FILM COATED ORAL at 08:23

## 2021-08-03 RX ADMIN — LEVETIRACETAM 500 MG: 500 TABLET ORAL at 08:23

## 2021-08-03 RX ADMIN — SODIUM CHLORIDE 100 ML/HR: 9 INJECTION, SOLUTION INTRAVENOUS at 02:00

## 2021-08-03 RX ADMIN — NIMODIPINE 60 MG: 30 CAPSULE, LIQUID FILLED ORAL at 11:51

## 2021-08-03 RX ADMIN — BUMETANIDE 1 MG: 0.25 INJECTION, SOLUTION INTRAMUSCULAR; INTRAVENOUS at 10:02

## 2021-08-03 RX ADMIN — NIMODIPINE 60 MG: 30 CAPSULE, LIQUID FILLED ORAL at 16:06

## 2021-08-03 RX ADMIN — LEVETIRACETAM 500 MG: 500 TABLET ORAL at 17:52

## 2021-08-03 NOTE — PROGRESS NOTES
1930: Bedside and Verbal shift change report given to MEETA Langston, RN (oncoming nurse) by TORSTEN Miranda RN (offgoing nurse). Report included the following information SBAR, Kardex, Intake/Output, MAR, Accordion, Recent Results, Cardiac Rhythm NSR/SB, Alarm Parameters  and Dual Neuro Assessment. 0730: Bedside and Verbal shift change report given to MARIA TERESA Hyde (oncoming nurse) by Imelda Ansari. KAIN Langston (offgoing nurse). Report included the following information SBAR, Kardex, Intake/Output, MAR, Accordion, Recent Results, Cardiac Rhythm NSR/SB, Alarm Parameters  and Dual Neuro Assessment.

## 2021-08-03 NOTE — PROGRESS NOTES
[facility] [facility] Adult  Hospitalist Group                                                                                          Hospitalist Progress Note  Jeny Patricio MD  Answering service: [phone] from in house phone        Date of Service:  8/3/2021  NAME:  Robert Newman  :  1960  MRN:  [id number]      Chief Complaint:   [facility]    59-year-old male with hypertension admitted to our hospital as a transfer from outside facility on  with subarachnoid hemorrhage    Overnight event     Overnight Events:   8/3: bp better, tolerate diet   : No acute event. Blood pressure better controlled. : No acute event, off nicardipine, minoxidil was added. : No overnight events  : No acute overnight events  : No acute overnight events  : Better blood pressure control with adding clonidine orally, hold beta-blocker due to bradycardia [asymptomatic], off nicardipine drip.  Did well with diuresis and he is negative about 1600 cc.  Still positive about 19 L since admission and that is reflected in his weight as well. :No acute event, did well with gentle diuresis, -600 cc however still positive about 20 L since admission.  Continue to require drips to control blood pressure to goal.       Subjective:   No acute event         Review of System   As per subjective, all other systems are reviewed and are negative. Vital Signs:    Last 24hrs VS reviewed since prior progress note.  Most recent are:  Visit Vitals  BP (!) 132/56   Pulse 79   Temp 98 °F (36.7 °C)   Resp 21   Ht 6' (1.829 m)   Wt 114.2 kg (251 lb 12.3 oz)   SpO2 97%   BMI 34.15 kg/m²         Intake/Output Summary (Last 24 hours) at 8/3/2021 1325  Last data filed at 8/3/2021 1200  Gross per 24 hour   Intake 2120 ml   Output 4850 ml   Net -2730 ml        Physical Examination      General: Not in any distress  Resp: CTA Bilaterally   CV: S1+S2, No MGR  Abdomen: Soft, non tender  Neuro: Awake, alert, following commands, mild left lateral rectus palsy   Musculoskeletal: No Edema present, no wounds present          Data Review:   Labs and other clinical data reviewed      Labs:     Recent Labs     08/03/21 0513 08/02/21 0720   WBC 10.4 11.1   HGB 12.5 12.7   HCT 36.4* 36.7    234     Recent Labs     08/03/21  0513 08/02/21  0720 08/01/21  0351    137 134*   K 4.1 4.0 4.1    105 103   CO2 26 27 28   BUN 19 25* 17   CREA 0.68* 0.91 0.81   * 106* 139*   CA 8.1* 7.7* 8.0*   MG 2.1 2.2 2.1   PHOS 3.5 3.8 3.2     No results for input(s): ALT, AP, TBIL, TBILI, TP, ALB, GLOB, GGT, AML, LPSE in the last 72 hours. No lab exists for component: SGOT, GPT, AMYP, HLPSE  No results for input(s): INR, PTP, APTT, INREXT, INREXT in the last 72 hours. No results for input(s): FE, TIBC, PSAT, FERR in the last 72 hours. No results found for: FOL, RBCF   No results for input(s): PH, PCO2, PO2 in the last 72 hours. No results for input(s): CPK, CKNDX, TROIQ in the last 72 hours.     No lab exists for component: CPKMB  Lab Results   Component Value Date/Time    Cholesterol, total 125 07/20/2021 04:11 AM    HDL Cholesterol 68 07/20/2021 04:11 AM    LDL, calculated 25.2 07/20/2021 04:11 AM    Triglyceride 159 (H) 07/20/2021 04:11 AM    CHOL/HDL Ratio 1.8 07/20/2021 04:11 AM     Lab Results   Component Value Date/Time    Glucose (POC) 120 (H) 08/03/2021 11:48 AM    Glucose (POC) 96 08/03/2021 07:03 AM    Glucose (POC) 213 (H) 08/02/2021 10:00 PM    Glucose (POC) 120 (H) 08/02/2021 04:51 PM    Glucose (POC) 105 08/02/2021 11:24 AM     Lab Results   Component Value Date/Time    Color YELLOW/STRAW 03/11/2015 12:33 PM    Appearance CLEAR 03/11/2015 12:33 PM    Specific gravity 1.019 03/11/2015 12:33 PM    pH (UA) 6.0 03/11/2015 12:33 PM    Protein NEGATIVE  03/11/2015 12:33 PM    Glucose NEGATIVE  03/11/2015 12:33 PM    Ketone NEGATIVE  03/11/2015 12:33 PM    Bilirubin NEGATIVE  03/11/2015 12:33 PM    Urobilinogen 1.0 03/11/2015 12:33 PM    Nitrites NEGATIVE  03/11/2015 12:33 PM    Leukocyte Esterase NEGATIVE  03/11/2015 12:33 PM    Epithelial cells FEW 03/11/2015 12:33 PM    Bacteria NEGATIVE  03/11/2015 12:33 PM    WBC 0-4 03/11/2015 12:33 PM    RBC 0-5 03/11/2015 12:33 PM         Medications Reviewed:     Current Facility-Administered Medications   Medication Dose Route Frequency    cloNIDine HCL (CATAPRES) tablet 0.2 mg  0.2 mg Oral Q8H    lisinopriL (PRINIVIL, ZESTRIL) tablet 40 mg  40 mg Oral QHS    chlorthalidone (HYGROTON) tablet 25 mg  25 mg Oral DAILY    hydrALAZINE (APRESOLINE) tablet 50 mg  50 mg Oral TID    dexamethasone (DECADRON) 4 mg/mL injection 2 mg  2 mg IntraVENous Q12H    senna-docusate (PERICOLACE) 8.6-50 mg per tablet 1 Tablet  1 Tablet Oral DAILY    polyethylene glycol (MIRALAX) packet 17 g  17 g Oral DAILY PRN    bisacodyL (DULCOLAX) tablet 10 mg  10 mg Oral DAILY PRN    labetaloL (NORMODYNE;TRANDATE) injection 10 mg  10 mg IntraVENous Q4H PRN    hydrALAZINE (APRESOLINE) 20 mg/mL injection 20 mg  20 mg IntraVENous Q6H PRN    insulin lispro (HUMALOG) injection   SubCUTAneous AC&HS    glucose chewable tablet 16 g  4 Tablet Oral PRN    dextrose (D50W) injection syrg 12.5-25 g  12.5-25 g IntraVENous PRN    glucagon (GLUCAGEN) injection 1 mg  1 mg IntraMUSCular PRN    ondansetron (ZOFRAN) injection 4 mg  4 mg IntraVENous Q6H PRN    naloxone (NARCAN) injection 0.4 mg  0.4 mg IntraVENous PRN    acetaminophen (TYLENOL) tablet 650 mg  650 mg Oral Q4H PRN    Or    acetaminophen (TYLENOL) solution 650 mg  650 mg Per NG tube Q4H PRN    Or    acetaminophen (TYLENOL) suppository 650 mg  650 mg Rectal Q4H PRN    niMODipine (NIMOTOP) capsule 60 mg  60 mg Oral Q4H    levETIRAcetam (KEPPRA) tablet 500 mg  500 mg Oral BID         Assessment & Plan:          This is 71-year-old gentleman with past medical history of hypertension,  currently getting transferred out of ICU after [facility] and hypertensive emergency treatment. He presented from [facility] with headache and dizziness, found to have elevated blood pressure. CT of the head showed an acute subarachnoid hemorrhage anterior to the sarahi. CTA of the head and neck showed an AVM in the left superior cerebellar peduncle, with the dominant drainage into the vein of Gaston. No large vessel occlusion, no hemodynamically significant stenosis. Redemonstrated subarachnoid hemorrhage in the basilar cisterns. Patient was initially started on IV antihypertensives, now switched to oral antihypertensives. He was also started on Nimodipine. Neuro intervention team is on board, recommended no surgical intervention, recommends outpatient radiation therapy. Acute SAH in the setting of cerebellar AVM with associated flow related aneurysm  Mild left lateral rectus palsy  Hypertension  Hyperglycemia secondary to steroid use      Continue nimodipine 60 mg every 4 hour. [place]  is day 15/21 nimodipine   Continue Keppra  Continue Decadron 4 mg twice daily  Neuro intervention team on board, recommendations appreciated  Patient's blood pressure is improving, nephrology on board, recommendations appreciated  Continue chlorthalidone, clonidine, hydralazine, metoprolol, minoxidil. Hemoglobin A1c 5.6, continue sliding scale of insulin.   PT/OT     Full code  DVT prophylaxis: SCD                      Lori Thomas MD

## 2021-08-03 NOTE — PROGRESS NOTES
[facility]   [address] [facility], [address] [place] [address], [facility]  Phone: [phone]   [phone]       Nephrology Progress Note  Kathleen Butts     1960     [id number]  Date of Admission : 7/20/2021 08/03/21    CC: Follow up for secondary HTN       Assessment and Plan   Resistant HTN:  - exclude secondary causes   - quite possibly this may be  APO-L1 related or ENac channel mutation   -  Reduced clonidine, stopped IVF (has significant peripheral edema)  - could increase chlorthalidone tomorrow   - CTA renals In the next 1-2 days   - continue other meds     Polyuria :  - 2/2 SAH   - watch for now   - not suspecting CSW    Cerebellar AVM w/ SAH   - per Endovascular Neuro Sx     Chronic heavy smoker    Elevated TSH. Interval History:  Seen and examined. BP improving. No headache. Has pedal edema     Review of Systems: A comprehensive review of systems was negative except for that written in the HPI.     Current Medications:   Current Facility-Administered Medications   Medication Dose Route Frequency    bumetanide (BUMEX) injection 1 mg  1 mg IntraVENous ONCE    cloNIDine HCL (CATAPRES) tablet 0.2 mg  0.2 mg Oral Q8H    lisinopriL (PRINIVIL, ZESTRIL) tablet 40 mg  40 mg Oral QHS    chlorthalidone (HYGROTON) tablet 25 mg  25 mg Oral DAILY    hydrALAZINE (APRESOLINE) tablet 50 mg  50 mg Oral TID    dexamethasone (DECADRON) 4 mg/mL injection 2 mg  2 mg IntraVENous Q12H    senna-docusate (PERICOLACE) 8.6-50 mg per tablet 1 Tablet  1 Tablet Oral DAILY    polyethylene glycol (MIRALAX) packet 17 g  17 g Oral DAILY PRN    bisacodyL (DULCOLAX) tablet 10 mg  10 mg Oral DAILY PRN    labetaloL (NORMODYNE;TRANDATE) injection 10 mg  10 mg IntraVENous Q4H PRN    hydrALAZINE (APRESOLINE) 20 mg/mL injection 20 mg  20 mg IntraVENous Q6H PRN    insulin lispro (HUMALOG) injection   SubCUTAneous AC&HS    glucose chewable tablet 16 g  4 Tablet Oral PRN    dextrose (D50W) injection syrg 12.5-25 g  12.5-25 g IntraVENous PRN    glucagon (GLUCAGEN) injection 1 mg  1 mg IntraMUSCular PRN    ondansetron (ZOFRAN) injection 4 mg  4 mg IntraVENous Q6H PRN    naloxone (NARCAN) injection 0.4 mg  0.4 mg IntraVENous PRN    acetaminophen (TYLENOL) tablet 650 mg  650 mg Oral Q4H PRN    Or    acetaminophen (TYLENOL) solution 650 mg  650 mg Per NG tube Q4H PRN    Or    acetaminophen (TYLENOL) suppository 650 mg  650 mg Rectal Q4H PRN    niMODipine (NIMOTOP) capsule 60 mg  60 mg Oral Q4H    levETIRAcetam (KEPPRA) tablet 500 mg  500 mg Oral BID      No Known Allergies    Objective:  Vitals:    Vitals:    08/03/21 0600 08/03/21 0700 08/03/21 0800 08/03/21 0900   BP: 115/74 118/75 116/60 128/62   Pulse: (!) 46 (!) 54 (!) 55 (!) 59   Resp: 14 16 15 17   Temp:   97.5 °F (36.4 °C)    SpO2:       Weight:       Height:         Intake and Output:  08/03 0701 - 08/03 1900  In: 300 [I.V.:300]  Out: -   08/01 1901 - 08/03 0700  In: 5416.7 [P.O.:1000; I.V.:4416.7]  Out: 2101 [Urine:4375]    Physical Examination:    General: NAD,Conversant   Neck:  Supple, no mass  Resp:  Lungs CTA B/L, no wheezing , normal respiratory effort  CV:  RRR,  no murmur or rub,trace LE edema  GI:  Soft, NT, + Bowel sounds, no hepatosplenomegaly  Neurologic:  Non focal  Psych:             AAO x 3 appropriate affect   Skin:  No Rash  :  No peña     []    High complexity decision making was performed  []    Patient is at high-risk of decompensation with multiple organ involvement    Lab Data Personally Reviewed: I have reviewed all the pertinent labs, microbiology data and radiology studies during assessment.     Recent Labs     08/03/21  0513 08/02/21  0720 08/01/21  0351 07/31/21  2254    137 134* 134*   K 4.1 4.0 4.1 4.1    105 103 102   CO2 26 27 28 28   * 106* 139* 114*   BUN 19 25* 17 16   CREA 0.68* 0.91 0.81 0.70   CA 8.1* 7.7* 8.0* 7.9*   MG 2.1 2.2 2.1  --    PHOS 3.5 3.8 3.2  --      Recent Labs     08/03/21  0513 08/02/21  0720 08/01/21  0351 07/31/21  2254   WBC 10.4 11.1 10.7 10.9   HGB 12.5 12.7 13.8 13.0   HCT 36.4* 36.7 39.7 37.7    234 256 244     No results found for: SDES  Lab Results   Component Value Date/Time    Culture result: MRSA NOT PRESENT 03/11/2015 12:00 PM    Culture result:  03/11/2015 12:00 PM         Screening of patient nares for MRSA is for surveillance purposes and, if positive, to facilitate isolation considerations in high risk settings. It is not intended for automatic decolonization interventions per se as regimens are not sufficiently effective to warrant routine use. Recent Results (from the past 24 hour(s))   GLUCOSE, POC    Collection Time: 08/02/21 11:24 AM   Result Value Ref Range    Glucose (POC) 105 65 - 117 mg/dL    Performed by [address] [place] [address] , POC    Collection Time: 08/02/21  4:51 PM   Result Value Ref Range    Glucose (POC) 120 (H) 65 - 117 mg/dL    Performed by [address] [place] [address] , POC    Collection Time: 08/02/21 10:00 PM   Result Value Ref Range    Glucose (POC) 213 (H) 65 - 117 mg/dL    Performed by [address] [place] [address] [address]    Collection Time: 08/03/21  5:13 AM   Result Value Ref Range    Magnesium 2.1 1.6 - 2.4 mg/dL   PHOSPHORUS    Collection Time: 08/03/21  5:13 AM   Result Value Ref Range    Phosphorus 3.5 2.6 - 4.7 MG/DL   CBC W/O DIFF    Collection Time: 08/03/21  5:13 AM   Result Value Ref Range    WBC 10.4 4.1 - 11.1 K/uL    RBC 4.40 4. 10 - 5.70 M/uL    HGB 12.5 12.1 - 17.0 g/dL    HCT 36.4 (L) 36.6 - 50.3 %    MCV 82.7 80.0 - 99.0 FL    MCH 28.4 26.0 - 34.0 PG    MCHC 34.3 30.0 - 36.5 g/dL    RDW 13.0 11.5 - 14.5 %    PLATELET 220 797 - 749 K/uL    MPV 10.9 8.9 - 12.9 FL    NRBC 0.0 0  WBC    ABSOLUTE NRBC 0.00 0.00 - 0.35 K/uL   METABOLIC PANEL, BASIC    Collection Time: 08/03/21  5:13 AM   Result Value Ref Range    Sodium 136 136 - 145 mmol/L    Potassium 4.1 3.5 - 5.1 mmol/L    Chloride 105 97 - 108 mmol/L    CO2 26 21 - 32 mmol/L Anion gap 5 5 - 15 mmol/L    Glucose 114 (H) 65 - 100 mg/dL    BUN 19 6 - 20 MG/DL    Creatinine 0.68 (L) 0.70 - 1.30 MG/DL    BUN/Creatinine ratio 28 (H) 12 - 20      GFR est AA >60 >60 ml/min/1.73m2    GFR est non-AA >60 >60 ml/min/1.73m2    Calcium 8.1 (L) 8.5 - 10.1 MG/DL   GLUCOSE, POC    Collection Time: 08/03/21  7:03 AM   Result Value Ref Range    Glucose (POC) 96 65 - 117 mg/dL    Performed by BISI BATRES            Total time spent with patient:  xxx   min. Care Plan discussed with:  Patient     Family      RN      Consulting Physician [address],         I have reviewed the flowsheets. Chart and Pertinent Notes have been reviewed. No change in PMH ,family and social history from Consult note.       Jeison Braswell MD

## 2021-08-03 NOTE — PROGRESS NOTES
Neurointerventional Surgery Progress Note  Amita [facility]  Neurocritical Care NP      Admit Date: 7/20/2021   LOS: 14 days        Daily Progress Note: 8/3/2021    S/P: POD 14 status post diagnostic cerebral angiogram        Subjective:   Patient is doing well this morning. BP is stable. He denies any headache, new vision changes, chest pain, shortness of breath, coordination issues, tingling, nausea, or vomiting. He still complains of some double vision, left facial numbness, and left facial weakness that is unchanged. He still complains of some dizziness with movement and balance issues, but it has improved. He does have a stutter with his speech at baseline, but no new speech difficulties. He complains of some intermittent numbness in his feet, but he believes this is secondary to swelling in his feet.      Current Facility-Administered Medications   Medication Dose Route Frequency Provider Last Rate Last Admin    cloNIDine HCL (CATAPRES) tablet 0.2 mg  0.2 mg Oral Q8H Piyush Melvin MD   0.2 mg at 08/03/21 1448    lisinopriL (PRINIVIL, ZESTRIL) tablet 40 mg  40 mg Oral QHS Piyush Melvin MD   40 mg at 08/02/21 2200    chlorthalidone (HYGROTON) tablet 25 mg  25 mg Oral DAILY Navi Goss MD   25 mg at 08/03/21 6455    hydrALAZINE (APRESOLINE) tablet 50 mg  50 mg Oral TID Navi Goss MD   50 mg at 08/03/21 1606    dexamethasone (DECADRON) 4 mg/mL injection 2 mg  2 mg IntraVENous Q12H Andre Grant NP   2 mg at 08/03/21 0823    senna-docusate (PERICOLACE) 8.6-50 mg per tablet 1 Tablet  1 Tablet Oral DAILY Andre Grant NP   1 Tablet at 08/03/21 0823    polyethylene glycol (MIRALAX) packet 17 g  17 g Oral DAILY PRN Delia Munguia NP   17 g at 07/25/21 0906    bisacodyL (DULCOLAX) tablet 10 mg  10 mg Oral DAILY PRN Andre Grant NP        labetaloL (NORMODYNE;TRANDATE) injection 10 mg  10 mg IntraVENous Q4H PRN Delia Munguia NP 10 mg at 07/30/21 0521    hydrALAZINE (APRESOLINE) 20 mg/mL injection 20 mg  20 mg IntraVENous Q6H PRN Herberth Morales DO   20 mg at 07/31/21 1612    insulin lispro (HUMALOG) injection   SubCUTAneous AC&HS Loyda Gan NP   2 Units at 08/02/21 2205    glucose chewable tablet 16 g  4 Tablet Oral PRN Loyda Gan NP        dextrose (D50W) injection syrg 12.5-25 g  12.5-25 g IntraVENous PRN Loyda Gan, NP        glucagon (GLUCAGEN) injection 1 mg  1 mg IntraMUSCular PRN Loyda Gan NP        ondansetron [facility]) injection 4 mg  4 mg IntraVENous Q6H PRN Kisha Esparza NP-C        naloxone [facility]) injection 0.4 mg  0.4 mg IntraVENous PRN Kisha Esparza NP-WILLY        acetaminophen (TYLENOL) tablet 650 mg  650 mg Oral Q4H PRN Kisha Esparza NP-C   650 mg at 08/01/21 9508    Or    acetaminophen (TYLENOL) solution 650 mg  650 mg Per NG tube Q4H PRN Kisha Esparza NP-WILLY        Or    acetaminophen (TYLENOL) suppository 650 mg  650 mg Rectal Q4H PRN Kisha Esparza NP-WILLY        niMODipine (NIMOTOP) capsule 60 mg  60 mg Oral Q4H Binford, Tiney Aase, NP   60 mg at 08/03/21 1606    levETIRAcetam (KEPPRA) tablet 500 mg  500 mg Oral BID Pema Gallegos NP   500 mg at 08/03/21 1752      HPI: Natalie Mooney is a 66-year-old male with a past medical history significant for hypertension and diabetes type 2 who initially presented to [facility] ED on 7/19/2021 for acute onset of dizziness, headache, left-sided hearing loss, and left-sided weakness. Upon arrival to the ED his blood pressure was noted to be 265/120 per review of notes. A code stroke was called and CT of the head showed an acute subarachnoid hemorrhage anterior to the sarahi. CTA of the head and neck showed an AVM in the left superior cerebellar peduncle, with the dominant drainage into the vein of Gaston. No large vessel occlusion, no hemodynamically significant stenosis. Redemonstrated subarachnoid hemorrhage in the basilar cisterns.  He was started on a Cardene drip and was also given IV hydralazine and labetalol. He was then transferred to [facility] for a higher level of care. NIS was consulted and patient underwent a diagnostic cerebral angiogram on 2021. The angiogram showed evidence of a cerebral AVM with multiple feeders coming of the left PCA, left anterior choroidal and left SCA with a large single draining vein that drains into the vein of Villanueva. 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 No Known Allergies    Review of Systems:  Pertinent items are noted in the History of Present Illness. Objective:     Vital signs  Temp (24hrs), Av.9 °F (36.6 °C), Min:97.5 °F (36.4 °C), Max:98.5 °F (36.9 °C)   701 - 1900  In: 403.3 [I.V.:403.3]  Out: 2500 [Urine:2500]  1901 -  0700  In: 5416.7 [P.O.:1000;  I.V.:4416.7]  Out: 4375 [Urine:4375]    Visit Vitals  BP (!) 148/69 (BP 1 Location: Right arm, BP Patient Position: At rest)   Pulse 61   Temp 98.5 °F (36.9 °C)   Resp 16   Ht 6' (1.829 m)   Wt 251 lb 12.3 oz (114.2 kg)   SpO2 97%   BMI 34.15 kg/m²      O2 Device: None (Room air)     Pain control  Pain Assessment  Pain Scale 1: Numeric (0 - 10)  Pain Intensity 1: 0  Pain Onset 1:  (denied)  Pain Location 1: Head  Pain Orientation 1: Left  Pain Description 1: Pressure  Pain Intervention(s) 1: Medication (see MAR)           Vitals:    21 1200 21 1222 21 1300 21 1600   BP: (!) 148/49 (!) 132/56 (!) 125/52 (!) 148/69   Pulse: 79  66 61   Resp: 21  18 16   Temp: 98 °F (36.7 °C)   98.5 °F (36.9 °C)   SpO2: 97%      Weight:       Height:            Physical Exam:  GENERAL: alert, cooperative, no distress, appears stated age  LUNG: clear to auscultation bilaterally  HEART: regular rate and rhythm, S1, S2 normal, no murmur, click, rub or gallop  EXTREMITIES:  extremities normal, atraumatic, no cyanosis, trace peripheral edema in BUE, +2 pitting edema in BLE, Posterior tibial pulses +1 bilaterally, +2 pedal pulses bilaterally   SKIN: Skin cool to touch. Appropriate for ethnicity. Right groin site clean, dry, and intact. No hematoma, bruising, or bleeding noted. Neurologic Exam:  Mental Status:  Alert and oriented x 4. Appropriate affect, mood and behavior. Language:    Normal fluency, repetition, comprehension and naming. Cranial Nerves:        Pupils equal, round and reactive to light. Visual fields full to confrontation. Dysconjugate gaze intermittently with EOMs, otherwise intact. Has some subtle issues with looking completely to the left with left eye. Mild subtle nystagmus when looking to the left, but it has improved. Decreased sensation on the left side of face. Left facial asymmetry     Tongue protrudes to midline, palate elevates symmetrically. No dysarthria. Motor:    No pronator drift. Bulk and tone normal.      5/5 power in all extremities proximally and distally. No involuntary movements. Sensation:    Decreased sensation on the left side of face, otherwise sensation intact to light touch. No neglect. Coordination & Gait: No significant ataxia with finger-to-nose and heel-to-shin bilaterally. Gait deferred. 24 hour results:    Recent Results (from the past 24 hour(s))   GLUCOSE, POC    Collection Time: 08/02/21 10:00 PM   Result Value Ref Range    Glucose (POC) 213 (H) 65 - 117 mg/dL    Performed by [address] [place] [address]    Collection Time: 08/03/21  5:13 AM   Result Value Ref Range    Magnesium 2.1 1.6 - 2.4 mg/dL   PHOSPHORUS    Collection Time: 08/03/21  5:13 AM   Result Value Ref Range    Phosphorus 3.5 2.6 - 4.7 MG/DL   CBC W/O DIFF    Collection Time: 08/03/21  5:13 AM   Result Value Ref Range    WBC 10.4 4.1 - 11.1 K/uL    RBC 4.40 4. 10 - 5.70 M/uL    HGB 12.5 12.1 - 17.0 g/dL    HCT 36.4 (L) 36.6 - 50.3 %    MCV 82.7 80.0 - 99.0 FL    MCH 28.4 26.0 - 34.0 PG    MCHC 34.3 30.0 - 36.5 g/dL    RDW 13.0 11.5 - 14.5 %    PLATELET 055 380 - 616 K/uL    MPV 10.9 8.9 - 12.9 FL    NRBC 0.0 0  WBC    ABSOLUTE NRBC 0.00 0.00 - 1.44 K/uL   METABOLIC PANEL, BASIC    Collection Time: 08/03/21  5:13 AM   Result Value Ref Range    Sodium 136 136 - 145 mmol/L    Potassium 4.1 3.5 - 5.1 mmol/L    Chloride 105 97 - 108 mmol/L    CO2 26 21 - 32 mmol/L    Anion gap 5 5 - 15 mmol/L    Glucose 114 (H) 65 - 100 mg/dL    BUN 19 6 - 20 MG/DL    Creatinine 0.68 (L) 0.70 - 1.30 MG/DL    BUN/Creatinine ratio 28 (H) 12 - 20      GFR est AA >60 >60 ml/min/1.73m2    GFR est non-AA >60 >60 ml/min/1.73m2    Calcium 8.1 (L) 8.5 - 10.1 MG/DL   GLUCOSE, POC    Collection Time: 08/03/21  7:03 AM   Result Value Ref Range    Glucose (POC) 96 65 - 117 mg/dL    Performed by [address], POC    Collection Time: 08/03/21 11:48 AM   Result Value Ref Range    Glucose (POC) 120 (H) 65 - 117 mg/dL    Performed by [facility]    GLUCOSE, POC    Collection Time: 08/03/21  4:05 PM   Result Value Ref Range    Glucose (POC) 131 (H) 65 - 117 mg/dL    Performed by [facility]           Imaging:  CT of head on 7/19/2021 at 2104 shows  IMPRESSION  1. Acute subarachnoid hemorrhage anterior to the sarahi. CTA of the head and neck on 7/19/2021 at 2126 shows  IMPRESSION     1. Arteriovenous malformation in the left superior cerebellar peduncle, with the  dominant drainage into the vein of Gaston. 2. No large vessel occlusion, no hemodynamically significant stenosis. 3. Redemonstrated subarachnoid hemorrhage in the basilar cisterns. CT the head on 7/20/2021 at 1129 shows  IMPRESSION  1. Stable ventricular size.     2.  Questionable diffuse cerebral edema.     3.  Subarachnoid hemorrhage in the prepontine cistern and ambient cisterns as  well as sylvian fissures are decreased from the prior study. MRI brain with and without 7/20/2021 at 2214 shows  IMPRESSION  1.   Tiny 1 mm foci of diffusion restriction in the left cerebellum and left  cerebral hemisphere as described likely embolic in origin.     2.  Possible mild meningeal enhancement which may be secondary to meningeal  irritation from known subarachnoid hemorrhage.     3.  Dilated superior ophthalmic veins are noted which is new when compared to  prior imaging. This is of uncertain clinical significance. There appears to be  normal enhancement of the cavernous sinus bilaterally. CT of head on 7/23/2021 at 2233 shows     IMPRESSION  No hydrocephalus. Stable left prepontine subarachnoid hemorrhage. CT of Head on 7/24/2021 at 1357  IMPRESSION:   Left prepontine hemorrhage is slightly increased. CT of the head on 7/24/2021 at 10:21 PM shows  IMPRESSION  Stable left prepontine hemorrhage. CTA of Head and neck on 7/28/2021 at 1001 shows  IMPRESSION     No evidence for significant vasospasm. Diffuse mild atherosclerosis. Left  cerebellar vascular malformation is again noted with unchanged hemorrhage in the  left prepontine cistern. Small focal hypoattenuation in the left sarahi and left  cerebellar peduncle likely acute ischemic changes.     Small right pleural effusion. Assessment:     Principal Problem:    Cerebral arteriovenous malformation (AVM) (7/20/2021)      Overview: ([id number])- CT (Brain): There is an AVM in the superior cerebellum with multiple aneurysms. One measures 4 mm. Another measures 4 mm within the superior cerebellum.     Active Problems:    Malignant hypertension (7/20/2021)      Acute hypokalemia (7/20/2021)      Celiac artery stenosis ([address] (7/21/2021)      Vasogenic cerebral edema ([address] (7/21/2021)      Hypertensive emergency (7/21/2021)      SAH (subarachnoid hemorrhage) ([address] (7/20/2021)      Cerebral vasospasm (8/1/2021)        Plan:   Acute SAH in the setting of cerebellar AVM with associated flow related aneurysm, [place] Knox 1, Mistry Grade 2  - s/p diagnostic cerebral angiogram on 7/20/2021  - ok with downgrade to the floor  - Day 15 of 21 of Nimotop for the prevention of delayed cerebral ischemia  - continue Keppra 500 mg BID for seizure ppx  - Continue NS at 150 ml/hr to maintain euvolemia   - CTA on 7/28 showed no evidence for significant vasospasm diffuse mild atherosclerosis. Left cerebellar vascular malformation is again noted with unchanged hemorrhage in the left prepontine cistern. Small focal hypoattenuation in the left sarahi and left cerebellar peduncle likely acute ischemic changes. - IVF stopped by nephrology this AM for volume overload   - stop decadron today   - ECHO shows EF 65-70%, dilated left atrium. No shunting seen. Mild (grade 1) left ventricular diastolic dysfunction. - ok with neuro checks every 2 hours during the day and every 4 hours at night.   - SBP goal <140, on scheduled chlorthalidone, clonidine, hydralazine, and lisinopril, renal was consulted yesterday for additional work-up for possible secondary hypertension causes and some adjustments were made on patient's BP medications yesterday.  - PT/OT/SLP evals     - TCDs completed     Polyuria  - patient has been previously diuresed for fluid overload   - patient with intermittent polyuria, possibly autodiuresing   - urine random sodium 116, urine osmolality 455 , serum osmolality 280  - serum sodium normal today at 137   - Nephrology consulted and following     HTN  - SBP goal <140 as stated above  - patient on scheduled oral meds as stated above  - Patient had a bilateral artery/venous renal duplex on 7/22 which shows no evidence of significant renal artery stenosis. No evidence of significant renal parenchymal disease. Possible hemodynamically significant celiac artery stenosis.   - Nephrology consulted for additional secondary HTN work-up, defer management to nephrology  - Intensivist and Renal following    Constipation (resolved)  - continue Rissa-colace daily  - Miralax PRN daily  - Dulcolax 10 mg daily PRN      Activity: Up with assistance, ambulate daily  DVT ppx: SCDs  Dispo: TBD    Plan d/w Dr. Charo Ayers, Dr. Anitha Maldonado, RN, and patient. Okay to transfer to [facility] intermediate care.     Kimberly Solorzano NP

## 2021-08-03 NOTE — PROGRESS NOTES
Occupational Therapy 2723 -   [phone]    Chart reviewed in prep for OT treatment, RN reporting patient currently eating breakfast. Will defer and f/u later in AM/PM as able. Thank you. Matthew Nath MS, OTR/L

## 2021-08-03 NOTE — PROGRESS NOTES
TRANSITION OF CARE  RUR--18%  Disposition--TBD. HH versus IPR versus SNF rehab. PT and OT evaluations are pending. Transport--TBD    Patient's primary family contact: wife Jorge Tovar. CM reviewed case with treatment team during ICU Interdisciplinary Rounds. Patient now eating. Patient continues unstable for discharge.

## 2021-08-03 NOTE — PROGRESS NOTES
0730 Bedside and Verbal shift change report given to Allie Merchant RN (oncoming nurse) by Virginia Vigil RN (offgoing nurse). Report included the following information SBAR, Kardex, ED Summary, Procedure Summary, Intake/Output, MAR, Recent Results and Cardiac Rhythm NSR.     SHIFT SUMMARY: Pt alert and oriented x4 for shift. Family at bedside. 1930 Bedside and Verbal shift change report given to Virginia Vigil RN (oncoming nurse) by Allie Merchant RN (offgoing nurse). Report included the following information SBAR, Kardex, ED Summary, Procedure Summary, Intake/Output, MAR, Recent Results and Cardiac Rhythm NSR.

## 2021-08-03 NOTE — PROGRESS NOTES
0730: Bedside and Verbal shift change report given to Alma Pink RN (oncoming nurse) by Mahin Patricio RN (offgoing nurse). Report included the following information SBAR, Kardex, ED Summary, OR Summary, Intake/Output, MAR, Accordion, Recent Results, Med Rec Status, Cardiac Rhythm NSR, Alarm Parameters  and Dual Neuro Assessment.

## 2021-08-04 LAB
ANION GAP SERPL CALC-SCNC: 4 MMOL/L (ref 5–15)
BUN SERPL-MCNC: 17 MG/DL (ref 6–20)
BUN/CREAT SERPL: 23 (ref 12–20)
CALCIUM SERPL-MCNC: 8.3 MG/DL (ref 8.5–10.1)
CHLORIDE SERPL-SCNC: 103 MMOL/L (ref 97–108)
CO2 SERPL-SCNC: 28 MMOL/L (ref 21–32)
CREAT SERPL-MCNC: 0.74 MG/DL (ref 0.7–1.3)
CREAT UR-MCNC: 61.9 MG/DL
DOPAMINE UG/G CREAT: 141 UG/G CREAT (ref 0–348)
DOPAMINE UR-MCNC: 87 UG/L
EPINEPH UR-MCNC: 2 UG/L
EPINEPHRINE UG/G CREAT: 3 UG/G CREAT (ref 0–19)
ERYTHROCYTE [DISTWIDTH] IN BLOOD BY AUTOMATED COUNT: 13.6 % (ref 11.5–14.5)
GLUCOSE BLD STRIP.AUTO-MCNC: 86 MG/DL (ref 65–117)
GLUCOSE BLD STRIP.AUTO-MCNC: 88 MG/DL (ref 65–117)
GLUCOSE BLD STRIP.AUTO-MCNC: 95 MG/DL (ref 65–117)
GLUCOSE BLD STRIP.AUTO-MCNC: 97 MG/DL (ref 65–117)
GLUCOSE SERPL-MCNC: 111 MG/DL (ref 65–100)
HCT VFR BLD AUTO: 38.5 % (ref 36.6–50.3)
HGB BLD-MCNC: 13.6 G/DL (ref 12.1–17)
MAGNESIUM SERPL-MCNC: 2.1 MG/DL (ref 1.6–2.4)
MCH RBC QN AUTO: 29.1 PG (ref 26–34)
MCHC RBC AUTO-ENTMCNC: 35.3 G/DL (ref 30–36.5)
MCV RBC AUTO: 82.3 FL (ref 80–99)
NOREPINEPH UR-MCNC: 10 UG/L
NOREPINEPHRINE UG/G CREAT: 16 UG/G CREAT (ref 0–111)
NRBC # BLD: 0 K/UL (ref 0–0.01)
NRBC BLD-RTO: 0 PER 100 WBC
PHOSPHATE SERPL-MCNC: 3.4 MG/DL (ref 2.6–4.7)
PLATELET # BLD AUTO: 204 K/UL (ref 150–400)
PMV BLD AUTO: 10.9 FL (ref 8.9–12.9)
POTASSIUM SERPL-SCNC: 3.9 MMOL/L (ref 3.5–5.1)
RBC # BLD AUTO: 4.68 M/UL (ref 4.1–5.7)
SERVICE CMNT-IMP: NORMAL
SODIUM SERPL-SCNC: 135 MMOL/L (ref 136–145)
WBC # BLD AUTO: 10.6 K/UL (ref 4.1–11.1)

## 2021-08-04 PROCEDURE — 85027 COMPLETE CBC AUTOMATED: CPT

## 2021-08-04 PROCEDURE — 74011250637 HC RX REV CODE- 250/637: Performed by: NURSE PRACTITIONER

## 2021-08-04 PROCEDURE — 97116 GAIT TRAINING THERAPY: CPT

## 2021-08-04 PROCEDURE — 97535 SELF CARE MNGMENT TRAINING: CPT

## 2021-08-04 PROCEDURE — 74011250637 HC RX REV CODE- 250/637: Performed by: INTERNAL MEDICINE

## 2021-08-04 PROCEDURE — 82962 GLUCOSE BLOOD TEST: CPT

## 2021-08-04 PROCEDURE — 94762 N-INVAS EAR/PLS OXIMTRY CONT: CPT

## 2021-08-04 PROCEDURE — 74011000250 HC RX REV CODE- 250: Performed by: NURSE PRACTITIONER

## 2021-08-04 PROCEDURE — 80048 BASIC METABOLIC PNL TOTAL CA: CPT

## 2021-08-04 PROCEDURE — 65660000001 HC RM ICU INTERMED STEPDOWN

## 2021-08-04 PROCEDURE — 83735 ASSAY OF MAGNESIUM: CPT

## 2021-08-04 PROCEDURE — 99232 SBSQ HOSP IP/OBS MODERATE 35: CPT | Performed by: STUDENT IN AN ORGANIZED HEALTH CARE EDUCATION/TRAINING PROGRAM

## 2021-08-04 PROCEDURE — 36415 COLL VENOUS BLD VENIPUNCTURE: CPT

## 2021-08-04 PROCEDURE — 74011000250 HC RX REV CODE- 250: Performed by: INTERNAL MEDICINE

## 2021-08-04 PROCEDURE — 84100 ASSAY OF PHOSPHORUS: CPT

## 2021-08-04 RX ORDER — HYDRALAZINE HYDROCHLORIDE 50 MG/1
100 TABLET, FILM COATED ORAL 3 TIMES DAILY
Status: DISCONTINUED | OUTPATIENT
Start: 2021-08-04 | End: 2021-08-07

## 2021-08-04 RX ORDER — BUMETANIDE 0.25 MG/ML
1 INJECTION INTRAMUSCULAR; INTRAVENOUS ONCE
Status: COMPLETED | OUTPATIENT
Start: 2021-08-04 | End: 2021-08-04

## 2021-08-04 RX ADMIN — NIMODIPINE 60 MG: 30 CAPSULE, LIQUID FILLED ORAL at 00:14

## 2021-08-04 RX ADMIN — CLONIDINE HYDROCHLORIDE 0.2 MG: 0.2 TABLET ORAL at 06:08

## 2021-08-04 RX ADMIN — HYDRALAZINE HYDROCHLORIDE 100 MG: 50 TABLET, FILM COATED ORAL at 15:44

## 2021-08-04 RX ADMIN — NIMODIPINE 60 MG: 30 CAPSULE, LIQUID FILLED ORAL at 12:04

## 2021-08-04 RX ADMIN — NIMODIPINE 60 MG: 30 CAPSULE, LIQUID FILLED ORAL at 09:01

## 2021-08-04 RX ADMIN — HYDRALAZINE HYDROCHLORIDE 100 MG: 50 TABLET, FILM COATED ORAL at 20:49

## 2021-08-04 RX ADMIN — NIMODIPINE 60 MG: 30 CAPSULE, LIQUID FILLED ORAL at 15:44

## 2021-08-04 RX ADMIN — CHLORTHALIDONE 25 MG: 25 TABLET ORAL at 09:03

## 2021-08-04 RX ADMIN — ACETAMINOPHEN 650 MG: 325 TABLET ORAL at 18:27

## 2021-08-04 RX ADMIN — LABETALOL HYDROCHLORIDE 10 MG: 5 INJECTION INTRAVENOUS at 18:36

## 2021-08-04 RX ADMIN — ACETAMINOPHEN 650 MG: 325 TABLET ORAL at 03:35

## 2021-08-04 RX ADMIN — HYDRALAZINE HYDROCHLORIDE 50 MG: 50 TABLET, FILM COATED ORAL at 09:02

## 2021-08-04 RX ADMIN — BUMETANIDE 1 MG: 0.25 INJECTION, SOLUTION INTRAMUSCULAR; INTRAVENOUS at 13:05

## 2021-08-04 RX ADMIN — CLONIDINE HYDROCHLORIDE 0.2 MG: 0.2 TABLET ORAL at 20:49

## 2021-08-04 RX ADMIN — DOCUSATE SODIUM 50 MG AND SENNOSIDES 8.6 MG 1 TABLET: 8.6; 5 TABLET, FILM COATED ORAL at 09:03

## 2021-08-04 RX ADMIN — LEVETIRACETAM 500 MG: 500 TABLET ORAL at 18:26

## 2021-08-04 RX ADMIN — CLONIDINE HYDROCHLORIDE 0.2 MG: 0.2 TABLET ORAL at 14:16

## 2021-08-04 RX ADMIN — LEVETIRACETAM 500 MG: 500 TABLET ORAL at 09:03

## 2021-08-04 RX ADMIN — NIMODIPINE 60 MG: 30 CAPSULE, LIQUID FILLED ORAL at 03:35

## 2021-08-04 RX ADMIN — NIMODIPINE 60 MG: 30 CAPSULE, LIQUID FILLED ORAL at 20:46

## 2021-08-04 RX ADMIN — LISINOPRIL 40 MG: 20 TABLET ORAL at 20:52

## 2021-08-04 NOTE — PROGRESS NOTES
Neurointerventional Surgery Progress Note  Rema Salazar AGACNP-BC  Neurocritical Care NP      Admit Date: 7/20/2021   LOS: 15 days        Daily Progress Note: 8/4/2021    S/P: POD 15 status post diagnostic cerebral angiogram        Subjective:     Patient is doing well this morning. He was moved out of the ICU down to neuroscience telemetry unit. He has no further neurological issues or deficits. We discussed working towards discharge and what that would mean for him. We also discussed his follow-up with Dr. Daren Shelby for gamma knife radiation of the AVM. The patient is in agreement with this plan. From our perspective, the patient can work towards discharge once he is medically cleared.      Current Facility-Administered Medications   Medication Dose Route Frequency Provider Last Rate Last Admin    hydrALAZINE (APRESOLINE) tablet 100 mg  100 mg Oral TID Estrellita Melvin MD        cloNIDine HCL (CATAPRES) tablet 0.2 mg  0.2 mg Oral Q8H Piyush Melvin MD   0.2 mg at 08/04/21 1416    lisinopriL (PRINIVIL, ZESTRIL) tablet 40 mg  40 mg Oral QHS Piyush Melvin MD   40 mg at 08/03/21 2222    chlorthalidone (HYGROTON) tablet 25 mg  25 mg Oral DAILY Steve Maxwell MD   25 mg at 08/04/21 8518    senna-docusate (PERICOLACE) 8.6-50 mg per tablet 1 Tablet  1 Tablet Oral DAILY Primus Given, NP   1 Tablet at 08/04/21 0903    polyethylene glycol (MIRALAX) packet 17 g  17 g Oral DAILY PRN Primus Given, NP   17 g at 07/25/21 0906    bisacodyL (DULCOLAX) tablet 10 mg  10 mg Oral DAILY PRN Chente Grant NP        labetaloL (NORMODYNE;TRANDATE) injection 10 mg  10 mg IntraVENous Q4H PRN Chente Grant, NP   10 mg at 07/30/21 0521    hydrALAZINE (APRESOLINE) 20 mg/mL injection 20 mg  20 mg IntraVENous Q6H PRN Herberth Morales DO   20 mg at 07/31/21 1612    insulin lispro (HUMALOG) injection   SubCUTAneous AC&HS Ashely Fraser NP   2 Units at 08/02/21 2203    glucose chewable tablet 16 g  4 Tablet Oral PRN Johanna Avendaño NP        dextrose (D50W) injection syrg 12.5-25 g  12.5-25 g IntraVENous PRN Johanna Avendaño NP        glucagon (GLUCAGEN) injection 1 mg  1 mg IntraMUSCular PRN Johanna Avendaño NP        ondansetron [facility]) injection 4 mg  4 mg IntraVENous Q6H PRN Lazaro Sebastián, NP-C        naloxone [facility]) injection 0.4 mg  0.4 mg IntraVENous PRN Lazaro Sebastián, NP-C        acetaminophen (TYLENOL) tablet 650 mg  650 mg Oral Q4H PRN Lazaro Sebastián, NP-C   650 mg at 08/04/21 0335    Or    acetaminophen (TYLENOL) solution 650 mg  650 mg Per NG tube Q4H PRN Lazaro Sebastián, NP-C        Or    acetaminophen (TYLENOL) suppository 650 mg  650 mg Rectal Q4H PRN Lazaro Sebastián, NP-C        niMODipine (NIMOTOP) capsule 60 mg  60 mg Oral Q4H Binford, Booker Baumgarten, NP   60 mg at 08/04/21 1204    levETIRAcetam (KEPPRA) tablet 500 mg  500 mg Oral BID Zurdo Gallegos NP   500 mg at 08/04/21 9637      HPI: Jim Kennedy is a 35-year-old male with a past medical history significant for hypertension and diabetes type 2 who initially presented to [facility] ED on 7/19/2021 for acute onset of dizziness, headache, left-sided hearing loss, and left-sided weakness. Upon arrival to the ED his blood pressure was noted to be 265/120 per review of notes. A code stroke was called and CT of the head showed an acute subarachnoid hemorrhage anterior to the sarahi. CTA of the head and neck showed an AVM in the left superior cerebellar peduncle, with the dominant drainage into the vein of Gaston. No large vessel occlusion, no hemodynamically significant stenosis. Redemonstrated subarachnoid hemorrhage in the basilar cisterns. He was started on a Cardene drip and was also given IV hydralazine and labetalol. He was then transferred to [facility] for a higher level of care. NIS was consulted and patient underwent a diagnostic cerebral angiogram on 7/20/2021.   The angiogram showed evidence of a cerebral AVM with multiple feeders coming of the left PCA, left anterior choroidal and left SCA with a large single draining vein that drains into the vein of Villanueva. 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 No Known Allergies    Review of Systems:  Pertinent items are noted in the History of Present Illness. Objective:     Vital signs  Temp (24hrs), Av.3 °F (36.8 °C), Min:98 °F (36.7 °C), Max:98.6 °F (37 °C)   701 - 1900  In: 440 [P.O.:440]  Out: 2950 [Urine:2950]  1901 -  0700  In: 1503.3 [I.V.:1503.3]  Out: 5750 [Urine:5750]    Visit Vitals  /67   Pulse 61   Temp 98.3 °F (36.8 °C)   Resp 16   Ht 6' (1.829 m)   Wt 251 lb 15.8 oz (114.3 kg)   SpO2 98%   BMI 34.18 kg/m²      O2 Device: None (Room air)     Pain control  Pain Assessment  Pain Scale 1: Numeric (0 - 10)  Pain Intensity 1: 0  Pain Onset 1:  (denied)  Pain Location 1: Head  Pain Orientation 1: Left  Pain Description 1: Pressure  Pain Intervention(s) 1: Medication (see MAR)           Vitals:    21 1042 21 1204 21 1305 21 1400   BP:  (!) 168/88 137/70 138/67   Pulse:  (!) 57 (!) 59 61   Resp:    16   Temp:    98.3 °F (36.8 °C)   SpO2:    98%   Weight:       Height: 6' (1.829 m)           Physical Exam:  GENERAL: alert, cooperative, no distress, appears stated age  LUNG: clear to auscultation bilaterally  HEART: regular rate and rhythm, S1, S2 normal, no murmur, click, rub or gallop  EXTREMITIES:  extremities normal, atraumatic, no cyanosis, trace peripheral edema in BUE, +2 pitting edema in BLE, Posterior tibial pulses +1 bilaterally, +2 pedal pulses bilaterally   SKIN: Skin cool to touch. Appropriate for ethnicity. Right groin site clean, dry, and intact. No hematoma, bruising, or bleeding noted. Neurologic Exam:  Mental Status:  Alert and oriented x 4. Appropriate affect, mood and behavior. Language:    Normal fluency, repetition, comprehension and naming.     Cranial Nerves:        Pupils equal, round and reactive to light. Visual fields full to confrontation. Dysconjugate gaze intermittently with EOMs, otherwise intact. Has some subtle issues with looking completely to the left with left eye. Mild subtle nystagmus when looking to the left, but it has improved. Decreased sensation on the left side of face. Left facial asymmetry     Tongue protrudes to midline, palate elevates symmetrically. No dysarthria. Motor:    No pronator drift. Bulk and tone normal.      5/5 power in all extremities proximally and distally. No involuntary movements. Sensation:    Decreased sensation on the left side of face, otherwise sensation intact to light touch. No neglect. Coordination & Gait: No significant ataxia with finger-to-nose and heel-to-shin bilaterally. Gait deferred.      24 hour results:    Recent Results (from the past 24 hour(s))   GLUCOSE, POC    Collection Time: 08/03/21  4:05 PM   Result Value Ref Range    Glucose (POC) 131 (H) 65 - 117 mg/dL    Performed by [address] [place] [address], POC    Collection Time: 08/03/21 10:25 PM   Result Value Ref Range    Glucose (POC) 105 65 - 117 mg/dL    Performed by Concepción Buchanan  [facility]    MAGNESIUM    Collection Time: 08/04/21  3:43 AM   Result Value Ref Range    Magnesium 2.1 1.6 - 2.4 mg/dL   PHOSPHORUS    Collection Time: 08/04/21  3:43 AM   Result Value Ref Range    Phosphorus 3.4 2.6 - 4.7 MG/DL   CBC W/O DIFF    Collection Time: 08/04/21  3:43 AM   Result Value Ref Range    WBC 10.6 4.1 - 11.1 K/uL    RBC 4.68 4.10 - 5.70 M/uL    HGB 13.6 12.1 - 17.0 g/dL    HCT 38.5 36.6 - 50.3 %    MCV 82.3 80.0 - 99.0 FL    MCH 29.1 26.0 - 34.0 PG    MCHC 35.3 30.0 - 36.5 g/dL    RDW 13.6 11.5 - 14.5 %    PLATELET 729 789 - 298 K/uL    MPV 10.9 8.9 - 12.9 FL    NRBC 0.0 0  WBC    ABSOLUTE NRBC 0.00 0.00 - 2.40 K/uL   METABOLIC PANEL, BASIC    Collection Time: 08/04/21  3:43 AM   Result Value Ref Range    Sodium 135 (L) 136 - 145 mmol/L    Potassium 3.9 3.5 - 5.1 mmol/L    Chloride 103 97 - 108 mmol/L    CO2 28 21 - 32 mmol/L    Anion gap 4 (L) 5 - 15 mmol/L    Glucose 111 (H) 65 - 100 mg/dL    BUN 17 6 - 20 MG/DL    Creatinine 0.74 0.70 - 1.30 MG/DL    BUN/Creatinine ratio 23 (H) 12 - 20      GFR est AA >60 >60 ml/min/1.73m2    GFR est non-AA >60 >60 ml/min/1.73m2    Calcium 8.3 (L) 8.5 - 10.1 MG/DL   GLUCOSE, POC    Collection Time: 08/04/21  7:54 AM   Result Value Ref Range    Glucose (POC) 97 65 - 117 mg/dL    Performed by Rom Doss    GLUCOSE, POC    Collection Time: 08/04/21 11:27 AM   Result Value Ref Range    Glucose (POC) 88 65 - 117 mg/dL    Performed by Rom Doss           Imaging:  CT of head on 7/19/2021 at 2104 shows  IMPRESSION  1. Acute subarachnoid hemorrhage anterior to the sarahi. CTA of the head and neck on 7/19/2021 at 2126 shows  IMPRESSION     1. Arteriovenous malformation in the left superior cerebellar peduncle, with the  dominant drainage into the vein of Gaston. 2. No large vessel occlusion, no hemodynamically significant stenosis. 3. Redemonstrated subarachnoid hemorrhage in the basilar cisterns. CT the head on 7/20/2021 at 1129 shows  IMPRESSION  1. Stable ventricular size.     2.  Questionable diffuse cerebral edema.     3.  Subarachnoid hemorrhage in the prepontine cistern and ambient cisterns as  well as sylvian fissures are decreased from the prior study. MRI brain with and without 7/20/2021 at 2214 shows  IMPRESSION  1. Tiny 1 mm foci of diffusion restriction in the left cerebellum and left  cerebral hemisphere as described likely embolic in origin.     2.  Possible mild meningeal enhancement which may be secondary to meningeal  irritation from known subarachnoid hemorrhage.     3.  Dilated superior ophthalmic veins are noted which is new when compared to  prior imaging. This is of uncertain clinical significance.  There appears to be  normal enhancement of the cavernous sinus bilaterally. CT of head on 7/23/2021 at 2233 shows     IMPRESSION  No hydrocephalus. Stable left prepontine subarachnoid hemorrhage. CT of Head on 7/24/2021 at 1357  IMPRESSION:   Left prepontine hemorrhage is slightly increased. CT of the head on 7/24/2021 at 10:21 PM shows  IMPRESSION  Stable left prepontine hemorrhage. CTA of Head and neck on 7/28/2021 at 1001 shows  IMPRESSION     No evidence for significant vasospasm. Diffuse mild atherosclerosis. Left  cerebellar vascular malformation is again noted with unchanged hemorrhage in the  left prepontine cistern. Small focal hypoattenuation in the left sarahi and left  cerebellar peduncle likely acute ischemic changes.     Small right pleural effusion. Assessment:     Principal Problem:    Cerebral arteriovenous malformation (AVM) (7/20/2021)      Overview: ([id number])- CT (Brain): There is an AVM in the superior cerebellum with multiple aneurysms. One measures 4 mm. Another measures 4 mm within the superior cerebellum. Active Problems:    Malignant hypertension (7/20/2021)      Acute hypokalemia (7/20/2021)      Celiac artery stenosis ([address] (7/21/2021)      Vasogenic cerebral edema ([address] (7/21/2021)      Hypertensive emergency (7/21/2021)      SAH (subarachnoid hemorrhage) ([address] (7/20/2021)      Cerebral vasospasm (8/1/2021)        Plan:   Acute SAH in the setting of cerebellar AVM with associated flow related aneurysm, Clayton Knox 1, Mistry Grade 2  - s/p diagnostic cerebral angiogram on 7/20/2021  - ok with downgrade to the floor  - Day 16 of 21 of Nimotop for the prevention of delayed cerebral ischemia  - continue Keppra 500 mg BID for seizure ppx  - IVF stopped by nephrology this AM for volume overload   - ECHO shows EF 65-70%, dilated left atrium. No shunting seen. Mild (grade 1) left ventricular diastolic dysfunction.   - ok with q4 hour neuro checks   - SBP goal <140, on scheduled chlorthalidone, clonidine, hydralazine, and lisinopril, renal was consulted yesterday for additional work-up for possible secondary hypertension causes and some adjustments were made on patient's BP medications yesterday.  - PT/OT/SLP evals     - TCDs completed     Polyuria  - patient has been previously diuresed for fluid overload   - patient with intermittent polyuria, possibly autodiuresing   - urine random sodium 116, urine osmolality 455 , serum osmolality 280  - serum sodium normal today at 137   - Nephrology consulted and following     HTN  - SBP goal <140 as stated above  - patient on scheduled oral meds as stated above  - Patient had a bilateral artery/venous renal duplex on 7/22 which shows no evidence of significant renal artery stenosis. No evidence of significant renal parenchymal disease. Possible hemodynamically significant celiac artery stenosis.   - Nephrology consulted for additional secondary HTN work-up, defer management to nephrology  - Intensivist and Renal following    Constipation (resolved)  - continue Rissa-colace daily  - Miralax PRN daily  - Dulcolax 10 mg daily PRN      Activity: Up with assistance, ambulate daily  DVT ppx: SCDs  Dispo: TBD    Plan d/w Dr. Lambert Almaguer, Dr. Althea Amezquita , RN, and patient    Stanley Saucedo NP

## 2021-08-04 NOTE — PROGRESS NOTES
Transition of Care Plan   RUR- Low  17%   DISPOSITION: Return home with family and [address] F/U with PCP/Specialist     Transport: Family    Transition of Care Plan: PT/OT home health; patient is open to home health services. Referrals sent to [facility] has accepted patient. Follow up info placed on AVS.    The Plan for Transition of Care is related to the following treatment goals: home health     The Patient  was provided with a choice of provider and agrees  with the discharge plan. Yes [x] No []    A Freedom of choice list was provided with basic dialogue that supports the patient's individualized plan of care/goals and shares the quality data associated with the providers. Yes [x] No []       DENNIS Lovelace.

## 2021-08-04 NOTE — PROGRESS NOTES
Problem: Mobility Impaired (Adult and Pediatric)  Goal: *Acute Goals and Plan of Care (Insert Text)  Description: FUNCTIONAL STATUS PRIOR TO ADMISSION: Patient was independent with all mobility and active (worked as ). He lived at home with his wife in a 1-story house. Physical Therapy Goals  Goals re-assessed 7/28/2021. Goals re-assessed 8/4/2021 and appropriate for carryover. 1.  Patient will move from supine to sit and sit to supine , scoot up and down, and roll side to side in bed with independence within 7 day(s). 2.  Patient will transfer from bed to chair and chair to bed with independence using the least restrictive device within 7 day(s). 3.  Patient will perform sit to stand with  mod I within 7 day(s). 4.  Patient will ambulate with CGA  for 150 feet with the least restrictive device within 7 day(s). 5.  Patient will ascend/descend 3 stairs with 2 handrail(s) with CGA within 7 day(s). Initiated 7/22/2021  1. Patient will move from supine to sit and sit to supine , scoot up and down, and roll side to side in bed with independence within 7 day(s). 2.  Patient will transfer from bed to chair and chair to bed with minimal assistance using the least restrictive device within 7 day(s). 3.  Patient will perform sit to stand with supervision/set-up within 7 day(s). 4.  Patient will ambulate with min assistance  for 50 feet with the least restrictive device within 7 day(s). 5.  Patient will ascend/descend 3 stairs with 2 handrail(s) with moderate assistance within 7 day(s).   Outcome: Progressing Towards Goal  PHYSICAL THERAPY TREATMENT: WEEKLY REASSESSMENT  Patient: Main Sheets [de-identified]64 y.o. male)  Date: 8/4/2021  Primary Diagnosis: SAH (subarachnoid hemorrhage) ([facility] [I60.9]        Precautions: Fall         ASSESSMENT  Patient continues with skilled PT services and is progressing towards goals - presents with impaired vision, mild dizziness/lightheadedness, impaired balance, slow/unsteady gait, BLE edema, and overall decline in functional mobility. Pt pleasant and agreeable to work with therapy this date. Pt was SBA overall for mobility. Session focused on ambulation, pt tolerated 300 ft. Initially trialled without RW but pt cautious and unsteady - gait deviations significantly improved with use of RW and pt reports he owns one for use at home. Pt reports he has several family members to assist at home upon discharge. Recommending  PT once medically cleared for d/c. Current Level of Function Impacting Discharge (mobility/balance): SBA for ambulation     Other factors to consider for discharge: Fall risk, independent baseline, more agreeable to participate to participate in the mornings         PLAN :  Goals have been updated based on progression since last assessment. Patient continues to benefit from skilled intervention to address the above impairments. Recommendations and Planned Interventions: bed mobility training, transfer training, gait training, therapeutic exercises, neuromuscular re-education, patient and family training/education, and therapeutic activities      Frequency/Duration: Patient will be followed by physical therapy:  3 times a week to address goals. Recommendation for discharge: (in order for the patient to meet his/her long term goals)  Physical therapy at least 2 days/week in the home AND ensure assist and/or supervision for safety with mobility and ADLs as needed    This discharge recommendation:  Has not yet been discussed the attending provider and/or case management    IF patient discharges home will need the following DME: patient owns DME required for discharge (RW)         SUBJECTIVE:   Patient stated I have a walker from my hip surgery.     OBJECTIVE DATA SUMMARY:   HISTORY:    Past Medical History:   Diagnosis Date    Acute hypokalemia 7/20/2021    Celiac artery stenosis ([facility] 7/21/2021    Cerebral arteriovenous malformation (AVM) 7/20/2021    Diabetes ([facility]     type 2    ED (erectile dysfunction)     HTN (hypertension)     Hypertension     Hypertensive emergency 7/21/2021    Impaired fasting glucose     Malignant hypertension 7/20/2021    Vasogenic cerebral edema ([facility] 7/21/2021     Past Surgical History:   Procedure Laterality Date    HX TONSIL AND ADENOIDECTOMY       Home Situation  Home Environment: Private residence  Living Alone: No  Support Systems: Spouse/Significant Other/Partner, Family member(s)  Current DME Used/Available at Home: None  Tub or Shower Type: Tub/Shower combination    EXAMINATION/PRESENTATION/DECISION MAKING:   Critical Behavior:  Neurologic State: Alert, Eyes open spontaneously  Orientation Level: Oriented X4  Cognition: Follows commands  Safety/Judgement: Decreased awareness of need for safety, Decreased insight into deficits, Decreased awareness of need for assistance, Decreased awareness of environment    Functional Mobility:  Bed Mobility:     Supine to Sit: Stand-by assistance  Sit to Supine: Stand-by assistance  Scooting: Stand-by assistance  Transfers:  Sit to Stand: Stand-by assistance  Stand to Sit: Stand-by assistance                       Balance:   Sitting: Intact  Standing: Impaired; With support  Standing - Static: Good;Constant support  Standing - Dynamic : Good;Constant support  Ambulation/Gait Training:  Distance (ft): 300 Feet (ft)  Assistive Device: Gait belt;Walker, rolling  Ambulation - Level of Assistance: Stand-by assistance        Gait Abnormalities: Ataxic;Trunk sway increased; Shuffling gait        Base of Support: Widened     Speed/Rita: Slow;Shuffled  Step Length: Right shortened;Left shortened      Activity Tolerance:   Good and Fair    After treatment patient left in no apparent distress:   Supine in bed, Call bell within reach, and Bed / chair alarm activated    COMMUNICATION/EDUCATION:   The patients plan of care was discussed with: Occupational therapist and Registered nurse. Fall prevention education was provided and the patient/caregiver indicated understanding., Patient/family have participated as able in goal setting and plan of care. , and Patient/family agree to work toward stated goals and plan of care.     Thank you for this referral.  Elisha Licona, PT, DPT   Time Calculation: 23 mins

## 2021-08-04 NOTE — PROGRESS NOTES
Problem: Falls - Risk of  Goal: *Absence of Falls  Description: Document Shyla Boss Fall Risk and appropriate interventions in the flowsheet.   Outcome: Progressing Towards Goal  Note: Fall Risk Interventions:  Mobility Interventions: Bed/chair exit alarm, Communicate number of staff needed for ambulation/transfer, OT consult for ADLs, Patient to call before getting OOB, PT Consult for mobility concerns, PT Consult for assist device competence, Strengthening exercises (ROM-active/passive), Utilize gait belt for transfers/ambulation         Medication Interventions: Bed/chair exit alarm, Evaluate medications/consider consulting pharmacy, Patient to call before getting OOB, Teach patient to arise slowly, Utilize gait belt for transfers/ambulation    Elimination Interventions: Call light in reach, Bed/chair exit alarm, Urinal in reach, Patient to call for help with toileting needs, Stay With Me (per policy)    History of Falls Interventions: Bed/chair exit alarm, Consult care management for discharge planning, Door open when patient unattended, Assess for delayed presentation/identification of injury for 48 hrs (comment for end date), Utilize gait belt for transfer/ambulation, Evaluate medications/consider consulting pharmacy, Investigate reason for fall         Problem: Subarachnoid Hemorrhage Stroke:Admission Day 5-Discharge  Goal: Activity/Safety  Outcome: Progressing Towards Goal  Goal: Diagnostic Test/Procedures  Outcome: Progressing Towards Goal  Goal: Nutrition/Diet  Outcome: Progressing Towards Goal  Goal: Medications  Outcome: Progressing Towards Goal  Goal: Patient maintains clear airway/absence of aspiration  Outcome: Progressing Towards Goal  Goal: Treatments/Interventions/Procedures  Outcome: Progressing Towards Goal  Goal: Psychosocial  Outcome: Progressing Towards Goal  Goal: *Hemodynamically stable  Outcome: Progressing Towards Goal  Goal: *Absence of signs and symptoms of DVT  Outcome: Progressing Towards Goal     Problem: Subarachnoid Hemorrhage Stroke:Discharge Outcomes  Goal: *Verbalizes anxiety and depression are reduced or absent  Outcome: Progressing Towards Goal  Goal: *Verbalizes understanding of risk factor modification (Stroke Metric)  Outcome: Progressing Towards Goal  Goal: *Hemodynamically stable  Outcome: Progressing Towards Goal  Goal: *Aware of needed dietary changes  Outcome: Progressing Towards Goal  Goal: *Verbalizes understanding and describes medication purposes and frequencies  Outcome: Progressing Towards Goal  Goal: *Verbalizes understanding of plan for nimodipine administration  Description: until day 21 and when next dose is scheduled  Outcome: Progressing Towards Goal  Goal: *Absence of signs and symptoms of DVT  Outcome: Progressing Towards Goal  Goal: *Absence of aspiration  Outcome: Progressing Towards Goal  Goal: *Progressive mobility and function  Outcome: Progressing Towards Goal  Goal: *Home safety concerns addressed  Outcome: Progressing Towards Goal

## 2021-08-04 NOTE — PROGRESS NOTES
Problem: Self Care Deficits Care Plan (Adult)  Goal: *Acute Goals and Plan of Care (Insert Text)  Description:   FUNCTIONAL STATUS PRIOR TO ADMISSION: Patient was independent and active without use of DME, independent for basic and instrumental ADLs, working full time as a , driving. HOME SUPPORT: The patient lived with family but did not require assist.    Occupational Therapy Goals  Weekly Re-Assessment 8/4/21, goals modified/continued below  Initiated 7/22/2021  1. Patient will perform grooming at the sink with supervision/set-up within 7 day(s). CONTINUE  2. Patient will perform bathing with supervision/set-up within 7 day(s). MODIFIED to SPV with AE PRN 8/4  3. Patient will perform upper and lower body dressing with supervision/set-up within 7 day(s). MODIFIED to SPV with AE PRN 8/4  4. Patient will perform toilet transfers with supervision/set-up within 7 day(s). CONTINUE  5. Patient will perform all aspects of toileting with supervision/set-up within 7 day(s). CONTINUE  6. Patient will participate in upper extremity therapeutic exercise/activities with supervision/set-up for 10 minutes within 7 day(s). CONTINUE  7. Patient will utilize energy conservation techniques during functional activities with verbal and visual cues within 7 day(s). Outcome: Progressing Towards Goal     OCCUPATIONAL THERAPY TREATMENT/WEEKLY RE-ASSESSMENT  Patient: Natalie Mooney [de-identified]64 y.o. male)  Date: 8/4/2021  Diagnosis: SAH (subarachnoid hemorrhage) (HCC) [I60.9] Cerebral arteriovenous malformation (AVM)       Precautions:    Chart, occupational therapy assessment, plan of care, and goals were reviewed. ASSESSMENT  Patient continues with skilled OT services and is progressing towards goals however remains limited by decreased balance, activity tolerance, BLE ROM & strength, and safety awareness, progress limited this week but goals modified above.  He requires SBA-CGA with RW support for bathroom mobility & standing ADLs, however continues with decreased functional reach d/t baseline BLE ROM limitations and onset of BLE \"leg cramps. \" Encouraged continued OOB mobility with staff, BLE AROM to prevent stiffness, and engagement in ADLs to prevent deconditioning. Recommend d/c home with [facility] and family support when medically stable. Current Level of Function Impacting Discharge (ADLs): SBA-Min A for ADLs, SBA-CGA for mobility with RW    Other factors to consider for discharge: fall risk, PMH, PLOF         PLAN :  Goals have been updated based on progression since last assessment. Patient continues to benefit from skilled intervention to address the above impairments. Continue to follow patient 3 times a week to address goals. Recommend with staff: Recommend with nursing, ADLs with assist PRN, OOB to chair 3x/day and toileting via functional mobility to and from bathroom with RW. Thank you for completing as able in order to maintain patient strength, endurance and independence. Recommendation for discharge: (in order for the patient to meet his/her long term goals)  Occupational therapy at least 2 days/week in the home AND ensure assist and/or supervision for safety with ADLs/IADLs and mobility    This discharge recommendation:  Has been made in collaboration with the attending provider and/or case management    IF patient discharges home will need the following DME: may benefit from shower chair       SUBJECTIVE:   Patient stated My legs are cramping so bad.     OBJECTIVE DATA SUMMARY:   Cognitive/Behavioral Status:  Neurologic State: Alert  Orientation Level: Oriented X4  Cognition: Appropriate for age attention/concentration; Follows commands;Poor safety awareness  Perception: Appears intact  Perseveration: No perseveration noted  Safety/Judgement: Decreased awareness of need for safety    Functional Mobility and Transfers for ADLs:  Bed Mobility:  Supine to Sit: Stand-by assistance  Sit to Supine: Stand-by assistance  Scooting: Stand-by assistance    Transfers:  Sit to Stand: Stand-by assistance;Contact guard assistance (BLE weakness & leg cramps, decr safety)  Functional Transfers  Bathroom Mobility: Stand-by assistance;Contact guard assistance (RW)  Toilet Transfer : Stand-by assistance       Balance:  Sitting: Intact  Standing: Impaired; With support (RW)  Standing - Static: Good  Standing - Dynamic : Good;Fair    ADL Intervention:       Lower Body Dressing Assistance  Dressing Assistance: Minimum assistance  Pants With Elastic Waist: Stand-by assistance; Compensatory technique training (simulated, cues for technique d/t L hip weakness)  Socks: Moderate assistance (decr BLE ROM & functional reach )  Leg Crossed Method Used: No  Position Performed: Seated edge of bed;Standing  Cues: Physical assistance; Tactile cues provided;Verbal cues provided;Visual cues provided    Toileting  Toileting Assistance: Stand-by assistance (simulated in bathroom)  Bladder Hygiene: Supervision  Bowel Hygiene: Supervision  Clothing Management: Stand-by assistance  Cues: Verbal cues provided;Visual cues provided  Adaptive Equipment: Walker;Grab bars    Cognitive Retraining  Safety/Judgement: Decreased awareness of need for safety    Pain:  BLE leg cramps reported    Activity Tolerance:   Fair    After treatment patient left in no apparent distress:   Supine in bed, Call bell within reach, Bed / chair alarm activated and Side rails x 3    COMMUNICATION/COLLABORATION:   The patients plan of care was discussed with: Physical therapist and Registered nurse.      REINA Ray, OTR/L  Time Calculation: 16 mins

## 2021-08-04 NOTE — PROGRESS NOTES
Bedside shift change report given to Erlin Jacobsen RN (oncoming nurse) by Savanah Garrett RN (offgoing nurse). Report included the following information SBAR, Kardex, Intake/Output, MAR, Recent Results, Cardiac Rhythm NSR, Alarm Parameters , Quality Measures and Dual Neuro Assessment.

## 2021-08-04 NOTE — PROGRESS NOTES
Hospitalist Progress Note    NAME: Jordan Coulter   :  1960   MRN:  [id number]     Hospital course excerpted form ICU transfer note: \"This is 35-year-old gentleman with past medical history of hypertension,  currently getting transferred out of ICU after [facility] and hypertensive emergency treatment. He presented from [facility] with headache and dizziness, found to have elevated blood pressure. CT of the head showed an acute subarachnoid hemorrhage anterior to the sarahi. CTA of the head and neck showed an AVM in the left superior cerebellar peduncle, with the dominant drainage into the vein of Gaston. No large vessel occlusion, no hemodynamically significant stenosis. Redemonstrated subarachnoid hemorrhage in the basilar cisterns. Patient was initially started on IV antihypertensives, now switched to oral antihypertensives. He was also started on Nimodipine.      Neuro intervention team is on board, recommended no surgical intervention, recommends outpatient radiation therapy. 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 - S/P diagnostic cerebral andio on 21.  - Day 16  on nimodipine 60 mg po q4h. - Continue levetiracetam 500 mg po bid. Polyuria  Hyponatremia   - Nephrology input appreciated. - Monitor for now. HTN  - Continue chlorthalidone 25 mg po daily. - Continue clonidine 0.2 mg po q8h. - Continue hydralazine 100 mg po tid. - Continue lisinopril 40 mg po daily.    - ARR pending.  - Plan for CTA 21. DM II  Hyperglycemia   - Continue to hold oral diabetes meds. - Continue FSBS with SSI correction scale. Constipation   - Continue senna-docusate 8.6-50 mg po daily. 30.0 - 39.9 Obese / Body mass index is 34.18 kg/m².     Code status: Full  Prophylaxis: SCD's  Recommended Disposition: Home w/Family     Subjective:     Chief Complaint / Reason for Physician Visit  No complaints except for generalized weakness. Plan of care and pertinent events reviewed with bedside nurse. Review of Systems:  Symptom Y/N Comments  Symptom Y/N Comments   Fever/Chills N   Chest Pain N    Poor Appetite N   Edema N    Cough N   Abdominal Pain N    Sputum N   Joint Pain N    SOB/GEORGE N   Pruritis/Rash N    Nausea/vomit N   Tolerating PT/OT Y    Diarrhea N   Tolerating Diet Y    Constipation N Last BM 08/04/21  Other       Could NOT obtain due to:      Objective:     VITALS:   Last 24hrs VS reviewed since prior progress note. Most recent are:  Patient Vitals for the past 24 hrs:   Temp Pulse Resp BP SpO2   08/04/21 1400 98.3 °F (36.8 °C) 61 16 138/67 98 %   08/04/21 1305 -- (!) 59 -- 137/70 --   08/04/21 1204 -- (!) 57 -- (!) 168/88 --   08/04/21 1000 98.6 °F (37 °C) (!) 58 19 134/67 98 %   08/04/21 0901 -- (!) 55 -- (!) 150/81 --   08/04/21 0608 98.4 °F (36.9 °C) 63 15 130/65 --   08/04/21 0335 98 °F (36.7 °C) (!) 59 11 (!) 142/74 --   08/04/21 0014 -- (!) 59 -- 127/70 --   08/03/21 2213 98.5 °F (36.9 °C) 66 16 126/63 --   08/03/21 2028 -- 65 -- (!) 152/76 --   08/03/21 1932 98 °F (36.7 °C) 72 18 (!) 153/68 98 %   08/03/21 1600 98.5 °F (36.9 °C) 61 16 (!) 148/69 --       Intake/Output Summary (Last 24 hours) at 8/4/2021 1424  Last data filed at 8/4/2021 1413  Gross per 24 hour   Intake 440 ml   Output 4750 ml   Net -4310 ml        I had a face to face encounter and independently examined this patient on 8/4/2021, as outlined below:  PHYSICAL EXAM:  General:  A/A/O X 3. NAD. HEENT:  Normocephalic. Sclera anicteric. Dysconjugate gaze. PERRL at 2 mm. Mucous membranes moist.    Chest:  Resps even/unlabored with symmetrical CWE. Air entry full. Lungs CTA. No use of accessory muscles. CV:  RRR. Normal S1/S2. No M/C/R appreciated. No JVD. No peripheral edema. Cap refill < 3 sec. GI:  Abdomen soft/NT/ND. No organomegaly. No hernia.   ABT X 4.    : Voiding. Neurologic:  Left sided facial asymmetry. Symmetric rise of soft palate. Tongue midline. Left eye brow does not rise. Speech normal with occasional word finding trouble. Psych:  Cooperative. No anxiety or agitation. Skin:  Warm and color appropriate. No rashes or jaundice. Turgor elastic. Reviewed most current lab test results and cultures  YES  Reviewed most current radiology test results   YES  Review and summation of old records today    NO  Reviewed patient's current orders and MAR    YES  PMH/SH reviewed - no change compared to H&P  ________________________________________________________________________  Care Plan discussed with:    Comments   Patient [address]     Consultant                        Multidiciplinary team rounds were held today with , nursing, pharmacist and clinical coordinator. Patient's plan of care was discussed; medications were reviewed and discharge planning was addressed. ________________________________________________________________________  Rocael Aly NP     Procedures: see electronic medical records for all procedures/Xrays and details which were not copied into this note but were reviewed prior to creation of Plan. LABS:  I reviewed today's most current labs and imaging studies.   Pertinent labs include:  Recent Labs     08/04/21 0343 08/03/21 0513 08/02/21  0720   WBC 10.6 10.4 11.1   HGB 13.6 12.5 12.7   HCT 38.5 36.4* 36.7    222 234     Recent Labs     08/04/21 0343 08/03/21 0513 08/02/21  0720   * 136 137   K 3.9 4.1 4.0    105 105   CO2 28 26 27   * 114* 106*   BUN 17 19 25*   CREA 0.74 0.68* 0.91   CA 8.3* 8.1* 7.7*   MG 2.1 2.1 2.2   PHOS 3.4 3.5 3.8       Signed: Rocael Aly NP

## 2021-08-04 NOTE — PROGRESS NOTES
Bedside and Verbal shift change report given to [address] [facility],[address] (oncoming nurse) by Katalina Forte (offgoing nurse). Report included the following information SBAR, Kardex, Intake/Output, MAR, Recent Results, Cardiac Rhythm NSR and Dual Neuro Assessment.

## 2021-08-04 NOTE — PROGRESS NOTES
[facility]   [address] [address], [address], [facility] [place]  Phone: [phone]   [phone]       Nephrology Progress Note  Ravin Browne     1960 [id number]  Date of Admission : 7/20/2021 08/04/21    CC: Follow up for secondary HTN       Assessment and Plan   Resistant HTN:  - Suspect familial cause. R/o SHAR    - ARR pending. Not suspecting Pheo based on available chemistries so far   -  Increased Hydralazine   - CTA tomorrow     Polyuria :  - 2/2 SAH   - watch for now   - not suspecting CSW    Cerebellar AVM w/ SAH   - per Endovascular Neuro Sx     Chronic heavy smoker    Elevated TSH. Interval History:  Seen and examined. BP labile. Has some HA today . Has persistent pedal edema     Review of Systems: A comprehensive review of systems was negative except for that written in the HPI.     Current Medications:   Current Facility-Administered Medications   Medication Dose Route Frequency    hydrALAZINE (APRESOLINE) tablet 100 mg  100 mg Oral TID    cloNIDine HCL (CATAPRES) tablet 0.2 mg  0.2 mg Oral Q8H    lisinopriL (PRINIVIL, ZESTRIL) tablet 40 mg  40 mg Oral QHS    chlorthalidone (HYGROTON) tablet 25 mg  25 mg Oral DAILY    senna-docusate (PERICOLACE) 8.6-50 mg per tablet 1 Tablet  1 Tablet Oral DAILY    polyethylene glycol (MIRALAX) packet 17 g  17 g Oral DAILY PRN    bisacodyL (DULCOLAX) tablet 10 mg  10 mg Oral DAILY PRN    labetaloL (NORMODYNE;TRANDATE) injection 10 mg  10 mg IntraVENous Q4H PRN    hydrALAZINE (APRESOLINE) 20 mg/mL injection 20 mg  20 mg IntraVENous Q6H PRN    insulin lispro (HUMALOG) injection   SubCUTAneous AC&HS    glucose chewable tablet 16 g  4 Tablet Oral PRN    dextrose (D50W) injection syrg 12.5-25 g  12.5-25 g IntraVENous PRN    glucagon (GLUCAGEN) injection 1 mg  1 mg IntraMUSCular PRN    ondansetron (ZOFRAN) injection 4 mg  4 mg IntraVENous Q6H PRN    naloxone (NARCAN) injection 0.4 mg  0.4 mg IntraVENous PRN    acetaminophen (TYLENOL) tablet 650 mg  650 mg Oral Q4H PRN    Or    acetaminophen (TYLENOL) solution 650 mg  650 mg Per NG tube Q4H PRN    Or    acetaminophen (TYLENOL) suppository 650 mg  650 mg Rectal Q4H PRN    niMODipine (NIMOTOP) capsule 60 mg  60 mg Oral Q4H    levETIRAcetam (KEPPRA) tablet 500 mg  500 mg Oral BID      No Known Allergies    Objective:  Vitals:    Vitals:    08/04/21 0901 08/04/21 1000 08/04/21 1042 08/04/21 1204   BP: (!) 150/81 134/67  (!) 168/88   Pulse: (!) 55 (!) 58  (!) 57   Resp:  19     Temp:  98.6 °F (37 °C)     SpO2:  98%     Weight:       Height:   6' (1.829 m)      Intake and Output:  08/04 0701 - 08/04 1900  In: -   Out: 300 [Urine:300]  08/02 1901 - 08/04 0700  In: 1503.3 [I.V.:1503.3]  Out: 5750 [Urine:5750]    Physical Examination:    General: NAD,Conversant   Neck:  Supple, no mass  Resp:  Lungs CTA B/L, no wheezing , normal respiratory effort  CV:  RRR,  no murmur or rub,2+ LE edema  GI:  Soft, NT, + Bowel sounds, no hepatosplenomegaly  Neurologic:  Non focal  Psych:             AAO x 3 appropriate affect     []    High complexity decision making was performed  []    Patient is at high-risk of decompensation with multiple organ involvement    Lab Data Personally Reviewed: I have reviewed all the pertinent labs, microbiology data and radiology studies during assessment.     Recent Labs     08/04/21 0343 08/03/21 0513 08/02/21  0720   * 136 137   K 3.9 4.1 4.0    105 105   CO2 28 26 27   * 114* 106*   BUN 17 19 25*   CREA 0.74 0.68* 0.91   CA 8.3* 8.1* 7.7*   MG 2.1 2.1 2.2   PHOS 3.4 3.5 3.8     Recent Labs     08/04/21 0343 08/03/21  0513 08/02/21  0720   WBC 10.6 10.4 11.1   HGB 13.6 12.5 12.7   HCT 38.5 36.4* 36.7    222 234     No results found for: SDES  Lab Results   Component Value Date/Time    Culture result: MRSA NOT PRESENT 03/11/2015 12:00 PM    Culture result:  03/11/2015 12:00 PM         Screening of patient nares for MRSA is for surveillance purposes and, if positive, to facilitate isolation considerations in high risk settings. It is not intended for automatic decolonization interventions per se as regimens are not sufficiently effective to warrant routine use.      Recent Results (from the past 24 hour(s))   GLUCOSE, POC    Collection Time: 08/03/21  4:05 PM   Result Value Ref Range    Glucose (POC) 131 (H) 65 - 117 mg/dL    Performed by [address] [place] [address], POC    Collection Time: 08/03/21 10:25 PM   Result Value Ref Range    Glucose (POC) 105 65 - 117 mg/dL    Performed by Xuanry Churchman  PCT    MAGNESIUM    Collection Time: 08/04/21  3:43 AM   Result Value Ref Range    Magnesium 2.1 1.6 - 2.4 mg/dL   PHOSPHORUS    Collection Time: 08/04/21  3:43 AM   Result Value Ref Range    Phosphorus 3.4 2.6 - 4.7 MG/DL   CBC W/O DIFF    Collection Time: 08/04/21  3:43 AM   Result Value Ref Range    WBC 10.6 4.1 - 11.1 K/uL    RBC 4.68 4.10 - 5.70 M/uL    HGB 13.6 12.1 - 17.0 g/dL    HCT 38.5 36.6 - 50.3 %    MCV 82.3 80.0 - 99.0 FL    MCH 29.1 26.0 - 34.0 PG    MCHC 35.3 30.0 - 36.5 g/dL    RDW 13.6 11.5 - 14.5 %    PLATELET 863 478 - 639 K/uL    MPV 10.9 8.9 - 12.9 FL    NRBC 0.0 0  WBC    ABSOLUTE NRBC 0.00 0.00 - 8.19 K/uL   METABOLIC PANEL, BASIC    Collection Time: 08/04/21  3:43 AM   Result Value Ref Range    Sodium 135 (L) 136 - 145 mmol/L    Potassium 3.9 3.5 - 5.1 mmol/L    Chloride 103 97 - 108 mmol/L    CO2 28 21 - 32 mmol/L    Anion gap 4 (L) 5 - 15 mmol/L    Glucose 111 (H) 65 - 100 mg/dL    BUN 17 6 - 20 MG/DL    Creatinine 0.74 0.70 - 1.30 MG/DL    BUN/Creatinine ratio 23 (H) 12 - 20      GFR est AA >60 >60 ml/min/1.73m2    GFR est non-AA >60 >60 ml/min/1.73m2    Calcium 8.3 (L) 8.5 - 10.1 MG/DL   GLUCOSE, POC    Collection Time: 08/04/21  7:54 AM   Result Value Ref Range    Glucose (POC) 97 65 - 117 mg/dL    Performed by Chester Barrera    GLUCOSE, POC    Collection Time: 08/04/21 11:27 AM   Result Value Ref Range Glucose (POC) 88 65 - 117 mg/dL    Performed by Lilli Reynolds            Total time spent with patient:  xxx   min. Care Plan discussed with:  Patient     Family      RN      Consulting Physician [address] [facility],         I have reviewed the flowsheets. Chart and Pertinent Notes have been reviewed. No change in PMH ,family and social history from Consult note.       Merrill Tom MD

## 2021-08-04 NOTE — PROGRESS NOTES
NUTRITION  Reason for Screen: LOS        RECOMMENDATIONS:   1. Pt BG levels have been adequate, Decadron discontinued, pt eating well - removed 3 carb serving restriction and modified to 5 carb servings/meal.      Interventions   - diet order adjusted to: 5 carb servings  - none at this time  - continue current diet       Information obtained from:   IDRs, chart review  Past Medical History:   Diagnosis Date    Acute hypokalemia 7/20/2021    Celiac artery stenosis ([facility] 7/21/2021    Cerebral arteriovenous malformation (AVM) 7/20/2021    Diabetes ([facility]     type 2    ED (erectile dysfunction)     HTN (hypertension)     Hypertension     Hypertensive emergency 7/21/2021    Impaired fasting glucose     Malignant hypertension 7/20/2021    Vasogenic cerebral edema ([facility] 7/21/2021       Pt reviewed d/t LOS. Pt eating well, now transferred to [facility]. S/p diagnostic angiogram 7/20. Day 16 of 21 of [facility]. Will follow per nutrition policy.      Diet:  consistent CHO  Supplements: None  Meal Intake:   Patient Vitals for the past 168 hrs:   % Diet Eaten   08/01/21 1733 76 - 100%   08/01/21 1300 76 - 100%   08/01/21 0900 76 - 100%   07/31/21 1800 76 - 100%   07/31/21 0900 76 - 100%   07/30/21 1200 76 - 100%   07/30/21 0900 76 - 100%   07/29/21 1700 76 - 100%   07/29/21 1400 76 - 100%   07/28/21 1700 76 - 100%   07/28/21 1300 26 - 50%         Weight Changes:   Stable  Wt Readings from Last 10 Encounters:   08/04/21 114.3 kg (251 lb 15.8 oz)   07/19/21 94.2 kg (207 lb 10.8 oz)   03/22/19 96.2 kg (212 lb)   03/18/15 96.6 kg (213 lb)   03/11/15 98.4 kg (217 lb)       Nutrition-Related Concerns Identified:  None    Estimated Nutrition Needs:   Energy: 7025-4589 (22-25 kcal/kg IBW)  Wt used: Ideal  Protein: 80-90 (1-1.1 g/kg IBW)  Wt used: Ideal   Fluid: 4090-5117     PLAN:   - Continue current diet  - Rescreen per policy  - Follow wt trends    Will revisit per policy    John Mena RD    Contact via [employer]

## 2021-08-04 NOTE — PROGRESS NOTES
Problem: Falls - Risk of  Goal: *Absence of Falls  Description: Document Todd Fall Risk and appropriate interventions in the flowsheet.   Outcome: Progressing Towards Goal  Note: Fall Risk Interventions:  Mobility Interventions: Assess mobility with egress test, Bed/chair exit alarm, OT consult for ADLs, Patient to call before getting OOB, PT Consult for mobility concerns, PT Consult for assist device competence, Strengthening exercises (ROM-active/passive), Communicate number of staff needed for ambulation/transfer, Utilize walker, cane, or other assistive device         Medication Interventions: Assess postural VS orthostatic hypotension, Patient to call before getting OOB, Teach patient to arise slowly, Bed/chair exit alarm    Elimination Interventions: Bed/chair exit alarm, Elevated toilet seat, Patient to call for help with toileting needs, Call light in reach, Stay With Me (per policy), Urinal in reach    History of Falls Interventions: Bed/chair exit alarm, Consult care management for discharge planning, Door open when patient unattended, Evaluate medications/consider consulting pharmacy, Investigate reason for fall, Room close to nurse's station         Problem: Subarachnoid Hemorrhage Stroke:Admission Day 5-Discharge  Goal: Activity/Safety  Outcome: Progressing Towards Goal  Goal: Diagnostic Test/Procedures  Outcome: Progressing Towards Goal  Goal: Nutrition/Diet  Outcome: Progressing Towards Goal  Goal: Medications  Outcome: Progressing Towards Goal  Goal: Patient maintains clear airway/absence of aspiration  Outcome: Progressing Towards Goal  Goal: Treatments/Interventions/Procedures  Outcome: Progressing Towards Goal  Goal: Psychosocial  Outcome: Progressing Towards Goal  Goal: *Hemodynamically stable  Outcome: Progressing Towards Goal  Goal: *Absence of signs and symptoms of DVT  Outcome: Progressing Towards Goal

## 2021-08-05 ENCOUNTER — APPOINTMENT (OUTPATIENT)
Dept: CT IMAGING | Age: 61
DRG: 064 | End: 2021-08-05
Attending: NURSE PRACTITIONER
Payer: COMMERCIAL

## 2021-08-05 LAB
ALDOST SERPL-MCNC: 1.8 NG/DL (ref 0–30)
ANION GAP SERPL CALC-SCNC: 6 MMOL/L (ref 5–15)
BUN SERPL-MCNC: 14 MG/DL (ref 6–20)
BUN/CREAT SERPL: 17 (ref 12–20)
CALCIUM SERPL-MCNC: 8.8 MG/DL (ref 8.5–10.1)
CHLORIDE SERPL-SCNC: 99 MMOL/L (ref 97–108)
CO2 SERPL-SCNC: 30 MMOL/L (ref 21–32)
COMMENT, HOLDF: NORMAL
CREAT SERPL-MCNC: 0.81 MG/DL (ref 0.7–1.3)
ERYTHROCYTE [DISTWIDTH] IN BLOOD BY AUTOMATED COUNT: 13.1 % (ref 11.5–14.5)
GLUCOSE BLD STRIP.AUTO-MCNC: 142 MG/DL (ref 65–117)
GLUCOSE BLD STRIP.AUTO-MCNC: 163 MG/DL (ref 65–117)
GLUCOSE BLD STRIP.AUTO-MCNC: 88 MG/DL (ref 65–117)
GLUCOSE SERPL-MCNC: 86 MG/DL (ref 65–100)
HCT VFR BLD AUTO: 40.5 % (ref 36.6–50.3)
HGB BLD-MCNC: 13.8 G/DL (ref 12.1–17)
MAGNESIUM SERPL-MCNC: 2.3 MG/DL (ref 1.6–2.4)
MCH RBC QN AUTO: 28.2 PG (ref 26–34)
MCHC RBC AUTO-ENTMCNC: 34.1 G/DL (ref 30–36.5)
MCV RBC AUTO: 82.7 FL (ref 80–99)
METANEPH FREE SERPL-MCNC: 22.5 PG/ML (ref 0–88)
NORMETANEPHRINE SERPL-MCNC: 22.2 PG/ML (ref 0–191.8)
NRBC # BLD: 0 K/UL (ref 0–0.01)
NRBC BLD-RTO: 0 PER 100 WBC
PHOSPHATE SERPL-MCNC: 4.5 MG/DL (ref 2.6–4.7)
PLATELET # BLD AUTO: 231 K/UL (ref 150–400)
PMV BLD AUTO: 11.3 FL (ref 8.9–12.9)
POTASSIUM SERPL-SCNC: 4 MMOL/L (ref 3.5–5.1)
RBC # BLD AUTO: 4.9 M/UL (ref 4.1–5.7)
RENIN PLAS-CCNC: 27.66 NG/ML/HR (ref 0.17–5.38)
SAMPLES BEING HELD,HOLD: NORMAL
SERVICE CMNT-IMP: ABNORMAL
SERVICE CMNT-IMP: ABNORMAL
SERVICE CMNT-IMP: NORMAL
SODIUM SERPL-SCNC: 135 MMOL/L (ref 136–145)
WBC # BLD AUTO: 9.1 K/UL (ref 4.1–11.1)

## 2021-08-05 PROCEDURE — 84100 ASSAY OF PHOSPHORUS: CPT

## 2021-08-05 PROCEDURE — 74011250637 HC RX REV CODE- 250/637: Performed by: INTERNAL MEDICINE

## 2021-08-05 PROCEDURE — 85027 COMPLETE CBC AUTOMATED: CPT

## 2021-08-05 PROCEDURE — 74011250637 HC RX REV CODE- 250/637: Performed by: NURSE PRACTITIONER

## 2021-08-05 PROCEDURE — 70450 CT HEAD/BRAIN W/O DYE: CPT

## 2021-08-05 PROCEDURE — 74175 CTA ABDOMEN W/CONTRAST: CPT

## 2021-08-05 PROCEDURE — 82962 GLUCOSE BLOOD TEST: CPT

## 2021-08-05 PROCEDURE — 36415 COLL VENOUS BLD VENIPUNCTURE: CPT

## 2021-08-05 PROCEDURE — 74011250636 HC RX REV CODE- 250/636: Performed by: NURSE PRACTITIONER

## 2021-08-05 PROCEDURE — 65660000001 HC RM ICU INTERMED STEPDOWN

## 2021-08-05 PROCEDURE — 99232 SBSQ HOSP IP/OBS MODERATE 35: CPT | Performed by: NURSE PRACTITIONER

## 2021-08-05 PROCEDURE — 74011636637 HC RX REV CODE- 636/637: Performed by: NURSE PRACTITIONER

## 2021-08-05 PROCEDURE — 74011250636 HC RX REV CODE- 250/636: Performed by: ANESTHESIOLOGY

## 2021-08-05 PROCEDURE — 83735 ASSAY OF MAGNESIUM: CPT

## 2021-08-05 PROCEDURE — 74011000636 HC RX REV CODE- 636: Performed by: RADIOLOGY

## 2021-08-05 PROCEDURE — 80048 BASIC METABOLIC PNL TOTAL CA: CPT

## 2021-08-05 PROCEDURE — 74011000250 HC RX REV CODE- 250: Performed by: NURSE PRACTITIONER

## 2021-08-05 PROCEDURE — 70496 CT ANGIOGRAPHY HEAD: CPT

## 2021-08-05 RX ORDER — METOCLOPRAMIDE HYDROCHLORIDE 5 MG/ML
5 INJECTION INTRAMUSCULAR; INTRAVENOUS
Status: DISCONTINUED | OUTPATIENT
Start: 2021-08-05 | End: 2021-08-07

## 2021-08-05 RX ORDER — LABETALOL HCL 20 MG/4 ML
10 SYRINGE (ML) INTRAVENOUS ONCE
Status: DISCONTINUED | OUTPATIENT
Start: 2021-08-05 | End: 2021-08-05

## 2021-08-05 RX ORDER — MORPHINE SULFATE 2 MG/ML
2 INJECTION, SOLUTION INTRAMUSCULAR; INTRAVENOUS ONCE
Status: COMPLETED | OUTPATIENT
Start: 2021-08-05 | End: 2021-08-05

## 2021-08-05 RX ORDER — DOXAZOSIN 1 MG/1
2 TABLET ORAL DAILY
Status: DISCONTINUED | OUTPATIENT
Start: 2021-08-05 | End: 2021-08-06

## 2021-08-05 RX ORDER — HYDRALAZINE HYDROCHLORIDE 20 MG/ML
10 INJECTION INTRAMUSCULAR; INTRAVENOUS ONCE
Status: COMPLETED | OUTPATIENT
Start: 2021-08-05 | End: 2021-08-05

## 2021-08-05 RX ADMIN — CLONIDINE HYDROCHLORIDE 0.2 MG: 0.2 TABLET ORAL at 13:55

## 2021-08-05 RX ADMIN — DOCUSATE SODIUM 50 MG AND SENNOSIDES 8.6 MG 1 TABLET: 8.6; 5 TABLET, FILM COATED ORAL at 11:25

## 2021-08-05 RX ADMIN — LEVETIRACETAM 500 MG: 500 TABLET ORAL at 17:23

## 2021-08-05 RX ADMIN — HYDRALAZINE HYDROCHLORIDE 20 MG: 20 INJECTION INTRAMUSCULAR; INTRAVENOUS at 07:10

## 2021-08-05 RX ADMIN — HYDRALAZINE HYDROCHLORIDE 20 MG: 20 INJECTION INTRAMUSCULAR; INTRAVENOUS at 21:34

## 2021-08-05 RX ADMIN — LABETALOL HYDROCHLORIDE 10 MG: 5 INJECTION INTRAVENOUS at 01:36

## 2021-08-05 RX ADMIN — DOXAZOSIN 2 MG: 1 TABLET ORAL at 11:25

## 2021-08-05 RX ADMIN — CHLORTHALIDONE 25 MG: 25 TABLET ORAL at 11:25

## 2021-08-05 RX ADMIN — NIMODIPINE 60 MG: 30 CAPSULE, LIQUID FILLED ORAL at 00:01

## 2021-08-05 RX ADMIN — HYDRALAZINE HYDROCHLORIDE 100 MG: 50 TABLET, FILM COATED ORAL at 21:02

## 2021-08-05 RX ADMIN — INSULIN LISPRO 2 UNITS: 100 INJECTION, SOLUTION INTRAVENOUS; SUBCUTANEOUS at 17:23

## 2021-08-05 RX ADMIN — NIMODIPINE 60 MG: 30 CAPSULE, LIQUID FILLED ORAL at 07:02

## 2021-08-05 RX ADMIN — HYDRALAZINE HYDROCHLORIDE 100 MG: 50 TABLET, FILM COATED ORAL at 16:11

## 2021-08-05 RX ADMIN — MORPHINE SULFATE 2 MG: 2 INJECTION, SOLUTION INTRAMUSCULAR; INTRAVENOUS at 02:45

## 2021-08-05 RX ADMIN — IOPAMIDOL 100 ML: 755 INJECTION, SOLUTION INTRAVENOUS at 11:00

## 2021-08-05 RX ADMIN — LISINOPRIL 40 MG: 20 TABLET ORAL at 21:02

## 2021-08-05 RX ADMIN — HYDRALAZINE HYDROCHLORIDE 100 MG: 50 TABLET, FILM COATED ORAL at 11:27

## 2021-08-05 RX ADMIN — NIMODIPINE 60 MG: 30 CAPSULE, LIQUID FILLED ORAL at 03:34

## 2021-08-05 RX ADMIN — CLONIDINE HYDROCHLORIDE 0.2 MG: 0.2 TABLET ORAL at 07:00

## 2021-08-05 RX ADMIN — NIMODIPINE 60 MG: 30 CAPSULE, LIQUID FILLED ORAL at 21:02

## 2021-08-05 RX ADMIN — NIMODIPINE 60 MG: 30 CAPSULE, LIQUID FILLED ORAL at 11:25

## 2021-08-05 RX ADMIN — HYDRALAZINE HYDROCHLORIDE 10 MG: 20 INJECTION, SOLUTION INTRAMUSCULAR; INTRAVENOUS at 04:39

## 2021-08-05 RX ADMIN — IOPAMIDOL 60 ML: 755 INJECTION, SOLUTION INTRAVENOUS at 10:38

## 2021-08-05 RX ADMIN — CLONIDINE HYDROCHLORIDE 0.2 MG: 0.2 TABLET ORAL at 21:01

## 2021-08-05 RX ADMIN — HYDRALAZINE HYDROCHLORIDE 20 MG: 20 INJECTION INTRAMUSCULAR; INTRAVENOUS at 00:05

## 2021-08-05 RX ADMIN — ACETAMINOPHEN 650 MG: 325 TABLET ORAL at 01:35

## 2021-08-05 RX ADMIN — NIMODIPINE 60 MG: 30 CAPSULE, LIQUID FILLED ORAL at 16:11

## 2021-08-05 RX ADMIN — LEVETIRACETAM 500 MG: 500 TABLET ORAL at 11:25

## 2021-08-05 RX ADMIN — NIMODIPINE 60 MG: 30 CAPSULE, LIQUID FILLED ORAL at 23:43

## 2021-08-05 RX ADMIN — ACETAMINOPHEN 650 MG: 325 TABLET ORAL at 13:22

## 2021-08-05 NOTE — PROGRESS NOTES
[facility]   [address] [facility], [address] [place] [address], [facility]  Phone: [phone]   [phone]       Nephrology Progress Note  Main Sheets     1960     [id number]  Date of Admission : 7/20/2021 08/05/21    CC: Follow up for secondary HTN       Assessment and Plan   Resistant HTN:  - Suspect familial cause   - negative secondary w/u   - added Cardura and continue all other meds     Volume overload   - continue Chlorthalidone   - Hold Bumex today     Cerebellar AVM w/ SAH   - per NIS    Chronic heavy smoker    Elevated TSH. Interval History:  Seen and examined. Had CTA of renal today and was negative for SHAR. He had HA and had CTA that was normal. No new sx . Edema improving. Effusions noted on CT    Review of Systems: A comprehensive review of systems was negative except for that written in the HPI.     Current Medications:   Current Facility-Administered Medications   Medication Dose Route Frequency    doxazosin (CARDURA) tablet 2 mg  2 mg Oral DAILY    hydrALAZINE (APRESOLINE) tablet 100 mg  100 mg Oral TID    cloNIDine HCL (CATAPRES) tablet 0.2 mg  0.2 mg Oral Q8H    lisinopriL (PRINIVIL, ZESTRIL) tablet 40 mg  40 mg Oral QHS    chlorthalidone (HYGROTON) tablet 25 mg  25 mg Oral DAILY    senna-docusate (PERICOLACE) 8.6-50 mg per tablet 1 Tablet  1 Tablet Oral DAILY    polyethylene glycol (MIRALAX) packet 17 g  17 g Oral DAILY PRN    bisacodyL (DULCOLAX) tablet 10 mg  10 mg Oral DAILY PRN    labetaloL (NORMODYNE;TRANDATE) injection 10 mg  10 mg IntraVENous Q4H PRN    hydrALAZINE (APRESOLINE) 20 mg/mL injection 20 mg  20 mg IntraVENous Q6H PRN    insulin lispro (HUMALOG) injection   SubCUTAneous AC&HS    glucose chewable tablet 16 g  4 Tablet Oral PRN    dextrose (D50W) injection syrg 12.5-25 g  12.5-25 g IntraVENous PRN    glucagon (GLUCAGEN) injection 1 mg  1 mg IntraMUSCular PRN    ondansetron (ZOFRAN) injection 4 mg  4 mg IntraVENous Q6H PRN    naloxone [facility]) injection 0.4 mg  0.4 mg IntraVENous PRN    acetaminophen (TYLENOL) tablet 650 mg  650 mg Oral Q4H PRN    Or    acetaminophen (TYLENOL) solution 650 mg  650 mg Per NG tube Q4H PRN    Or    acetaminophen (TYLENOL) suppository 650 mg  650 mg Rectal Q4H PRN    niMODipine (NIMOTOP) capsule 60 mg  60 mg Oral Q4H    levETIRAcetam (KEPPRA) tablet 500 mg  500 mg Oral BID      No Known Allergies    Objective:  Vitals:    Vitals:    08/05/21 1355 08/05/21 1410 08/05/21 1434 08/05/21 1611   BP: (!) 149/44 (!) 149/44 (!) 137/57 (!) 137/59   Pulse: 72 70 (!) 58 (!) 53   Resp:  18     Temp:  98.9 °F (37.2 °C)     SpO2:  99%     Weight:       Height:         Intake and Output:  08/05 0701 - 08/05 1900  In: -   Out: 7138 [JDIJU:7129]  08/03 1901 - 08/05 0700  In: 440 [P.O.:440]  Out: 6100 [Urine:6100]    Physical Examination:    General: NAD,Conversant   Neck:  Supple, no mass  Resp:  Lungs CTA B/L, no wheezing , normal respiratory effort  CV:  RRR,  no murmur or rub,2+ LE edema  GI:  Soft, NT, + Bowel sounds, no hepatosplenomegaly  Neurologic:  Non focal  Psych:             AAO x 3 appropriate affect     []    High complexity decision making was performed  []    Patient is at high-risk of decompensation with multiple organ involvement    Lab Data Personally Reviewed: I have reviewed all the pertinent labs, microbiology data and radiology studies during assessment.     Recent Labs     08/05/21 0147 08/04/21 0343 08/03/21  0513   * 135* 136   K 4.0 3.9 4.1   CL 99 103 105   CO2 30 28 26   GLU 86 111* 114*   BUN 14 17 19   CREA 0.81 0.74 0.68*   CA 8.8 8.3* 8.1*   MG 2.3 2.1 2.1   PHOS 4.5 3.4 3.5     Recent Labs     08/05/21 0147 08/04/21 0343 08/03/21  0513   WBC 9.1 10.6 10.4   HGB 13.8 13.6 12.5   HCT 40.5 38.5 36.4*    204 222     No results found for: SDES  Lab Results   Component Value Date/Time    Culture result: MRSA NOT PRESENT 03/11/2015 12:00 PM    Culture result:  03/11/2015 12:00 PM Screening of patient nares for MRSA is for surveillance purposes and, if positive, to facilitate isolation considerations in high risk settings. It is not intended for automatic decolonization interventions per se as regimens are not sufficiently effective to warrant routine use. Recent Results (from the past 24 hour(s))   GLUCOSE, POC    Collection Time: 08/04/21  8:54 PM   Result Value Ref Range    Glucose (POC) 95 65 - 117 mg/dL    Performed by Joyce Servin    MAGNESIUM    Collection Time: 08/05/21  1:47 AM   Result Value Ref Range    Magnesium 2.3 1.6 - 2.4 mg/dL   PHOSPHORUS    Collection Time: 08/05/21  1:47 AM   Result Value Ref Range    Phosphorus 4.5 2.6 - 4.7 MG/DL   CBC W/O DIFF    Collection Time: 08/05/21  1:47 AM   Result Value Ref Range    WBC 9.1 4.1 - 11.1 K/uL    RBC 4.90 4. 10 - 5.70 M/uL    HGB 13.8 12.1 - 17.0 g/dL    HCT 40.5 36.6 - 50.3 %    MCV 82.7 80.0 - 99.0 FL    MCH 28.2 26.0 - 34.0 PG    MCHC 34.1 30.0 - 36.5 g/dL    RDW 13.1 11.5 - 14.5 %    PLATELET 901 898 - 145 K/uL    MPV 11.3 8.9 - 12.9 FL    NRBC 0.0 0  WBC    ABSOLUTE NRBC 0.00 0.00 - 8.06 K/uL   METABOLIC PANEL, BASIC    Collection Time: 08/05/21  1:47 AM   Result Value Ref Range    Sodium 135 (L) 136 - 145 mmol/L    Potassium 4.0 3.5 - 5.1 mmol/L    Chloride 99 97 - 108 mmol/L    CO2 30 21 - 32 mmol/L    Anion gap 6 5 - 15 mmol/L    Glucose 86 65 - 100 mg/dL    BUN 14 6 - 20 MG/DL    Creatinine 0.81 0.70 - 1.30 MG/DL    BUN/Creatinine ratio 17 12 - 20      GFR est AA >60 >60 ml/min/1.73m2    GFR est non-AA >60 >60 ml/min/1.73m2    Calcium 8.8 8.5 - 10.1 MG/DL   SAMPLES BEING HELD    Collection Time: 08/05/21  1:47 AM   Result Value Ref Range    SAMPLES BEING HELD 1SST,1BLU     COMMENT        Add-on orders for these samples will be processed based on acceptable specimen integrity and analyte stability, which may vary by analyte.    GLUCOSE, POC    Collection Time: 08/05/21  7:04 AM   Result Value Ref Range    Glucose (POC) 88 65 - 117 mg/dL    Performed by JONATHAN Quiroz    Collection Time: 08/05/21  4:34 PM   Result Value Ref Range    Glucose (POC) 142 (H) 65 - 117 mg/dL    Performed by Karthikeyan Green            Total time spent with patient:  xxx   min. Care Plan discussed with:  Patient     Family      RN      Consulting Physician [facility],         I have reviewed the flowsheets. Chart and Pertinent Notes have been reviewed. No change in PMH ,family and social history from Consult note.       Roselee Duverney, MD

## 2021-08-05 NOTE — PROGRESS NOTES
[facility] [facility]                                                                                          Hospitalist Progress Note  Dino Moseley MD  Answering service: [phone] from in house phone        Date of Service:  2021  NAME:  Daisy White  :  1960  MRN:  [id number]      Admission Summary: \"This is 45-year-old gentleman with past medical history of hypertension,  currently getting transferred out of ICU after [facility] and hypertensive emergency treatment. He presented from [facility] hospital with headache and dizziness, found to have elevated blood pressure. CT of the head showed an acute subarachnoid hemorrhage anterior to the sarahi. CTA of the head and neck showed an AVM in the left superior cerebellar peduncle, with the dominant drainage into the vein of [place]. No large vessel occlusion, no hemodynamically significant stenosis. Redemonstrated subarachnoid hemorrhage in the basilar cisterns. Patient was initially started on IV antihypertensives, now switched to oral antihypertensives. He was also started on Nimodipine. 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 - S/P diagnostic cerebral andio on 21.  - Day 17  on nimodipine 60 mg po q4h. - Continue levetiracetam 500 mg po bid.       Polyuria  Hyponatremia   - Nephrology input appreciated. - Monitor for now.       HTN  - Continue chlorthalidone 25 mg po daily. - Continue clonidine 0.2 mg po q8h. - Continue hydralazine 100 mg po tid. - Continue lisinopril 40 mg po daily.    - ARR pending.  - Plan for CTA abdomen 21.- 1. 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   CT head within normal limit       DM II  Hyperglycemia   - Continue to hold oral diabetes meds.  - Continue FSBS with SSI correction scale.      Constipation   - Continue senna-docusate 8.6-50 mg po daily.          Obesity/ Body mass index is 34.18 kg/m².     Code status: FULL  DVT prophylaxis: scd    Care Plan discussed with: Patient/Family and Nurse  Anticipated Disposition: Home w/Family  Anticipated Discharge: 24 hours to 48 hours     Hospital Problems  Date Reviewed: 7/20/2021        Codes Class Noted POA    Celiac artery stenosis (HCC) (Chronic) ICD-10-CM: I77.4  ICD-9-CM: 447. 4 Chronic 7/21/2021 Yes        Vasogenic cerebral edema (HCC) ICD-10-CM: G93.6  ICD-9-CM: 391. 5 Acute 7/21/2021 No        Hypertensive emergency ICD-10-CM: I16.1  ICD-9-CM: 401.9 Acute 7/21/2021 Yes        Malignant hypertension ICD-10-CM: I10  ICD-9-CM: 401.0 Acute 7/20/2021 Yes        * (Principal) Cerebral arteriovenous malformation (AVM) ICD-10-CM: Q28.2  ICD-9-CM: 747.81 Acute 7/20/2021 Yes    Overview Signed 7/20/2021  7:03 AM by Edvin Crawford MD     ([id number])- CT (Brain): There is an AVM in the superior cerebellum with multiple aneurysms. One measures 4 mm. Another measures 4 mm within the superior cerebellum. Acute hypokalemia ICD-10-CM: E87.6  ICD-9-CM: 276.8 Acute 7/20/2021 Yes        Cerebral vasospasm ICD-10-CM: U33.484  ICD-9-CM: 435.9  8/1/2021 No        SAH (subarachnoid hemorrhage) (HCC) ICD-10-CM: I60.9  ICD-9-CM: 430  7/20/2021 Yes                Review of Systems:   A comprehensive review of systems was negative. Vital Signs:    Last 24hrs VS reviewed since prior progress note.  Most recent are:  Visit Vitals  BP (!) 152/64   Pulse (!) 56   Temp 99 °F (37.2 °C)   Resp 18   Ht 6' (1.829 m)   Wt 114.4 kg (252 lb 4.8 oz)   SpO2 99%   BMI 34.22 kg/m²         Intake/Output Summary (Last 24 hours) at 8/5/2021 1149  Last data filed at 8/5/2021 0700  Gross per 24 hour   Intake 220 ml   Output 4450 ml   Net -4230 ml        Physical Examination:     I had a face to face encounter with this patient and independently examined them on 8/5/2021 as outlined below:          Constitutional:  No acute distress, cooperative, pleasant    ENT:  Oral mucosa moist, oropharynx benign. Resp:  CTA bilaterally. No wheezing/rhonchi/rales. No accessory muscle use   CV:  Regular rhythm, normal rate, no murmurs, gallops, rubs    GI:  Soft, non distended, non tender. normoactive bowel sounds, no hepatosplenomegaly     Musculoskeletal:  No edema, warm, 2+ pulses throughout    Neurologic:  Moves all extremities. AAOx3, CN II-XII reviewed            Data Review:    I personally reviewed  Image and LABS      Labs:     Recent Labs     08/05/21 0147 08/04/21 0343   WBC 9.1 10.6   HGB 13.8 13.6   HCT 40.5 38.5    204     Recent Labs     08/05/21 0147 08/04/21 0343 08/03/21  0513   * 135* 136   K 4.0 3.9 4.1   CL 99 103 105   CO2 30 28 26   BUN 14 17 19   CREA 0.81 0.74 0.68*   GLU 86 111* 114*   CA 8.8 8.3* 8.1*   MG 2.3 2.1 2.1   PHOS 4.5 3.4 3.5     No results for input(s): ALT, AP, TBIL, TBILI, TP, ALB, GLOB, GGT, AML, LPSE in the last 72 hours. No lab exists for component: SGOT, GPT, AMYP, HLPSE  No results for input(s): INR, PTP, APTT, INREXT in the last 72 hours. No results for input(s): FE, TIBC, PSAT, FERR in the last 72 hours. No results found for: FOL, RBCF   No results for input(s): PH, PCO2, PO2 in the last 72 hours. No results for input(s): CPK, CKNDX, TROIQ in the last 72 hours.     No lab exists for component: CPKMB  Lab Results   Component Value Date/Time    Cholesterol, total 125 07/20/2021 04:11 AM    HDL Cholesterol 68 07/20/2021 04:11 AM    LDL, calculated 25.2 07/20/2021 04:11 AM    Triglyceride 159 (H) 07/20/2021 04:11 AM    CHOL/HDL Ratio 1.8 07/20/2021 04:11 AM     Lab Results   Component Value Date/Time    Glucose (POC) 88 08/05/2021 07:04 AM    Glucose (POC) 95 08/04/2021 08:54 PM    Glucose (POC) 86 08/04/2021 04:51 PM    Glucose (POC) 88 08/04/2021 11:27 AM    Glucose (POC) 97 08/04/2021 07:54 AM     Lab Results   Component Value Date/Time    Color YELLOW/STRAW 03/11/2015 12:33 PM    Appearance CLEAR 03/11/2015 12:33 PM    Specific gravity 1.019 03/11/2015 12:33 PM    pH (UA) 6.0 03/11/2015 12:33 PM    Protein NEGATIVE  03/11/2015 12:33 PM    Glucose NEGATIVE  03/11/2015 12:33 PM    Ketone NEGATIVE  03/11/2015 12:33 PM    Bilirubin NEGATIVE  03/11/2015 12:33 PM    Urobilinogen 1.0 03/11/2015 12:33 PM    Nitrites NEGATIVE  03/11/2015 12:33 PM    Leukocyte Esterase NEGATIVE  03/11/2015 12:33 PM    Epithelial cells FEW 03/11/2015 12:33 PM    Bacteria NEGATIVE  03/11/2015 12:33 PM    WBC 0-4 03/11/2015 12:33 PM    RBC 0-5 03/11/2015 12:33 PM         Medications Reviewed:     Current Facility-Administered Medications   Medication Dose Route Frequency    doxazosin (CARDURA) tablet 2 mg  2 mg Oral DAILY    hydrALAZINE (APRESOLINE) tablet 100 mg  100 mg Oral TID    cloNIDine HCL (CATAPRES) tablet 0.2 mg  0.2 mg Oral Q8H    lisinopriL (PRINIVIL, ZESTRIL) tablet 40 mg  40 mg Oral QHS    chlorthalidone (HYGROTON) tablet 25 mg  25 mg Oral DAILY    senna-docusate (PERICOLACE) 8.6-50 mg per tablet 1 Tablet  1 Tablet Oral DAILY    polyethylene glycol (MIRALAX) packet 17 g  17 g Oral DAILY PRN    bisacodyL (DULCOLAX) tablet 10 mg  10 mg Oral DAILY PRN    labetaloL (NORMODYNE;TRANDATE) injection 10 mg  10 mg IntraVENous Q4H PRN    hydrALAZINE (APRESOLINE) 20 mg/mL injection 20 mg  20 mg IntraVENous Q6H PRN    insulin lispro (HUMALOG) injection   SubCUTAneous AC&HS    glucose chewable tablet 16 g  4 Tablet Oral PRN    dextrose (D50W) injection syrg 12.5-25 g  12.5-25 g IntraVENous PRN    glucagon (GLUCAGEN) injection 1 mg  1 mg IntraMUSCular PRN    ondansetron (ZOFRAN) injection 4 mg  4 mg IntraVENous Q6H PRN    naloxone (NARCAN) injection 0.4 mg  0.4 mg IntraVENous PRN    acetaminophen (TYLENOL) tablet 650 mg  650 mg Oral Q4H PRN    Or    acetaminophen (TYLENOL) solution 650 mg 650 mg Per NG tube Q4H PRN    Or    acetaminophen (TYLENOL) suppository 650 mg  650 mg Rectal Q4H PRN    niMODipine (NIMOTOP) capsule 60 mg  60 mg Oral Q4H    levETIRAcetam (KEPPRA) tablet 500 mg  500 mg Oral BID     ______________________________________________________________________  EXPECTED LENGTH OF STAY: 4d 9h  ACTUAL LENGTH OF STAY:          Sabas Kiserday, MD

## 2021-08-05 NOTE — ADT AUTH CERT NOTES
Neurology [address] Care Day 14 (8/2/2021) by Camille Moreno RN       Review Status Review Entered   Completed 8/3/2021 16:13      Criteria Review      Care Day: 14 Care Date: 8/2/2021 Level of Care: ICU    Guideline Day 3    Level Of Care    (X) * Activity level acceptable    8/3/2021 16:11:18 EDT by Dayna Aiken [facility] care bed    ( ) Floor to discharge    8/3/2021 16:11:18 EDT by Guevara Butterfield      97.9 67 20 95 143/62 ra    ( ) * Complete discharge planning    Clinical Status    (X) * No infection, or status acceptable    (X) * Isolation not needed, or status acceptable    (X) * Respiratory status acceptable    (X) * Pain and nausea absent or adequately managed    (X) * Ventilatory status acceptable    (X) * Neurologic problems absent or stabilized    (X) * Muscle or nerve damage absent or stable    ( ) * General Discharge Criteria met    Interventions    (X) * Intake acceptable    (X) * No inpatient interventions needed    8/3/2021 16:12:46 EDT by Guevara Butterfield      hygroton 25 mg po qd, Decadron 2 mg iv q12, hydrlazine 50 mg po tid, Humalog ssi qid, Keppra 500 mg po bid, lisinpril 40 mg po qhs Nimotop 60 mg po q4,  hr Catapres 0.3 mg po q8,    8/3/2021 16:13:39 EDT by Guevara Butterfield    Subject: Additional Clinical Information    glu 106 bun 25 bun raito 27 ca 7.7 tsh 6.77      Right Transcranial Doppler    Limited exam.  No ultrasound window identified for visualization of the MCA, JOSSELYN, or terminal ICA.  The PCA and vertebral are patent and without evidence of vasospasm         Left Transcranial Doppler    Limited exam.  No ultrasound window identified for visualization of the MCA, JOSSELYN, or terminal ICA.  Poor visualization of the left PCA through the right temple; no evidence of vasospasm.  The vertebral is patent and without evidence of vasospasm.           The basilar is patent and without evidence of vasospasm       * Milestone   Additional Notes   8/2/21            Neuro: Rounded on the patient overnight with no new neurological symptoms noted overnight.  Continues to have facial numbness with intermittent extremities numbness and tingling and diplopia with use of eye patch.  Blood pressure appears to be running on the low side.  Patient on IV fluids infusing at 150 mL/hr. BLEE noted and +1,555 daily total I/O. Will likely decrease to 100 ml/hr. Nephrology is on board and will defer blood pressure adjustment to nephrology. Enedelia Garcia hypertension work-up labs ordered by nephrology with further recommendation outlined in the notes.  Daily TCD's showed no vasospasm.  Pending TCD's for this morning. Nephro :CC:  Follow up for secondary HTN          Assessment and Plan    Resistant HTN:   - exclude secondary causes    - quite possibly this may be  APO-L1 related or ENac channel mutation    - BP at goal and adjusting meds    - stopped Amlodipine while getting Nimotop    - expect good response to Chlorthalidone. But given his risk for vasospasm, will watch his volume status closely    - changed Lisinopril to bedtime, continue Hydralazine and clonidine    - hold Minoxidil for now    - f/u results of secondary w/u. CT renal angiogram if and when he goes for Cerebral angiogram        Cerebellar AVM w/ SAH    - per Endovascular Neuro Sx        Chronic heavy smoker       Elevated TSH.    - Check FT3,FT4            Interval History:   Seen and examined. BP improving.  No headache.        Review of Systems: A comprehensive review of systems was negative except for that written in the HPI.       Physical Examination:       General:          NAD,Conversant    Neck:               Supple, no mass   Resp:               Lungs CTA B/L, no wheezing , normal respiratory effort   CV:                  RRR,  no murmur or rub,trace LE edema   GI:                   Soft, NT, + Bowel sounds, no hepatosplenomegaly   Neurologic:       Non focal   Psych:             AAO x 3 appropriate affect    Skin:                No Silvia   :                  No peña           ICU TEAM Progress Note       Name: Ravin Browne   : 1960   MRN: [id number]   Date: 2021        I   Subjective:   Progress Note: 2021         Reason for ICU Admission: Cerebellar arteriovenous malformation, subarachnoid hemorrhage       Interval history:   80-year-old male with hypertension admitted to our hospital as a transfer from outside facility on  with subarachnoid hemorrhage   Overnight Events:    : No acute event.  Blood pressure better controlled. : No acute event, off nicardipine, minoxidil was added. : No overnight events   : No acute overnight events   : No acute overnight events   : Better blood pressure control with adding clonidine orally, hold beta-blocker due to bradycardia [asymptomatic], off nicardipine drip.  Did well with diuresis and he is negative about 1600 cc.  Still positive about 19 L since admission and that is reflected in his weight as well. :No acute event, did well with gentle diuresis, -600 cc however still positive          Physical Exam:       General:  Alert, cooperative, no distress, appears stated age. Facial asymmetry with diplopia. Dysmetria LUE. Neck:  Symmetrical, trachea midline, no JVD or carotid bruit. Back:    No CVA tenderness to percussion. Lungs:    Clear to auscultation bilaterally. Heart:  Regular rate and rhythm. S1, S2 normal, without murmur, click, rub or gallop. Abdomen:    Normoactive bowel sounds. Soft, non-tender, no masses or organomegaly.    Extremities:  Atraumatic, no cyanosis positive edema   Neurologic:  Speech normal normal strength.  Left facial droop, negative drift, at times I could appreciate disconjugate gaze       Assessment and Plan:   -Arteriovenous malformation left superior cerebellar peduncle:   -Acute subarachnoid hemorrhage:   -Difficult to control hypertension:           -Appreciate neurology recommendation management:             -[id number] systolic blood pressure below 140               -Day 14/21 of nimodipine               -Maintain euvolemia [starting from July 25, cumulatively patient is now even balance, however remains about 14 L positive since admission] he is now on 150 cc of normal saline.  Serum osmolality on the 31st was within normal.   -Blood pressure better appreciate nephrology input and recommendation.  Minoxidil placed on hold today, chlorthalidone started yesterday.   -Prophylactic Keppra,  TCD's per neurology       DVT GI prophylaxis.  Neurochecks as ordered by neurology [now q 2 hour].       DISPOSITION   Discussed with neurology team. Norberto Torres recommended transfer to [facility]             IM: Physical Examination        General: Not in any distress   Resp: CTA Bilaterally    CV: S1+S2, No MGR   Abdomen: Soft, non tender   Neuro: Awake, alert, following commands, mild left lateral rectus palsy    Musculoskeletal: No Edema present, no wounds present    Assessment & Plan:               This is 77-year-old gentleman with past medical history of hypertension,  currently getting transferred out of ICU after [facility] and hypertensive emergency treatment. [facility] presented from [facility] with headache and dizziness, found to have elevated blood pressure. CT of the head showed an acute subarachnoid hemorrhage anterior to the sarahi.  CTA of the head and neck showed an AVM in the left superior cerebellar peduncle, with the dominant drainage into the vein of Gaston. No large vessel occlusion, no hemodynamically significant stenosis.  Redemonstrated subarachnoid hemorrhage in the basilar cisterns.  Patient was initially started on IV antihypertensives, now switched to oral antihypertensives. [facility] was also started on Nimodipine.  Today is day 14/21 nimodipine.  Neuro intervention team is on board, recommended no surgical intervention, recommends outpatient radiation therapy.       Acute SAH in the setting of cerebellar AVM with associated flow related aneurysm   Mild left lateral rectus palsy   Hypertension   Hyperglycemia secondary to steroid use           Continue nimodipine 60 mg every 4 hour. Continue Keppra   Continue Decadron 4 mg twice daily   Neuro intervention team on board, recommendations appreciated   Patient's blood pressure is improving, nephrology on board, recommendations appreciated   Continue chlorthalidone, clonidine, hydralazine, metoprolol, minoxidil.    Hemoglobin A1c 5.6, continue sliding scale of insulin.                         Neurology [facility] - Care Day 13 (8/1/2021) by Eric Scott RN       Review Status Review Entered   Completed 8/2/2021 15:56      Criteria Review      Care Day: 13 Care Date: 8/1/2021 Level of Care: ICU    Guideline Day 3    Level Of Care    (X) * Activity level acceptable    ( ) * Complete discharge planning    Clinical Status    (X) * No infection, or status acceptable    (X) * Isolation not needed, or status acceptable    (X) * Respiratory status acceptable    (X) * Pain and nausea absent or adequately managed    (X) * Ventilatory status acceptable    (X) * Neurologic problems absent or stabilized    (X) * Muscle or nerve damage absent or stable    ( ) * General Discharge Criteria met    Interventions    (X) * Intake acceptable    (X) * No inpatient interventions needed    * Milestone   Additional Notes   8/1/21  inpt icu   98.1    57   20   94/51     98% ra   WBC: 10.7   HGB: 13.8   HCT: 39.7   PLATELET: 332   Sodium: 134 (L)   Potassium: 4.1   Chloride: 103   CO2: 28   Anion gap: 3 (L)   Glucose: 139 (H)   BUN: 17   Creatinine: 0.81   BUN/Creatinine ratio: 21 (H)   Calcium: 8.0 (L)      MEDS   NS @ 100 cc/hr   Norvasc 10mg po qday   Apresoline 50mg iv Tid     Nimotop 60mg po q4   Ssi x   Keppra 500mg po bid   Lisinopril 40mg po qday   Lopressor 100mg po q12   Nimotop 60mg po q4   Pericolace 1 tab po qday   Catapres 0.3mg po q8   Lopressor 100mg po q8   Decadon 2mg iv q12   Critical care note   Assessment and Plan:   -Arteriovenous malformation left superior cerebellar peduncle:   -Acute subarachnoid hemorrhage:   -Difficult to control hypertension:           -Appreciate neurology recommendation management:               -Maintain systolic blood pressure below 140               -Day 13/21 of nimodipine               -Maintain euvolemia [starting from July 25, cumulatively patient is now even balance, however remains about 14 L positive since admission] he is now on 150 cc of normal saline.  Serum osmolality on the 31st was within normal.   -Blood pressure better controlled he is on clonidine and minoxidil lisinopril amlodipine hydrochlorothiazide    -Prophylactic Keppra,  TCD's per neurology       DVT GI prophylaxis.  Neurochecks as ordered by neurology [no acute 4-hour].       DISPOSITION   Stay in ICU          Endovascular Surgical Neuroradiology   Yemi Mancia 94 #14    No events overnight   Intermittently complains of bilateral hands, feet and left facial \"numbness\" that may correlate with anxiety per NP   Left facial and left CN6 palsy stable   Improved BP (521-736G systolic) requiring multiple meds   We discussed his AVM and hemorrhage again today.  He seems to have good insight now.     TCDs reviewed   NICOM today please.  Goal euvolemia.  Not quite out of the spasm window yet.            Renal consult    ASSESSMENT and PLAN :        1 Resistant HTN   2 Cerebella AVM  And Subarachonoid hemorrhage    3 H/o HTN   4 Celliac artery stenosis in smoker    5 Ployuria       Plan    1 Will change HCTZ to chlorthalidione    2 Given Bradycardia decrease clonidine to 0.3 TID    3 Decrease Hydralazine to 50 TID and increase Minoxidil to 5 BID    4 Secondary HTN work up labs ordered for tomorrow am    5 Can add Spiranolactone tomorrow after labs has been send    6 May need RENal artery CTA if BP remains an issues given his extensive smoking h/o and already noted celeia artery stenosis on Renal Doppler        Care Plan discussed with:  Nurse and pt             Neurology [address] Day 12 (7/31/2021) by Trice Figueroa RN       Review Status Review Entered   Completed 8/2/2021 15:50      Criteria Review      Care Day: 12 Care Date: 7/31/2021 Level of Care: ICU    Guideline Day 3    Level Of Care    (X) * Activity level acceptable    ( ) * Complete discharge planning    Clinical Status    (X) * No infection, or status acceptable    (X) * Isolation not needed, or status acceptable    (X) * Respiratory status acceptable    8/2/2021 15:50:57 EDT by Alia Lozano      rr 20   98% ra    (X) * Pain and nausea absent or adequately managed    (X) * Ventilatory status acceptable    (X) * Neurologic problems absent or stabilized    (X) * Muscle or nerve damage absent or stable    ( ) * General Discharge Criteria met    Interventions    (X) * Intake acceptable    (X) * No inpatient interventions needed    8/2/2021 15:50:57 EDT by Melissa Pascual see below    * Milestone      Neurology [address] Day 11 (7/30/2021) by Trice Figueroa RN       Review Status Review Entered   Completed 8/2/2021 15:43      Criteria Review      Care Day: 11 Care Date: 7/30/2021 Level of Care: ICU    Guideline Day 3    Level Of Care    (X) * Activity level acceptable    ( ) * Complete discharge planning    Clinical Status    (X) * No infection, or status acceptable    (X) * Isolation not needed, or status acceptable    (X) * Respiratory status acceptable    8/2/2021 15:43:29 EDT by Alia Lozano      rr 18   97% ra    (X) * Pain and nausea absent or adequately managed    (X) * Ventilatory status acceptable    (X) * Neurologic problems absent or stabilized    (X) * Muscle or nerve damage absent or stable    ( ) * General Discharge Criteria met    Interventions    (X) * Intake acceptable    (X) * No inpatient interventions needed    8/2/2021 15:43:29 EDT by Melissa Pascual see below    * Milestone   Additional Notes   7/30/21  in icu   98   48   18   157/72   97% ra   WBC: 10.1   HGB: 14.2   HCT: 43.3   PLATELET: 208   Phosphorus: 3.4   Magnesium: 2.4      MEDS   NS @ 100 cc/hr   Norvasc 10mg po qday   Apresoline 20mg iv q6 prn x 1   Apresoline 100mg iv Tid     Nimotop 60mg po q4   Ssi x   Keppra 500mg po bid   Lisinopril 40mg po qday   Lopressor 100mg po q12   Nimotop 60mg po q4   Pericolace 1 tab po qday   Catapres 0.2mg po q8   Lopressor 100mg po q8   Decadon 4mg iv q12        Critical care note   Assessment and Plan:   -Arteriovenous malformation left superior cerebellar peduncle:   -Acute subarachnoid hemorrhage:   -Difficult to control hypertension:           -Appreciate neurology recommendation management:               -Maintain systolic blood pressure below 140               -Day 11/21 of nimodipine               -Maintain euvolemia   -Blood pressure better controlled after adding oral clonidine.  Place metoprolol               on hold due to bradycardia   -Prophylactic Keppra, Daily TCD's   -On multiple antihypertensive medication, adding hydrochlorothiazide to amlodipine, lisinopril, clonidine and hydralazine.       DVT GI prophylaxis.  Neurochecks as ordered by neurology.       DISPOSITION   Stay in ICU         Neurointerventional Surgery Progress Note   Assessment:       Principal Problem:     Cerebral arteriovenous malformation (AVM) (7/20/2021)       Overview: ([id number])- CT (Brain):       There is an AVM in the superior cerebellum with multiple aneurysms.     One measures 4 mm.  Another measures 4 mm within the superior cerebellum.       Active Problems:     Malignant hypertension (7/20/2021)         Acute hypokalemia (7/20/2021)         Celiac artery stenosis ([address] (7/21/2021)         Vasogenic cerebral edema ([address] (7/21/2021)         Hypertensive emergency (7/21/2021)         SAH (subarachnoid hemorrhage) ([address] (7/20/2021)               Plan:   Acute SAH in the setting of cerebellar AVM with associated flow related aneurysm, Hunt Knox 1, Mistry Grade 2   - s/p diagnostic cerebral angiogram on 7/20/2021   - Day 10 of 21 of Nimotop for the prevention of delayed cerebral ischemia   - continue Keppra 500 mg BID for seizure ppx   - Repeat head CTA 07/28/21 showed Left cerebellar vascular malformation is again noted with unchanged hemorrhage in the left prepontine cistern. Small focal hypoattenuation in the left sarahi and left cerebellar peduncle likely acute ischemic changes. - Strict I's and O's: assess NICOM PRN for fluid responsiveness    - ECHO shows EF 65-70%, dilated left atrium. No shunting seen. - continue every hour neuro checks   - SBP goal <140   - PT/OT/SLP evals    - Daily TCDs; very limited exam with the bl MCA, JOSSELYN and terminal ICA not visualized due to lack of ultrasound windows.  Visualized PCA, vertebral and basilar arteries appear patent and without evidence of vasospasm       HTN   - SBP goal <140 as stated above   - Cardene/Labetalol drip PRN   - patient on multiple oral antihypertensives (lisinopril + hydralazine (can switch to Prinzide), metoprolol (on hold)  amlodipine, and clonidine)   - Patient had a bilateral artery/venous renal duplex on 7/22 which shows no evidence of significant renal artery stenosis.     - Intensivist following       Constipation (improved)   - continue Rissa-colace daily   - Miralax PRN daily   - Dulcolax 10 mg daily PRN           Activity: Up with assistance, ambulate today   DVT ppx: SCDs   Dispo: TBD         Neurology [address] - Care Day 10 (7/29/2021) by Gayathri Rasmussen RN       Review Status Review Entered   Completed 8/2/2021 15:36      Criteria Review      Care Day: 10 Care Date: 7/29/2021 Level of Care: ICU    Guideline Day 3    Level Of Care    (X) * Activity level acceptable    ( ) * Complete discharge planning    Clinical Status    (X) * No infection, or status acceptable    (X) * Isolation not needed, or status acceptable    (X) * Respiratory status acceptable    8/2/2021 15:36:54 EDT by Jacob Morgan      rr 20   O2 sat 95% ra    (X) * Pain and nausea absent or adequately managed    (X) * Ventilatory status acceptable    (X) * Neurologic problems absent or stabilized    (X) * Muscle or nerve damage absent or stable    ( ) * General Discharge Criteria met    Interventions    (X) * Intake acceptable    (X) * No inpatient interventions needed    8/2/2021 15:36:54 EDT by Guy Chase see below    * Milestone   Additional Notes   7/29/21  inpt icu   97    47    20     104/45  95% ra   WBC: 12.0 (H)   HGB: 14.3   HCT: 42.1   PLATELET: 540   Sodium: 135 (L)   Potassium: 4.4   Chloride: 104   CO2: 25   Anion gap: 6   Glucose: 115 (H)   BUN: 20   Creatinine: 0.87   BUN/Creatinine ratio: 23 (H)   MEDS   NS @ 100 cc/hr   Norvasc 10mg po qday   Apresoline 20mg iv q6 prn x 1   Apresoline 100mg iv Tid     Nimotop 60mg po q4   Ssi x1   Keppra 500mg po bid   Lisinopril 40mg po qday   Lopressor 100mg po q12   Nimotop 60mg po q4   Pericolace 1 tab po qday   Catapres 0.2mg po q8   Lopressor 100mg po q8   Decadon 4mg iv q12           Critical care hospitalist    Assessment and Plan:   -Arteriovenous malformation left superior cerebellar peduncle:   -Acute subarachnoid hemorrhage:   -Difficult to control hypertension:           -Appreciate neurology recommendation management:               -Maintain systolic blood pressure below 140               -Day 10/21 of nimodipine               -Maintain euvolemia   -Blood pressure better controlled after adding oral clonidine.  Place metoprolol               on hold due to bradycardia   -Prophylactic Keppra, Daily TCD's   -On multiple antihypertensive medication, adding hydrochlorothiazide to amlodipine, lisinopril, clonidine and hydralazine.       DVT GI prophylaxis.  Neurochecks as ordered by neurology.       DISPOSITION   Stay in ICU          Neurointerventional Surgery Progress Note      .  Assessment:       Principal Problem:     Cerebral arteriovenous malformation (AVM) (7/20/2021)       Overview: ([id number])- CT (Brain):      Lisa Magallanes is an AVM in the superior cerebellum with multiple aneurysms.     One measures 4 mm. Another measures 4 mm within the superior cerebellum.       Active Problems:     Malignant hypertension (7/20/2021)         Acute hypokalemia (7/20/2021)         Celiac artery stenosis ([address] (7/21/2021)         Vasogenic cerebral edema (HCC) (7/21/2021)         Hypertensive emergency (7/21/2021)         SAH (subarachnoid hemorrhage) ([address] (7/20/2021)               Plan:   Acute SAH in the setting of cerebellar AVM with associated flow related aneurysm, [place] Knox 1, Mistry Grade 2   - s/p diagnostic cerebral angiogram on 7/20/2021   - Day 9 of 21 of Nimotop for the prevention of delayed cerebral ischemia   - continue Keppra 500 mg BID for seizure ppx   - patient is being diuresed by intensivist, fluids stopped. -2L today    - Repeat head CT last night showed stable left prepontine hemorrhage   - Strict I's and O's, patient +1244 mls on  I's and O's currently   - ECHO shows EF 65-70%, dilated left atrium. No shunting seen. Mild (grade 1) left ventricular diastolic dysfunction.    - continue every hour neuro checks   - SBP goal <140   - PT/OT/SLP evals    - assess NICOM PRN for fluid responsiveness    - daily TCDs, CTA head/neck done today which shows no significant vasospasm       HTN   - SBP goal <140 as stated above   - Cardene/Labetalol drip PRN   - patient on multiple oral antihypertensives (lisinopril, metoprolol, hydralazine, amlodipine, and clonidine)   - Patient had a bilateral artery/venous renal duplex on 7/22 which shows no evidence of significant renal   artery stenosis.     - Intensivist following       Constipation (improved)   - continue Rissa-colace daily   - Miralax PRN daily   - Dulcolax 10 mg daily PRN           Activity: Up with assistance, ambulate today   DVT ppx: SCDs   Dispo: TBD             Neurology [address] Day 9 (7/28/2021) by Semaj Goode Edith Coon RN       Review Status Review Entered   Completed 8/2/2021 15:20      Criteria Review      Care Day: 9 Care Date: 7/28/2021 Level of Care: ICU    Guideline Day 3    Level Of Care    (X) * Activity level acceptable    ( ) * Complete discharge planning    Clinical Status    (X) * No infection, or status acceptable    (X) * Isolation not needed, or status acceptable    (X) * Respiratory status acceptable    8/2/2021 15:20:40 EDT by Jeanie Bradley      RR 13  98% sat    (X) * Pain and nausea absent or adequately managed    (X) * Ventilatory status acceptable    (X) * Neurologic problems absent or stabilized    (X) * Muscle or nerve damage absent or stable    ( ) * General Discharge Criteria met    Interventions    (X) * Intake acceptable    8/2/2021 15:20:40 EDT by Jeanie Bradley      PO and  NS at 100    (X) * No inpatient interventions needed    8/2/2021 15:20:40 EDT by Jeanie Bradley      see below    * Milestone   Additional Notes   7/28/21  inpt icu   97.5  44  13 111/58    98% ra   WBC: 12.5 (H)   HGB: 13.7   HCT: 39.3   PLATELET: 891   Sodium: 138   Potassium: 4.3   Chloride: 107   CO2: 23   Anion gap: 8   Glucose: 131 (H)   BUN: 19   Creatinine: 0.73   BUN/Creatinine ratio: 26 (H)   Calcium: 7.9 (L)   MEDS   NS @ 100 cc/hr   Norvasc 10mg po qday   Decadron 4mg iv q12   Apresoline 100mg po tid   Nimotop 60mg po q4   Hydrochlorothiazide 25mg po qday   Ssi x1   Keppra 500mg po bid   Lisinopril 40mg po qday   Lopressor 100mg po q12   Cardene drip titrate iv   Nimotop 60mg po q4   Pericolace 1 tab po qday   Catapres 0.2mg po q8   Lopressor 100mg po q8         Neurointerventional Surgery Progress Note   Assessment:       Principal Problem:     Cerebral arteriovenous malformation (AVM) (7/20/2021)       Overview: ([id number])- CT (Brain):       There is an AVM in the superior cerebellum with multiple aneurysms.     One measures 4 mm.  Another measures 4 mm within the superior cerebellum.       Active Problems:   Malignant hypertension (7/20/2021)         Acute hypokalemia (7/20/2021)         Celiac artery stenosis ([address] (7/21/2021)         Vasogenic cerebral edema ([address] (7/21/2021)         Hypertensive emergency (7/21/2021)         SAH (subarachnoid hemorrhage) ([address] (7/20/2021)               Plan:   Acute SAH in the setting of cerebellar AVM with associated flow related aneurysm, Clayton Knox 1, Mistry Grade 2   - s/p diagnostic cerebral angiogram on 7/20/2021   - Day 9 of 21 of Nimotop for the prevention of delayed cerebral ischemia   - continue Keppra 500 mg BID for seizure ppx   - patient is being diuresed by intensivist, fluids stopped. -2L today    - Repeat head CT last night showed stable left prepontine hemorrhage   - Strict I's and O's, patient +1244 mls on  I's and O's currently   - ECHO shows EF 65-70%, dilated left atrium. No shunting seen. Mild (grade 1) left ventricular diastolic dysfunction.    - continue every hour neuro checks   - SBP goal <140   - PT/OT/SLP evals    - assess NICOM PRN for fluid responsiveness    - daily TCDs, CTA head/neck done today which shows no significant vasospasm       HTN   - SBP goal <140 as stated above   - Cardene/Labetalol drip PRN   - patient on multiple oral antihypertensives (lisinopril, metoprolol, hydralazine, amlodipine, and clonidine)   - Patient had a bilateral artery/venous renal duplex on 722 which shows no evidence of significant renal artery stenosis.     - Intensivist following       Constipation (improved)   - continue Rissa-colace daily   - Miralax PRN daily   - Dulcolax 10 mg daily PRN           Activity: Up with assistance, ambulate today   DVT ppx: SCDs   Dispo: TBD          Critical care hospitalist    Assessment and Plan:   -Arteriovenous malformation left superior cerebellar peduncle:   -Acute subarachnoid hemorrhage:   -Difficult to control hypertension:           -Appreciate neurology recommendation management:               -Maintain systolic blood pressure below 140               -Day 9/21 of nimodipine               -Maintain euvolemia, patient -1600 cc after 1 dose of Bumex yesterday. Karli Muskrat he is positive about 19 L since admission and this is reflective on his weight gain.  NICOM showed 56.4% change in SBI with PLR.  Neurology resumed NS at 76.  I would hold on diuresis today, I will monitor blood pressure closely and we have added chloral hydrate thiazide to his antihypertensive regimen on July 27.  I still believe he is clinically overloaded as discussed above.  We will continue to reassess. Blood pressure better controlled after adding oral clonidine.  Place metoprolol on hold due to bradycardia               -Prophylactic Keppra, Daily TCD's   -On multiple antihypertensive medication, adding hydrochlorothiazide to amlodipine, lisinopril, clonidine and hydralazine.       DVT GI prophylaxis.  Neurochecks as ordered by neurology.

## 2021-08-05 NOTE — PROGRESS NOTES
Neurointerventional Surgery Progress Note  [facility]  Neurocritical Care NP      Admit Date: 7/20/2021   LOS: 16 days        Daily Progress Note: 8/5/2021    S/P: POD 16 status post diagnostic cerebral angiogram        Subjective:     Patient reports that he had a 10/10 headache overnight and received 2 mg of IV morphine which did not help his headache. He states that the headache was \"throbbing\" in nature. Our service was unfortunately not notified of this change overnight. I ordered a STAT head CT and CTA Head/Neck which were both done and showed no acute changes. Plans underway for discharging patient back to home with home health PT.      Current Facility-Administered Medications   Medication Dose Route Frequency Provider Last Rate Last Admin    doxazosin (CARDURA) tablet 2 mg  2 mg Oral DAILY Piyush Melvin MD   2 mg at 08/05/21 1125    hydrALAZINE (APRESOLINE) tablet 100 mg  100 mg Oral TID Samir INFANTE MD   100 mg at 08/05/21 1611    cloNIDine HCL (CATAPRES) tablet 0.2 mg  0.2 mg Oral Q8H Piyush Melvin MD   0.2 mg at 08/05/21 1355    lisinopriL (PRINIVIL, ZESTRIL) tablet 40 mg  40 mg Oral QHS Piyush Melvin MD   40 mg at 08/04/21 2052    chlorthalidone (HYGROTON) tablet 25 mg  25 mg Oral DAILY Amelia Haley MD   25 mg at 08/05/21 1125    senna-docusate (PERICOLACE) 8.6-50 mg per tablet 1 Tablet  1 Tablet Oral DAILY Mamie Grant NP   1 Tablet at 08/05/21 1125    polyethylene glycol (MIRALAX) packet 17 g  17 g Oral DAILY PRN Mamie Grant NP   17 g at 07/25/21 0906    bisacodyL (DULCOLAX) tablet 10 mg  10 mg Oral DAILY PRN Mamie Grant NP        labetaloL (NORMODYNE;TRANDATE) injection 10 mg  10 mg IntraVENous Q4H PRN Mamie Grant NP   10 mg at 08/05/21 0136    hydrALAZINE (APRESOLINE) 20 mg/mL injection 20 mg  20 mg IntraVENous Q6H PRN Herberth Morales DO   20 mg at 08/05/21 0710    insulin lispro (HUMALOG) injection   SubCUTAneous AC&HS Ruthy Godoy NP   2 Units at 08/02/21 2205    glucose chewable tablet 16 g  4 Tablet Oral PRN Ruthy Godoy NP        dextrose (D50W) injection syrg 12.5-25 g  12.5-25 g IntraVENous PRN Ruthy Godoy NP        glucagon (GLUCAGEN) injection 1 mg  1 mg IntraMUSCular PRN Ruthy Godoy NP        ondansetron [facility]) injection 4 mg  4 mg IntraVENous Q6H PRN Yolanda Mota NP-C        naloxone [facility]) injection 0.4 mg  0.4 mg IntraVENous PRN Yolanda Mota NP-C        acetaminophen (TYLENOL) tablet 650 mg  650 mg Oral Q4H PRN Yolanda Mota NP-C   650 mg at 08/05/21 1322    Or    acetaminophen (TYLENOL) solution 650 mg  650 mg Per NG tube Q4H PRN Yolanda Mota NP-WILLY        Or    acetaminophen (TYLENOL) suppository 650 mg  650 mg Rectal Q4H PRN Yolanda Mota NP-WILLY        niMODipine (NIMOTOP) capsule 60 mg  60 mg Oral Q4H Jonathan Grant NP   60 mg at 08/05/21 1611    levETIRAcetam (KEPPRA) tablet 500 mg  500 mg Oral BID Tayla Gallegos NP   500 mg at 08/05/21 1125      HPI: Kathleen Butts is a 66-year-old male with a past medical history significant for hypertension and diabetes type 2 who initially presented to [facility] ED on 7/19/2021 for acute onset of dizziness, headache, left-sided hearing loss, and left-sided weakness. Upon arrival to the ED his blood pressure was noted to be 265/120 per review of notes. A code stroke was called and CT of the head showed an acute subarachnoid hemorrhage anterior to the sarahi. CTA of the head and neck showed an AVM in the left superior cerebellar peduncle, with the dominant drainage into the vein of Gaston. No large vessel occlusion, no hemodynamically significant stenosis. Redemonstrated subarachnoid hemorrhage in the basilar cisterns. He was started on a Cardene drip and was also given IV hydralazine and labetalol. He was then transferred to [facility] for a higher level of care.   NIS was consulted and patient underwent a diagnostic cerebral angiogram on 2021. The angiogram showed evidence of a cerebral AVM with multiple feeders coming of the left PCA, left anterior choroidal and left SCA with a large single draining vein that drains into the vein of Villanueva. 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 No Known Allergies    Review of Systems:  Pertinent items are noted in the History of Present Illness. Objective:     Vital signs  Temp (24hrs), Av.8 °F (37.1 °C), Min:98.5 °F (36.9 °C), Max:99.1 °F (37.3 °C)   701 - 1900  In: -   Out: 3141 [Urine:1225]  1901 -  07  In: 440 [P.O.:440]  Out: 6100 [Urine:6100]    Visit Vitals  BP (!) 137/59   Pulse (!) 53   Temp 98.9 °F (37.2 °C)   Resp 18   Ht 6' (1.829 m)   Wt 252 lb 4.8 oz (114.4 kg)   SpO2 99%   BMI 34.22 kg/m²      O2 Device: None (Room air)       Vitals:    21 1355 21 1410 21 1434 21 1611   BP: (!) 149/44 (!) 149/44 (!) 137/57 (!) 137/59   Pulse: 72 70 (!) 58 (!) 53   Resp:  18     Temp:  98.9 °F (37.2 °C)     SpO2:  99%     Weight:       Height:            Physical Exam:  GENERAL: alert, cooperative, no distress, appears stated age  LUNG: clear to auscultation bilaterally  HEART: regular rate and rhythm, S1, S2 normal, no murmur, click, rub or gallop  EXTREMITIES:  extremities normal, atraumatic, no cyanosis, trace peripheral edema in BUE, +2 pitting edema in BLE, Posterior tibial pulses +1 bilaterally, +2 pedal pulses bilaterally   SKIN: Skin cool to touch. Appropriate for ethnicity. Right groin site clean, dry, and intact. No hematoma, bruising, or bleeding noted. Neurologic Exam:  Mental Status:  Alert and oriented x 4. Appropriate affect, mood and behavior. Language:    Normal fluency, repetition, comprehension and naming. Cranial Nerves:        Pupils equal, round and reactive to light. Visual fields full to confrontation. Dysconjugate gaze intermittently with EOMs, otherwise intact. Has some subtle issues with looking completely to the left with left eye. Mild subtle nystagmus when looking to the left, but it has improved. Decreased sensation on the left side of face. Left facial asymmetry     Tongue protrudes to midline, palate elevates symmetrically. No dysarthria. Motor:    No pronator drift. Bulk and tone normal.      5/5 power in all extremities proximally and distally. No involuntary movements. Sensation:    Decreased sensation on the left side of face, otherwise sensation intact to light touch. No neglect. Coordination & Gait: No significant ataxia with finger-to-nose and heel-to-shin bilaterally. Gait deferred. 24 hour results:    Recent Results (from the past 24 hour(s))   GLUCOSE, POC    Collection Time: 08/04/21  4:51 PM   Result Value Ref Range    Glucose (POC) 86 65 - 117 mg/dL    Performed by Fadia Valle, POC    Collection Time: 08/04/21  8:54 PM   Result Value Ref Range    Glucose (POC) 95 65 - 117 mg/dL    Performed by Balbir Gregorio    MAGNESIUM    Collection Time: 08/05/21  1:47 AM   Result Value Ref Range    Magnesium 2.3 1.6 - 2.4 mg/dL   PHOSPHORUS    Collection Time: 08/05/21  1:47 AM   Result Value Ref Range    Phosphorus 4.5 2.6 - 4.7 MG/DL   CBC W/O DIFF    Collection Time: 08/05/21  1:47 AM   Result Value Ref Range    WBC 9.1 4.1 - 11.1 K/uL    RBC 4.90 4. 10 - 5.70 M/uL    HGB 13.8 12.1 - 17.0 g/dL    HCT 40.5 36.6 - 50.3 %    MCV 82.7 80.0 - 99.0 FL    MCH 28.2 26.0 - 34.0 PG    MCHC 34.1 30.0 - 36.5 g/dL    RDW 13.1 11.5 - 14.5 %    PLATELET 395 068 - 759 K/uL    MPV 11.3 8.9 - 12.9 FL    NRBC 0.0 0  WBC    ABSOLUTE NRBC 0.00 0.00 - 1.64 K/uL   METABOLIC PANEL, BASIC    Collection Time: 08/05/21  1:47 AM   Result Value Ref Range    Sodium 135 (L) 136 - 145 mmol/L    Potassium 4.0 3.5 - 5.1 mmol/L    Chloride 99 97 - 108 mmol/L    CO2 30 21 - 32 mmol/L    Anion gap 6 5 - 15 mmol/L    Glucose 86 65 - 100 mg/dL    BUN 14 6 - 20 MG/DL    Creatinine 0.81 0.70 - 1.30 MG/DL    BUN/Creatinine ratio 17 12 - 20      GFR est AA >60 >60 ml/min/1.73m2    GFR est non-AA >60 >60 ml/min/1.73m2    Calcium 8.8 8.5 - 10.1 MG/DL   SAMPLES BEING HELD    Collection Time: 08/05/21  1:47 AM   Result Value Ref Range    SAMPLES BEING HELD 1SST,1BLU     COMMENT        Add-on orders for these samples will be processed based on acceptable specimen integrity and analyte stability, which may vary by analyte. GLUCOSE, POC    Collection Time: 08/05/21  7:04 AM   Result Value Ref Range    Glucose (POC) 88 65 - 117 mg/dL    Performed by Oneal Ruiz           Imaging:    CT Head WO Contrast 8/5/21     IMPRESSION  No acute intracranial process. CTA Head/Neck W Contrast 8/5/21 :        IMPRESSION  1. Unchanged left pontine arteriovenous malformation. 2.  Resolution of prepontine cistern hemorrhage. 3.  No evidence of vasospasm. 4.  No significant carotid stenosis. CT of head on 7/19/2021 at 2104 shows  IMPRESSION  1. Acute subarachnoid hemorrhage anterior to the sarahi. CTA of the head and neck on 7/19/2021 at 2126 shows  IMPRESSION     1. Arteriovenous malformation in the left superior cerebellar peduncle, with the  dominant drainage into the vein of Gaston. 2. No large vessel occlusion, no hemodynamically significant stenosis. 3. Redemonstrated subarachnoid hemorrhage in the basilar cisterns. CT the head on 7/20/2021 at 1129 shows  IMPRESSION  1. Stable ventricular size.     2.  Questionable diffuse cerebral edema.     3.  Subarachnoid hemorrhage in the prepontine cistern and ambient cisterns as  well as sylvian fissures are decreased from the prior study. MRI brain with and without 7/20/2021 at 2214 shows  IMPRESSION  1. Tiny 1 mm foci of diffusion restriction in the left cerebellum and left  cerebral hemisphere as described likely embolic in origin.     2. Possible mild meningeal enhancement which may be secondary to meningeal  irritation from known subarachnoid hemorrhage.     3.  Dilated superior ophthalmic veins are noted which is new when compared to  prior imaging. This is of uncertain clinical significance. There appears to be  normal enhancement of the cavernous sinus bilaterally. CT of head on 7/23/2021 at 2233 shows     IMPRESSION  No hydrocephalus. Stable left prepontine subarachnoid hemorrhage. CT of Head on 7/24/2021 at 1357  IMPRESSION:   Left prepontine hemorrhage is slightly increased. CT of the head on 7/24/2021 at 10:21 PM shows  IMPRESSION  Stable left prepontine hemorrhage. CTA of Head and neck on 7/28/2021 at 1001 shows  IMPRESSION     No evidence for significant vasospasm. Diffuse mild atherosclerosis. Left  cerebellar vascular malformation is again noted with unchanged hemorrhage in the  left prepontine cistern. Small focal hypoattenuation in the left sarahi and left  cerebellar peduncle likely acute ischemic changes.     Small right pleural effusion. Assessment:     Principal Problem:    Cerebral arteriovenous malformation (AVM) (7/20/2021)      Overview: ([id number])- CT (Brain): There is an AVM in the superior cerebellum with multiple aneurysms. One measures 4 mm. Another measures 4 mm within the superior cerebellum.     Active Problems:    Malignant hypertension (7/20/2021)      Acute hypokalemia (7/20/2021)      Celiac artery stenosis ([address] (7/21/2021)      Vasogenic cerebral edema ([address] (7/21/2021)      Hypertensive emergency (7/21/2021)      SAH (subarachnoid hemorrhage) ([address] (7/20/2021)      Cerebral vasospasm (8/1/2021)        Plan:   Acute SAH in the setting of cerebellar AVM with associated flow related aneurysm, [place] 1, Mistry Grade 2  - s/p diagnostic cerebral angiogram on 7/20/2021  - Day 17 of 21 of Nimotop for the prevention of delayed cerebral ischemia  - continue Keppra 500 mg BID for seizure ppx  - IVF stopped by nephrology for volume overload   - ECHO shows EF 65-70%, dilated left atrium. No shunting seen. Mild (grade 1) left ventricular diastolic dysfunction. - ok with q4 hour neuro checks   - SBP goal <140, on scheduled chlorthalidone, clonidine, hydralazine, and lisinopril  - PT/OT/SLP evals     - TCDs completed     Polyuria- improved   - patient has been previously diuresed for fluid overload   - patient with intermittent polyuria, possibly autodiuresing   - urine random sodium 116, urine osmolality 455 , serum osmolality 280  - serum sodium normal today at 137   - Nephrology consulted and following     HTN- improved  - SBP goal <140 as stated above  - patient on scheduled oral meds as stated above  - Patient had a bilateral artery/venous renal duplex on 7/22 which shows no evidence of significant renal artery stenosis. No evidence of significant renal parenchymal disease. Possible hemodynamically significant celiac artery stenosis.   - Nephrology consulted for additional secondary HTN work-up, defer management to nephrology  - Intensivist and Renal following    Constipation (resolved)  - continue Rissa-colace daily  - Miralax PRN daily  - Dulcolax 10 mg daily PRN      Activity: Up with assistance, ambulate daily  DVT ppx: SCDs  Dispo: TBD    Plan d/w Dr. Lambert Almaguer, Dr. Debbie Cornejo, RN, and patient    Stanley Saucedo NP

## 2021-08-05 NOTE — PROGRESS NOTES
Physical Therapy: Defer    Chart reviewed and discussed with RN. Pt with 10/10 headache and currently ROSSANA for stat head imaging. Will defer and continue to follow.     Robina Salas, PT, DPT

## 2021-08-06 LAB
ANION GAP SERPL CALC-SCNC: 5 MMOL/L (ref 5–15)
BUN SERPL-MCNC: 14 MG/DL (ref 6–20)
BUN/CREAT SERPL: 17 (ref 12–20)
CALCIUM SERPL-MCNC: 9.1 MG/DL (ref 8.5–10.1)
CHLORIDE SERPL-SCNC: 100 MMOL/L (ref 97–108)
CO2 SERPL-SCNC: 29 MMOL/L (ref 21–32)
COMMENT, HOLDF: NORMAL
CREAT SERPL-MCNC: 0.83 MG/DL (ref 0.7–1.3)
ERYTHROCYTE [DISTWIDTH] IN BLOOD BY AUTOMATED COUNT: 13.6 % (ref 11.5–14.5)
GLUCOSE BLD STRIP.AUTO-MCNC: 131 MG/DL (ref 65–117)
GLUCOSE BLD STRIP.AUTO-MCNC: 88 MG/DL (ref 65–117)
GLUCOSE BLD STRIP.AUTO-MCNC: 93 MG/DL (ref 65–117)
GLUCOSE SERPL-MCNC: 109 MG/DL (ref 65–100)
HCT VFR BLD AUTO: 39 % (ref 36.6–50.3)
HGB BLD-MCNC: 13.4 G/DL (ref 12.1–17)
MAGNESIUM SERPL-MCNC: 2.2 MG/DL (ref 1.6–2.4)
MCH RBC QN AUTO: 28.4 PG (ref 26–34)
MCHC RBC AUTO-ENTMCNC: 34.4 G/DL (ref 30–36.5)
MCV RBC AUTO: 82.6 FL (ref 80–99)
NRBC # BLD: 0 K/UL (ref 0–0.01)
NRBC BLD-RTO: 0 PER 100 WBC
PHOSPHATE SERPL-MCNC: 3.6 MG/DL (ref 2.6–4.7)
PLATELET # BLD AUTO: 199 K/UL (ref 150–400)
PMV BLD AUTO: 11 FL (ref 8.9–12.9)
POTASSIUM SERPL-SCNC: 4.2 MMOL/L (ref 3.5–5.1)
RBC # BLD AUTO: 4.72 M/UL (ref 4.1–5.7)
SAMPLES BEING HELD,HOLD: NORMAL
SERVICE CMNT-IMP: ABNORMAL
SERVICE CMNT-IMP: NORMAL
SERVICE CMNT-IMP: NORMAL
SODIUM SERPL-SCNC: 134 MMOL/L (ref 136–145)
WBC # BLD AUTO: 7.2 K/UL (ref 4.1–11.1)

## 2021-08-06 PROCEDURE — 74011250637 HC RX REV CODE- 250/637: Performed by: NURSE PRACTITIONER

## 2021-08-06 PROCEDURE — 82962 GLUCOSE BLOOD TEST: CPT

## 2021-08-06 PROCEDURE — 83735 ASSAY OF MAGNESIUM: CPT

## 2021-08-06 PROCEDURE — 99232 SBSQ HOSP IP/OBS MODERATE 35: CPT | Performed by: STUDENT IN AN ORGANIZED HEALTH CARE EDUCATION/TRAINING PROGRAM

## 2021-08-06 PROCEDURE — 36415 COLL VENOUS BLD VENIPUNCTURE: CPT

## 2021-08-06 PROCEDURE — 85027 COMPLETE CBC AUTOMATED: CPT

## 2021-08-06 PROCEDURE — 80048 BASIC METABOLIC PNL TOTAL CA: CPT

## 2021-08-06 PROCEDURE — 74011250637 HC RX REV CODE- 250/637: Performed by: INTERNAL MEDICINE

## 2021-08-06 PROCEDURE — 65660000001 HC RM ICU INTERMED STEPDOWN

## 2021-08-06 PROCEDURE — 74011250636 HC RX REV CODE- 250/636: Performed by: NURSE PRACTITIONER

## 2021-08-06 PROCEDURE — 84100 ASSAY OF PHOSPHORUS: CPT

## 2021-08-06 RX ORDER — PRAZOSIN HYDROCHLORIDE 1 MG/1
2 CAPSULE ORAL 3 TIMES DAILY
Status: DISCONTINUED | OUTPATIENT
Start: 2021-08-06 | End: 2021-08-10 | Stop reason: HOSPADM

## 2021-08-06 RX ADMIN — HYDRALAZINE HYDROCHLORIDE 100 MG: 50 TABLET, FILM COATED ORAL at 16:35

## 2021-08-06 RX ADMIN — HYDRALAZINE HYDROCHLORIDE 100 MG: 50 TABLET, FILM COATED ORAL at 23:27

## 2021-08-06 RX ADMIN — NIMODIPINE 60 MG: 30 CAPSULE, LIQUID FILLED ORAL at 11:20

## 2021-08-06 RX ADMIN — METOCLOPRAMIDE 5 MG: 5 INJECTION, SOLUTION INTRAMUSCULAR; INTRAVENOUS at 23:42

## 2021-08-06 RX ADMIN — METOCLOPRAMIDE 5 MG: 5 INJECTION, SOLUTION INTRAMUSCULAR; INTRAVENOUS at 07:36

## 2021-08-06 RX ADMIN — DOCUSATE SODIUM 50 MG AND SENNOSIDES 8.6 MG 1 TABLET: 8.6; 5 TABLET, FILM COATED ORAL at 09:14

## 2021-08-06 RX ADMIN — NIMODIPINE 60 MG: 30 CAPSULE, LIQUID FILLED ORAL at 07:36

## 2021-08-06 RX ADMIN — NIMODIPINE 60 MG: 30 CAPSULE, LIQUID FILLED ORAL at 04:00

## 2021-08-06 RX ADMIN — CLONIDINE HYDROCHLORIDE 0.2 MG: 0.2 TABLET ORAL at 07:36

## 2021-08-06 RX ADMIN — NIMODIPINE 60 MG: 30 CAPSULE, LIQUID FILLED ORAL at 16:35

## 2021-08-06 RX ADMIN — PRAZOSIN HYDROCHLORIDE 2 MG: 1 CAPSULE ORAL at 16:39

## 2021-08-06 RX ADMIN — LISINOPRIL 40 MG: 20 TABLET ORAL at 23:29

## 2021-08-06 RX ADMIN — NIMODIPINE 60 MG: 30 CAPSULE, LIQUID FILLED ORAL at 23:32

## 2021-08-06 RX ADMIN — CLONIDINE HYDROCHLORIDE 0.2 MG: 0.2 TABLET ORAL at 13:49

## 2021-08-06 RX ADMIN — LEVETIRACETAM 500 MG: 500 TABLET ORAL at 17:56

## 2021-08-06 RX ADMIN — LEVETIRACETAM 500 MG: 500 TABLET ORAL at 09:14

## 2021-08-06 RX ADMIN — CLONIDINE HYDROCHLORIDE 0.2 MG: 0.2 TABLET ORAL at 23:29

## 2021-08-06 RX ADMIN — ACETAMINOPHEN 650 MG: 325 TABLET ORAL at 07:36

## 2021-08-06 NOTE — PROGRESS NOTES
Bedside and Verbal shift change report given to Sarah Rasmussen RN (oncoming nurse) by Mendy Champion (offgoing nurse). Report included the following information SBAR, Kardex, Intake/Output, MAR, Cardiac Rhythm NSR and Quality Measures.

## 2021-08-06 NOTE — PROGRESS NOTES
Neurocritical Care Brief Progress Note:    70-year-old male with SAH with cerebellar AVM with multiple feeders coming off the left PCA, left anterior choroidal and left SCA with a large single draining vein that drains into the vein of Gaston. 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 Physical Exam:  Gen: NAD, calm, cooperative. Has eyepatch over left eye. Neuro: A&Ox4. Follows commands. Speech clear. Affect normal. PERRL, 3 mm bilaterally. Visual fields full to confrontation. However, has stated double vision at a distance. Mild disconjugate gaze present at times. EOMI. Occasional twitching of right eye note again tonight. Significant left lower facial droop with stated left facial numbness noted to left check and forehead on light touch. Bettey Grad Beni spontaneously. Strength 5/5 in UE and LE BL. Negative drift. Bulk and tone normal. No involuntary movements. Gait deferred. No ataxia. Skin: Warm, dry, color appropriate for ethnicity. PLAN:Continue current plan per NIS. Had headache 10/10 overnight last night CTH/CTA this am stable. According to nursing and patient, he has been having headaches on and off for the past several days while doing PT. States he was straining turning himself in the bed when headache happened overnight. Encouraged him to not do any activities that cause straining and to ask nursing to help with any difficult tasks that may require him to strain. Stat [facility] and call Neurocritical Care NP with any severe headaches. Will add Reglan prn for headaches as well. Continue with Tylenol prn headaches also.      Jazmin Campa NP  Neurocritical Care Nurse Practitioner  [phone]

## 2021-08-06 NOTE — PROGRESS NOTES
Problem: Subarachnoid Hemorrhage Stroke:Admission Day 5-Discharge  Goal: Diagnostic Test/Procedures  Outcome: Progressing Towards Goal  Goal: Nutrition/Diet  Outcome: Progressing Towards Goal  Goal: Medications  Outcome: Progressing Towards Goal  Goal: Patient maintains clear airway/absence of aspiration  Outcome: Progressing Towards Goal  Goal: Treatments/Interventions/Procedures  Outcome: Progressing Towards Goal  Goal: Psychosocial  Outcome: Progressing Towards Goal  Goal: *Hemodynamically stable  Outcome: Progressing Towards Goal  Goal: *Absence of signs and symptoms of DVT  Outcome: Progressing Towards Goal

## 2021-08-06 NOTE — PROGRESS NOTES
[address] [facility] Adult  Hospitalist Group                                                                                          Hospitalist Progress Note  Santiago Bird MD  Answering service: [phone] from in house phone        Date of Service:  2021  NAME:  Mark Benavidez  :  1960  MRN:  [id number]      Admission Summary: \"This is 57-year-old gentleman with past medical history of hypertension,  currently getting transferred out of ICU after [facility] and hypertensive emergency treatment. He presented from [facility] hospital with headache and dizziness, found to have elevated blood pressure. CT of the head showed an acute subarachnoid hemorrhage anterior to the sarahi. CTA of the head and neck showed an AVM in the left superior cerebellar peduncle, with the dominant drainage into the vein of Gaston. No large vessel occlusion, no hemodynamically significant stenosis. Redemonstrated subarachnoid hemorrhage in the basilar cisterns. Patient was initially started on IV antihypertensives, now switched to oral antihypertensives. He was also started on Nimodipine. 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 - S/P diagnostic cerebral andio on 21.  - Day 18  on nimodipine 60 mg po q4h. - Continue levetiracetam 500 mg po bid.       Polyuria  Hyponatremia   - Nephrology input appreciated. - Monitor for now.       HTN- HYPOTENSIVE THIS MORNING HOLD MEDS WILL READJUST   - Continue chlorthalidone 25 mg po daily. - Continue clonidine 0.2 mg po q8h. - Continue hydralazine 100 mg po tid. - Continue lisinopril 40 mg po daily.    - ARR pending.  - Plan for CTA abdomen 21.- 1.  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 CT head within normal limit       DM II  Hyperglycemia   - Continue to hold oral diabetes meds. - Continue FSBS with SSI correction scale.      Constipation   - Continue senna-docusate 8.6-50 mg po daily.          Obesity/ Body mass index is 34.18 kg/m².     Code status: FULL  DVT prophylaxis: scd    Care Plan discussed with: Patient/Family and Nurse  Anticipated Disposition: Home w/Family  Anticipated Discharge: 24 hours to 48 hours     Hospital Problems  Date Reviewed: 7/20/2021        Codes Class Noted POA    Celiac artery stenosis (HCC) (Chronic) ICD-10-CM: I77.4  ICD-9-CM: 447. 4 Chronic 7/21/2021 Yes        Vasogenic cerebral edema (HCC) ICD-10-CM: G93.6  ICD-9-CM: 407. 5 Acute 7/21/2021 No        Hypertensive emergency ICD-10-CM: I16.1  ICD-9-CM: 401.9 Acute 7/21/2021 Yes        Malignant hypertension ICD-10-CM: I10  ICD-9-CM: 401.0 Acute 7/20/2021 Yes        * (Principal) Cerebral arteriovenous malformation (AVM) ICD-10-CM: Q28.2  ICD-9-CM: 747.81 Acute 7/20/2021 Yes    Overview Signed 7/20/2021  7:03 AM by Rika Rashid MD     ([id number])- CT (Brain): There is an AVM in the superior cerebellum with multiple aneurysms. One measures 4 mm. Another measures 4 mm within the superior cerebellum. Acute hypokalemia ICD-10-CM: E87.6  ICD-9-CM: 276.8 Acute 7/20/2021 Yes        Cerebral vasospasm ICD-10-CM: R76.822  ICD-9-CM: 435.9  8/1/2021 No        SAH (subarachnoid hemorrhage) (HCC) ICD-10-CM: I60.9  ICD-9-CM: 430  7/20/2021 Yes                Review of Systems:   A comprehensive review of systems was negative. Vital Signs:    Last 24hrs VS reviewed since prior progress note.  Most recent are:  Visit Vitals  /61   Pulse (!) 52   Temp 98.6 °F (37 °C)   Resp 19   Ht 6' (1.829 m)   Wt 106.4 kg (234 lb 9.6 oz)   SpO2 97%   BMI 31.82 kg/m²         Intake/Output Summary (Last 24 hours) at 8/6/2021 1137  Last data filed at 8/6/2021 1124  Gross per 24 hour   Intake --   Output 2925 ml   Net -2925 ml        Physical Examination:     I had a face to face encounter with this patient and independently examined them on 8/6/2021 as outlined below:          Constitutional:  No acute distress, cooperative, pleasant    ENT:  Oral mucosa moist, oropharynx benign. Resp:  CTA bilaterally. No wheezing/rhonchi/rales. No accessory muscle use   CV:  Regular rhythm, normal rate, no murmurs, gallops, rubs    GI:  Soft, non distended, non tender. normoactive bowel sounds, no hepatosplenomegaly     Musculoskeletal:  No edema, warm, 2+ pulses throughout    Neurologic:  Moves all extremities. AAOx3, CN II-XII reviewed            Data Review:    I personally reviewed  Image and LABS      Labs:     Recent Labs     08/06/21 0402 08/05/21 0147   WBC 7.2 9.1   HGB 13.4 13.8   HCT 39.0 40.5    231     Recent Labs     08/06/21 0402 08/05/21 0147 08/04/21  0343   * 135* 135*   K 4.2 4.0 3.9    99 103   CO2 29 30 28   BUN 14 14 17   CREA 0.83 0.81 0.74   * 86 111*   CA 9.1 8.8 8.3*   MG 2.2 2.3 2.1   PHOS 3.6 4.5 3.4     No results for input(s): ALT, AP, TBIL, TBILI, TP, ALB, GLOB, GGT, AML, LPSE in the last 72 hours. No lab exists for component: SGOT, GPT, AMYP, HLPSE  No results for input(s): INR, PTP, APTT, INREXT, INREXT in the last 72 hours. No results for input(s): FE, TIBC, PSAT, FERR in the last 72 hours. No results found for: FOL, RBCF   No results for input(s): PH, PCO2, PO2 in the last 72 hours. No results for input(s): CPK, CKNDX, TROIQ in the last 72 hours.     No lab exists for component: CPKMB  Lab Results   Component Value Date/Time    Cholesterol, total 125 07/20/2021 04:11 AM    HDL Cholesterol 68 07/20/2021 04:11 AM    LDL, calculated 25.2 07/20/2021 04:11 AM    Triglyceride 159 (H) 07/20/2021 04:11 AM    CHOL/HDL Ratio 1.8 07/20/2021 04:11 AM     Lab Results   Component Value Date/Time    Glucose (POC) 93 08/06/2021 07:34 AM    Glucose (POC) 163 (H) 08/05/2021 09:23 PM    Glucose (POC) 142 (H) 08/05/2021 04:34 PM    Glucose (POC) 88 08/05/2021 07:04 AM    Glucose (POC) 95 08/04/2021 08:54 PM     Lab Results   Component Value Date/Time    Color YELLOW/STRAW 03/11/2015 12:33 PM    Appearance CLEAR 03/11/2015 12:33 PM    Specific gravity 1.019 03/11/2015 12:33 PM    pH (UA) 6.0 03/11/2015 12:33 PM    Protein NEGATIVE  03/11/2015 12:33 PM    Glucose NEGATIVE  03/11/2015 12:33 PM    Ketone NEGATIVE  03/11/2015 12:33 PM    Bilirubin NEGATIVE  03/11/2015 12:33 PM    Urobilinogen 1.0 03/11/2015 12:33 PM    Nitrites NEGATIVE  03/11/2015 12:33 PM    Leukocyte Esterase NEGATIVE  03/11/2015 12:33 PM    Epithelial cells FEW 03/11/2015 12:33 PM    Bacteria NEGATIVE  03/11/2015 12:33 PM    WBC 0-4 03/11/2015 12:33 PM    RBC 0-5 03/11/2015 12:33 PM         Medications Reviewed:     Current Facility-Administered Medications   Medication Dose Route Frequency    prazosin (MINIPRESS) capsule 2 mg  2 mg Oral TID    metoclopramide HCl (REGLAN) injection 5 mg  5 mg IntraVENous Q6H PRN    hydrALAZINE (APRESOLINE) tablet 100 mg  100 mg Oral TID    cloNIDine HCL (CATAPRES) tablet 0.2 mg  0.2 mg Oral Q8H    lisinopriL (PRINIVIL, ZESTRIL) tablet 40 mg  40 mg Oral QHS    chlorthalidone (HYGROTON) tablet 25 mg  25 mg Oral DAILY    senna-docusate (PERICOLACE) 8.6-50 mg per tablet 1 Tablet  1 Tablet Oral DAILY    polyethylene glycol (MIRALAX) packet 17 g  17 g Oral DAILY PRN    bisacodyL (DULCOLAX) tablet 10 mg  10 mg Oral DAILY PRN    labetaloL (NORMODYNE;TRANDATE) injection 10 mg  10 mg IntraVENous Q4H PRN    hydrALAZINE (APRESOLINE) 20 mg/mL injection 20 mg  20 mg IntraVENous Q6H PRN    insulin lispro (HUMALOG) injection   SubCUTAneous AC&HS    glucose chewable tablet 16 g  4 Tablet Oral PRN    dextrose (D50W) injection syrg 12.5-25 g  12.5-25 g IntraVENous PRN    glucagon (GLUCAGEN) injection 1 mg  1 mg IntraMUSCular PRN    ondansetron (ZOFRAN) injection 4 mg  4 mg IntraVENous Q6H PRN    naloxone (NARCAN) injection 0.4 mg  0.4 mg IntraVENous PRN    acetaminophen (TYLENOL) tablet 650 mg  650 mg Oral Q4H PRN    Or    acetaminophen (TYLENOL) solution 650 mg  650 mg Per NG tube Q4H PRN    Or    acetaminophen (TYLENOL) suppository 650 mg  650 mg Rectal Q4H PRN    niMODipine (NIMOTOP) capsule 60 mg  60 mg Oral Q4H    levETIRAcetam (KEPPRA) tablet 500 mg  500 mg Oral BID     ______________________________________________________________________  EXPECTED LENGTH OF STAY: 4d 9h  ACTUAL LENGTH OF STAY:          Dudley Zayas MD

## 2021-08-06 NOTE — PROGRESS NOTES
[facility]   [address] [facility], [address] [place] [address], [facility]  Phone: [phone]   [phone]       Nephrology Progress Note  Edward Hutson     1960     [id number]  Date of Admission : 7/20/2021 08/06/21    CC: Follow up for secondary HTN       Assessment and Plan   Resistant HTN:  - Suspect familial cause   - negative secondary w/u   -Changed Cardura to prazosin 2 mg 3 times daily.  -If BP remains elevated consider increasing prazosin dose if heart rate is stable. Could also increase clonidine dose    Volume overload   -Improving with diuresis.  -No Bumex today    Cerebellar AVM w/ SAH   - per NIS    Chronic heavy smoker    Elevated TSH. Interval History:  Seen and examined. Reports no further headache this morning. BP is still not at goal.  Edema is improving. Denies any new symptoms. Review of Systems: A comprehensive review of systems was negative except for that written in the HPI.     Current Medications:   Current Facility-Administered Medications   Medication Dose Route Frequency    prazosin (MINIPRESS) capsule 2 mg  2 mg Oral TID    metoclopramide HCl (REGLAN) injection 5 mg  5 mg IntraVENous Q6H PRN    hydrALAZINE (APRESOLINE) tablet 100 mg  100 mg Oral TID    cloNIDine HCL (CATAPRES) tablet 0.2 mg  0.2 mg Oral Q8H    lisinopriL (PRINIVIL, ZESTRIL) tablet 40 mg  40 mg Oral QHS    chlorthalidone (HYGROTON) tablet 25 mg  25 mg Oral DAILY    senna-docusate (PERICOLACE) 8.6-50 mg per tablet 1 Tablet  1 Tablet Oral DAILY    polyethylene glycol (MIRALAX) packet 17 g  17 g Oral DAILY PRN    bisacodyL (DULCOLAX) tablet 10 mg  10 mg Oral DAILY PRN    labetaloL (NORMODYNE;TRANDATE) injection 10 mg  10 mg IntraVENous Q4H PRN    hydrALAZINE (APRESOLINE) 20 mg/mL injection 20 mg  20 mg IntraVENous Q6H PRN    insulin lispro (HUMALOG) injection   SubCUTAneous AC&HS    glucose chewable tablet 16 g  4 Tablet Oral PRN    dextrose (D50W) injection syrg 12.5-25 g 12.5-25 g IntraVENous PRN    glucagon (GLUCAGEN) injection 1 mg  1 mg IntraMUSCular PRN    ondansetron (ZOFRAN) injection 4 mg  4 mg IntraVENous Q6H PRN    naloxone (NARCAN) injection 0.4 mg  0.4 mg IntraVENous PRN    acetaminophen (TYLENOL) tablet 650 mg  650 mg Oral Q4H PRN    Or    acetaminophen (TYLENOL) solution 650 mg  650 mg Per NG tube Q4H PRN    Or    acetaminophen (TYLENOL) suppository 650 mg  650 mg Rectal Q4H PRN    niMODipine (NIMOTOP) capsule 60 mg  60 mg Oral Q4H    levETIRAcetam (KEPPRA) tablet 500 mg  500 mg Oral BID      No Known Allergies    Objective:  Vitals:    Vitals:    08/06/21 0914 08/06/21 0946 08/06/21 1120 08/06/21 1236   BP: (!) 106/43 (!) 116/45 122/61    Pulse: (!) 50 (!) 52 (!) 52 (!) 51   Resp:  19     Temp:  98.6 °F (37 °C)     SpO2:       Weight:       Height:         Intake and Output:  08/06 0701 - 08/06 1900  In: -   Out: 700 [Urine:700]  08/04 1901 - 08/06 0700  In: -   Out: 5915 [WJQLV:0859]    Physical Examination:    General: NAD,Conversant   Neck:  Supple, no mass  Resp:  Lungs CTA B/L, no wheezing , normal respiratory effort  CV:  RRR,  no murmur or rub,2+ LE edema  GI:  Soft, NT, + Bowel sounds, no hepatosplenomegaly  Neurologic:  Non focal  Psych:             AAO x 3 appropriate affect     []    High complexity decision making was performed  []    Patient is at high-risk of decompensation with multiple organ involvement    Lab Data Personally Reviewed: I have reviewed all the pertinent labs, microbiology data and radiology studies during assessment.     Recent Labs     08/06/21  0402 08/05/21  0147 08/04/21  0343   * 135* 135*   K 4.2 4.0 3.9    99 103   CO2 29 30 28   * 86 111*   BUN 14 14 17   CREA 0.83 0.81 0.74   CA 9.1 8.8 8.3*   MG 2.2 2.3 2.1   PHOS 3.6 4.5 3.4     Recent Labs     08/06/21  0402 08/05/21  0147 08/04/21  0343   WBC 7.2 9.1 10.6   HGB 13.4 13.8 13.6   HCT 39.0 40.5 38.5    231 204     No results found for: SDES  Lab Results   Component Value Date/Time    Culture result: MRSA NOT PRESENT 03/11/2015 12:00 PM    Culture result:  03/11/2015 12:00 PM         Screening of patient nares for MRSA is for surveillance purposes and, if positive, to facilitate isolation considerations in high risk settings. It is not intended for automatic decolonization interventions per se as regimens are not sufficiently effective to warrant routine use.      Recent Results (from the past 24 hour(s))   GLUCOSE, POC    Collection Time: 08/05/21  4:34 PM   Result Value Ref Range    Glucose (POC) 142 (H) 65 - 117 mg/dL    Performed by Philip Chinchilla    GLUCOSE, POC    Collection Time: 08/05/21  9:23 PM   Result Value Ref Range    Glucose (POC) 163 (H) 65 - 117 mg/dL    Performed by Salvatore Connell    MAGNESIUM    Collection Time: 08/06/21  4:02 AM   Result Value Ref Range    Magnesium 2.2 1.6 - 2.4 mg/dL   PHOSPHORUS    Collection Time: 08/06/21  4:02 AM   Result Value Ref Range    Phosphorus 3.6 2.6 - 4.7 MG/DL   CBC W/O DIFF    Collection Time: 08/06/21  4:02 AM   Result Value Ref Range    WBC 7.2 4.1 - 11.1 K/uL    RBC 4.72 4.10 - 5.70 M/uL    HGB 13.4 12.1 - 17.0 g/dL    HCT 39.0 36.6 - 50.3 %    MCV 82.6 80.0 - 99.0 FL    MCH 28.4 26.0 - 34.0 PG    MCHC 34.4 30.0 - 36.5 g/dL    RDW 13.6 11.5 - 14.5 %    PLATELET 264 130 - 359 K/uL    MPV 11.0 8.9 - 12.9 FL    NRBC 0.0 0  WBC    ABSOLUTE NRBC 0.00 0.00 - 6.14 K/uL   METABOLIC PANEL, BASIC    Collection Time: 08/06/21  4:02 AM   Result Value Ref Range    Sodium 134 (L) 136 - 145 mmol/L    Potassium 4.2 3.5 - 5.1 mmol/L    Chloride 100 97 - 108 mmol/L    CO2 29 21 - 32 mmol/L    Anion gap 5 5 - 15 mmol/L    Glucose 109 (H) 65 - 100 mg/dL    BUN 14 6 - 20 MG/DL    Creatinine 0.83 0.70 - 1.30 MG/DL    BUN/Creatinine ratio 17 12 - 20      GFR est AA >60 >60 ml/min/1.73m2    GFR est non-AA >60 >60 ml/min/1.73m2    Calcium 9.1 8.5 - 10.1 MG/DL   SAMPLES BEING HELD    Collection Time: 08/06/21  4:02 AM Result Value Ref Range    SAMPLES BEING HELD 1BLUE     COMMENT        Add-on orders for these samples will be processed based on acceptable specimen integrity and analyte stability, which may vary by analyte. GLUCOSE, POC    Collection Time: 08/06/21  7:34 AM   Result Value Ref Range    Glucose (POC) 93 65 - 117 mg/dL    Performed by [address] [place] [address], POC    Collection Time: 08/06/21 11:55 AM   Result Value Ref Range    Glucose (POC) 88 65 - 117 mg/dL    Performed by Fab Pierson            Total time spent with patient:  xxx   min. Care Plan discussed with:  Patient     Family      RN      Consulting Physician [facility]        I have reviewed the flowsheets. Chart and Pertinent Notes have been reviewed. No change in PMH ,family and social history from Consult note.       Pramod Britt MD

## 2021-08-06 NOTE — PROGRESS NOTES
Problem: Falls - Risk of  Goal: *Absence of Falls  Description: Document Carol Thomson Fall Risk and appropriate interventions in the flowsheet.   Outcome: Progressing Towards Goal  Note: Fall Risk Interventions:  Mobility Interventions: Communicate number of staff needed for ambulation/transfer, PT Consult for mobility concerns, PT Consult for assist device competence, Strengthening exercises (ROM-active/passive)         Medication Interventions: Patient to call before getting OOB, Teach patient to arise slowly    Elimination Interventions: Stay With Me (per policy), Toilet paper/wipes in reach, Toileting schedule/hourly rounds, Patient to call for help with toileting needs, Call light in reach    History of Falls Interventions: Door open when patient unattended, Room close to nurse's station, Vital signs minimum Q4HRs X 24 hrs (comment for end date)         Problem: Subarachnoid Hemorrhage Stroke:Admission Day 5-Discharge  Goal: Diagnostic Test/Procedures  Outcome: Progressing Towards Goal  Goal: Nutrition/Diet  Outcome: Progressing Towards Goal  Goal: Medications  Outcome: Progressing Towards Goal  Goal: Psychosocial  Outcome: Progressing Towards Goal  Goal: *Hemodynamically stable  Outcome: Progressing Towards Goal     Problem: Subarachnoid Hemorrhage Stroke:Discharge Outcomes  Goal: *Hemodynamically stable  Outcome: Progressing Towards Goal  Goal: *Aware of needed dietary changes  Outcome: Progressing Towards Goal  Goal: *Absence of signs and symptoms of DVT  Outcome: Progressing Towards Goal  Goal: *Absence of aspiration  Outcome: Progressing Towards Goal

## 2021-08-06 NOTE — ADT AUTH CERT NOTES
Neurology [address] - Care Day 18 (8/6/2021) by Francisco J Whitley RN       Review Status Review Entered   Completed 8/6/2021 11:59      Criteria Review      Care Day: 18 Care Date: 8/6/2021 Level of Care: Intermediate Care    Guideline Day 3    Level Of Care    (X) * Activity level acceptable    ( ) * Complete discharge planning    Clinical Status    (X) * No infection, or status acceptable    (X) * Isolation not needed, or status acceptable    (X) * Respiratory status acceptable    (X) * Pain and nausea absent or adequately managed    ( ) * Ventilatory status acceptable    (X) * Neurologic problems absent or stabilized    (X) * Muscle or nerve damage absent or stable    ( ) * General Discharge Criteria met    Interventions    (X) * Intake acceptable    ( ) * No inpatient interventions needed    8/6/2021 11:59:52 EDT by Radha Roldan      see below    * Milestone   Additional Notes   8/6/21  inpt intermediate care    98.6   50   19   156/65   97% ra   WBC: 7.2   HGB: 13.4   HCT: 39.0   PLATELET: 328   Sodium: 134 (L)   Potassium: 4.2   Glucose: 109 (H)   BUN: 14   Creatinine: 0.83   BUN/Creatinine ratio: 17   MEDS  Tylenol 650mg po q4prn x 1   hygroton 25mgt po qd   catapres 0.2mg po q8   apresoline 20mg iv q6 prn x   apresoline 100mg po Tid   Humalog ss ac and hs   labetalol 10mg iv q4 prn  Keppra 500mg po Bid   lisinopril 40mg po qhs   nimotop 60mg po q4   reglan 5mg iv q6 prn  x1   minipress 2mg po Tid     Orders:  reg diet   PT/OT    neuro ck q4   OOB           Hospitalist note   Interval history / Subjective:   Patient seen and examined no new issues   BP low this morning holding BP meds may need readjustment        Assessment & Plan:       Acute SAH in setting of 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 - S/P diagnostic cerebral andio on 07/20/21.   - Day 18 of 21 on nimodipine 60 mg po q4h.    - Continue levetiracetam 500 mg po bid.         Polyuria Hyponatremia    - Nephrology input appreciated. - Monitor for now.         HTN- HYPOTENSIVE THIS MORNING HOLD MEDS WILL READJUST    - Continue chlorthalidone 25 mg po daily. - Continue clonidine 0.2 mg po q8h. - Continue hydralazine 100 mg po tid.     - Continue lisinopril 40 mg po daily.     - ARR pending.   - Plan for CTA abdomen 08/05/21.- 1. 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  CT head within normal limit           DM II   Hyperglycemia    - Continue to hold oral diabetes meds.    - Continue FSBS with SSI correction scale.        Constipation    - Continue senna-docusate 8.6-50 mg po daily.             Obesity/ Body mass index is 34.18 kg/m².       Code status: FULL   DVT prophylaxis: scd       Care Plan discussed with: Patient/Family and Nurse   Anticipated Disposition: Home w/Family   Anticipated Discharge: 24 hours to 48 hours               Neurology [facility] - Care Day 17 (8/5/2021) by Marina Osei RN       Review Status Review Entered   Completed 8/6/2021 11:54      Criteria Review      Care Day: 17 Care Date: 8/5/2021 Level of Care: Intermediate Care    Guideline Day 3    Level Of Care    (X) * Activity level acceptable    ( ) * Complete discharge planning    Clinical Status    (X) * No infection, or status acceptable    (X) * Isolation not needed, or status acceptable    (X) * Respiratory status acceptable    (X) * Pain and nausea absent or adequately managed    ( ) * Ventilatory status acceptable    (X) * Neurologic problems absent or stabilized    (X) * Muscle or nerve damage absent or stable    ( ) * General Discharge Criteria met    Interventions    (X) * Intake acceptable    ( ) * No inpatient interventions needed    * Milestone   Additional Notes   8/5/21  inpt intermediate care    99    53    20   149/44    98% ra   WBC: 9.1   HGB: 13.8   HCT: 40.5   PLATELET: 210   Sodium: 135 (L)   Potassium: 4.0   Glucose: 86   BUN: 14   Creatinine: 0.81 BUN/Creatinine ratio: 17   CT HEAD WO CONT  IMPRESSION   No acute intracranial process. CTA HEAD NECK W CONT   IMPRESSION   1.  Unchanged left pontine arteriovenous malformation. 2.  Resolution of prepontine cistern hemorrhage. 3.  No evidence of vasospasm. 4.  No significant carotid stenosis   CTA ABD  1. 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 Otherwise, the renal vasculature is unremarkable, there is no stenosis. The   kidneys are also unremarkable, with symmetric perfusion, no renal mass, no   filling defects. 2. 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 - S/P diagnostic cerebral andio on 07/20/21.   - Day 17 of 21 on nimodipine 60 mg po q4h. - Continue levetiracetam 500 mg po bid.         Polyuria   Hyponatremia    - Nephrology input appreciated. - Monitor for now.         HTN   - Continue chlorthalidone 25 mg po daily. - Continue clonidine 0.2 mg po q8h. - Continue hydralazine 100 mg po tid.     - Continue lisinopril 40 mg po daily.     - ARR pending.   - Plan for CTA abdomen 08/05/21.- 1. 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  CT head within normal limit           DM II   Hyperglycemia    - Continue to hold oral diabetes meds.    - Continue FSBS with SSI correction scale.        Constipation    - Continue senna-docusate 8.6-50 mg po daily.             Obesity/ Body mass index is 34.18 kg/m².       Code status: FULL   DVT prophylaxis: scd       Care Plan discussed with: Patient/Family and Nurse   Anticipated Disposition: Home w/Family   Anticipated Discharge: 24 hours to 48 hours             Neurocritical Care Brief Progress Note:       71-year-old male with SAH with cerebellar AVM with multiple feeders coming off the left PCA, left anterior choroidal and left SCA with a large single draining vein that drains into the vein of Gaston. 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         Physical Exam:   Gen: NAD, calm, cooperative. Has eyepatch over left eye. Neuro: A&Ox4. Follows commands. Speech clear. Affect normal. PERRL, 3 mm bilaterally. Visual fields full to confrontation. However, has stated double vision at a distance. Mild disconjugate gaze present at times. EOMI.  Occasional twitching of right eye note again tonight.  Significant left lower facial droop with stated left facial numbness noted to left check and forehead on light touch. Fei Martinet spontaneously. Strength 5/5 in UE and LE BL. Negative drift. Bulk and tone normal. No involuntary movements. Gait deferred. No ataxia. Skin: Warm, dry, color appropriate for ethnicity.        PLAN:Continue current plan per NIS. Had headache 10/10 overnight last night CTH/CTA this am stable. According to nursing and patient, he has been having headaches on and off for the past several days while doing PT. States he was straining turning himself in the bed when headache happened overnight. Encouraged him to not do any activities that cause straining and to ask nursing to help with any difficult tasks that may require him to strain. Stat [address] and call Neurocritical Care NP with any severe headaches. Will add Reglan prn for headaches as well.  Continue with Tylenol prn headaches also.              Neurology [address] [place] [address] Day 16 (8/4/2021) by Hammad Hays RN       Review Status Review Entered   Completed 8/5/2021 16:28      Criteria Review      Care Day: 16 Care Date: 8/4/2021 Level of Care: ICU    Guideline Day 3    Level Of Care    (X) * Activity level acceptable    ( ) * Complete discharge planning    Clinical Status    (X) * No infection, or status acceptable    (X) * Isolation not needed, or status acceptable    (X) * Respiratory status acceptable    8/5/2021 16:28:34 EDT by Nicole Cortés      rr 19   sats 96% ra    (X) * Pain and nausea absent or adequately managed    (X) * Ventilatory status acceptable    (X) * Neurologic problems absent or stabilized    (X) * Muscle or nerve damage absent or stable    ( ) * General Discharge Criteria met    Interventions    (X) * Intake acceptable    (X) * No inpatient interventions needed    8/5/2021 16:28:34 EDT by Jo Ann Bangura see below    * Milestone   Additional Notes   8/4/21  inpt intermediate care   98.6   57   19   111/55   96% ra   WBC: 10.6   HGB: 13.6   HCT: 38.5   PLATELET: 417   Sodium: 135 (L)   Potassium: 3.9   Chloride: 103   CO2: 28   Anion gap: 4 (L)   Glucose: 111 (H)   BUN: 17   Creatinine: 0.74   BUN/Creatinine ratio: 23 (H)   Calcium: 8.3 (L)   MEDS   Norvasc 10mg po qday   Apresoline 100mg po Tid      Nimotop 60mg po q4   Ssi x   Keppra 500mg po bid   Labetalol 10mg iv q4prn x 1   Lisinopril 40mg po qday   Lopressor 100mg po q12   Nimotop 60mg po q4   Pericolace 1 tab po qday   Catapres 0.3mg po q8   Lopressor 100mg po q8   Decadon 2mg iv q12     Tylenol 650mg po q4 prn x 2   Bumax 1mg iv once        Orders:  reg diet   PT   neuro cks q4  strict I&O        Renal note   CC:  Follow up for secondary HTN          Assessment and Plan    Resistant HTN:   - Suspect familial cause. R/o SHAR     - ARR pending.  Not suspecting Pheo based on available chemistries so far    -  Increased Hydralazine    - CTA tomorrow        Polyuria :   - 2/2 SAH    - watch for now    - not suspecting CSW       Cerebellar AVM w/ SAH    - per Endovascular Neuro Sx        Chronic heavy smoker       Elevated TSH.            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 - S/P diagnostic cerebral andio on 07/20/21.   - Day 16 of 21 on nimodipine 60 mg po q4h. - Continue levetiracetam 500 mg po bid.         Polyuria   Hyponatremia    - Nephrology input appreciated. - Monitor for now.         HTN   - Continue chlorthalidone 25 mg po daily. - Continue clonidine 0.2 mg po q8h. - Continue hydralazine 100 mg po tid.     - Continue lisinopril 40 mg po daily.     - ARR pending.   - Plan for CTA 08/05/21.       DM II   Hyperglycemia    - Continue to hold oral diabetes meds. - Continue FSBS with SSI correction scale.        Constipation    - Continue senna-docusate 8.6-50 mg po daily.             30.0 - 39.9 Obese / Body mass index is 34.18 kg/m².       Code status: Full   Prophylaxis: SCD's   Recommended Disposition: Home w/Family             Neurointerventional Surgery Progress Note   S/P: POD 15 status post diagnostic cerebral angiogram    Assessment:       Principal Problem:     Cerebral arteriovenous malformation (AVM) (7/20/2021)       Overview: ([id number])- CT (Brain):       There is an AVM in the superior cerebellum with multiple aneurysms.     One measures 4 mm.  Another measures 4 mm within the superior cerebellum.       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 No shunting seen. Mild (grade 1) left ventricular diastolic dysfunction. - ok with q4 hour neuro checks    - SBP goal <140, on scheduled chlorthalidone, clonidine, hydralazine, and lisinopril, renal was consulted yesterday for additional work-up for possible secondary hypertension causes and some adjustments were made on patient's BP medications yesterday.   - PT/OT/SLP evals      - TCDs completed        Polyuria   - patient has been previously diuresed for fluid overload    - patient with intermittent polyuria, possibly autodiuresing    - urine random sodium 116, urine osmolality 455 , serum osmolality 280   - serum sodium normal today at 137    - Nephrology consulted and following        HTN   - SBP goal <140 as stated above   - patient on scheduled oral meds as stated above   - Patient had a bilateral artery/venous renal duplex on 7/22 which shows no evidence of significant renal artery stenosis.  No evidence of significant renal parenchymal disease.  Possible hemodynamically significant celiac artery stenosis.    - Nephrology consulted for additional secondary HTN work-up, defer management to nephrology   - Intensivist and Renal following       Constipation (resolved)   - continue Rissa-colace daily   - Miralax PRN daily   - Dulcolax 10 mg daily PRN           Activity: Up with assistance, ambulate daily   DVT ppx: SCDs   Dispo: TBD                Neurology [address] - Care Day 15 (8/3/2021) by Maribeth Ochoa RN       Review Status Review Entered   Completed 8/5/2021 16:19      Criteria Review      Care Day: 15 Care Date: 8/3/2021 Level of Care: ICU    Guideline Day 3    Level Of Care    (X) * Activity level acceptable    ( ) * Complete discharge planning Clinical Status    (X) * No infection, or status acceptable    (X) * Isolation not needed, or status acceptable    (X) * Respiratory status acceptable    (X) * Pain and nausea absent or adequately managed    (X) * Ventilatory status acceptable    (X) * Neurologic problems absent or stabilized    (X) * Muscle or nerve damage absent or stable    ( ) * General Discharge Criteria met    Interventions    (X) * Intake acceptable    (X) * No inpatient interventions needed    8/5/2021 16:19:09 EDT by Bard Combs see below    * Milestone   Additional Notes   8/3/21  inpt intermediate care   98    46   21   148/49   98% ra   WBC: 10.4   HGB: 12.5   HCT: 36.4 (L)   PLATELET: 785   Sodium: 136   Potassium: 4.1   Glucose: 114 (H)   BUN: 19   Creatinine: 0.68 (L)   BUN/Creatinine ratio: 28 (H)   Calcium: 8.1 (L)   MEDS   NS @ 100 cc/hr   Norvasc 10mg po qday   Apresoline 50mg iv Tid     Nimotop 60mg po q4   Ssi x   Keppra 500mg po bid   Lisinopril 40mg po qday   Lopressor 100mg po q12   Nimotop 60mg po q4   Pericolace 1 tab po qday   Catapres 0.3mg po q8   Lopressor 100mg po q8   Decadon 2mg iv q12     Bumex 1mg iv once       Orders:  reg diet   PT   neuro cks q4  strict I&O           Hospitalist note   Assessment & Plan:               This is 19-year-old gentleman with past medical history of hypertension,  currently getting transferred out of ICU after [facility] and hypertensive emergency treatment. Alok Carroll presented from [facility] with headache and dizziness, found to have elevated blood pressure. CT of the head showed an acute subarachnoid hemorrhage anterior to the sarahi.  CTA of the head and neck showed an AVM in the left superior cerebellar peduncle, with the dominant drainage into the vein of Gaston. No large vessel occlusion, no hemodynamically significant stenosis.  Redemonstrated subarachnoid hemorrhage in the basilar cisterns.  Patient was initially started on IV antihypertensives, now switched to oral antihypertensives. [facility] was also started on Nimodipine.          Neuro intervention team is on board, recommended no surgical intervention, recommends outpatient radiation therapy.       Acute SAH in the setting of cerebellar AVM with associated flow related aneurysm   Mild left lateral rectus palsy   Hypertension   Hyperglycemia secondary to steroid use           Continue nimodipine 60 mg every 4 hour.    Today8/3  is day 15/21 nimodipine    Continue Keppra   Continue Decadron 4 mg twice daily   Neuro intervention team on board, recommendations appreciated   Patient's blood pressure is improving, nephrology on board, recommendations appreciated   Continue chlorthalidone, clonidine, hydralazine, metoprolol, minoxidil. Hemoglobin A1c 5.6, continue sliding scale of insulin.    PT/OT        Full code   DVT prophylaxis: SCD

## 2021-08-06 NOTE — PROGRESS NOTES
Neurointerventional Surgery Progress Note  Keon Hernandez [facility]  Neurocritical Care NP      Admit Date: 7/20/2021   LOS: 17 days        Daily Progress Note: 8/6/2021    S/P: POD 17 status post diagnostic cerebral angiogram      Subjective:     No neuro events. Headaches more stable today. He denies any pain during our evaluation. CTH and CTA Head/Neck yesterday with no acute findings. Was -700ml negative today, but no input documented. Discussed with staff. Clinically euvolemic. Will continue to follow until discharge or until out of vasospasm window. Plans underway for discharging patient back to home with home health PT.      Current Facility-Administered Medications   Medication Dose Route Frequency Provider Last Rate Last Admin    prazosin (MINIPRESS) capsule 2 mg  2 mg Oral TID Piyush Melvin MD        metoclopramide HCl (REGLAN) injection 5 mg  5 mg IntraVENous Q6H PRN Gavin Dunne NP   5 mg at 08/06/21 0736    hydrALAZINE (APRESOLINE) tablet 100 mg  100 mg Oral TID Sugey INFANTE MD   100 mg at 08/05/21 2102    cloNIDine HCL (CATAPRES) tablet 0.2 mg  0.2 mg Oral Q8H Sugey Thapa MD   0.2 mg at 08/06/21 1349    lisinopriL (PRINIVIL, ZESTRIL) tablet 40 mg  40 mg Oral QHS Piyush Melvin MD   40 mg at 08/05/21 2102    chlorthalidone (HYGROTON) tablet 25 mg  25 mg Oral DAILY Gemini Velazquez MD   25 mg at 08/05/21 1125    senna-docusate (PERICOLACE) 8.6-50 mg per tablet 1 Tablet  1 Tablet Oral DAILY Reddy Grant NP   1 Tablet at 08/06/21 0914    polyethylene glycol (MIRALAX) packet 17 g  17 g Oral DAILY PRN Reddy Grant NP   17 g at 07/25/21 0906    bisacodyL (DULCOLAX) tablet 10 mg  10 mg Oral DAILY PRN Reddy Grant NP        labetaloL (NORMODYNE;TRANDATE) injection 10 mg  10 mg IntraVENous Q4H PRN Reddy Grant NP   10 mg at 08/05/21 0136    hydrALAZINE (APRESOLINE) 20 mg/mL injection 20 mg  20 mg IntraVENous Q6H PRN Carmen, Herberth ROWE DO   20 mg at 08/05/21 2134    insulin lispro (HUMALOG) injection   SubCUTAneous AC&HS Paz Sherman NP   2 Units at 08/05/21 1723    glucose chewable tablet 16 g  4 Tablet Oral PRN Paz Sherman NP        dextrose (D50W) injection syrg 12.5-25 g  12.5-25 g IntraVENous PRN Paz Sherman, NP        glucagon (GLUCAGEN) injection 1 mg  1 mg IntraMUSCular PRN Paz Sherman NP        ondansetron [facility]) injection 4 mg  4 mg IntraVENous Q6H PRN Miriam Cea, NP-C        naloxone [facility]) injection 0.4 mg  0.4 mg IntraVENous PRN Miriam Cea, NP-C        acetaminophen (TYLENOL) tablet 650 mg  650 mg Oral Q4H PRN Miriam Cea, NP-C   650 mg at 08/06/21 0840    Or    acetaminophen (TYLENOL) solution 650 mg  650 mg Per NG tube Q4H PRN Miriam Cea, NP-C        Or    acetaminophen (TYLENOL) suppository 650 mg  650 mg Rectal Q4H PRN Miriam Cea, NP-C        niMODipine (NIMOTOP) capsule 60 mg  60 mg Oral Q4H Colleen Grant NP   60 mg at 08/06/21 1120    levETIRAcetam (KEPPRA) tablet 500 mg  500 mg Oral BID Cherelle Gallegos NP   500 mg at 08/06/21 1343      HPI: Anatoliy Delgado is a 77-year-old male with a past medical history significant for hypertension and diabetes type 2 who initially presented to [facility] ED on 7/19/2021 for acute onset of dizziness, headache, left-sided hearing loss, and left-sided weakness. Upon arrival to the ED his blood pressure was noted to be 265/120 per review of notes. A code stroke was called and CT of the head showed an acute subarachnoid hemorrhage anterior to the sarahi. CTA of the head and neck showed an AVM in the left superior cerebellar peduncle, with the dominant drainage into the vein of Gaston. No large vessel occlusion, no hemodynamically significant stenosis. Redemonstrated subarachnoid hemorrhage in the basilar cisterns. He was started on a Cardene drip and was also given IV hydralazine and labetalol.   He was then transferred to [facility] [place] for a higher level of care. NIS was consulted and patient underwent a diagnostic cerebral angiogram on 2021. The angiogram showed evidence of a cerebral AVM with multiple feeders coming of the left PCA, left anterior choroidal and left SCA with a large single draining vein that drains into the vein of Villanueva. 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 No Known Allergies    Review of Systems:  Pertinent items are noted in the History of Present Illness. Objective:     Vital signs  Temp (24hrs), Av.3 °F (36.8 °C), Min:97.6 °F (36.4 °C), Max:98.6 °F (37 °C)   701 - 1900  In: -   Out: 700 [Urine:700]  1901 - 700  In: -   Out: 5006 [QUETF:8469]    Visit Vitals  BP (!) 129/48   Pulse (!) 57   Temp 98.6 °F (37 °C)   Resp 19   Ht 6' (1.829 m)   Wt 106.4 kg (234 lb 9.6 oz)   SpO2 97%   BMI 31.82 kg/m²      O2 Device: None (Room air)       Vitals:    21 0946 21 1120 21 1236 21 1349   BP: (!) 116/45 122/61  (!) 129/48   Pulse: (!) 52 (!) 52 (!) 51 (!) 57   Resp: 19      Temp: 98.6 °F (37 °C)      SpO2:       Weight:       Height:            Physical Exam:  GENERAL: alert, cooperative, no distress, appears stated age  LUNG: clear to auscultation bilaterally  HEART: regular rate and rhythm, S1, S2 normal, no murmur, click, rub or gallop  EXTREMITIES:  extremities normal, atraumatic, no cyanosis, trace peripheral edema in BUE, +2 pitting edema in BLE, Posterior tibial pulses +1 bilaterally, +2 pedal pulses bilaterally   SKIN: Skin cool to touch. Appropriate for ethnicity. Right groin site clean, dry, and intact. No hematoma, bruising, or bleeding noted. Neurologic Exam:  Mental Status:  Alert and oriented x 4. Appropriate affect, mood and behavior. Language:    Normal fluency, repetition, comprehension and naming. Cranial Nerves:        Pupils equal, round and reactive to light. Visual fields full to confrontation. Dysconjugate gaze intermittently with EOMs, otherwise intact. Has some subtle issues with looking completely to the left with left eye. Mild subtle nystagmus when looking to the left, but it has improved. Decreased sensation on the left side of face. Left facial asymmetry     Tongue protrudes to midline, palate elevates symmetrically. No dysarthria. Motor:    No pronator drift. Bulk and tone normal.      5/5 power in all extremities proximally and distally. No involuntary movements. Sensation:    Decreased sensation on the left side of face, otherwise sensation intact to light touch. No neglect. Coordination & Gait: No significant ataxia with finger-to-nose and heel-to-shin bilaterally. Gait deferred.      24 hour results:    Recent Results (from the past 24 hour(s))   GLUCOSE, POC    Collection Time: 08/05/21  4:34 PM   Result Value Ref Range    Glucose (POC) 142 (H) 65 - 117 mg/dL    Performed by Ángel Hernandez    GLUCOSE, POC    Collection Time: 08/05/21  9:23 PM   Result Value Ref Range    Glucose (POC) 163 (H) 65 - 117 mg/dL    Performed by Jose Collins    MAGNESIUM    Collection Time: 08/06/21  4:02 AM   Result Value Ref Range    Magnesium 2.2 1.6 - 2.4 mg/dL   PHOSPHORUS    Collection Time: 08/06/21  4:02 AM   Result Value Ref Range    Phosphorus 3.6 2.6 - 4.7 MG/DL   CBC W/O DIFF    Collection Time: 08/06/21  4:02 AM   Result Value Ref Range    WBC 7.2 4.1 - 11.1 K/uL    RBC 4.72 4.10 - 5.70 M/uL    HGB 13.4 12.1 - 17.0 g/dL    HCT 39.0 36.6 - 50.3 %    MCV 82.6 80.0 - 99.0 FL    MCH 28.4 26.0 - 34.0 PG    MCHC 34.4 30.0 - 36.5 g/dL    RDW 13.6 11.5 - 14.5 %    PLATELET 140 418 - 606 K/uL    MPV 11.0 8.9 - 12.9 FL    NRBC 0.0 0  WBC    ABSOLUTE NRBC 0.00 0.00 - 3.56 K/uL   METABOLIC PANEL, BASIC    Collection Time: 08/06/21  4:02 AM   Result Value Ref Range    Sodium 134 (L) 136 - 145 mmol/L    Potassium 4.2 3.5 - 5.1 mmol/L    Chloride 100 97 - 108 mmol/L    CO2 29 21 - 32 mmol/L    Anion gap 5 5 - 15 mmol/L    Glucose 109 (H) 65 - 100 mg/dL    BUN 14 6 - 20 MG/DL    Creatinine 0.83 0.70 - 1.30 MG/DL    BUN/Creatinine ratio 17 12 - 20      GFR est AA >60 >60 ml/min/1.73m2    GFR est non-AA >60 >60 ml/min/1.73m2    Calcium 9.1 8.5 - 10.1 MG/DL   SAMPLES BEING HELD    Collection Time: 08/06/21  4:02 AM   Result Value Ref Range    SAMPLES BEING HELD 1BLUE     COMMENT        Add-on orders for these samples will be processed based on acceptable specimen integrity and analyte stability, which may vary by analyte. GLUCOSE, POC    Collection Time: 08/06/21  7:34 AM   Result Value Ref Range    Glucose (POC) 93 65 - 117 mg/dL    Performed by [address] [place] [address], POC    Collection Time: 08/06/21 11:55 AM   Result Value Ref Range    Glucose (POC) 88 65 - 117 mg/dL    Performed by Zuleyka Renner           Imaging:    CT Head WO Contrast 8/5/21     IMPRESSION  No acute intracranial process. CTA Head/Neck W Contrast 8/5/21 :        IMPRESSION  1. Unchanged left pontine arteriovenous malformation. 2.  Resolution of prepontine cistern hemorrhage. 3.  No evidence of vasospasm. 4.  No significant carotid stenosis. CT of head on 7/19/2021 at 2104 shows  IMPRESSION  1. Acute subarachnoid hemorrhage anterior to the sarahi. CTA of the head and neck on 7/19/2021 at 2126 shows  IMPRESSION     1. Arteriovenous malformation in the left superior cerebellar peduncle, with the  dominant drainage into the vein of Gaston. 2. No large vessel occlusion, no hemodynamically significant stenosis. 3. Redemonstrated subarachnoid hemorrhage in the basilar cisterns. CT the head on 7/20/2021 at 1129 shows  IMPRESSION  1. Stable ventricular size.     2.  Questionable diffuse cerebral edema.     3.  Subarachnoid hemorrhage in the prepontine cistern and ambient cisterns as  well as sylvian fissures are decreased from the prior study.     MRI brain with and without 7/20/2021 at 2214 shows  IMPRESSION  1. Tiny 1 mm foci of diffusion restriction in the left cerebellum and left  cerebral hemisphere as described likely embolic in origin.     2.  Possible mild meningeal enhancement which may be secondary to meningeal  irritation from known subarachnoid hemorrhage.     3.  Dilated superior ophthalmic veins are noted which is new when compared to  prior imaging. This is of uncertain clinical significance. There appears to be  normal enhancement of the cavernous sinus bilaterally. CT of head on 7/23/2021 at 2233 shows     IMPRESSION  No hydrocephalus. Stable left prepontine subarachnoid hemorrhage. CT of Head on 7/24/2021 at 1357  IMPRESSION:   Left prepontine hemorrhage is slightly increased. CT of the head on 7/24/2021 at 10:21 PM shows  IMPRESSION  Stable left prepontine hemorrhage. CTA of Head and neck on 7/28/2021 at 1001 shows  IMPRESSION     No evidence for significant vasospasm. Diffuse mild atherosclerosis. Left  cerebellar vascular malformation is again noted with unchanged hemorrhage in the  left prepontine cistern. Small focal hypoattenuation in the left sarahi and left  cerebellar peduncle likely acute ischemic changes.     Small right pleural effusion. Assessment:     Principal Problem:    Cerebral arteriovenous malformation (AVM) (7/20/2021)      Overview: ([id number])- CT (Brain): There is an AVM in the superior cerebellum with multiple aneurysms. One measures 4 mm. Another measures 4 mm within the superior cerebellum.     Active Problems:    Malignant hypertension (7/20/2021)      Acute hypokalemia (7/20/2021)      Celiac artery stenosis ([address] (7/21/2021)      Vasogenic cerebral edema ([address] (7/21/2021)      Hypertensive emergency (7/21/2021)      SAH (subarachnoid hemorrhage) ([address] (7/20/2021)      Cerebral vasospasm (8/1/2021)        Plan:   Acute SAH in the setting of cerebellar AVM with associated flow related aneurysm, [place] Knox 1, Mistry Grade 2  - s/p diagnostic cerebral angiogram on 7/20/2021  - Day 18 of 21 of Nimotop for the prevention of delayed cerebral ischemia  - continue Keppra 500 mg BID for seizure ppx  - IVF stopped by nephrology for volume overload   - ECHO shows EF 65-70%, dilated left atrium. No shunting seen. Mild (grade 1) left ventricular diastolic dysfunction. - ok with q4 hour neuro checks   - SBP goal <140, on scheduled chlorthalidone, clonidine, hydralazine, and lisinopril; low this AM being managed by primary care team  - PT/OT/SLP evals     - TCDs completed  - F/u with Dr. Ananya Pa after d/c for AVM treatment      Polyuria- improved   - patient has been previously diuresed for fluid overload   - patient with intermittent polyuria, possibly autodiuresing   - urine random sodium 116, urine osmolality 455 , serum osmolality 280  - trend serum sodium  - Nephrology following     HTN- improved  - SBP goal <140 as stated above  - patient on scheduled oral meds as stated above  - Patient had a bilateral artery/venous renal duplex on 7/22 which shows no evidence of significant renal artery stenosis. No evidence of significant renal parenchymal disease. Possible hemodynamically significant celiac artery stenosis.   - Nephrology consulted for additional secondary HTN work-up, defer management to nephrology  - Hospitalist and Renal following    Constipation (resolved)  - continue Rissa-colace daily  - Miralax PRN daily  - Dulcolax 10 mg daily PRN      Activity: Up with assistance, ambulate daily  DVT ppx: SCDs  Dispo: TBD    Plan d/w Dr. Vicki Sanchez, Dr. Porfirio Bryant RN, and patient    John Juarez NP

## 2021-08-06 NOTE — PROGRESS NOTES
Problem: Subarachnoid Hemorrhage Stroke:Admission Day 5-Discharge  Goal: Diagnostic Test/Procedures  Outcome: Progressing Towards Goal  Goal: Nutrition/Diet  Outcome: Progressing Towards Goal  Goal: Medications  Outcome: Progressing Towards Goal  Goal: Patient maintains clear airway/absence of aspiration  Outcome: Progressing Towards Goal  Goal: Treatments/Interventions/Procedures  Outcome: Progressing Towards Goal  Goal: Psychosocial  Outcome: Progressing Towards Goal

## 2021-08-06 NOTE — ROUTINE PROCESS
Bedside shift change report given to [address] (oncoming nurse) by Aiden Velasco RN (offgoing nurse). Report included the following information SBAR, Kardex, ED Summary, Intake/Output, MAR, Cardiac Rhythm NSR and Dual Neuro Assessment.

## 2021-08-07 ENCOUNTER — APPOINTMENT (OUTPATIENT)
Dept: MRI IMAGING | Age: 61
DRG: 064 | End: 2021-08-07
Attending: NURSE PRACTITIONER
Payer: COMMERCIAL

## 2021-08-07 ENCOUNTER — APPOINTMENT (OUTPATIENT)
Dept: CT IMAGING | Age: 61
DRG: 064 | End: 2021-08-07
Attending: NURSE PRACTITIONER
Payer: COMMERCIAL

## 2021-08-07 LAB
ANION GAP SERPL CALC-SCNC: 5 MMOL/L (ref 5–15)
BUN SERPL-MCNC: 16 MG/DL (ref 6–20)
BUN/CREAT SERPL: 22 (ref 12–20)
CALCIUM SERPL-MCNC: 7.8 MG/DL (ref 8.5–10.1)
CHLORIDE SERPL-SCNC: 105 MMOL/L (ref 97–108)
CO2 SERPL-SCNC: 26 MMOL/L (ref 21–32)
CREAT SERPL-MCNC: 0.74 MG/DL (ref 0.7–1.3)
GLUCOSE BLD STRIP.AUTO-MCNC: 117 MG/DL (ref 65–117)
GLUCOSE BLD STRIP.AUTO-MCNC: 146 MG/DL (ref 65–117)
GLUCOSE BLD STRIP.AUTO-MCNC: 175 MG/DL (ref 65–117)
GLUCOSE BLD STRIP.AUTO-MCNC: 82 MG/DL (ref 65–117)
GLUCOSE BLD STRIP.AUTO-MCNC: 97 MG/DL (ref 65–117)
GLUCOSE SERPL-MCNC: 101 MG/DL (ref 65–100)
POTASSIUM SERPL-SCNC: 3.5 MMOL/L (ref 3.5–5.1)
SERVICE CMNT-IMP: ABNORMAL
SERVICE CMNT-IMP: ABNORMAL
SERVICE CMNT-IMP: NORMAL
SODIUM SERPL-SCNC: 136 MMOL/L (ref 136–145)

## 2021-08-07 PROCEDURE — 70544 MR ANGIOGRAPHY HEAD W/O DYE: CPT

## 2021-08-07 PROCEDURE — 74011000636 HC RX REV CODE- 636: Performed by: RADIOLOGY

## 2021-08-07 PROCEDURE — 70450 CT HEAD/BRAIN W/O DYE: CPT

## 2021-08-07 PROCEDURE — 74011250636 HC RX REV CODE- 250/636: Performed by: ANESTHESIOLOGY

## 2021-08-07 PROCEDURE — 65660000001 HC RM ICU INTERMED STEPDOWN

## 2021-08-07 PROCEDURE — 36415 COLL VENOUS BLD VENIPUNCTURE: CPT

## 2021-08-07 PROCEDURE — 80048 BASIC METABOLIC PNL TOTAL CA: CPT

## 2021-08-07 PROCEDURE — 74011250637 HC RX REV CODE- 250/637: Performed by: NURSE PRACTITIONER

## 2021-08-07 PROCEDURE — 74011250637 HC RX REV CODE- 250/637: Performed by: INTERNAL MEDICINE

## 2021-08-07 PROCEDURE — 74011250637 HC RX REV CODE- 250/637: Performed by: HOSPITALIST

## 2021-08-07 PROCEDURE — 70551 MRI BRAIN STEM W/O DYE: CPT

## 2021-08-07 PROCEDURE — 82962 GLUCOSE BLOOD TEST: CPT

## 2021-08-07 PROCEDURE — APPSS45 APP SPLIT SHARED TIME 31-45 MINUTES: Performed by: NURSE PRACTITIONER

## 2021-08-07 PROCEDURE — 99232 SBSQ HOSP IP/OBS MODERATE 35: CPT | Performed by: STUDENT IN AN ORGANIZED HEALTH CARE EDUCATION/TRAINING PROGRAM

## 2021-08-07 PROCEDURE — 74011636637 HC RX REV CODE- 636/637: Performed by: NURSE PRACTITIONER

## 2021-08-07 PROCEDURE — 74011250636 HC RX REV CODE- 250/636: Performed by: NURSE PRACTITIONER

## 2021-08-07 PROCEDURE — 70496 CT ANGIOGRAPHY HEAD: CPT

## 2021-08-07 PROCEDURE — 94760 N-INVAS EAR/PLS OXIMETRY 1: CPT

## 2021-08-07 RX ORDER — METOCLOPRAMIDE HYDROCHLORIDE 5 MG/ML
10 INJECTION INTRAMUSCULAR; INTRAVENOUS
Status: DISCONTINUED | OUTPATIENT
Start: 2021-08-07 | End: 2021-08-10 | Stop reason: HOSPADM

## 2021-08-07 RX ORDER — CLONIDINE HYDROCHLORIDE 0.1 MG/1
0.1 TABLET ORAL EVERY 8 HOURS
Status: DISCONTINUED | OUTPATIENT
Start: 2021-08-07 | End: 2021-08-09

## 2021-08-07 RX ORDER — ALPRAZOLAM 0.5 MG/1
0.5 TABLET ORAL ONCE
Status: COMPLETED | OUTPATIENT
Start: 2021-08-07 | End: 2021-08-07

## 2021-08-07 RX ORDER — SODIUM CHLORIDE, SODIUM LACTATE, POTASSIUM CHLORIDE, CALCIUM CHLORIDE 600; 310; 30; 20 MG/100ML; MG/100ML; MG/100ML; MG/100ML
100 INJECTION, SOLUTION INTRAVENOUS CONTINUOUS
Status: DISPENSED | OUTPATIENT
Start: 2021-08-07 | End: 2021-08-07

## 2021-08-07 RX ORDER — HYDRALAZINE HYDROCHLORIDE 50 MG/1
50 TABLET, FILM COATED ORAL 3 TIMES DAILY
Status: DISCONTINUED | OUTPATIENT
Start: 2021-08-07 | End: 2021-08-09

## 2021-08-07 RX ADMIN — CLONIDINE HYDROCHLORIDE 0.1 MG: 0.1 TABLET ORAL at 14:14

## 2021-08-07 RX ADMIN — SODIUM CHLORIDE, POTASSIUM CHLORIDE, SODIUM LACTATE AND CALCIUM CHLORIDE 500 ML: 600; 310; 30; 20 INJECTION, SOLUTION INTRAVENOUS at 00:44

## 2021-08-07 RX ADMIN — CHLORTHALIDONE 25 MG: 25 TABLET ORAL at 08:25

## 2021-08-07 RX ADMIN — LEVETIRACETAM 500 MG: 500 TABLET ORAL at 18:08

## 2021-08-07 RX ADMIN — NIMODIPINE 60 MG: 30 CAPSULE, LIQUID FILLED ORAL at 16:24

## 2021-08-07 RX ADMIN — HYDRALAZINE HYDROCHLORIDE 20 MG: 20 INJECTION INTRAMUSCULAR; INTRAVENOUS at 18:44

## 2021-08-07 RX ADMIN — PRAZOSIN HYDROCHLORIDE 2 MG: 1 CAPSULE ORAL at 18:11

## 2021-08-07 RX ADMIN — NIMODIPINE 60 MG: 30 CAPSULE, LIQUID FILLED ORAL at 08:25

## 2021-08-07 RX ADMIN — IOPAMIDOL 100 ML: 755 INJECTION, SOLUTION INTRAVENOUS at 00:29

## 2021-08-07 RX ADMIN — CLONIDINE HYDROCHLORIDE 0.2 MG: 0.2 TABLET ORAL at 06:45

## 2021-08-07 RX ADMIN — LEVETIRACETAM 500 MG: 500 TABLET ORAL at 08:24

## 2021-08-07 RX ADMIN — HYDRALAZINE HYDROCHLORIDE 50 MG: 50 TABLET, FILM COATED ORAL at 16:23

## 2021-08-07 RX ADMIN — DOCUSATE SODIUM 50 MG AND SENNOSIDES 8.6 MG 1 TABLET: 8.6; 5 TABLET, FILM COATED ORAL at 08:25

## 2021-08-07 RX ADMIN — INSULIN LISPRO 2 UNITS: 100 INJECTION, SOLUTION INTRAVENOUS; SUBCUTANEOUS at 18:09

## 2021-08-07 RX ADMIN — PRAZOSIN HYDROCHLORIDE 2 MG: 1 CAPSULE ORAL at 08:25

## 2021-08-07 RX ADMIN — CLONIDINE HYDROCHLORIDE 0.1 MG: 0.1 TABLET ORAL at 22:20

## 2021-08-07 RX ADMIN — NIMODIPINE 60 MG: 30 CAPSULE, LIQUID FILLED ORAL at 12:03

## 2021-08-07 RX ADMIN — METOCLOPRAMIDE 10 MG: 5 INJECTION, SOLUTION INTRAMUSCULAR; INTRAVENOUS at 05:02

## 2021-08-07 RX ADMIN — PRAZOSIN HYDROCHLORIDE 2 MG: 1 CAPSULE ORAL at 00:43

## 2021-08-07 RX ADMIN — HYDRALAZINE HYDROCHLORIDE 100 MG: 50 TABLET, FILM COATED ORAL at 08:24

## 2021-08-07 RX ADMIN — SODIUM CHLORIDE, POTASSIUM CHLORIDE, SODIUM LACTATE AND CALCIUM CHLORIDE 250 ML: 600; 310; 30; 20 INJECTION, SOLUTION INTRAVENOUS at 06:00

## 2021-08-07 RX ADMIN — PRAZOSIN HYDROCHLORIDE 2 MG: 1 CAPSULE ORAL at 22:25

## 2021-08-07 RX ADMIN — NIMODIPINE 60 MG: 30 CAPSULE, LIQUID FILLED ORAL at 19:57

## 2021-08-07 RX ADMIN — LISINOPRIL 40 MG: 20 TABLET ORAL at 21:28

## 2021-08-07 RX ADMIN — NIMODIPINE 60 MG: 30 CAPSULE, LIQUID FILLED ORAL at 00:30

## 2021-08-07 RX ADMIN — NIMODIPINE 60 MG: 30 CAPSULE, LIQUID FILLED ORAL at 04:00

## 2021-08-07 RX ADMIN — ALPRAZOLAM 0.5 MG: 0.5 TABLET ORAL at 12:56

## 2021-08-07 RX ADMIN — SODIUM CHLORIDE, POTASSIUM CHLORIDE, SODIUM LACTATE AND CALCIUM CHLORIDE 100 ML/HR: 600; 310; 30; 20 INJECTION, SOLUTION INTRAVENOUS at 06:00

## 2021-08-07 NOTE — PROGRESS NOTES
Neurointerventional Surgery Progress Note  Norbert Montana AGACNP-BC  Neurocritical Care NP      Admit Date: 7/20/2021   LOS: 18 days        Daily Progress Note: 8/7/2021    S/P: POD 18 status post diagnostic cerebral angiogram    HPI: Nati Saeed is a 20-year-old male with a past medical history significant for hypertension and diabetes type 2 who initially presented to [facility] ED on 7/19/2021 for acute onset of dizziness, headache, left-sided hearing loss, and left-sided weakness. Upon arrival to the ED his blood pressure was noted to be 265/120 per review of notes. A code stroke was called and CT of the head showed an acute subarachnoid hemorrhage anterior to the sarahi. CTA of the head and neck showed an AVM in the left superior cerebellar peduncle, with the dominant drainage into the vein of Gaston. No large vessel occlusion, no hemodynamically significant stenosis. Redemonstrated subarachnoid hemorrhage in the basilar cisterns. He was started on a Cardene drip and was also given IV hydralazine and labetalol. He was then transferred to [facility] for a higher level of care. NIS was consulted and patient underwent a diagnostic cerebral angiogram on 7/20/2021. The angiogram showed evidence of a cerebral AVM with multiple feeders coming of the left PCA, left anterior choroidal and left SCA with a large single draining vein that drains into the vein of [place]. 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 Subjective:   Patient complaining of a worsening headache overnight. CT of the head and CT of the head and neck was repeated overnight which showed no acute findings. He did receive fluid boluses overnight. He he denies any current headache. He still endorses double vision which has not changed. He reports that his dizziness with movement has improved. He does still have some issues with balance and reports some issues with depth perception.   He denies any chest pain, shortness of breath, nausea, vomiting, tingling, or any new speech difficulty. He still endorses left facial weakness and left facial numbness.      Current Facility-Administered Medications   Medication Dose Route Frequency Provider Last Rate Last Admin    metoclopramide HCl (REGLAN) injection 10 mg  10 mg IntraVENous Q6H PRN David Lino NP   10 mg at 08/07/21 0502    lactated Ringers infusion  100 mL/hr IntraVENous CONTINUOUS David Lino  mL/hr at 08/07/21 0600 100 mL/hr at 08/07/21 0600    prazosin (MINIPRESS) capsule 2 mg  2 mg Oral TID Chetna INFANTE MD   2 mg at 08/07/21 0825    hydrALAZINE (APRESOLINE) tablet 100 mg  100 mg Oral TID Chetna INFANTE MD   100 mg at 08/07/21 0824    cloNIDine HCL (CATAPRES) tablet 0.2 mg  0.2 mg Oral Q8H Piyush Melvin MD   0.2 mg at 08/07/21 0645    lisinopriL (PRINIVIL, ZESTRIL) tablet 40 mg  40 mg Oral QHS Chetna Garcia MD   40 mg at 08/06/21 2329    chlorthalidone (HYGROTON) tablet 25 mg  25 mg Oral DAILY Hilario Saul MD   25 mg at 08/07/21 0825    senna-docusate (PERICOLACE) 8.6-50 mg per tablet 1 Tablet  1 Tablet Oral DAILY Nasir Grant NP   1 Tablet at 08/07/21 0825    polyethylene glycol (MIRALAX) packet 17 g  17 g Oral DAILY PRN Nasir Grant NP   17 g at 07/25/21 0906    bisacodyL (DULCOLAX) tablet 10 mg  10 mg Oral DAILY PRN Nasir Grant NP        labetaloL (NORMODYNE;TRANDATE) injection 10 mg  10 mg IntraVENous Q4H PRN Nasir Grant NP   10 mg at 08/05/21 0136    hydrALAZINE (APRESOLINE) 20 mg/mL injection 20 mg  20 mg IntraVENous Q6H PRN Herberth Morales DO   20 mg at 08/05/21 2134    insulin lispro (HUMALOG) injection   SubCUTAneous AC&HS Glenis Kettle, NP   2 Units at 08/05/21 1723    glucose chewable tablet 16 g  4 Tablet Oral PRN Glenis Fam, NP        dextrose (D50W) injection syrg 12.5-25 g  12.5-25 g IntraVENous PRN Glenis Fam, NP        glucagon (GLUCAGEN) injection 1 mg 1 mg IntraMUSCular PRN Daphne Pompa NP        ondansetron [facility]) injection 4 mg  4 mg IntraVENous Q6H PRN Giselle Ronde, NP-C        naloxone [facility]) injection 0.4 mg  0.4 mg IntraVENous PRN Giselle Ronde, NP-C        acetaminophen (TYLENOL) tablet 650 mg  650 mg Oral Q4H PRN Giselle Ronde, NP-C   650 mg at 21 0253    Or    acetaminophen (TYLENOL) solution 650 mg  650 mg Per NG tube Q4H PRN Giselle Ronde, NP-C        Or    acetaminophen (TYLENOL) suppository 650 mg  650 mg Rectal Q4H PRN Giselle Ronde, NP-C        niMODipine (NIMOTOP) capsule 60 mg  60 mg Oral Q4H Andre Grant, NP   60 mg at 21 0825    levETIRAcetam (KEPPRA) tablet 500 mg  500 mg Oral BID Luly Gallegos NP   500 mg at 21 1966        No Known Allergies    Review of Systems:  Pertinent items are noted in the History of Present Illness. Objective:     Vital signs  Temp (24hrs), Av.1 °F (36.7 °C), Min:97.8 °F (36.6 °C), Max:98.3 °F (36.8 °C)   No intake/output data recorded.    1901 -  0700  In: 800 [P.O.:800]  Out: 1900 [Urine:1900]    Visit Vitals  BP (!) 166/78   Pulse (!) 57   Temp 98.3 °F (36.8 °C)   Resp 15   Ht 6' (1.829 m)   Wt 234 lb 9.6 oz (106.4 kg)   SpO2 98%   BMI 31.82 kg/m²      O2 Device: None (Room air)     Pain control  Pain Assessment  Pain Scale 1: Numeric (0 - 10)  Pain Intensity 1: 0  Pain Onset 1:  (denied)  Pain Location 1: Head  Pain Orientation 1: Left  Pain Description 1: Pressure  Pain Intervention(s) 1: Medication (see MAR)    PT/OT  Gait     Gait  Base of Support: Widened  Speed/Rita: Slow, Shuffled  Step Length: Right shortened, Left shortened  Gait Abnormalities: Ataxic, Trunk sway increased, Shuffling gait  Ambulation - Level of Assistance: Stand-by assistance  Distance (ft): 300 Feet (ft)  Assistive Device: Gait belt, Walker, rolling             Vitals:    21 0400 21 0600 21 0645 21 0824   BP: 137/66  (!) 144/91 (!) 166/78   Pulse: (!) 54 (!) 56 (!) 56 (!) 57   Resp:       Temp:       SpO2:       Weight:       Height:            Physical Exam:  GENERAL: alert, cooperative, no distress, appears stated age  LUNG: clear to auscultation bilaterally  HEART: regular rate and rhythm, S1, S2 normal, no murmur, click, rub or gallop  EXTREMITIES:  extremities normal, atraumatic, no cyanosis, trace peripheral edema in BUE, +1 pitting edema in BLE, Posterior tibial pulses +1 bilaterally, +2 pedal pulses bilaterally   SKIN: Skin warm to touch. Appropriate for ethnicity. Right groin site clean, dry, and intact. No hematoma, bruising, or bleeding noted. Neurologic Exam:  Mental Status:  Alert and oriented x 4. Appropriate affect, mood and behavior. Language:    Normal fluency, repetition, comprehension and naming. Cranial Nerves:        Pupils equal, round and reactive to light. Visual fields full to confrontation. Dysconjugate gaze intermittently with EOMs, otherwise intact. Mild subtle nystagmus when looking to the left, but it has improved. Left eye proptosis. Decreased sensation on the left side of face. Left facial asymmetry     Tongue protrudes to midline, palate elevates symmetrically. No dysarthria. Motor:    No pronator drift. Bulk and tone normal.      5/5 power in all extremities proximally and distally. No involuntary movements. Sensation:    Decreased sensation on the left side of face, otherwise sensation intact to light touch. No neglect. Coordination & Gait: No significant ataxia with finger-to-nose and heel-to-shin bilaterally. Gait deferred.      24 hour results:    Recent Results (from the past 24 hour(s))   GLUCOSE, POC    Collection Time: 08/06/21 11:55 AM   Result Value Ref Range    Glucose (POC) 88 65 - 117 mg/dL    Performed by [address] [place] [address], POC    Collection Time: 08/06/21  4:48 PM   Result Value Ref Range Glucose (POC) 131 (H) 65 - 117 mg/dL    Performed by Lizzy Miller, POC    Collection Time: 08/07/21 12:55 AM   Result Value Ref Range    Glucose (POC) 117 65 - 117 mg/dL    Performed by Adeel Melchor, POC    Collection Time: 08/07/21  7:14 AM   Result Value Ref Range    Glucose (POC) 97 65 - 117 mg/dL    Performed by Sahil Liu           Imaging:  CT of head on 7/19/2021 at 2104 shows  IMPRESSION  1. Acute subarachnoid hemorrhage anterior to the sarahi. CTA of the head and neck on 7/19/2021 at 2126 shows  IMPRESSION     1. Arteriovenous malformation in the left superior cerebellar peduncle, with the  dominant drainage into the vein of Gaston. 2. No large vessel occlusion, no hemodynamically significant stenosis. 3. Redemonstrated subarachnoid hemorrhage in the basilar cisterns. CT the head on 7/20/2021 at 1129 shows  IMPRESSION  1. Stable ventricular size.     2.  Questionable diffuse cerebral edema.     3.  Subarachnoid hemorrhage in the prepontine cistern and ambient cisterns as  well as sylvian fissures are decreased from the prior study. MRI brain with and without 7/20/2021 at 2214 shows  IMPRESSION  1. Tiny 1 mm foci of diffusion restriction in the left cerebellum and left  cerebral hemisphere as described likely embolic in origin.     2.  Possible mild meningeal enhancement which may be secondary to meningeal  irritation from known subarachnoid hemorrhage.     3.  Dilated superior ophthalmic veins are noted which is new when compared to  prior imaging. This is of uncertain clinical significance. There appears to be  normal enhancement of the cavernous sinus bilaterally. CT of head on 7/23/2021 at 2233 shows     IMPRESSION  No hydrocephalus. Stable left prepontine subarachnoid hemorrhage. CT of Head on 7/24/2021 at 1357  IMPRESSION:   Left prepontine hemorrhage is slightly increased.     CT of the head on 7/24/2021 at 10:21 PM shows  IMPRESSION  Stable left prepontine hemorrhage. CTA of Head and neck on 7/28/2021 at 1001 shows  IMPRESSION     No evidence for significant vasospasm. Diffuse mild atherosclerosis. Left  cerebellar vascular malformation is again noted with unchanged hemorrhage in the  left prepontine cistern. Small focal hypoattenuation in the left sarahi and left  cerebellar peduncle likely acute ischemic changes.     Small right pleural effusion. CT of head on 8/5/2021 1037 shows  IMPRESSION  No acute intracranial process. CTA of head and neck on 8/5/2021 at 1037 shows  IMPRESSION  1. Unchanged left pontine arteriovenous malformation. 2.  Resolution of prepontine cistern hemorrhage. 3.  No evidence of vasospasm. 4.  No significant carotid stenosis. CT of head on 8/7/2021 at 0026 shows  IMPRESSION  No acute findings. CTA of head and neck on 8/7/2021 at 0026 preliminary report shows  Unchanged left pontine arteriovenous malformation. No large vessel occlusion.       Assessment:     Principal Problem:    Cerebral arteriovenous malformation (AVM) (7/20/2021)      Overview: ([id number])- CT (Brain): There is an AVM in the superior cerebellum with multiple aneurysms. One measures 4 mm. Another measures 4 mm within the superior cerebellum.     Active Problems:    Malignant hypertension (7/20/2021)      Acute hypokalemia (7/20/2021)      Celiac artery stenosis ([address] (7/21/2021)      Vasogenic cerebral edema ([address] (7/21/2021)      Hypertensive emergency (7/21/2021)      SAH (subarachnoid hemorrhage) ([address] (7/20/2021)      Cerebral vasospasm (8/1/2021)        Plan:   Acute SAH in the setting of cerebellar AVM with associated flow related aneurysm, [place] Knox 1, Mistry Grade 2  - s/p diagnostic cerebral angiogram on 7/20/2021  - Day 19 of 21 of Nimotop for the prevention of delayed cerebral ischemia  - continue Keppra 500 mg BID for seizure ppx  - Continue LR at 100 ml/hr to maintain euvolemia   -Repeat CTA of the head and neck on 8/7/2021 shows unchanged left pontine AVM. No large vessel occlusion. CTA on 8/5 shows unchanged left pontine AVM. Resolution of prepontine cistern hemorrhage. No evidence of vasospasm. No significant carotid stenosis.  - Dr. Chari Burgess feels left facial weakness is worse and has some concern for left eye proptosis. Ordered stat MRI of Brain and MRV of Brain to assess for any new changes and rule out venous thrombosis  - Strict I's and O's, patient -500 mls on  I's and O's, continue IVF to 100 ml/hr to maintain euvolemia, patient is essentially out of vasospasm window.  - ECHO shows EF 65-70%, dilated left atrium. No shunting seen. Mild (grade 1) left ventricular diastolic dysfunction. - neuro checks every 4 hours  - SBP goal <140, on scheduled chlorthalidone, clonidine, hydralazine, prazosin, and lisinopril, defer management to primary team  - PT/OT following  - assess NICOM PRN for fluid responsiveness   - TCDs completed   - follow up with Dr. Michelle Platt after discharge for AVM treatment    - NIS following    Polyuria--> improved   - patient has been previously diuresed for fluid overload   - patient with intermittent polyuria, possibly autodiuresing   - trend serum sodium, last sodium 134, BMP daily   - Nephrology consulted for further opinion of any ADH abnormalities in the setting of [address] and per review of notes there is no concern for cerebral salt wasting syndrome.  - Strict I's and O's, continue to monitor  - Nephrology following     HTN  - SBP goal <140 as stated above  - patient on scheduled oral meds as stated above  - Patient had a bilateral artery/venous renal duplex on 7/22 which shows no evidence of significant renal artery stenosis. No evidence of significant renal parenchymal disease. Possible hemodynamically significant celiac artery stenosis. - Nephrology following, secondary HTN work up is negative.    - Nephrology following    Constipation (resolved)  - continue Rissa-colace daily  - Miralax PRN daily  - Dulcolax 10 mg daily PRN      Activity: Up with assistance, ambulate daily  DVT ppx: SCDs  Dispo: TBD    Plan d/w Dr. Timbo Farah, Dr. Chelsey Brady, RN, and patient. I called updated patient's wife Queta Villalba via phone [phone] on plan of care for today. Luis Patel, NP     Addendum 1450: MRI of Brain and MRV of Brain shows no evidence of dural venous sinus thrombosis. No evidence of ophthalmic vein thrombosis. Stable imaging findings related to prepontine AV malformation with chronic hemorrhage in the left anterior sarahi and left brachium pontis. No new diffusion restriction abnormality. No acute intracranial process is demonstrated. Updated patient and Dr. Timbo Farah on MRI results.

## 2021-08-07 NOTE — PROGRESS NOTES
Problem: Falls - Risk of  Goal: *Absence of Falls  Description: Document Adina Castellanos Fall Risk and appropriate interventions in the flowsheet.   Outcome: Progressing Towards Goal  Note: Fall Risk Interventions:  Mobility Interventions: Communicate number of staff needed for ambulation/transfer, Patient to call before getting OOB, PT Consult for mobility concerns, OT consult for ADLs, Utilize walker, cane, or other assistive device         Medication Interventions: Patient to call before getting OOB, Teach patient to arise slowly    Elimination Interventions: Bed/chair exit alarm, Call light in reach    History of Falls Interventions: Bed/chair exit alarm, Door open when patient unattended, Consult care management for discharge planning, Evaluate medications/consider consulting pharmacy, Investigate reason for fall         Problem: Subarachnoid Hemorrhage Stroke:Admission Day 5-Discharge  Goal: Activity/Safety  Outcome: Progressing Towards Goal  Goal: Psychosocial  Outcome: Progressing Towards Goal  Goal: *Hemodynamically stable  Outcome: Progressing Towards Goal

## 2021-08-07 NOTE — PROGRESS NOTES
[facility]  Hospitalist Group                                                                                          Hospitalist Progress Note  Roma Sharif MD  Answering service: [phone] from in house phone        Date of Service:  2021  NAME:  Lizbeth Schultz  :  1960  MRN:  [id number]      Admission Summary: \"This is 78-year-old gentleman with past medical history of hypertension,  currently getting transferred out of ICU after [facility] and hypertensive emergency treatment. He presented from [facility] hospital with headache and dizziness, found to have elevated blood pressure. CT of the head showed an acute subarachnoid hemorrhage anterior to the sarahi. CTA of the head and neck showed an AVM in the left superior cerebellar peduncle, with the dominant drainage into the vein of Gaston. No large vessel occlusion, no hemodynamically significant stenosis. Redemonstrated subarachnoid hemorrhage in the basilar cisterns. Patient was initially started on IV antihypertensives, now switched to oral antihypertensives. He was also started on Nimodipine. Interval history / Subjective:   Patient seen and examined no new issues  Left side seems weaker , d/w NIS will get MRI/ MRA today  CHETAN low reduced clonidine and hydralazine - keep readjusting   No headache     Assessment & Plan:     Acute SAH in setting of cerebellar AVM with associated flow related aneurysm  - Interventional neurology input appreciated. - S/P diagnostic cerebral andio on 21.  - Day  on nimodipine 60 mg po q4h. - Continue levetiracetam 500 mg po bid. - repeat MRI/ MRA today      Polyuria  Hyponatremia   - Nephrology input appreciated. - Monitor for now.       HTN- HYPOTENSIVE THIS MORNING HOLD MEDS WILL READJUST   - Continue chlorthalidone 25 mg po daily. - Continue clonidine 0.1 mg po q8h. - Continue hydralazine 50 mg po tid.     - Continue lisinopril 40 mg po daily.    - Prazosin 2 mg po TID   - Plan for CTA abdomen 08/05/21.- 1. 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 CT head within normal limit       DM II  Hyperglycemia   - Continue to hold oral diabetes meds. - Continue FSBS with SSI correction scale.      Constipation   - Continue senna-docusate 8.6-50 mg po daily.          Obesity/ Body mass index is 34.18 kg/m².     Code status: FULL  DVT prophylaxis: scd    Care Plan discussed with: Patient/Family and Nurse  Anticipated Disposition: Home w/Family  Anticipated Discharge: 24 hours to 48 hours     Hospital Problems  Date Reviewed: 7/20/2021        Codes Class Noted POA    Celiac artery stenosis (HCC) (Chronic) ICD-10-CM: I77.4  ICD-9-CM: 447. 4 Chronic 7/21/2021 Yes        Vasogenic cerebral edema (HCC) ICD-10-CM: G93.6  ICD-9-CM: 940. 5 Acute 7/21/2021 No        Hypertensive emergency ICD-10-CM: I16.1  ICD-9-CM: 401.9 Acute 7/21/2021 Yes        Malignant hypertension ICD-10-CM: I10  ICD-9-CM: 401.0 Acute 7/20/2021 Yes        * (Principal) Cerebral arteriovenous malformation (AVM) ICD-10-CM: Q28.2  ICD-9-CM: 747.81 Acute 7/20/2021 Yes    Overview Signed 7/20/2021  7:03 AM by Saskia Green MD     ([id number])- CT (Brain): There is an AVM in the superior cerebellum with multiple aneurysms. One measures 4 mm. Another measures 4 mm within the superior cerebellum. Acute hypokalemia ICD-10-CM: E87.6  ICD-9-CM: 276.8 Acute 7/20/2021 Yes        Cerebral vasospasm ICD-10-CM: K79.297  ICD-9-CM: 435.9  8/1/2021 No        SAH (subarachnoid hemorrhage) (HCC) ICD-10-CM: I60.9  ICD-9-CM: 430  7/20/2021 Yes                Review of Systems:   A comprehensive review of systems was negative. Vital Signs:    Last 24hrs VS reviewed since prior progress note.  Most recent are:  Visit Vitals  BP (!) 123/57 (BP 1 Location: Right upper arm, BP Patient Position: At rest)   Pulse 66   Temp 98.2 °F (36.8 °C)   Resp 19   Ht 6' (1.829 m)   Wt 106.4 kg (234 lb 9.6 oz)   SpO2 98%   BMI 31.82 kg/m²         Intake/Output Summary (Last 24 hours) at 8/7/2021 1014  Last data filed at 8/6/2021 1759  Gross per 24 hour   Intake 800 ml   Output 1300 ml   Net -500 ml        Physical Examination:     I had a face to face encounter with this patient and independently examined them on 8/7/2021 as outlined below:          Constitutional:  No acute distress, cooperative, pleasant    ENT:  Oral mucosa moist, oropharynx benign. Resp:  CTA bilaterally. No wheezing/rhonchi/rales. No accessory muscle use   CV:  Regular rhythm, normal rate, no murmurs, gallops, rubs    GI:  Soft, non distended, non tender. normoactive bowel sounds, no hepatosplenomegaly     Musculoskeletal:  No edema, warm, 2+ pulses throughout    Neurologic:  Moves all extremities. AAOx3, CN II-XII reviewed            Data Review:    I personally reviewed  Image and LABS      Labs:     Recent Labs     08/06/21  0402 08/05/21  0147   WBC 7.2 9.1   HGB 13.4 13.8   HCT 39.0 40.5    231     Recent Labs     08/06/21  0402 08/05/21  0147   * 135*   K 4.2 4.0    99   CO2 29 30   BUN 14 14   CREA 0.83 0.81   * 86   CA 9.1 8.8   MG 2.2 2.3   PHOS 3.6 4.5     No results for input(s): ALT, AP, TBIL, TBILI, TP, ALB, GLOB, GGT, AML, LPSE in the last 72 hours. No lab exists for component: SGOT, GPT, AMYP, HLPSE  No results for input(s): INR, PTP, APTT, INREXT, INREXT in the last 72 hours. No results for input(s): FE, TIBC, PSAT, FERR in the last 72 hours. No results found for: FOL, RBCF   No results for input(s): PH, PCO2, PO2 in the last 72 hours. No results for input(s): CPK, CKNDX, TROIQ in the last 72 hours.     No lab exists for component: CPKMB  Lab Results   Component Value Date/Time    Cholesterol, total 125 07/20/2021 04:11 AM    HDL Cholesterol 68 07/20/2021 04:11 AM    LDL, calculated 25.2 07/20/2021 04:11 AM    Triglyceride 159 (H) 07/20/2021 04:11 AM    CHOL/HDL Ratio 1.8 07/20/2021 04:11 AM     Lab Results   Component Value Date/Time    Glucose (POC) 97 08/07/2021 07:14 AM    Glucose (POC) 117 08/07/2021 12:55 AM    Glucose (POC) 131 (H) 08/06/2021 04:48 PM    Glucose (POC) 88 08/06/2021 11:55 AM    Glucose (POC) 93 08/06/2021 07:34 AM     Lab Results   Component Value Date/Time    Color YELLOW/STRAW 03/11/2015 12:33 PM    Appearance CLEAR 03/11/2015 12:33 PM    Specific gravity 1.019 03/11/2015 12:33 PM    pH (UA) 6.0 03/11/2015 12:33 PM    Protein NEGATIVE  03/11/2015 12:33 PM    Glucose NEGATIVE  03/11/2015 12:33 PM    Ketone NEGATIVE  03/11/2015 12:33 PM    Bilirubin NEGATIVE  03/11/2015 12:33 PM    Urobilinogen 1.0 03/11/2015 12:33 PM    Nitrites NEGATIVE  03/11/2015 12:33 PM    Leukocyte Esterase NEGATIVE  03/11/2015 12:33 PM    Epithelial cells FEW 03/11/2015 12:33 PM    Bacteria NEGATIVE  03/11/2015 12:33 PM    WBC 0-4 03/11/2015 12:33 PM    RBC 0-5 03/11/2015 12:33 PM         Medications Reviewed:     Current Facility-Administered Medications   Medication Dose Route Frequency    metoclopramide HCl (REGLAN) injection 10 mg  10 mg IntraVENous Q6H PRN    lactated Ringers infusion  100 mL/hr IntraVENous CONTINUOUS    ALPRAZolam (XANAX) tablet 0.5 mg  0.5 mg Oral ONCE    cloNIDine HCL (CATAPRES) tablet 0.1 mg  0.1 mg Oral Q8H    hydrALAZINE (APRESOLINE) tablet 50 mg  50 mg Oral TID    prazosin (MINIPRESS) capsule 2 mg  2 mg Oral TID    lisinopriL (PRINIVIL, ZESTRIL) tablet 40 mg  40 mg Oral QHS    chlorthalidone (HYGROTON) tablet 25 mg  25 mg Oral DAILY    senna-docusate (PERICOLACE) 8.6-50 mg per tablet 1 Tablet  1 Tablet Oral DAILY    polyethylene glycol (MIRALAX) packet 17 g  17 g Oral DAILY PRN    bisacodyL (DULCOLAX) tablet 10 mg  10 mg Oral DAILY PRN    labetaloL (NORMODYNE;TRANDATE) injection 10 mg  10 mg IntraVENous Q4H PRN    hydrALAZINE (APRESOLINE) 20 mg/mL injection 20 mg  20 mg IntraVENous Q6H PRN    insulin lispro (HUMALOG) injection   SubCUTAneous AC&HS    glucose chewable tablet 16 g  4 Tablet Oral PRN    dextrose (D50W) injection syrg 12.5-25 g  12.5-25 g IntraVENous PRN    glucagon (GLUCAGEN) injection 1 mg  1 mg IntraMUSCular PRN    ondansetron (ZOFRAN) injection 4 mg  4 mg IntraVENous Q6H PRN    naloxone (NARCAN) injection 0.4 mg  0.4 mg IntraVENous PRN    acetaminophen (TYLENOL) tablet 650 mg  650 mg Oral Q4H PRN    Or    acetaminophen (TYLENOL) solution 650 mg  650 mg Per NG tube Q4H PRN    Or    acetaminophen (TYLENOL) suppository 650 mg  650 mg Rectal Q4H PRN    niMODipine (NIMOTOP) capsule 60 mg  60 mg Oral Q4H    levETIRAcetam (KEPPRA) tablet 500 mg  500 mg Oral BID     ______________________________________________________________________  EXPECTED LENGTH OF STAY: 4d 9h  ACTUAL LENGTH OF STAY:          18                 Sha Lovelace MD

## 2021-08-07 NOTE — ROUTINE PROCESS
Bedside shift change report given to Norma Ramirez [address] (oncoming nurse) by Kotrney Parker RN (offgoing nurse). Report included the following information SBAR, Kardex, ED Summary, Intake/Output, MAR, Cardiac Rhythm NSR and Dual Neuro Assessment.

## 2021-08-07 NOTE — PROGRESS NOTES
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 Physical Exam:  Gen: NAD, calm, cooperative. Has eyepatch over right eye tonight and has his glasses on. Neuro: A&Ox4. Follows commands. Speech clear. Affect normal. PERRL, 3 mm bilaterally. Visual fields full to confrontation. However, has stated double vision at a distance. Mild disconjugate gaze present at times. EOMI. Significant left lower facial droop with stated left facial numbness noted to left check and forehead on light touch. Beni spontaneously. Strength 5/5 in UE and LE BL. Negative drift. Bulk and tone normal. No involuntary movements. Gait deferred. No ataxia. Skin: Warm, dry, color appropriate for ethnicity. PLAN:Continue current plan per NIS. Addendum: 11:45 Patient complaining of 8/10 throbbing left frontal headache. Neuro exam stable. Recommended Reglan IV and IVF bolus. BP stable at 138/54. Will repeat CTH and CTA H/N. Addendum: 01:00 CTH/CTA stable with no acute findings. Maintain SBP less than 140. Call  with any further sudden onset severe headaches or other acute neuro changes. Addendum: 05:00 patient states his headache had not totally gone away and had gotten worse again for a while but did not want to tell anyone. States he really wants to go home. Neuro exam is stable. Pupils equal and reactive 2 mm. Increased Reglan to 10 mg IV q 6 hours prn. Give another LR bolus 250 ml and maintain at 100 ml/hr x 5 hours. Addendum 06:20. HA improving, 's. Feeling better.          Enrike Camp NP  Neurocritical Care Nurse Practitioner

## 2021-08-07 NOTE — PROGRESS NOTES
Problem: Falls - Risk of  Goal: *Absence of Falls  Description: Document Angeles Eugene Fall Risk and appropriate interventions in the flowsheet. Outcome: Progressing Towards Goal  Note: Fall Risk Interventions:  Mobility Interventions: Communicate number of staff needed for ambulation/transfer, Patient to call before getting OOB, PT Consult for mobility concerns, OT consult for ADLs, Utilize walker, cane, or other assistive device         Medication Interventions: Evaluate medications/consider consulting pharmacy, Patient to call before getting OOB    Elimination Interventions: Patient to call for help with toileting needs, Toileting schedule/hourly rounds    History of Falls Interventions:  Investigate reason for fall, Room close to nurse's station, Vital signs minimum Q4HRs X 24 hrs (comment for end date)         Problem: Patient Education: Go to Patient Education Activity  Goal: Patient/Family Education  Outcome: Progressing Towards Goal     Problem: Patient Education: Go to Patient Education Activity  Goal: Patient/Family Education  Outcome: Progressing Towards Goal     Problem: Patient Education: Go to Patient Education Activity  Goal: Patient/Family Education  Outcome: Progressing Towards Goal     Problem: Patient Education: Go to Patient Education Activity  Goal: Patient/Family Education  Outcome: Progressing Towards Goal     Problem: Patient Education:  Go to Education Activity  Goal: Patient/Family Education  Outcome: Progressing Towards Goal     Problem: Subarachnoid Hemorrhage Stroke:Admission Day 3  Goal: Activity/Safety  Outcome: Progressing Towards Goal  Goal: Diagnostic Test/Procedures  Outcome: Progressing Towards Goal  Goal: Nutrition/Diet  Outcome: Progressing Towards Goal  Goal: Medications  Outcome: Progressing Towards Goal  Goal: Patient maintains clear airway/absence of aspiration  Outcome: Progressing Towards Goal  Goal: Treatments/Interventions/Procedures  Outcome: Progressing Towards Goal  Goal: Psychosocial  Outcome: Progressing Towards Goal  Goal: *Hemodynamically stable  Outcome: Progressing Towards Goal  Goal: *Absence of signs and symptoms of DVT  Outcome: Progressing Towards Goal     Problem: Subarachnoid Hemorrhage Stroke:Admission Day 4  Goal: Activity/Safety  Outcome: Progressing Towards Goal  Goal: Diagnostic Test/Procedures  Outcome: Progressing Towards Goal  Goal: Nutrition/Diet  Outcome: Progressing Towards Goal  Goal: Medications  Outcome: Progressing Towards Goal  Goal: Patient maintains clear airway/absence of aspiration  Outcome: Progressing Towards Goal  Goal: Treatments/Interventions/Procedures  Outcome: Progressing Towards Goal  Goal: Psychosocial  Outcome: Progressing Towards Goal  Goal: *Hemodynamically stable  Outcome: Progressing Towards Goal  Goal: *Absence of signs and symptoms of DVT  Outcome: Progressing Towards Goal     Problem: Subarachnoid Hemorrhage Stroke:Admission Day 5-Discharge  Goal: Activity/Safety  Outcome: Progressing Towards Goal  Goal: Diagnostic Test/Procedures  Outcome: Progressing Towards Goal  Goal: Nutrition/Diet  Outcome: Progressing Towards Goal  Goal: Medications  Outcome: Progressing Towards Goal  Goal: Patient maintains clear airway/absence of aspiration  Outcome: Progressing Towards Goal  Goal: Treatments/Interventions/Procedures  Outcome: Progressing Towards Goal  Goal: Psychosocial  Outcome: Progressing Towards Goal  Goal: *Hemodynamically stable  Outcome: Progressing Towards Goal  Goal: *Absence of signs and symptoms of DVT  Outcome: Progressing Towards Goal     Problem: Subarachnoid Hemorrhage Stroke:Discharge Outcomes  Goal: *Verbalizes anxiety and depression are reduced or absent  Outcome: Progressing Towards Goal  Goal: *Verbalizes understanding of risk factor modification (Stroke Metric)  Outcome: Progressing Towards Goal  Goal: *Hemodynamically stable  Outcome: Progressing Towards Goal  Goal: *Aware of needed dietary changes  Outcome: Progressing Towards Goal  Goal: *Verbalizes understanding and describes medication purposes and frequencies  Outcome: Progressing Towards Goal  Goal: *Verbalizes understanding of plan for nimodipine administration  Description: until day 21 and when next dose is scheduled  Outcome: Progressing Towards Goal  Goal: *Absence of signs and symptoms of DVT  Outcome: Progressing Towards Goal  Goal: *Absence of aspiration  Outcome: Progressing Towards Goal  Goal: *Progressive mobility and function  Outcome: Progressing Towards Goal  Goal: *Home safety concerns addressed  Outcome: Progressing Towards Goal

## 2021-08-08 LAB
ANION GAP SERPL CALC-SCNC: 6 MMOL/L (ref 5–15)
BUN SERPL-MCNC: 15 MG/DL (ref 6–20)
BUN/CREAT SERPL: 18 (ref 12–20)
CALCIUM SERPL-MCNC: 8.9 MG/DL (ref 8.5–10.1)
CHLORIDE SERPL-SCNC: 105 MMOL/L (ref 97–108)
CO2 SERPL-SCNC: 23 MMOL/L (ref 21–32)
CREAT SERPL-MCNC: 0.83 MG/DL (ref 0.7–1.3)
GLUCOSE BLD STRIP.AUTO-MCNC: 114 MG/DL (ref 65–117)
GLUCOSE BLD STRIP.AUTO-MCNC: 114 MG/DL (ref 65–117)
GLUCOSE BLD STRIP.AUTO-MCNC: 131 MG/DL (ref 65–117)
GLUCOSE BLD STRIP.AUTO-MCNC: 88 MG/DL (ref 65–117)
GLUCOSE SERPL-MCNC: 100 MG/DL (ref 65–100)
MAGNESIUM SERPL-MCNC: 2.2 MG/DL (ref 1.6–2.4)
PHOSPHATE SERPL-MCNC: 3.2 MG/DL (ref 2.6–4.7)
POTASSIUM SERPL-SCNC: 4.2 MMOL/L (ref 3.5–5.1)
SERVICE CMNT-IMP: ABNORMAL
SERVICE CMNT-IMP: NORMAL
SODIUM SERPL-SCNC: 134 MMOL/L (ref 136–145)

## 2021-08-08 PROCEDURE — 65660000001 HC RM ICU INTERMED STEPDOWN

## 2021-08-08 PROCEDURE — 99232 SBSQ HOSP IP/OBS MODERATE 35: CPT | Performed by: STUDENT IN AN ORGANIZED HEALTH CARE EDUCATION/TRAINING PROGRAM

## 2021-08-08 PROCEDURE — 74011250637 HC RX REV CODE- 250/637: Performed by: NURSE PRACTITIONER

## 2021-08-08 PROCEDURE — 84100 ASSAY OF PHOSPHORUS: CPT

## 2021-08-08 PROCEDURE — 74011250637 HC RX REV CODE- 250/637: Performed by: INTERNAL MEDICINE

## 2021-08-08 PROCEDURE — 36415 COLL VENOUS BLD VENIPUNCTURE: CPT

## 2021-08-08 PROCEDURE — 82962 GLUCOSE BLOOD TEST: CPT

## 2021-08-08 PROCEDURE — 80048 BASIC METABOLIC PNL TOTAL CA: CPT

## 2021-08-08 PROCEDURE — 74011250637 HC RX REV CODE- 250/637: Performed by: HOSPITALIST

## 2021-08-08 PROCEDURE — 74011000250 HC RX REV CODE- 250: Performed by: NURSE PRACTITIONER

## 2021-08-08 PROCEDURE — 83735 ASSAY OF MAGNESIUM: CPT

## 2021-08-08 RX ADMIN — NIMODIPINE 60 MG: 30 CAPSULE, LIQUID FILLED ORAL at 03:03

## 2021-08-08 RX ADMIN — HYDRALAZINE HYDROCHLORIDE 50 MG: 50 TABLET, FILM COATED ORAL at 21:12

## 2021-08-08 RX ADMIN — LEVETIRACETAM 500 MG: 500 TABLET ORAL at 09:18

## 2021-08-08 RX ADMIN — PRAZOSIN HYDROCHLORIDE 2 MG: 1 CAPSULE ORAL at 17:23

## 2021-08-08 RX ADMIN — CLONIDINE HYDROCHLORIDE 0.1 MG: 0.1 TABLET ORAL at 14:06

## 2021-08-08 RX ADMIN — HYDRALAZINE HYDROCHLORIDE 50 MG: 50 TABLET, FILM COATED ORAL at 17:22

## 2021-08-08 RX ADMIN — LABETALOL HYDROCHLORIDE 10 MG: 5 INJECTION INTRAVENOUS at 20:47

## 2021-08-08 RX ADMIN — CLONIDINE HYDROCHLORIDE 0.1 MG: 0.1 TABLET ORAL at 06:08

## 2021-08-08 RX ADMIN — LISINOPRIL 40 MG: 20 TABLET ORAL at 21:12

## 2021-08-08 RX ADMIN — NIMODIPINE 60 MG: 30 CAPSULE, LIQUID FILLED ORAL at 09:20

## 2021-08-08 RX ADMIN — DOCUSATE SODIUM 50 MG AND SENNOSIDES 8.6 MG 1 TABLET: 8.6; 5 TABLET, FILM COATED ORAL at 09:18

## 2021-08-08 RX ADMIN — PRAZOSIN HYDROCHLORIDE 2 MG: 1 CAPSULE ORAL at 21:12

## 2021-08-08 RX ADMIN — LEVETIRACETAM 500 MG: 500 TABLET ORAL at 17:22

## 2021-08-08 RX ADMIN — CLONIDINE HYDROCHLORIDE 0.1 MG: 0.1 TABLET ORAL at 21:12

## 2021-08-08 RX ADMIN — PRAZOSIN HYDROCHLORIDE 2 MG: 1 CAPSULE ORAL at 09:22

## 2021-08-08 RX ADMIN — NIMODIPINE 60 MG: 30 CAPSULE, LIQUID FILLED ORAL at 17:22

## 2021-08-08 RX ADMIN — NIMODIPINE 60 MG: 30 CAPSULE, LIQUID FILLED ORAL at 11:57

## 2021-08-08 RX ADMIN — CHLORTHALIDONE 25 MG: 25 TABLET ORAL at 09:17

## 2021-08-08 RX ADMIN — NIMODIPINE 60 MG: 30 CAPSULE, LIQUID FILLED ORAL at 20:53

## 2021-08-08 RX ADMIN — HYDRALAZINE HYDROCHLORIDE 50 MG: 50 TABLET, FILM COATED ORAL at 09:17

## 2021-08-08 NOTE — PROGRESS NOTES
Problem: Falls - Risk of  Goal: *Absence of Falls  Description: Document Sim Celeste Fall Risk and appropriate interventions in the flowsheet.   Outcome: Progressing Towards Goal  Note: Fall Risk Interventions:  Mobility Interventions: Communicate number of staff needed for ambulation/transfer, Patient to call before getting OOB, PT Consult for mobility concerns, OT consult for ADLs, Utilize walker, cane, or other assistive device         Medication Interventions: Patient to call before getting OOB, Teach patient to arise slowly    Elimination Interventions: Bed/chair exit alarm, Call light in reach    History of Falls Interventions: Bed/chair exit alarm, Door open when patient unattended, Consult care management for discharge planning, Evaluate medications/consider consulting pharmacy, Investigate reason for fall         Problem: Patient Education: Go to Patient Education Activity  Goal: Patient/Family Education  Outcome: Progressing Towards Goal     Problem: Patient Education: Go to Patient Education Activity  Goal: Patient/Family Education  Outcome: Progressing Towards Goal     Problem: Patient Education: Go to Patient Education Activity  Goal: Patient/Family Education  Outcome: Progressing Towards Goal     Problem: Patient Education: Go to Patient Education Activity  Goal: Patient/Family Education  Outcome: Progressing Towards Goal     Problem: Patient Education:  Go to Education Activity  Goal: Patient/Family Education  Outcome: Progressing Towards Goal     Problem: Subarachnoid Hemorrhage Stroke:Admission Day 3  Goal: Activity/Safety  Outcome: Progressing Towards Goal  Goal: Diagnostic Test/Procedures  Outcome: Progressing Towards Goal  Goal: Nutrition/Diet  Outcome: Progressing Towards Goal  Goal: Medications  Outcome: Progressing Towards Goal  Goal: Patient maintains clear airway/absence of aspiration  Outcome: Progressing Towards Goal  Goal: Treatments/Interventions/Procedures  Outcome: Progressing Towards Goal  Goal: Psychosocial  Outcome: Progressing Towards Goal  Goal: *Hemodynamically stable  Outcome: Progressing Towards Goal  Goal: *Absence of signs and symptoms of DVT  Outcome: Progressing Towards Goal     Problem: Subarachnoid Hemorrhage Stroke:Admission Day 4  Goal: Activity/Safety  Outcome: Progressing Towards Goal  Goal: Diagnostic Test/Procedures  Outcome: Progressing Towards Goal  Goal: Nutrition/Diet  Outcome: Progressing Towards Goal  Goal: Medications  Outcome: Progressing Towards Goal  Goal: Patient maintains clear airway/absence of aspiration  Outcome: Progressing Towards Goal  Goal: Treatments/Interventions/Procedures  Outcome: Progressing Towards Goal  Goal: Psychosocial  Outcome: Progressing Towards Goal  Goal: *Hemodynamically stable  Outcome: Progressing Towards Goal  Goal: *Absence of signs and symptoms of DVT  Outcome: Progressing Towards Goal     Problem: Subarachnoid Hemorrhage Stroke:Admission Day 5-Discharge  Goal: Activity/Safety  Outcome: Progressing Towards Goal  Goal: Diagnostic Test/Procedures  Outcome: Progressing Towards Goal  Goal: Nutrition/Diet  Outcome: Progressing Towards Goal  Goal: Medications  Outcome: Progressing Towards Goal  Goal: Patient maintains clear airway/absence of aspiration  Outcome: Progressing Towards Goal  Goal: Treatments/Interventions/Procedures  Outcome: Progressing Towards Goal  Goal: Psychosocial  Outcome: Progressing Towards Goal  Goal: *Hemodynamically stable  Outcome: Progressing Towards Goal  Goal: *Absence of signs and symptoms of DVT  Outcome: Progressing Towards Goal     Problem: Subarachnoid Hemorrhage Stroke:Discharge Outcomes  Goal: *Verbalizes anxiety and depression are reduced or absent  Outcome: Progressing Towards Goal  Goal: *Verbalizes understanding of risk factor modification (Stroke Metric)  Outcome: Progressing Towards Goal  Goal: *Hemodynamically stable  Outcome: Progressing Towards Goal  Goal: *Aware of needed dietary changes  Outcome: Progressing Towards Goal  Goal: *Verbalizes understanding and describes medication purposes and frequencies  Outcome: Progressing Towards Goal  Goal: *Verbalizes understanding of plan for nimodipine administration  Description: until day 21 and when next dose is scheduled  Outcome: Progressing Towards Goal  Goal: *Absence of signs and symptoms of DVT  Outcome: Progressing Towards Goal  Goal: *Absence of aspiration  Outcome: Progressing Towards Goal  Goal: *Progressive mobility and function  Outcome: Progressing Towards Goal  Goal: *Home safety concerns addressed  Outcome: Progressing Towards Goal

## 2021-08-08 NOTE — PROGRESS NOTES
Neurocritical Care Brief Progress Note:    80-year-old male with SAH with cerebellar AVM with multiple feeders coming off the left PCA, left anterior choroidal and left SCA with a large single draining vein that drains into the vein of Gaston. 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 Patient denies any headaches all day. Had a repeat MRI/MRV. Showed stable imaging findings related to prepontine AV malformation with chronic hemorrhage in the left anterior sarahi and left brachium pontis. MRA showed  left pontine AVM with draining vein on the left extending to the vein of Gaston. No evidence of dural venous sinus thrombosis. No evidence of ophthalmic vein thrombosis. Physical Exam:  Gen: NAD, calm, cooperative. Has eyepatch over right eye tonight and has his glasses on. Neuro: A&Ox4. Follows commands. Speech clear. Affect normal. PERRL, 3 mm bilaterally. Visual fields full to confrontation. However, has stated double vision at a distance. Mild disconjugate gaze present at times. EOMI. Significant left lower facial droop with stated left facial numbness noted to left check and forehead on light touch. Beni spontaneously. Strength 5/5 in UE and LE BL. Negative drift. Bulk and tone normal. No involuntary movements. Gait deferred. No ataxia. Skin: Warm, dry, color appropriate for ethnicity. PLAN: Continue current plan per NIS. Continue with SBP less than 140. Recommended trying to sleep with HOB slightly elevated to see if that will help with his headaches that have been coming at night.            Jenae Arce NP  Neurocritical Care Nurse Practitioner

## 2021-08-08 NOTE — PROGRESS NOTES
[facility] [facility]  Hospitalist Group                                                                                          Hospitalist Progress Note  Adrian Samayoa MD  Answering service: [phone] from in house phone        Date of Service:  2021  NAME:  Natalie Mooney  :  1960  MRN:  [id number]      Admission Summary: \"This is 71-year-old gentleman with past medical history of hypertension,  currently getting transferred out of ICU after [facility] and hypertensive emergency treatment. He presented from [facility] hospital with headache and dizziness, found to have elevated blood pressure. CT of the head showed an acute subarachnoid hemorrhage anterior to the sarahi. CTA of the head and neck showed an AVM in the left superior cerebellar peduncle, with the dominant drainage into the vein of Gaston. No large vessel occlusion, no hemodynamically significant stenosis. Redemonstrated subarachnoid hemorrhage in the basilar cisterns. Patient was initially started on IV antihypertensives, now switched to oral antihypertensives. He was also started on Nimodipine. Interval history / Subjective:   Patient seen and examined no new issues  Blood pressure is stable with new adjustment of medications pneumatobilia 820 x 21  Repeat MRI/MRA within normal limit no acute issues     Assessment & Plan:     Acute SAH in setting of cerebellar AVM with associated flow related aneurysm  - Interventional neurology input appreciated. - S/P diagnostic cerebral andio on 21.  - Day  on nimodipine 60 mg po q4h. - Continue levetiracetam 500 mg po bid. - repeat MRI/ MRA- reviewed     Polyuria  Hyponatremia   - Nephrology input appreciated. - Monitor for now.       HTN- HYPOTENSIVE THIS MORNING HOLD MEDS WILL READJUST   - Continue chlorthalidone 25 mg po daily. - Continue clonidine 0.1 mg po q8h. - Continue hydralazine 50 mg po tid.     - Continue lisinopril 40 mg po daily.    - Prazosin 2 mg po TID   - Plan for CTA abdomen 08/05/21.- 1. 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 CT head within normal limit       DM II  Hyperglycemia   - Continue to hold oral diabetes meds. - Continue FSBS with SSI correction scale.      Constipation   - Continue senna-docusate 8.6-50 mg po daily.          Obesity/ Body mass index is 34.18 kg/m².     Code status: FULL  DVT prophylaxis: scd    Care Plan discussed with: Patient/Family and Nurse  Anticipated Disposition: Home w/Family  Anticipated Discharge: in 24 hrs     Hospital Problems  Date Reviewed: 7/20/2021        Codes Class Noted POA    Celiac artery stenosis (HCC) (Chronic) ICD-10-CM: I77.4  ICD-9-CM: 447. 4 Chronic 7/21/2021 Yes        Vasogenic cerebral edema (HCC) ICD-10-CM: G93.6  ICD-9-CM: 839. 5 Acute 7/21/2021 No        Hypertensive emergency ICD-10-CM: I16.1  ICD-9-CM: 401.9 Acute 7/21/2021 Yes        Malignant hypertension ICD-10-CM: I10  ICD-9-CM: 401.0 Acute 7/20/2021 Yes        * (Principal) Cerebral arteriovenous malformation (AVM) ICD-10-CM: Q28.2  ICD-9-CM: 747.81 Acute 7/20/2021 Yes    Overview Signed 7/20/2021  7:03 AM by Edvin Crawford MD     ([id number])- CT (Brain): There is an AVM in the superior cerebellum with multiple aneurysms. One measures 4 mm. Another measures 4 mm within the superior cerebellum. Acute hypokalemia ICD-10-CM: E87.6  ICD-9-CM: 276.8 Acute 7/20/2021 Yes        Cerebral vasospasm ICD-10-CM: T36.101  ICD-9-CM: 435.9  8/1/2021 No        SAH (subarachnoid hemorrhage) (HCC) ICD-10-CM: I60.9  ICD-9-CM: 430  7/20/2021 Yes                Review of Systems:   A comprehensive review of systems was negative. Vital Signs:    Last 24hrs VS reviewed since prior progress note.  Most recent are:  Visit Vitals  /63 (BP 1 Location: Right upper arm, BP Patient Position: At rest)   Pulse 60   Temp 97.9 °F (36.6 °C)   Resp 18   Ht 6' (1.829 m)   Wt 106.4 kg (234 lb 9.6 oz) SpO2 97%   BMI 31.82 kg/m²         Intake/Output Summary (Last 24 hours) at 8/8/2021 1206  Last data filed at 8/8/2021 7293  Gross per 24 hour   Intake --   Output 775 ml   Net -775 ml        Physical Examination:     I had a face to face encounter with this patient and independently examined them on 8/8/2021 as outlined below:          Constitutional:  No acute distress, cooperative, pleasant    ENT:  Oral mucosa moist, oropharynx benign. Resp:  CTA bilaterally. No wheezing/rhonchi/rales. No accessory muscle use   CV:  Regular rhythm, normal rate, no murmurs, gallops, rubs    GI:  Soft, non distended, non tender. normoactive bowel sounds, no hepatosplenomegaly     Musculoskeletal:  No edema, warm, 2+ pulses throughout    Neurologic:  Moves all extremities. AAOx3, CN II-XII reviewed            Data Review:    I personally reviewed  Image and LABS      Labs:     Recent Labs     08/06/21  0402   WBC 7.2   HGB 13.4   HCT 39.0        Recent Labs     08/07/21  1259 08/06/21  0402    134*   K 3.5 4.2    100   CO2 26 29   BUN 16 14   CREA 0.74 0.83   * 109*   CA 7.8* 9.1   MG  --  2.2   PHOS  --  3.6     No results for input(s): ALT, AP, TBIL, TBILI, TP, ALB, GLOB, GGT, AML, LPSE in the last 72 hours. No lab exists for component: SGOT, GPT, AMYP, HLPSE  No results for input(s): INR, PTP, APTT, INREXT, INREXT in the last 72 hours. No results for input(s): FE, TIBC, PSAT, FERR in the last 72 hours. No results found for: FOL, RBCF   No results for input(s): PH, PCO2, PO2 in the last 72 hours. No results for input(s): CPK, CKNDX, TROIQ in the last 72 hours.     No lab exists for component: CPKMB  Lab Results   Component Value Date/Time    Cholesterol, total 125 07/20/2021 04:11 AM    HDL Cholesterol 68 07/20/2021 04:11 AM    LDL, calculated 25.2 07/20/2021 04:11 AM    Triglyceride 159 (H) 07/20/2021 04:11 AM    CHOL/HDL Ratio 1.8 07/20/2021 04:11 AM     Lab Results   Component Value Date/Time    Glucose (POC) 114 08/08/2021 11:32 AM    Glucose (POC) 88 08/08/2021 07:57 AM    Glucose (POC) 146 (H) 08/07/2021 09:16 PM    Glucose (POC) 175 (H) 08/07/2021 04:16 PM    Glucose (POC) 82 08/07/2021 11:47 AM     Lab Results   Component Value Date/Time    Color YELLOW/STRAW 03/11/2015 12:33 PM    Appearance CLEAR 03/11/2015 12:33 PM    Specific gravity 1.019 03/11/2015 12:33 PM    pH (UA) 6.0 03/11/2015 12:33 PM    Protein NEGATIVE  03/11/2015 12:33 PM    Glucose NEGATIVE  03/11/2015 12:33 PM    Ketone NEGATIVE  03/11/2015 12:33 PM    Bilirubin NEGATIVE  03/11/2015 12:33 PM    Urobilinogen 1.0 03/11/2015 12:33 PM    Nitrites NEGATIVE  03/11/2015 12:33 PM    Leukocyte Esterase NEGATIVE  03/11/2015 12:33 PM    Epithelial cells FEW 03/11/2015 12:33 PM    Bacteria NEGATIVE  03/11/2015 12:33 PM    WBC 0-4 03/11/2015 12:33 PM    RBC 0-5 03/11/2015 12:33 PM         Medications Reviewed:     Current Facility-Administered Medications   Medication Dose Route Frequency    metoclopramide HCl (REGLAN) injection 10 mg  10 mg IntraVENous Q6H PRN    cloNIDine HCL (CATAPRES) tablet 0.1 mg  0.1 mg Oral Q8H    hydrALAZINE (APRESOLINE) tablet 50 mg  50 mg Oral TID    prazosin (MINIPRESS) capsule 2 mg  2 mg Oral TID    lisinopriL (PRINIVIL, ZESTRIL) tablet 40 mg  40 mg Oral QHS    chlorthalidone (HYGROTON) tablet 25 mg  25 mg Oral DAILY    senna-docusate (PERICOLACE) 8.6-50 mg per tablet 1 Tablet  1 Tablet Oral DAILY    polyethylene glycol (MIRALAX) packet 17 g  17 g Oral DAILY PRN    bisacodyL (DULCOLAX) tablet 10 mg  10 mg Oral DAILY PRN    labetaloL (NORMODYNE;TRANDATE) injection 10 mg  10 mg IntraVENous Q4H PRN    hydrALAZINE (APRESOLINE) 20 mg/mL injection 20 mg  20 mg IntraVENous Q6H PRN    insulin lispro (HUMALOG) injection   SubCUTAneous AC&HS    glucose chewable tablet 16 g  4 Tablet Oral PRN    dextrose (D50W) injection syrg 12.5-25 g  12.5-25 g IntraVENous PRN    glucagon (GLUCAGEN) injection 1 mg 1 mg IntraMUSCular PRN    ondansetron (ZOFRAN) injection 4 mg  4 mg IntraVENous Q6H PRN    naloxone (NARCAN) injection 0.4 mg  0.4 mg IntraVENous PRN    acetaminophen (TYLENOL) tablet 650 mg  650 mg Oral Q4H PRN    Or    acetaminophen (TYLENOL) solution 650 mg  650 mg Per NG tube Q4H PRN    Or    acetaminophen (TYLENOL) suppository 650 mg  650 mg Rectal Q4H PRN    niMODipine (NIMOTOP) capsule 60 mg  60 mg Oral Q4H    levETIRAcetam (KEPPRA) tablet 500 mg  500 mg Oral BID     ______________________________________________________________________  EXPECTED LENGTH OF STAY: 4d 9h  ACTUAL LENGTH OF STAY:          19                 Mila Martin MD

## 2021-08-08 NOTE — PROGRESS NOTES
Neurointerventional Surgery Progress Note  Thu Young NP    Admit Date: 2021   LOS: 19 days      Daily Progress Note: 2021    S/P:  Cerebral angiogram without intervention    Interval Events: Overnight patient reports he slept well and this AM reports no headache and that his blurring vision has improved. He's neurologic exam is stable and he denies any acute concerns or questions. MRI/MRA completed yesterday due to concerns of possible vasospasm given his persistent headache that was throbbing in nature-these remained stable. HPI:  In Brief, pt with PMH of former tobacco abuse, HTN, DMII was admitted on  after sudden onset of HA and noted to be in hypertensive emergency with left hearing loss, dizziness, and slight left sided weakness. CTH showed SAH and CTA, confirmed with cerebral angiogram, showed superior cerebellum AVM with multiple aneurysms/feeders. No intervention was performed due to size, location, and number of feeding vessels. He has been monitored inpatient since that time. Subjective:       Denies numbness, tingling, chest pain, leg pain, nausea, vomiting, difficulty swallowing, headache, and dyspnea. No Known Allergies    Review of Systems:  A comprehensive review of systems was negative.     Objective:   Vital signs  Temp (24hrs), Av.4 °F (36.9 °C), Min:97.8 °F (36.6 °C), Max:98.9 °F (37.2 °C)    07 -  1900  In: -   Out: 425 [Urine:425]  1901 -  0700  In: -   Out: 750 [Urine:750]  Visit Vitals  /63 (BP 1 Location: Right upper arm, BP Patient Position: At rest)   Pulse 65   Temp 97.9 °F (36.6 °C)   Resp 18   Ht 6' (1.829 m)   Wt 106.4 kg (234 lb 9.6 oz)   SpO2 97%   BMI 31.82 kg/m²      O2 Device: None (Room air)   Vitals:    21 0608 21 0917 21 0945 21 1000   BP: 133/68 133/63 133/63    Pulse: (!) 59 61 60 65   Resp: 16  18    Temp: 98.1 °F (36.7 °C)  97.9 °F (36.6 °C)    SpO2: 96%  97%    Weight: Height:            Physical Exam:  GENERAL: Calm, cooperative, NAD, double vision with left gaze  SKIN: Warm, dry, color appropriate for ethnicity. Neurologic Exam:  Mental Status:  Alert and oriented x 4. Appropriate affect, mood and behavior. Language:    Normal fluency, repetition, comprehension and naming. Cranial Nerves:   Pupils 3 mm, equal, round and reactive to light. Visual fields full to confrontation. No nystagmus     Extraocular movements intact, slight difficulty with left abduction. Facial sensation intact to right, diminished to light touch to left cheek and forehead. Left facial droop      Hearing grossly intact to right, diminished on left     No dysarthria. Tongue protrudes to midline, palate elevates symmetrically. Shoulder shrug 5/5 bilaterally. Motor:    No pronator drift. Bulk and tone normal.      5/5 power in all extremities proximally and distally. No involuntary movements. Sensation:    Sensation intact throughout to light touch    Labs:  Lab Results   Component Value Date/Time    WBC 7.2 08/06/2021 04:02 AM    HGB 13.4 08/06/2021 04:02 AM    HCT 39.0 08/06/2021 04:02 AM    PLATELET 010 61/08/0159 04:02 AM    MCV 82.6 08/06/2021 04:02 AM     Lab Results   Component Value Date/Time    Sodium 136 08/07/2021 12:59 PM    Potassium 3.5 08/07/2021 12:59 PM    Chloride 105 08/07/2021 12:59 PM    CO2 26 08/07/2021 12:59 PM    Anion gap 5 08/07/2021 12:59 PM    Glucose 101 (H) 08/07/2021 12:59 PM    BUN 16 08/07/2021 12:59 PM    Creatinine 0.74 08/07/2021 12:59 PM    BUN/Creatinine ratio 22 (H) 08/07/2021 12:59 PM    GFR est AA >60 08/07/2021 12:59 PM    GFR est non-AA >60 08/07/2021 12:59 PM    Calcium 7.8 (L) 08/07/2021 12:59 PM     Imaging:  CT Results (maximum last 3):   Results from [place] encounter on 07/20/21    CTA HEAD NECK W CONT    Narrative  EXAM:  CTA HEAD NECK W CONT    INDICATION:   sudden severe headache in patient with recent [address] AVM and  unsecured aneurysm    COMPARISON:  CTA head and neck from 8/5/2021. CONTRAST:  100 mL of Isovue-370. FINDINGS:    CT: No abnormal enhancement. No new infarct, or herniation. CTA Head:  There is no evidence of large vessel occlusion or flow-limiting stenosis of the  intracranial internal carotid, anterior cerebral, and middle cerebral arteries. The anterior communicating artery is patent. .    There is no evidence of large vessel occlusion or flow-limiting stenosis of the  intracranial vertebral arteries, basilar artery, or posterior cerebral arteries. The posterior communicating arteries are patent. There is a redemonstrated left pontine AV malformation measuring 16 x 8 mm axial  with the arterial supply predominantly from the left superior cerebellar artery  and draining into the vein of Gaston. The dural venous sinuses and deep cerebral  venous system are patent. CTA NECK:  NASCET method was utilized for calculating stenosis. The aortic arch is unremarkable. The common carotid arteries demonstrate no  significant stenosis. There is no evidence of significant stenosis in the  cervical right internal carotid artery. There is no evidence of significant  stenosis in the cervical left internal carotid artery. There is a codominant vertebrobasilar arterial system. The cervical vertebral  arteries are normal in course, size and contour without significant stenosis. Visualized soft tissues of the neck are unremarkable. Partially imaged  groundglass opacity in the right lung measuring 10 mm. Suspicious osseous  lesions. Multilevel degenerative changes of the cervical spine. Impression  1. No large vessel occlusion or flow-limiting stenosis. 2.  No change in left pontine AV malformation. 3.  No new hemorrhage or herniation. CT HEAD WO CONT    Narrative  EXAM: CT HEAD WO CONT    INDICATION: 8/10 sudden onset headache in patient  with recent SAH and pontine  bleed. AVM and  unsecured aneurysm    COMPARISON: CT 8/5/2021. Suha Guillermo CONTRAST: None. FINDINGS:  The ventricles and sulci are normal in size, shape and configuration. . There is  no significant white matter disease. There is no intracranial hemorrhage,  extra-axial collection, or mass effect. The basilar cisterns are open. No CT  evidence of acute infarct. The bone windows demonstrate no abnormalities. The visualized portions of the  paranasal sinuses and mastoid air cells are clear. Impression  No acute findings    Assessment:   Principal Problem:    Cerebral arteriovenous malformation (AVM) (7/20/2021)      Overview: ([id number])- CT (Brain): There is an AVM in the superior cerebellum with multiple aneurysms. One measures 4 mm. Another measures 4 mm within the superior cerebellum. Active Problems:    Malignant hypertension (7/20/2021)      Acute hypokalemia (7/20/2021)      Celiac artery stenosis ([address] (7/21/2021)      Vasogenic cerebral edema ([address] (7/21/2021)      Hypertensive emergency (7/21/2021)      SAH (subarachnoid hemorrhage) ([address] (7/20/2021)      Cerebral vasospasm (8/1/2021)      Plan: Acute SAH in the setting of cerebellar AVM w/associated flow related aneurysm, [place] 1, Mistry Grade 2  - s/p diagnostic cerebral angiogram on 7/20/2021  - Day 20 of 21 of Nimotop for the prevention of delayed cerebral ischemia  - continue Keppra 500 mg BID for seizure ppx-Will need to consider when/if to wean this off  - Fluids stopped in anticipation of discharge   - rCTA of the head and neck on 8/7/2021 shows unchanged left pontine AVM. No large vessel occlusion.  MRI/MRA 8/7 shows no interval change in AVM with chronic hemorrhage to left anterior sarahi and left brachium pontis  - ECHO shows EF 65-70%, dilated left atrium, mild (grade 1) left ventricular diastolic dysfunction   - neuro checks every 4 hours  - SBP goal <140, on scheduled chlorthalidone, clonidine, hydralazine, prazosin, and lisinopril, defer management to primary team  - PT/OT recommending home with assist of family and HH  - follow up with Dr. Amish Andrade after discharge for AVM treatment, anticipate gamma knife as outpatient  - NIS following, will need out patient follow up with Dr. Rose Morales pending     Polyuria--> improved   - NA stable  - Nephrology consulted for further opinion of any ADH abnormalities in the setting of [facility]; no concern for cerebral salt wasting syndrome, autodiuresing .  - Strict I's and O's, continue to monitor     HTN  - SBP goal <140 as stated above  - Patient had a bilateral artery/venous renal duplex on 7/22 shows no evidence of significant renal artery stenosis. Possible hemodynamically significant celiac artery stenosis. - Nephrology following, secondary HTN work up is negative. - Will need to consider alternative agent or keeping Nimotop for HTN management as outpatient as this is typically cost restrictive         Activity: Up with assistance, ambulate daily  DVT ppx: SCDs  Dispo: Anticipate home with [facility] possibly tomorrow     Plan discussed with Dr. Graciela Lopez and the patient. The patient verbalized understanding of the current plan of care and is in agreement.      Vidal Looney NP  Neurocritical Care Nurse Practitioner

## 2021-08-08 NOTE — PROGRESS NOTES
Problem: Falls - Risk of  Goal: *Absence of Falls  Description: Document Zane Sites Fall Risk and appropriate interventions in the flowsheet.   8/8/2021 0439 by Janet Kanner, RN  Outcome: Progressing Towards Goal  Note: Fall Risk Interventions:  Mobility Interventions: Communicate number of staff needed for ambulation/transfer, Patient to call before getting OOB, PT Consult for mobility concerns, OT consult for ADLs, Utilize walker, cane, or other assistive device         Medication Interventions: Patient to call before getting OOB, Teach patient to arise slowly    Elimination Interventions: Bed/chair exit alarm, Call light in reach    History of Falls Interventions: Bed/chair exit alarm, Door open when patient unattended, Consult care management for discharge planning, Evaluate medications/consider consulting pharmacy, Investigate reason for fall      8/8/2021 0432 by Janet Kanner, RN  Outcome: Progressing Towards Goal  Note: Fall Risk Interventions:  Mobility Interventions: Communicate number of staff needed for ambulation/transfer, Patient to call before getting OOB, PT Consult for mobility concerns, OT consult for ADLs, Utilize walker, cane, or other assistive device         Medication Interventions: Patient to call before getting OOB, Teach patient to arise slowly    Elimination Interventions: Bed/chair exit alarm, Call light in reach    History of Falls Interventions: Bed/chair exit alarm, Door open when patient unattended, Consult care management for discharge planning, Evaluate medications/consider consulting pharmacy, Investigate reason for fall         Problem: Patient Education: Go to Patient Education Activity  Goal: Patient/Family Education  8/8/2021 0439 by Janet Kanner, RN  Outcome: Progressing Towards Goal  8/8/2021 0432 by Janet Kanner, RN  Outcome: Progressing Towards Goal     Problem: Patient Education: Go to Patient Education Activity  Goal: Patient/Family Education  8/8/2021 0439 by Santiago Herrera RN  Outcome: Progressing Towards Goal  8/8/2021 0432 by Santiago Herrera RN  Outcome: Progressing Towards Goal     Problem: Patient Education: Go to Patient Education Activity  Goal: Patient/Family Education  8/8/2021 0439 by Santiago Herrera RN  Outcome: Progressing Towards Goal  8/8/2021 0432 by Santiago Herrera RN  Outcome: Progressing Towards Goal     Problem: Patient Education: Go to Patient Education Activity  Goal: Patient/Family Education  8/8/2021 0439 by Santiago Herrera RN  Outcome: Progressing Towards Goal  8/8/2021 0432 by Santiago Herrera RN  Outcome: Progressing Towards Goal     Problem: Patient Education:  Go to Education Activity  Goal: Patient/Family Education  8/8/2021 0439 by Santiago Herrera RN  Outcome: Progressing Towards Goal  8/8/2021 0432 by Santiago Herrera RN  Outcome: Progressing Towards Goal     Problem: Subarachnoid Hemorrhage Stroke:Admission Day 3  Goal: Activity/Safety  8/8/2021 0439 by Santiago Herrera RN  Outcome: Progressing Towards Goal  8/8/2021 0432 by Santiago Herrera RN  Outcome: Progressing Towards Goal  Goal: Diagnostic Test/Procedures  8/8/2021 0439 by Santiago Herrera RN  Outcome: Progressing Towards Goal  8/8/2021 0432 by Santiago Herrera RN  Outcome: Progressing Towards Goal  Goal: Nutrition/Diet  8/8/2021 0439 by Santiago Herrera RN  Outcome: Progressing Towards Goal  8/8/2021 0432 by Santiago Herrera RN  Outcome: Progressing Towards Goal  Goal: Medications  8/8/2021 0439 by Santiago Herrera RN  Outcome: Progressing Towards Goal  8/8/2021 0432 by Santiago Herrera RN  Outcome: Progressing Towards Goal  Goal: Patient maintains clear airway/absence of aspiration  8/8/2021 0439 by Santiago Herrera RN  Outcome: Progressing Towards Goal  8/8/2021 0432 by Santiago Herrera RN  Outcome: Progressing Towards Goal  Goal: Treatments/Interventions/Procedures  8/8/2021 0439 by Santiago Herrera RN  Outcome: Progressing Towards Goal  8/8/2021 8035 by Lexi Andre RN  Outcome: Progressing Towards Goal  Goal: Psychosocial  8/8/2021 0439 by Lexi Andre RN  Outcome: Progressing Towards Goal  8/8/2021 0432 by Lexi Andre RN  Outcome: Progressing Towards Goal  Goal: *Hemodynamically stable  8/8/2021 0439 by Lexi Andre RN  Outcome: Progressing Towards Goal  8/8/2021 0432 by Lexi Andre RN  Outcome: Progressing Towards Goal  Goal: *Absence of signs and symptoms of DVT  8/8/2021 0439 by Lexi Andre RN  Outcome: Progressing Towards Goal  8/8/2021 0432 by Lexi Andre RN  Outcome: Progressing Towards Goal     Problem: Subarachnoid Hemorrhage Stroke:Admission Day 4  Goal: Activity/Safety  8/8/2021 0439 by Lexi Andre RN  Outcome: Progressing Towards Goal  8/8/2021 0432 by Lexi Andre RN  Outcome: Progressing Towards Goal  Goal: Diagnostic Test/Procedures  8/8/2021 0439 by Lexi Andre RN  Outcome: Progressing Towards Goal  8/8/2021 0432 by Lxei Andre RN  Outcome: Progressing Towards Goal  Goal: Nutrition/Diet  8/8/2021 0439 by Lexi Andre RN  Outcome: Progressing Towards Goal  8/8/2021 0432 by Lexi Andre RN  Outcome: Progressing Towards Goal  Goal: Medications  8/8/2021 0439 by Lexi Andre RN  Outcome: Progressing Towards Goal  8/8/2021 0432 by Lexi Andre RN  Outcome: Progressing Towards Goal  Goal: Patient maintains clear airway/absence of aspiration  8/8/2021 0439 by Lexi Andre RN  Outcome: Progressing Towards Goal  8/8/2021 0432 by Lexi Andre RN  Outcome: Progressing Towards Goal  Goal: Treatments/Interventions/Procedures  8/8/2021 0439 by Lexi Andre RN  Outcome: Progressing Towards Goal  8/8/2021 0432 by Lexi Andre RN  Outcome: Progressing Towards Goal  Goal: Psychosocial  8/8/2021 0439 by Lexi Andre RN  Outcome: Progressing Towards Goal  8/8/2021 0432 by Lexi Andre RN  Outcome: Progressing Towards Goal  Goal: *Hemodynamically stable  8/8/2021 0439 by Kinsey Ackerman RN  Outcome: Progressing Towards Goal  8/8/2021 0432 by Kinsey Ackerman RN  Outcome: Progressing Towards Goal  Goal: *Absence of signs and symptoms of DVT  8/8/2021 0439 by Kinsey Ackerman RN  Outcome: Progressing Towards Goal  8/8/2021 0432 by Kinsey Ackerman RN  Outcome: Progressing Towards Goal     Problem: Subarachnoid Hemorrhage Stroke:Admission Day 5-Discharge  Goal: Activity/Safety  8/8/2021 0439 by Kinsey Ackerman RN  Outcome: Progressing Towards Goal  8/8/2021 0432 by Kinsey Ackerman RN  Outcome: Progressing Towards Goal  Goal: Diagnostic Test/Procedures  8/8/2021 0439 by Kinsey Ackerman RN  Outcome: Progressing Towards Goal  8/8/2021 0432 by Kinsey Ackerman RN  Outcome: Progressing Towards Goal  Goal: Nutrition/Diet  8/8/2021 0439 by Kinsey Ackerman RN  Outcome: Progressing Towards Goal  8/8/2021 0432 by Kinsey Ackerman RN  Outcome: Progressing Towards Goal  Goal: Medications  8/8/2021 0439 by Kinsey Ackerman RN  Outcome: Progressing Towards Goal  8/8/2021 0432 by Kinsey Ackerman RN  Outcome: Progressing Towards Goal  Goal: Patient maintains clear airway/absence of aspiration  8/8/2021 0439 by Kinsey Ackerman RN  Outcome: Progressing Towards Goal  8/8/2021 0432 by Kinsey Ackerman RN  Outcome: Progressing Towards Goal  Goal: Treatments/Interventions/Procedures  8/8/2021 0439 by Kinsey Ackerman RN  Outcome: Progressing Towards Goal  8/8/2021 0432 by Kinsey Ackerman RN  Outcome: Progressing Towards Goal  Goal: Psychosocial  8/8/2021 0439 by Kinsey Ackerman RN  Outcome: Progressing Towards Goal  8/8/2021 0432 by Kinsey Ackerman RN  Outcome: Progressing Towards Goal  Goal: *Hemodynamically stable  8/8/2021 0439 by Kinsey Ackerman RN  Outcome: Progressing Towards Goal  8/8/2021 0432 by Kinsey Ackerman RN  Outcome: Progressing Towards Goal  Goal: *Absence of signs and symptoms of DVT  8/8/2021 0439 by Kinsey Ackerman, KAIN  Outcome: Progressing Towards Goal  8/8/2021 0432 by Radha Tabor RN  Outcome: Progressing Towards Goal     Problem: Subarachnoid Hemorrhage Stroke:Discharge Outcomes  Goal: *Verbalizes anxiety and depression are reduced or absent  8/8/2021 0439 by Radha Tabor RN  Outcome: Progressing Towards Goal  8/8/2021 0432 by Radha Tabor RN  Outcome: Progressing Towards Goal  Goal: *Verbalizes understanding of risk factor modification (Stroke Metric)  8/8/2021 0439 by Radha Tabor RN  Outcome: Progressing Towards Goal  8/8/2021 0432 by Radha Tabor RN  Outcome: Progressing Towards Goal  Goal: *Hemodynamically stable  8/8/2021 0439 by Radha Tabor RN  Outcome: Progressing Towards Goal  8/8/2021 0432 by Radha Tabor RN  Outcome: Progressing Towards Goal  Goal: *Aware of needed dietary changes  8/8/2021 0439 by Radha Tabor RN  Outcome: Progressing Towards Goal  8/8/2021 0432 by Radha Tabor RN  Outcome: Progressing Towards Goal  Goal: *Verbalizes understanding and describes medication purposes and frequencies  8/8/2021 0439 by Radha Tabor RN  Outcome: Progressing Towards Goal  8/8/2021 0432 by Radha Tabor RN  Outcome: Progressing Towards Goal  Goal: *Verbalizes understanding of plan for nimodipine administration  Description: until day 21 and when next dose is scheduled  8/8/2021 0439 by Radha Tabor RN  Outcome: Progressing Towards Goal  8/8/2021 0432 by Radha Tabor RN  Outcome: Progressing Towards Goal  Goal: *Absence of signs and symptoms of DVT  8/8/2021 0439 by Radha Tabor RN  Outcome: Progressing Towards Goal  8/8/2021 0432 by Radha Tabor RN  Outcome: Progressing Towards Goal  Goal: *Absence of aspiration  8/8/2021 0439 by Radha Tabor RN  Outcome: Progressing Towards Goal  8/8/2021 0432 by Radha Tabor RN  Outcome: Progressing Towards Goal  Goal: *Progressive mobility and function  8/8/2021 0439 by Radha Tabor RN  Outcome: Progressing Towards Goal  8/8/2021 0432 by Grace Medrano RN  Outcome: Progressing Towards Goal  Goal: *Home safety concerns addressed  8/8/2021 0439 by Grace Medrano RN  Outcome: Progressing Towards Goal  8/8/2021 0432 by Grace Medrano RN  Outcome: Progressing Towards Goal

## 2021-08-08 NOTE — PROGRESS NOTES
Bedside and Verbal shift change report given to KAIN Mares (oncoming nurse) by Alesia George (offgoing nurse). Report included the following information SBAR, Kardex, Intake/Output, MAR, Recent Results, Cardiac Rhythm NSR and Dual Neuro Assessment.

## 2021-08-09 LAB
ANION GAP SERPL CALC-SCNC: 6 MMOL/L (ref 5–15)
BUN SERPL-MCNC: 12 MG/DL (ref 6–20)
BUN/CREAT SERPL: 14 (ref 12–20)
CALCIUM SERPL-MCNC: 9.1 MG/DL (ref 8.5–10.1)
CHLORIDE SERPL-SCNC: 104 MMOL/L (ref 97–108)
CO2 SERPL-SCNC: 26 MMOL/L (ref 21–32)
CREAT SERPL-MCNC: 0.86 MG/DL (ref 0.7–1.3)
ERYTHROCYTE [DISTWIDTH] IN BLOOD BY AUTOMATED COUNT: 13.6 % (ref 11.5–14.5)
GLUCOSE BLD STRIP.AUTO-MCNC: 109 MG/DL (ref 65–117)
GLUCOSE BLD STRIP.AUTO-MCNC: 173 MG/DL (ref 65–117)
GLUCOSE BLD STRIP.AUTO-MCNC: 181 MG/DL (ref 65–117)
GLUCOSE BLD STRIP.AUTO-MCNC: 93 MG/DL (ref 65–117)
GLUCOSE SERPL-MCNC: 112 MG/DL (ref 65–100)
HCT VFR BLD AUTO: 37.6 % (ref 36.6–50.3)
HGB BLD-MCNC: 12.9 G/DL (ref 12.1–17)
MAGNESIUM SERPL-MCNC: 2.2 MG/DL (ref 1.6–2.4)
MCH RBC QN AUTO: 28.5 PG (ref 26–34)
MCHC RBC AUTO-ENTMCNC: 34.3 G/DL (ref 30–36.5)
MCV RBC AUTO: 83 FL (ref 80–99)
NRBC # BLD: 0 K/UL (ref 0–0.01)
NRBC BLD-RTO: 0 PER 100 WBC
PHOSPHATE SERPL-MCNC: 4 MG/DL (ref 2.6–4.7)
PLATELET # BLD AUTO: 168 K/UL (ref 150–400)
PMV BLD AUTO: 10.6 FL (ref 8.9–12.9)
POTASSIUM SERPL-SCNC: 4 MMOL/L (ref 3.5–5.1)
RBC # BLD AUTO: 4.53 M/UL (ref 4.1–5.7)
SERVICE CMNT-IMP: ABNORMAL
SERVICE CMNT-IMP: ABNORMAL
SERVICE CMNT-IMP: NORMAL
SERVICE CMNT-IMP: NORMAL
SODIUM SERPL-SCNC: 136 MMOL/L (ref 136–145)
WBC # BLD AUTO: 4.9 K/UL (ref 4.1–11.1)

## 2021-08-09 PROCEDURE — 74011250637 HC RX REV CODE- 250/637: Performed by: INTERNAL MEDICINE

## 2021-08-09 PROCEDURE — 85027 COMPLETE CBC AUTOMATED: CPT

## 2021-08-09 PROCEDURE — 74011250637 HC RX REV CODE- 250/637: Performed by: NURSE PRACTITIONER

## 2021-08-09 PROCEDURE — 84100 ASSAY OF PHOSPHORUS: CPT

## 2021-08-09 PROCEDURE — 36415 COLL VENOUS BLD VENIPUNCTURE: CPT

## 2021-08-09 PROCEDURE — 65660000001 HC RM ICU INTERMED STEPDOWN

## 2021-08-09 PROCEDURE — 82962 GLUCOSE BLOOD TEST: CPT

## 2021-08-09 PROCEDURE — 99232 SBSQ HOSP IP/OBS MODERATE 35: CPT | Performed by: RADIOLOGY

## 2021-08-09 PROCEDURE — 80048 BASIC METABOLIC PNL TOTAL CA: CPT

## 2021-08-09 PROCEDURE — 74011636637 HC RX REV CODE- 636/637: Performed by: NURSE PRACTITIONER

## 2021-08-09 PROCEDURE — 74011000250 HC RX REV CODE- 250: Performed by: NURSE PRACTITIONER

## 2021-08-09 PROCEDURE — 97116 GAIT TRAINING THERAPY: CPT

## 2021-08-09 PROCEDURE — 83735 ASSAY OF MAGNESIUM: CPT

## 2021-08-09 PROCEDURE — 74011250637 HC RX REV CODE- 250/637: Performed by: HOSPITALIST

## 2021-08-09 RX ORDER — CLONIDINE HYDROCHLORIDE 0.2 MG/1
0.2 TABLET ORAL EVERY 8 HOURS
Status: DISCONTINUED | OUTPATIENT
Start: 2021-08-09 | End: 2021-08-10 | Stop reason: HOSPADM

## 2021-08-09 RX ORDER — HYDRALAZINE HYDROCHLORIDE 50 MG/1
100 TABLET, FILM COATED ORAL 3 TIMES DAILY
Status: DISCONTINUED | OUTPATIENT
Start: 2021-08-09 | End: 2021-08-10 | Stop reason: HOSPADM

## 2021-08-09 RX ADMIN — NIMODIPINE 60 MG: 30 CAPSULE, LIQUID FILLED ORAL at 05:04

## 2021-08-09 RX ADMIN — PRAZOSIN HYDROCHLORIDE 2 MG: 1 CAPSULE ORAL at 08:52

## 2021-08-09 RX ADMIN — POLYVINYL ALCOHOL, POVIDONE 1 DROP: .5; .6 LIQUID OPHTHALMIC at 21:32

## 2021-08-09 RX ADMIN — NIMODIPINE 60 MG: 30 CAPSULE, LIQUID FILLED ORAL at 00:40

## 2021-08-09 RX ADMIN — HYDRALAZINE HYDROCHLORIDE 100 MG: 50 TABLET, FILM COATED ORAL at 21:32

## 2021-08-09 RX ADMIN — LEVETIRACETAM 500 MG: 500 TABLET ORAL at 08:21

## 2021-08-09 RX ADMIN — NIMODIPINE 60 MG: 30 CAPSULE, LIQUID FILLED ORAL at 08:52

## 2021-08-09 RX ADMIN — LABETALOL HYDROCHLORIDE 10 MG: 5 INJECTION INTRAVENOUS at 19:25

## 2021-08-09 RX ADMIN — CHLORTHALIDONE 25 MG: 25 TABLET ORAL at 08:22

## 2021-08-09 RX ADMIN — INSULIN LISPRO 2 UNITS: 100 INJECTION, SOLUTION INTRAVENOUS; SUBCUTANEOUS at 16:54

## 2021-08-09 RX ADMIN — POLYVINYL ALCOHOL, POVIDONE 1 DROP: .5; .6 LIQUID OPHTHALMIC at 16:02

## 2021-08-09 RX ADMIN — PRAZOSIN HYDROCHLORIDE 2 MG: 1 CAPSULE ORAL at 16:02

## 2021-08-09 RX ADMIN — PRAZOSIN HYDROCHLORIDE 2 MG: 1 CAPSULE ORAL at 21:36

## 2021-08-09 RX ADMIN — CLONIDINE HYDROCHLORIDE 0.1 MG: 0.1 TABLET ORAL at 06:23

## 2021-08-09 RX ADMIN — CLONIDINE HYDROCHLORIDE 0.2 MG: 0.2 TABLET ORAL at 13:57

## 2021-08-09 RX ADMIN — CLONIDINE HYDROCHLORIDE 0.2 MG: 0.2 TABLET ORAL at 21:33

## 2021-08-09 RX ADMIN — HYDRALAZINE HYDROCHLORIDE 50 MG: 50 TABLET, FILM COATED ORAL at 08:22

## 2021-08-09 RX ADMIN — HYDRALAZINE HYDROCHLORIDE 100 MG: 50 TABLET, FILM COATED ORAL at 16:02

## 2021-08-09 RX ADMIN — MINERAL OIL AND PETROLATUM: 150; 830 OINTMENT OPHTHALMIC at 21:32

## 2021-08-09 RX ADMIN — LEVETIRACETAM 500 MG: 500 TABLET ORAL at 17:43

## 2021-08-09 RX ADMIN — LISINOPRIL 40 MG: 20 TABLET ORAL at 21:33

## 2021-08-09 NOTE — PROGRESS NOTES
[facility]   [id number] [facility], [address] [place] [address] Rd [address], [facility]  Phone: [phone]   [phone]       Nephrology Progress Note  Jose J Rubio     1960     [id number]  Date of Admission : 7/20/2021 08/09/21    CC: Follow up for secondary HTN       Assessment and Plan   Resistant HTN:  - suspect primary HTN  - negative secondary w/u   - cont current meds    Volume overload   - improving    Cerebellar AVM w/ SAH   - per NIS    Chronic heavy smoker    Elevated TSH     Interval History:  Seen and examined. BP relatively stable over the past 24 hrs. No cp, sob, n/v/d reported    Review of Systems: A comprehensive review of systems was negative except for that written in the HPI.     Current Medications:   Current Facility-Administered Medications   Medication Dose Route Frequency    cloNIDine HCL (CATAPRES) tablet 0.2 mg  0.2 mg Oral Q8H    hydrALAZINE (APRESOLINE) tablet 100 mg  100 mg Oral TID    metoclopramide HCl (REGLAN) injection 10 mg  10 mg IntraVENous Q6H PRN    prazosin (MINIPRESS) capsule 2 mg  2 mg Oral TID    lisinopriL (PRINIVIL, ZESTRIL) tablet 40 mg  40 mg Oral QHS    chlorthalidone (HYGROTON) tablet 25 mg  25 mg Oral DAILY    senna-docusate (PERICOLACE) 8.6-50 mg per tablet 1 Tablet  1 Tablet Oral DAILY    polyethylene glycol (MIRALAX) packet 17 g  17 g Oral DAILY PRN    bisacodyL (DULCOLAX) tablet 10 mg  10 mg Oral DAILY PRN    labetaloL (NORMODYNE;TRANDATE) injection 10 mg  10 mg IntraVENous Q4H PRN    hydrALAZINE (APRESOLINE) 20 mg/mL injection 20 mg  20 mg IntraVENous Q6H PRN    insulin lispro (HUMALOG) injection   SubCUTAneous AC&HS    glucose chewable tablet 16 g  4 Tablet Oral PRN    dextrose (D50W) injection syrg 12.5-25 g  12.5-25 g IntraVENous PRN    glucagon (GLUCAGEN) injection 1 mg  1 mg IntraMUSCular PRN    ondansetron (ZOFRAN) injection 4 mg  4 mg IntraVENous Q6H PRN    naloxone (NARCAN) injection 0.4 mg  0.4 mg IntraVENous PRN    acetaminophen (TYLENOL) tablet 650 mg  650 mg Oral Q4H PRN    Or    acetaminophen (TYLENOL) solution 650 mg  650 mg Per NG tube Q4H PRN    Or    acetaminophen (TYLENOL) suppository 650 mg  650 mg Rectal Q4H PRN    niMODipine (NIMOTOP) capsule 60 mg  60 mg Oral Q4H    levETIRAcetam (KEPPRA) tablet 500 mg  500 mg Oral BID      No Known Allergies    Objective:  Vitals:    Vitals:    08/09/21 0800 08/09/21 0822 08/09/21 0948 08/09/21 1001   BP:  (!) 154/74 (!) 155/66    Pulse:  (!) 58 67 (!) 56   Resp:   20    Temp:   98.3 °F (36.8 °C)    SpO2: 98%      Weight:       Height:         Intake and Output:  08/09 0701 - 08/09 1900  In: -   Out: 300 [Urine:300]  08/07 1901 - 08/09 0700  In: 1008 [P.O.:1008]  Out: 0268 [Urine:3930]    Physical Examination:    General: NAD,Conversant   Neck:  Supple, no mass  Resp:  Lungs CTA B/L, no wheezing , normal respiratory effort  CV:  RRR,  no murmur or rub,2+ LE edema  GI:  Soft, NT, + Bowel sounds, no hepatosplenomegaly  Neurologic:  Non focal  Psych:             AAO x 3 appropriate affect     []    High complexity decision making was performed  []    Patient is at high-risk of decompensation with multiple organ involvement    Lab Data Personally Reviewed: I have reviewed all the pertinent labs, microbiology data and radiology studies during assessment.     Recent Labs     08/09/21  0551 08/08/21  1120 08/07/21  1259    134* 136   K 4.0 4.2 3.5    105 105   CO2 26 23 26   * 100 101*   BUN 12 15 16   CREA 0.86 0.83 0.74   CA 9.1 8.9 7.8*   MG 2.2 2.2  --    PHOS 4.0 3.2  --      Recent Labs     08/09/21  0551   WBC 4.9   HGB 12.9   HCT 37.6        No results found for: SDES  Lab Results   Component Value Date/Time    Culture result: MRSA NOT PRESENT 03/11/2015 12:00 PM    Culture result:  03/11/2015 12:00 PM         Screening of patient nares for MRSA is for surveillance purposes and, if positive, to facilitate isolation considerations in high risk settings. It is not intended for automatic decolonization interventions per se as regimens are not sufficiently effective to warrant routine use.      Recent Results (from the past 24 hour(s))   MAGNESIUM    Collection Time: 08/08/21 11:20 AM   Result Value Ref Range    Magnesium 2.2 1.6 - 2.4 mg/dL   PHOSPHORUS    Collection Time: 08/08/21 11:20 AM   Result Value Ref Range    Phosphorus 3.2 2.6 - 4.7 MG/DL   METABOLIC PANEL, BASIC    Collection Time: 08/08/21 11:20 AM   Result Value Ref Range    Sodium 134 (L) 136 - 145 mmol/L    Potassium 4.2 3.5 - 5.1 mmol/L    Chloride 105 97 - 108 mmol/L    CO2 23 21 - 32 mmol/L    Anion gap 6 5 - 15 mmol/L    Glucose 100 65 - 100 mg/dL    BUN 15 6 - 20 MG/DL    Creatinine 0.83 0.70 - 1.30 MG/DL    BUN/Creatinine ratio 18 12 - 20      GFR est AA >60 >60 ml/min/1.73m2    GFR est non-AA >60 >60 ml/min/1.73m2    Calcium 8.9 8.5 - 10.1 MG/DL   GLUCOSE, POC    Collection Time: 08/08/21 11:32 AM   Result Value Ref Range    Glucose (POC) 114 65 - 117 mg/dL    Performed by Isabel Hewitt (CON)    GLUCOSE, POC    Collection Time: 08/08/21  4:18 PM   Result Value Ref Range    Glucose (POC) 131 (H) 65 - 117 mg/dL    Performed by Isabel Hewitt (CON)    GLUCOSE, POC    Collection Time: 08/08/21  9:02 PM   Result Value Ref Range    Glucose (POC) 114 65 - 117 mg/dL    Performed by Maddy Bernardo    MAGNESIUM    Collection Time: 08/09/21  5:51 AM   Result Value Ref Range    Magnesium 2.2 1.6 - 2.4 mg/dL   PHOSPHORUS    Collection Time: 08/09/21  5:51 AM   Result Value Ref Range    Phosphorus 4.0 2.6 - 4.7 MG/DL   CBC W/O DIFF    Collection Time: 08/09/21  5:51 AM   Result Value Ref Range    WBC 4.9 4.1 - 11.1 K/uL    RBC 4.53 4.10 - 5.70 M/uL    HGB 12.9 12.1 - 17.0 g/dL    HCT 37.6 36.6 - 50.3 %    MCV 83.0 80.0 - 99.0 FL    MCH 28.5 26.0 - 34.0 PG    MCHC 34.3 30.0 - 36.5 g/dL    RDW 13.6 11.5 - 14.5 %    PLATELET 883 049 - 698 K/uL    MPV 10.6 8.9 - 12.9 FL    NRBC 0.0 0  WBC ABSOLUTE NRBC 0.00 0.00 - 7.45 K/uL   METABOLIC PANEL, BASIC    Collection Time: 08/09/21  5:51 AM   Result Value Ref Range    Sodium 136 136 - 145 mmol/L    Potassium 4.0 3.5 - 5.1 mmol/L    Chloride 104 97 - 108 mmol/L    CO2 26 21 - 32 mmol/L    Anion gap 6 5 - 15 mmol/L    Glucose 112 (H) 65 - 100 mg/dL    BUN 12 6 - 20 MG/DL    Creatinine 0.86 0.70 - 1.30 MG/DL    BUN/Creatinine ratio 14 12 - 20      GFR est AA >60 >60 ml/min/1.73m2    GFR est non-AA >60 >60 ml/min/1.73m2    Calcium 9.1 8.5 - 10.1 MG/DL   GLUCOSE, POC    Collection Time: 08/09/21  7:39 AM   Result Value Ref Range    Glucose (POC) 109 65 - 117 mg/dL    Performed by Sandi Pereira            Total time spent with patient:  xxx   min. Care Plan discussed with:  Patient     Family      RN      Consulting Physician [address] [place] [address]        I have reviewed the flowsheets. Chart and Pertinent Notes have been reviewed. No change in PMH ,family and social history from Consult note.       Bg Fernandez MD

## 2021-08-09 NOTE — PROGRESS NOTES
Problem: Falls - Risk of  Goal: *Absence of Falls  Description: Document Angeles Eugene Fall Risk and appropriate interventions in the flowsheet.   Outcome: Progressing Towards Goal  Note: Fall Risk Interventions:  Mobility Interventions: Bed/chair exit alarm, Communicate number of staff needed for ambulation/transfer, Patient to call before getting OOB, Utilize walker, cane, or other assistive device         Medication Interventions: Assess postural VS orthostatic hypotension, Bed/chair exit alarm, Evaluate medications/consider consulting pharmacy, Patient to call before getting OOB, Teach patient to arise slowly    Elimination Interventions: Bed/chair exit alarm, Call light in reach, Patient to call for help with toileting needs, Urinal in reach, Toileting schedule/hourly rounds    History of Falls Interventions: Bed/chair exit alarm         Problem: Patient Education: Go to Patient Education Activity  Goal: Patient/Family Education  Outcome: Progressing Towards Goal     Problem: Patient Education: Go to Patient Education Activity  Goal: Patient/Family Education  Outcome: Progressing Towards Goal     Problem: Patient Education: Go to Patient Education Activity  Goal: Patient/Family Education  Outcome: Progressing Towards Goal     Problem: Patient Education:  Go to Education Activity  Goal: Patient/Family Education  Outcome: Progressing Towards Goal     Problem: Subarachnoid Hemorrhage Stroke:Admission Day 5-Discharge  Goal: Activity/Safety  Outcome: Progressing Towards Goal  Goal: Diagnostic Test/Procedures  Outcome: Progressing Towards Goal  Goal: Nutrition/Diet  Outcome: Progressing Towards Goal  Goal: Medications  Outcome: Progressing Towards Goal  Goal: Patient maintains clear airway/absence of aspiration  Outcome: Progressing Towards Goal  Goal: Treatments/Interventions/Procedures  Outcome: Progressing Towards Goal  Goal: Psychosocial  Outcome: Progressing Towards Goal  Goal: *Hemodynamically stable  Outcome: Progressing Towards Goal  Goal: *Absence of signs and symptoms of DVT  Outcome: Progressing Towards Goal     Problem: Subarachnoid Hemorrhage Stroke:Discharge Outcomes  Goal: *Verbalizes anxiety and depression are reduced or absent  Outcome: Progressing Towards Goal  Goal: *Verbalizes understanding of risk factor modification (Stroke Metric)  Outcome: Progressing Towards Goal  Goal: *Hemodynamically stable  Outcome: Progressing Towards Goal  Goal: *Aware of needed dietary changes  Outcome: Progressing Towards Goal  Goal: *Verbalizes understanding and describes medication purposes and frequencies  Outcome: Progressing Towards Goal  Goal: *Verbalizes understanding of plan for nimodipine administration  Description: until day 21 and when next dose is scheduled  Outcome: Progressing Towards Goal  Goal: *Absence of signs and symptoms of DVT  Outcome: Progressing Towards Goal  Goal: *Absence of aspiration  Outcome: Progressing Towards Goal  Goal: *Progressive mobility and function  Outcome: Progressing Towards Goal  Goal: *Home safety concerns addressed  Outcome: Progressing Towards Goal     Problem: Pain  Goal: *Control of Pain  Outcome: Progressing Towards Goal

## 2021-08-09 NOTE — PROGRESS NOTES
Neurointerventional Surgery Progress Note  Jazmin Lux NP    Admit Date: 2021   LOS: 20 days      Daily Progress Note: 2021    S/P:  Cerebral angiogram without intervention     Interval Events: Overnight patient reports he slept well and this AM reports no headache and that his blurring vision has improved, continues to have double vision with left gaze. Patient remains on Nimotop, DC today and will need BP monitoring given his difficulty in control of this.      HPI:  In Brief, pt with PMH of former tobacco abuse, HTN, DMII was admitted on  after sudden onset of HA and noted to be in hypertensive emergency with left hearing loss, dizziness, and slight left sided weakness. CTH showed SAH and CTA, confirmed with cerebral angiogram, showed superior cerebellum AVM with multiple aneurysms/feeders. No intervention was performed due to size, location, and number of feeding vessels. He has been monitored inpatient since that time. He has had refractory HTN with use of several agents-his Nephrology work up for secondary cause has been negative. Nephrology also managing polyuria with hyponatremia-thought to be consistent with autodiuresing. Subjective:     Denies chest pain, leg pain, nausea, vomiting, difficulty swallowing, headache, and dyspnea. No Known Allergies    Review of Systems:  Review of systems not obtained due to patient factors.     Objective:   Vital signs  Temp (24hrs), Av.4 °F (36.9 °C), Min:97.5 °F (36.4 °C), Max:98.7 °F (37.1 °C)   701 - 1900  In: -   Out: 300 [Urine:300]  1901 -  07  In: 1008 [P.O.:1008]  Out: 5109 [Urine:3930]  Visit Vitals  BP (!) 155/66 (BP 1 Location: Right upper arm, BP Patient Position: At rest)   Pulse (!) 56   Temp 98.3 °F (36.8 °C)   Resp 20   Ht 6' (1.829 m)   Wt 96.2 kg (212 lb 1.3 oz)   SpO2 98%   BMI 28.76 kg/m²      O2 Device: None (Room air)   Vitals:    21 0800 21 0822 21 0948 21 1007 BP:  (!) 154/74 (!) 155/66    Pulse:  (!) 58 67 (!) 56   Resp:   20    Temp:   98.3 °F (36.8 °C)    SpO2: 98%      Weight:       Height:          Physical Exam:  GENERAL: Calm, cooperative, NAD  SKIN: Warm, dry, color appropriate for ethnicity.     Neurologic Exam:  Mental Status:             Alert and oriented x 4. Appropriate affect, mood and behavior.        Language:                   Normal fluency, repetition, comprehension and naming.     Cranial Nerves:           Pupils 3 mm, equal, round and reactive to light. Visual fields full to confrontation. No nystagmus                                      Extraocular movements intact, slight difficulty with left abduction. Facial sensation intact to right, diminished to light touch to left cheek and forehead. Left facial droop                                       Hearing grossly intact to right, absent to finger rub on left                                      No dysarthria. Tongue protrudes to midline, palate elevates symmetrically. Shoulder shrug 5/5 bilaterally. Motor:                          No pronator drift. Bulk and tone normal.                                       5/5 power in all extremities proximally and distally. No involuntary movements.     Sensation:                   Sensation intact throughout to light touch to extremities      Coordination:               FTN intact with no ataxia present.     Labs:  Lab Results   Component Value Date/Time    WBC 4.9 08/09/2021 05:51 AM    HGB 12.9 08/09/2021 05:51 AM    HCT 37.6 08/09/2021 05:51 AM    PLATELET 267 81/64/8645 05:51 AM    MCV 83.0 08/09/2021 05:51 AM     Lab Results   Component Value Date/Time Sodium 136 08/09/2021 05:51 AM    Potassium 4.0 08/09/2021 05:51 AM    Chloride 104 08/09/2021 05:51 AM    CO2 26 08/09/2021 05:51 AM    Anion gap 6 08/09/2021 05:51 AM    Glucose 112 (H) 08/09/2021 05:51 AM    BUN 12 08/09/2021 05:51 AM    Creatinine 0.86 08/09/2021 05:51 AM    BUN/Creatinine ratio 14 08/09/2021 05:51 AM    GFR est AA >60 08/09/2021 05:51 AM    GFR est non-AA >60 08/09/2021 05:51 AM    Calcium 9.1 08/09/2021 05:51 AM     Imaging:  CT Results (maximum last 3): Results from [place] encounter on 07/20/21    CTA HEAD NECK W CONT    Narrative  EXAM:  CTA HEAD NECK W CONT    INDICATION:   sudden severe headache in patient with recent [address] AVM and  unsecured aneurysm    COMPARISON:  CTA head and neck from 8/5/2021. CONTRAST:  100 mL of Isovue-370. FINDINGS:    CT: No abnormal enhancement. No new infarct, or herniation. CTA Head:  There is no evidence of large vessel occlusion or flow-limiting stenosis of the  intracranial internal carotid, anterior cerebral, and middle cerebral arteries. The anterior communicating artery is patent. .    There is no evidence of large vessel occlusion or flow-limiting stenosis of the  intracranial vertebral arteries, basilar artery, or posterior cerebral arteries. The posterior communicating arteries are patent. There is a redemonstrated left pontine AV malformation measuring 16 x 8 mm axial  with the arterial supply predominantly from the left superior cerebellar artery  and draining into the vein of Gaston. The dural venous sinuses and deep cerebral  venous system are patent. CTA NECK:  NASCET method was utilized for calculating stenosis. The aortic arch is unremarkable. The common carotid arteries demonstrate no  significant stenosis. There is no evidence of significant stenosis in the  cervical right internal carotid artery. There is no evidence of significant  stenosis in the cervical left internal carotid artery.     There is a codominant vertebrobasilar arterial system. The cervical vertebral  arteries are normal in course, size and contour without significant stenosis. Visualized soft tissues of the neck are unremarkable. Partially imaged  groundglass opacity in the right lung measuring 10 mm. Suspicious osseous  lesions. Multilevel degenerative changes of the cervical spine. Impression  1. No large vessel occlusion or flow-limiting stenosis. 2.  No change in left pontine AV malformation. 3.  No new hemorrhage or herniation. CT HEAD WO CONT    Narrative  EXAM: CT HEAD WO CONT    INDICATION: 8/10 sudden onset headache in patient  with recent SAH and pontine  bleed. AVM and  unsecured aneurysm    [facility]: None. FINDINGS:  The ventricles and sulci are normal in size, shape and configuration. . There is  no significant white matter disease. There is no intracranial hemorrhage,  extra-axial collection, or mass effect. The basilar cisterns are open. No CT  evidence of acute infarct. The bone windows demonstrate no abnormalities. The visualized portions of the  paranasal sinuses and mastoid air cells are clear. Impression  No acute findings    Assessment:   Principal Problem:    Cerebral arteriovenous malformation (AVM) (7/20/2021)      Overview: ([id number])- CT (Brain): There is an AVM in the superior cerebellum with multiple aneurysms. One measures 4 mm. Another measures 4 mm within the superior cerebellum.     Active Problems:    Malignant hypertension (7/20/2021)      Acute hypokalemia (7/20/2021)      Celiac artery stenosis ([address] (7/21/2021)      Vasogenic cerebral edema ([address] (7/21/2021)      Hypertensive emergency (7/21/2021)      SAH (subarachnoid hemorrhage) ([address] (7/20/2021)      Cerebral vasospasm (8/1/2021)      Plan: 1) Acute SAH in the setting of cerebellar AVM w/associated flow related aneurysm, [place] 1, Mistry Grade 2  - s/p diagnostic cerebral angiogram on 7/20/2021  - Day 21 of 21 of Nimotop for the prevention of delayed cerebral ischemia-DC'd  - continue Keppra 500 mg BID for seizure ppx as outpatient  - ECHO shows EF 65-70%, dilated left atrium, mild (grade 1) left ventricular diastolic dysfunction   - neuro checks every 4 hours  - SBP goal <140, on scheduled chlorthalidone, clonidine, hydralazine, prazosin, and lisinopril, stop Nimotop and monitor BP for additional day to assess if has rebound hypertension   - PT/OT recommending home with assist of family and HH  - follow up with Dr. Jamar Shepard after discharge for AVM treatment, anticipate gamma knife as outpatient  - NIS following, will need out patient follow up with Dr. Shahida Tabor in 2-4 weeks without imaging     Polyuria--> resolved   - NA stable  - Nephrology consulted for further opinion of any ADH abnormalities in the setting of [address]; no concern for cerebral salt wasting syndrome, autodiuresing     HTN  - SBP goal <140 as stated above  - Patient had a bilateral artery/venous renal duplex on 7/22 shows no evidence of significant renal artery stenosis.  Possible hemodynamically significant celiac artery stenosis. - Nephrology following, secondary HTN work up is negative.      Activity: Up with assistance, ambulate daily  DVT ppx: SCDs  Dispo: Anticipate home with [facility] possibly tomorrow     Plan discussed with Dr. Gerhard Ivy and the patient. The patient verbalized understanding of the current plan of care and is in agreement.      Lulú Cavazos NP  Neurocritical Care Nurse Practitioner

## 2021-08-09 NOTE — PROGRESS NOTES
[address] [facility] Adult  Hospitalist Group                                                                                          Hospitalist Progress Note  Brandon Clemons MD  Answering service: [phone] from in house phone        Date of Service:  2021  NAME:  Wendy Muir  :  1960  MRN:  [id number]      Admission Summary: \"This is 79-year-old gentleman with past medical history of hypertension,  currently getting transferred out of ICU after [facility] and hypertensive emergency treatment. He presented from [facility] hospital with headache and dizziness, found to have elevated blood pressure. CT of the head showed an acute subarachnoid hemorrhage anterior to the sarahi. CTA of the head and neck showed an AVM in the left superior cerebellar peduncle, with the dominant drainage into the vein of Gaston. No large vessel occlusion, no hemodynamically significant stenosis. Redemonstrated subarachnoid hemorrhage in the basilar cisterns. Patient was initially started on IV antihypertensives, now switched to oral antihypertensives. He was also started on Nimodipine. 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 - S/P diagnostic cerebral andio on 21.  - Day  on nimodipine 60 mg po q4h. - Continue levetiracetam 500 mg po bid. - repeat MRI/ MRA- reviewed     Polyuria  Hyponatremia   - Nephrology input appreciated. - Monitor for now.       HTN- HYPOTENSIVE THIS MORNING HOLD MEDS WILL READJUST   - Continue chlorthalidone 25 mg po daily. - Continue clonidine 0.2 mg po q8h. - Continue hydralazine 100 mg po tid. - Continue lisinopril 40 mg po daily.    - Prazosin 2 mg po TID   - CTA abdomen 08/05/21.- 1. 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 CT head within normal limit       DM II  Hyperglycemia   - Continue to hold oral diabetes meds. - Continue FSBS with SSI correction scale.      Constipation   - Continue senna-docusate 8.6-50 mg po daily.          Obesity/ Body mass index is 34.18 kg/m².     Code status: FULL  DVT prophylaxis: scd    Care Plan discussed with: Patient/Family and Nurse  Anticipated Disposition: Home w/Family  Anticipated Discharge: in 24 hrs if blood pressure is stable can be discharged tomorrow     Hospital Problems  Date Reviewed: 7/20/2021        Codes Class Noted POA    Celiac artery stenosis (HCC) (Chronic) ICD-10-CM: I77.4  ICD-9-CM: 447. 4 Chronic 7/21/2021 Yes        Vasogenic cerebral edema (HCC) ICD-10-CM: G93.6  ICD-9-CM: 125. 5 Acute 7/21/2021 No        Hypertensive emergency ICD-10-CM: I16.1  ICD-9-CM: 401.9 Acute 7/21/2021 Yes        Malignant hypertension ICD-10-CM: I10  ICD-9-CM: 401.0 Acute 7/20/2021 Yes        * (Principal) Cerebral arteriovenous malformation (AVM) ICD-10-CM: Q28.2  ICD-9-CM: 747.81 Acute 7/20/2021 Yes    Overview Signed 7/20/2021  7:03 AM by Nelly Lozano MD     ([id number])- CT (Brain): There is an AVM in the superior cerebellum with multiple aneurysms. One measures 4 mm. Another measures 4 mm within the superior cerebellum. Acute hypokalemia ICD-10-CM: E87.6  ICD-9-CM: 276.8 Acute 7/20/2021 Yes        Cerebral vasospasm ICD-10-CM: K25.236  ICD-9-CM: 435.9  8/1/2021 No        SAH (subarachnoid hemorrhage) (HCC) ICD-10-CM: I60.9  ICD-9-CM: 430  7/20/2021 Yes                Review of Systems:   A comprehensive review of systems was negative. Vital Signs:    Last 24hrs VS reviewed since prior progress note.  Most recent are:  Visit Vitals  BP (!) 155/66 (BP 1 Location: Right upper arm, BP Patient Position: At rest)   Pulse (!) 56   Temp 98.3 °F (36.8 °C)   Resp 20   Ht 6' (1.829 m)   Wt 96.2 kg (212 lb 1.3 oz)   SpO2 98%   BMI 28.76 kg/m²         Intake/Output Summary (Last 24 hours) at 8/9/2021 1042  Last data filed at 8/9/2021 1024  Gross per 24 hour   Intake 1008 ml   Output 3805 ml   Net -2797 ml        Physical Examination:     I had a face to face encounter with this patient and independently examined them on 8/9/2021 as outlined below:          Constitutional:  No acute distress, cooperative, pleasant    ENT:  Oral mucosa moist, oropharynx benign. Resp:  CTA bilaterally. No wheezing/rhonchi/rales. No accessory muscle use   CV:  Regular rhythm, normal rate, no murmurs, gallops, rubs    GI:  Soft, non distended, non tender. normoactive bowel sounds, no hepatosplenomegaly     Musculoskeletal:  No edema, warm, 2+ pulses throughout    Neurologic:  Moves all extremities. AAOx3, CN II-XII reviewed            Data Review:    I personally reviewed  Image and LABS      Labs:     Recent Labs     08/09/21  0551   WBC 4.9   HGB 12.9   HCT 37.6        Recent Labs     08/09/21  0551 08/08/21  1120 08/07/21  1259    134* 136   K 4.0 4.2 3.5    105 105   CO2 26 23 26   BUN 12 15 16   CREA 0.86 0.83 0.74   * 100 101*   CA 9.1 8.9 7.8*   MG 2.2 2.2  --    PHOS 4.0 3.2  --      No results for input(s): ALT, AP, TBIL, TBILI, TP, ALB, GLOB, GGT, AML, LPSE in the last 72 hours. No lab exists for component: SGOT, GPT, AMYP, HLPSE  No results for input(s): INR, PTP, APTT, INREXT, INREXT in the last 72 hours. No results for input(s): FE, TIBC, PSAT, FERR in the last 72 hours. No results found for: FOL, RBCF   No results for input(s): PH, PCO2, PO2 in the last 72 hours. No results for input(s): CPK, CKNDX, TROIQ in the last 72 hours.     No lab exists for component: CPKMB  Lab Results   Component Value Date/Time    Cholesterol, total 125 07/20/2021 04:11 AM    HDL Cholesterol 68 07/20/2021 04:11 AM LDL, calculated 25.2 07/20/2021 04:11 AM    Triglyceride 159 (H) 07/20/2021 04:11 AM    CHOL/HDL Ratio 1.8 07/20/2021 04:11 AM     Lab Results   Component Value Date/Time    Glucose (POC) 109 08/09/2021 07:39 AM    Glucose (POC) 114 08/08/2021 09:02 PM    Glucose (POC) 131 (H) 08/08/2021 04:18 PM    Glucose (POC) 114 08/08/2021 11:32 AM    Glucose (POC) 88 08/08/2021 07:57 AM     Lab Results   Component Value Date/Time    Color YELLOW/STRAW 03/11/2015 12:33 PM    Appearance CLEAR 03/11/2015 12:33 PM    Specific gravity 1.019 03/11/2015 12:33 PM    pH (UA) 6.0 03/11/2015 12:33 PM    Protein NEGATIVE  03/11/2015 12:33 PM    Glucose NEGATIVE  03/11/2015 12:33 PM    Ketone NEGATIVE  03/11/2015 12:33 PM    Bilirubin NEGATIVE  03/11/2015 12:33 PM    Urobilinogen 1.0 03/11/2015 12:33 PM    Nitrites NEGATIVE  03/11/2015 12:33 PM    Leukocyte Esterase NEGATIVE  03/11/2015 12:33 PM    Epithelial cells FEW 03/11/2015 12:33 PM    Bacteria NEGATIVE  03/11/2015 12:33 PM    WBC 0-4 03/11/2015 12:33 PM    RBC 0-5 03/11/2015 12:33 PM         Medications Reviewed:     Current Facility-Administered Medications   Medication Dose Route Frequency    cloNIDine HCL (CATAPRES) tablet 0.2 mg  0.2 mg Oral Q8H    hydrALAZINE (APRESOLINE) tablet 100 mg  100 mg Oral TID    metoclopramide HCl (REGLAN) injection 10 mg  10 mg IntraVENous Q6H PRN    prazosin (MINIPRESS) capsule 2 mg  2 mg Oral TID    lisinopriL (PRINIVIL, ZESTRIL) tablet 40 mg  40 mg Oral QHS    chlorthalidone (HYGROTON) tablet 25 mg  25 mg Oral DAILY    senna-docusate (PERICOLACE) 8.6-50 mg per tablet 1 Tablet  1 Tablet Oral DAILY    polyethylene glycol (MIRALAX) packet 17 g  17 g Oral DAILY PRN    bisacodyL (DULCOLAX) tablet 10 mg  10 mg Oral DAILY PRN    labetaloL (NORMODYNE;TRANDATE) injection 10 mg  10 mg IntraVENous Q4H PRN    hydrALAZINE (APRESOLINE) 20 mg/mL injection 20 mg  20 mg IntraVENous Q6H PRN    insulin lispro (HUMALOG) injection   SubCUTAneous AC&HS    glucose chewable tablet 16 g  4 Tablet Oral PRN    dextrose (D50W) injection syrg 12.5-25 g  12.5-25 g IntraVENous PRN    glucagon (GLUCAGEN) injection 1 mg  1 mg IntraMUSCular PRN    ondansetron (ZOFRAN) injection 4 mg  4 mg IntraVENous Q6H PRN    naloxone (NARCAN) injection 0.4 mg  0.4 mg IntraVENous PRN    acetaminophen (TYLENOL) tablet 650 mg  650 mg Oral Q4H PRN    Or    acetaminophen (TYLENOL) solution 650 mg  650 mg Per NG tube Q4H PRN    Or    acetaminophen (TYLENOL) suppository 650 mg  650 mg Rectal Q4H PRN    niMODipine (NIMOTOP) capsule 60 mg  60 mg Oral Q4H    levETIRAcetam (KEPPRA) tablet 500 mg  500 mg Oral BID     ______________________________________________________________________  EXPECTED LENGTH OF STAY: 4d 9h  ACTUAL LENGTH OF STAY:          20                 Martinez Carlton MD

## 2021-08-09 NOTE — PROGRESS NOTES
Occupational Therapy  08/09/21    Chart reviewed, discussed in IDRs and with PT. Met with patient who reports much improved BLE swelling, has been up ad cesario in the room/bathroom and in the hallway with RW support, completing self care with Mod I. Per hospitalist, plans for d/c tomorrow, patient reporting no ADL/IADL concerns, has good family support. He continues to report L distance diplopia, reports he is using the eye patch however not donned upon entry. Reviewed home safety, patient acknowledging understanding. No further OT needs, continue to recommend d/c home with [facility] safety evaluation.      Thank you,   Ming Galvan, OTD, OTR/L

## 2021-08-09 NOTE — PROGRESS NOTES
Bedside shift change report given to Camden Romo RN (oncoming nurse) by Orlando Velasquez RN (offgoing nurse). Report included the following information SBAR, ED Summary, Procedure Summary, Intake/Output, MAR, Recent Results, Med Rec Status and Cardiac Rhythm NSR.

## 2021-08-09 NOTE — PROGRESS NOTES
Problem: Falls - Risk of  Goal: *Absence of Falls  Description: Document Maya Pate Fall Risk and appropriate interventions in the flowsheet.   Outcome: Progressing Towards Goal  Note: Fall Risk Interventions:  Mobility Interventions: Bed/chair exit alarm, Patient to call before getting OOB, PT Consult for mobility concerns         Medication Interventions: Assess postural VS orthostatic hypotension, Evaluate medications/consider consulting pharmacy, Patient to call before getting OOB    Elimination Interventions: Bed/chair exit alarm, Patient to call for help with toileting needs, Toilet paper/wipes in reach    History of Falls Interventions: Consult care management for discharge planning, Evaluate medications/consider consulting pharmacy, Investigate reason for fall         Problem: Patient Education: Go to Patient Education Activity  Goal: Patient/Family Education  Outcome: Progressing Towards Goal     Problem: Patient Education: Go to Patient Education Activity  Goal: Patient/Family Education  Outcome: Progressing Towards Goal     Problem: Patient Education:  Go to Education Activity  Goal: Patient/Family Education  Outcome: Progressing Towards Goal     Problem: Subarachnoid Hemorrhage Stroke:Admission Day 3  Goal: Activity/Safety  Outcome: Progressing Towards Goal  Goal: Diagnostic Test/Procedures  Outcome: Progressing Towards Goal  Goal: Nutrition/Diet  Outcome: Progressing Towards Goal  Goal: Medications  Outcome: Progressing Towards Goal  Goal: Patient maintains clear airway/absence of aspiration  Outcome: Progressing Towards Goal  Goal: Treatments/Interventions/Procedures  Outcome: Progressing Towards Goal  Goal: Psychosocial  Outcome: Progressing Towards Goal  Goal: *Hemodynamically stable  Outcome: Progressing Towards Goal  Goal: *Absence of signs and symptoms of DVT  Outcome: Progressing Towards Goal     Problem: Subarachnoid Hemorrhage Stroke:Admission Day 4  Goal: Activity/Safety  Outcome: Progressing Towards Goal  Goal: Diagnostic Test/Procedures  Outcome: Progressing Towards Goal  Goal: Nutrition/Diet  Outcome: Progressing Towards Goal  Goal: Medications  Outcome: Progressing Towards Goal  Goal: Patient maintains clear airway/absence of aspiration  Outcome: Progressing Towards Goal  Goal: Treatments/Interventions/Procedures  Outcome: Progressing Towards Goal  Goal: Psychosocial  Outcome: Progressing Towards Goal  Goal: *Hemodynamically stable  Outcome: Progressing Towards Goal  Goal: *Absence of signs and symptoms of DVT  Outcome: Progressing Towards Goal     Problem: Subarachnoid Hemorrhage Stroke:Admission Day 5-Discharge  Goal: Activity/Safety  Outcome: Progressing Towards Goal  Goal: Diagnostic Test/Procedures  Outcome: Progressing Towards Goal  Goal: Nutrition/Diet  Outcome: Progressing Towards Goal  Goal: Medications  Outcome: Progressing Towards Goal  Goal: Patient maintains clear airway/absence of aspiration  Outcome: Progressing Towards Goal  Goal: Treatments/Interventions/Procedures  Outcome: Progressing Towards Goal  Goal: Psychosocial  Outcome: Progressing Towards Goal  Goal: *Hemodynamically stable  Outcome: Progressing Towards Goal  Goal: *Absence of signs and symptoms of DVT  Outcome: Progressing Towards Goal     Problem: Subarachnoid Hemorrhage Stroke:Discharge Outcomes  Goal: *Verbalizes anxiety and depression are reduced or absent  Outcome: Progressing Towards Goal  Goal: *Verbalizes understanding of risk factor modification (Stroke Metric)  Outcome: Progressing Towards Goal  Goal: *Hemodynamically stable  Outcome: Progressing Towards Goal  Goal: *Aware of needed dietary changes  Outcome: Progressing Towards Goal  Goal: *Verbalizes understanding and describes medication purposes and frequencies  Outcome: Progressing Towards Goal  Goal: *Verbalizes understanding of plan for nimodipine administration  Description: until day 21 and when next dose is scheduled  Outcome: Progressing Towards Goal  Goal: *Absence of signs and symptoms of DVT  Outcome: Progressing Towards Goal  Goal: *Absence of aspiration  Outcome: Progressing Towards Goal  Goal: *Progressive mobility and function  Outcome: Progressing Towards Goal  Goal: *Home safety concerns addressed  Outcome: Progressing Towards Goal

## 2021-08-09 NOTE — PROGRESS NOTES
Problem: Mobility Impaired (Adult and Pediatric)  Goal: *Acute Goals and Plan of Care (Insert Text)  Description: FUNCTIONAL STATUS PRIOR TO ADMISSION: Patient was independent with all mobility and active (worked as ). He lived at home with his wife in a 1-story house. Physical Therapy Goals  Goals re-assessed 7/28/2021. Goals re-assessed 8/4/2021 and appropriate for carryover. 1.  Patient will move from supine to sit and sit to supine , scoot up and down, and roll side to side in bed with independence within 7 day(s). 2.  Patient will transfer from bed to chair and chair to bed with independence using the least restrictive device within 7 day(s). 3.  Patient will perform sit to stand with  mod I within 7 day(s). 4.  Patient will ambulate with CGA  for 150 feet with the least restrictive device within 7 day(s). 5.  Patient will ascend/descend 3 stairs with 2 handrail(s) with CGA within 7 day(s). Initiated 7/22/2021  1. Patient will move from supine to sit and sit to supine , scoot up and down, and roll side to side in bed with independence within 7 day(s). 2.  Patient will transfer from bed to chair and chair to bed with minimal assistance using the least restrictive device within 7 day(s). 3.  Patient will perform sit to stand with supervision/set-up within 7 day(s). 4.  Patient will ambulate with min assistance  for 50 feet with the least restrictive device within 7 day(s). 5.  Patient will ascend/descend 3 stairs with 2 handrail(s) with moderate assistance within 7 day(s). Outcome: Resolved/Met  PHYSICAL THERAPY TREATMENT/DISCHARGE  Patient: Jordan Coulter [de-identified]64 y.o. male)  Date: 8/9/2021  Diagnosis: SAH (subarachnoid hemorrhage) (HCC) [I60.9] Cerebral arteriovenous malformation (AVM)       Precautions:    Chart, physical therapy assessment, plan of care and goals were reviewed. ASSESSMENT  Patient continues with skilled PT services and has progressed towards goals.  Pt pleasant and agreeable to work with therapy. Mobilized with supervision overall - tolerated 600 ft ambulation with slow but steady gait using RW. Pt lives with family and has no mobility questions or concerns for d/c. Will sign off. Other factors to consider for discharge: independent baseline         PLAN :  Patient will be discharged from acute skilled physical therapy at this time. Rationale for discharge:  Goals achieved    Recommendation for discharge: (in order for the patient to meet his/her long term goals)  Physical therapy at least 2 days/week in the home     This discharge recommendation:  Has not yet been discussed the attending provider and/or case management    IF patient discharges home will need the following DME: patient owns DME required for discharge (RW)       SUBJECTIVE:   Patient stated I have a walker from when I had my hip surgery.     OBJECTIVE DATA SUMMARY:   Critical Behavior:  Neurologic State: Alert  Orientation Level: Oriented X4  Cognition: Appropriate decision making, Appropriate for age attention/concentration, Appropriate safety awareness, Follows commands  Safety/Judgement: Decreased awareness of need for safety  Functional Mobility Training:  Bed Mobility:     Supine to Sit: Supervision  Sit to Supine: Supervision  Scooting: Supervision        Transfers:  Sit to Stand: Supervision  Stand to Sit: Supervision                             Balance:  Sitting: Intact  Standing: Intact; With support (RW)  Standing - Static: Good;Constant support  Standing - Dynamic : Good;Constant support  Ambulation/Gait Training:  Distance (ft): 600 Feet (ft)  Assistive Device: Gait belt;Walker, rolling  Ambulation - Level of Assistance: Supervision        Gait Abnormalities: Ataxic;Trunk sway increased; Shuffling gait        Base of Support: Widened     Speed/Rita: Slow  Step Length: Right shortened;Left shortened      Activity Tolerance:   Good    After treatment patient left in no apparent distress:   Supine in bed and Call bell within reach    COMMUNICATION/COLLABORATION:   The patients plan of care was discussed with: Occupational therapist and Registered nurse.      Karyn Luis PT, DPT   Time Calculation: 16 mins

## 2021-08-10 VITALS
HEART RATE: 60 BPM | WEIGHT: 212.3 LBS | BODY MASS INDEX: 28.76 KG/M2 | RESPIRATION RATE: 13 BRPM | HEIGHT: 72 IN | TEMPERATURE: 97.7 F | SYSTOLIC BLOOD PRESSURE: 140 MMHG | OXYGEN SATURATION: 100 % | DIASTOLIC BLOOD PRESSURE: 76 MMHG

## 2021-08-10 LAB
ANION GAP SERPL CALC-SCNC: 4 MMOL/L (ref 5–15)
BUN SERPL-MCNC: 15 MG/DL (ref 6–20)
BUN/CREAT SERPL: 19 (ref 12–20)
CALCIUM SERPL-MCNC: 9.4 MG/DL (ref 8.5–10.1)
CHLORIDE SERPL-SCNC: 104 MMOL/L (ref 97–108)
CO2 SERPL-SCNC: 26 MMOL/L (ref 21–32)
CREAT SERPL-MCNC: 0.81 MG/DL (ref 0.7–1.3)
ERYTHROCYTE [DISTWIDTH] IN BLOOD BY AUTOMATED COUNT: 13.4 % (ref 11.5–14.5)
GLUCOSE BLD STRIP.AUTO-MCNC: 108 MG/DL (ref 65–117)
GLUCOSE BLD STRIP.AUTO-MCNC: 92 MG/DL (ref 65–117)
GLUCOSE SERPL-MCNC: 113 MG/DL (ref 65–100)
HCT VFR BLD AUTO: 38.5 % (ref 36.6–50.3)
HGB BLD-MCNC: 12.9 G/DL (ref 12.1–17)
MAGNESIUM SERPL-MCNC: 2.3 MG/DL (ref 1.6–2.4)
MCH RBC QN AUTO: 28.3 PG (ref 26–34)
MCHC RBC AUTO-ENTMCNC: 33.5 G/DL (ref 30–36.5)
MCV RBC AUTO: 84.4 FL (ref 80–99)
NRBC # BLD: 0 K/UL (ref 0–0.01)
NRBC BLD-RTO: 0 PER 100 WBC
PHOSPHATE SERPL-MCNC: 4.5 MG/DL (ref 2.6–4.7)
PLATELET # BLD AUTO: 167 K/UL (ref 150–400)
PMV BLD AUTO: 10.8 FL (ref 8.9–12.9)
POTASSIUM SERPL-SCNC: 3.8 MMOL/L (ref 3.5–5.1)
RBC # BLD AUTO: 4.56 M/UL (ref 4.1–5.7)
SERVICE CMNT-IMP: NORMAL
SERVICE CMNT-IMP: NORMAL
SODIUM SERPL-SCNC: 134 MMOL/L (ref 136–145)
WBC # BLD AUTO: 4.9 K/UL (ref 4.1–11.1)

## 2021-08-10 PROCEDURE — 74011250637 HC RX REV CODE- 250/637: Performed by: HOSPITALIST

## 2021-08-10 PROCEDURE — 84100 ASSAY OF PHOSPHORUS: CPT

## 2021-08-10 PROCEDURE — 82962 GLUCOSE BLOOD TEST: CPT

## 2021-08-10 PROCEDURE — 85027 COMPLETE CBC AUTOMATED: CPT

## 2021-08-10 PROCEDURE — 74011250637 HC RX REV CODE- 250/637: Performed by: INTERNAL MEDICINE

## 2021-08-10 PROCEDURE — 36415 COLL VENOUS BLD VENIPUNCTURE: CPT

## 2021-08-10 PROCEDURE — 83735 ASSAY OF MAGNESIUM: CPT

## 2021-08-10 PROCEDURE — 74011250637 HC RX REV CODE- 250/637: Performed by: NURSE PRACTITIONER

## 2021-08-10 PROCEDURE — 80048 BASIC METABOLIC PNL TOTAL CA: CPT

## 2021-08-10 RX ORDER — CLONIDINE HYDROCHLORIDE 0.2 MG/1
0.2 TABLET ORAL EVERY 8 HOURS
Qty: 90 TABLET | Refills: 0 | Status: SHIPPED | OUTPATIENT
Start: 2021-08-10 | End: 2021-08-16 | Stop reason: SDUPTHER

## 2021-08-10 RX ORDER — PRAZOSIN HYDROCHLORIDE 2 MG/1
2 CAPSULE ORAL 3 TIMES DAILY
Qty: 90 CAPSULE | Refills: 0 | Status: SHIPPED | OUTPATIENT
Start: 2021-08-10 | End: 2021-08-16 | Stop reason: SDUPTHER

## 2021-08-10 RX ORDER — HYDRALAZINE HYDROCHLORIDE 100 MG/1
100 TABLET, FILM COATED ORAL 3 TIMES DAILY
Qty: 90 TABLET | Refills: 0 | Status: SHIPPED | OUTPATIENT
Start: 2021-08-10 | End: 2021-08-16 | Stop reason: SDUPTHER

## 2021-08-10 RX ORDER — LISINOPRIL 40 MG/1
40 TABLET ORAL
Qty: 30 TABLET | Refills: 0 | Status: SHIPPED | OUTPATIENT
Start: 2021-08-10 | End: 2021-08-16 | Stop reason: SDUPTHER

## 2021-08-10 RX ORDER — CHLORTHALIDONE 25 MG/1
25 TABLET ORAL DAILY
Qty: 30 TABLET | Refills: 0 | Status: SHIPPED | OUTPATIENT
Start: 2021-08-11 | End: 2021-08-16 | Stop reason: SDUPTHER

## 2021-08-10 RX ORDER — LEVETIRACETAM 500 MG/1
500 TABLET ORAL 2 TIMES DAILY
Qty: 60 TABLET | Refills: 0 | Status: SHIPPED | OUTPATIENT
Start: 2021-08-10 | End: 2021-08-16 | Stop reason: SDUPTHER

## 2021-08-10 RX ADMIN — PRAZOSIN HYDROCHLORIDE 2 MG: 1 CAPSULE ORAL at 08:54

## 2021-08-10 RX ADMIN — POLYVINYL ALCOHOL, POVIDONE 1 DROP: .5; .6 LIQUID OPHTHALMIC at 09:00

## 2021-08-10 RX ADMIN — CHLORTHALIDONE 25 MG: 25 TABLET ORAL at 08:54

## 2021-08-10 RX ADMIN — HYDRALAZINE HYDROCHLORIDE 100 MG: 50 TABLET, FILM COATED ORAL at 14:59

## 2021-08-10 RX ADMIN — LEVETIRACETAM 500 MG: 500 TABLET ORAL at 08:54

## 2021-08-10 RX ADMIN — CLONIDINE HYDROCHLORIDE 0.2 MG: 0.2 TABLET ORAL at 05:45

## 2021-08-10 RX ADMIN — CLONIDINE HYDROCHLORIDE 0.2 MG: 0.2 TABLET ORAL at 14:59

## 2021-08-10 NOTE — PROGRESS NOTES
Bedside shift change report given to Love Portillo RN (oncoming nurse) by Aly Lin RN (offgoing nurse). Report included the following information SBAR, ED Summary, Intake/Output, MAR, Recent Results and Cardiac Rhythm nsr.

## 2021-08-10 NOTE — DISCHARGE INSTRUCTIONS
Discharge Instructions       PATIENT ID: Gilmer Alcantara  MRN: [id number]   YOB: 1960    DATE OF ADMISSION: 7/20/2021 12:52 AM    DATE OF DISCHARGE: 8/10/2021    PRIMARY CARE PROVIDER: Adia Armando MD     ATTENDING PHYSICIAN: Wendy Callahan MD  DISCHARGING PROVIDER: Rachna Butler NP    To contact this individual call [phone] and ask the  to page. If unavailable ask to be transferred the Adult Hospitalist Department. 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  [facility]      Ronak August MD Radiology, Neuroradiology In 2 weeks  [facility]  [address] [address]      Nitesh Villarreal MD Nephrology In 2 weeks  [address] [facility] [phone]      Maria Guadalupe Mercedes MD Neurosurgery In 7 days Please call to schedule appointment as soon as possible for AVM treatment  [facility]  [phone]             ADDITIONAL CARE RECOMMENDATIONS:     Please keep your systolic blood pressure ( the top number) less than 140 at all times.  You should buy a blood pressure cuff and check your own blood pressure at home keeping a log to bring in to your doctors appointments in follow up. You need to call and make a follow up appointment with Dr. Lauri Harvey for gamma knife radiation of your AVM. His number is listed above. DIET: Cardiac Diet      ACTIVITY: Activity as tolerated    WOUND CARE:     EQUIPMENT needed:       Radiology      DISCHARGE MEDICATIONS:   See Medication Reconciliation Form    · It is important that you take the medication exactly as they are prescribed. · Keep your medication in the bottles provided by the pharmacist and keep a list of the medication names, dosages, and times to be taken in your wallet. · Do not take other medications without consulting your doctor. NOTIFY YOUR PHYSICIAN FOR ANY OF THE FOLLOWING:   Fever over 101 degrees for 24 hours. Chest pain, shortness of breath, fever, chills, nausea, vomiting, diarrhea, change in mentation, falling, weakness, bleeding. Severe pain or pain not relieved by medications. Or, any other signs or symptoms that you may have questions about.       DISPOSITION:  x  Home With:   OT  PT  HH  RN       SNF/Inpatient Rehab/LTAC    Independent/assisted living    Hospice    Other:     CDMP Checked:   Yes x     PROBLEM LIST Updated:  Yes x       Signed:   Dr. Tamie Matthews  8/10/2021  9:13 AM

## 2021-08-10 NOTE — PROGRESS NOTES
Prescriptions called in to [facility] per Patient request. [phone]. Spoke with pharmacist, Joelle Ojeda, and states will notify patient at [phone] when they are ready for . I have reviewed discharge instructions with the patient and spouse. The patient and spouse verbalized understanding.

## 2021-08-10 NOTE — PROGRESS NOTES
Neurocritical Care Brief Progress Note:    80-year-old male with SAH with cerebellar AVM with multiple feeders coming off the left PCA, left anterior choroidal and left SCA with a large single draining vein that drains into the vein of Gaston. 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 Patient denies any headaches today. States he is doing well and is supposed to go home tomorrow. He is very excited about this. Physical Exam:  Gen: NAD, calm, cooperative. Neuro: A&Ox4. Follows commands. Speech clear. Affect normal. PERRL, 3 mm bilaterally. Visual fields full to confrontation. However, has stated double vision at a distance. Mild disconjugate gaze present at times. EOMI. Left lower facial droop with stated left facial numbness noted to left cheek on light touch. Beni spontaneously. Strength 5/5 in UE and LE BL. Negative drift. Bulk and tone normal. No involuntary movements. Gait deferred. No ataxia. Skin: Warm, dry, color appropriate for ethnicity. PLAN: Continue current plan per NIS. Continue with SBP less than 140.           Jenae Arce NP  Neurocritical Care Nurse Practitioner

## 2021-08-10 NOTE — PROGRESS NOTES
Transition of Care Plan   RUR- Low  17%   DISPOSITION: Home with family and [address] F/U with PCP/Specialist     Transport: Family    CM informed [facility] of probable discharge today, pending patient's blood pressure stability. Patient will go home with family at discharge. GABBIE [email or web address].SAEID.

## 2021-08-10 NOTE — PROGRESS NOTES
Problem: Falls - Risk of  Goal: *Absence of Falls  Description: Document Ramírez Thomas Fall Risk and appropriate interventions in the flowsheet.   Outcome: Progressing Towards Goal  Note: Fall Risk Interventions:  Mobility Interventions: Bed/chair exit alarm, OT consult for ADLs, Patient to call before getting OOB  Medication Interventions: Assess postural VS orthostatic hypotension, Patient to call before getting OOB, Teach patient to arise slowly    Elimination Interventions: Bed/chair exit alarm, Call light in reach, Stay With Me (per policy)    History of Falls Interventions: Bed/chair exit alarm, Room close to nurse's station, Evaluate medications/consider consulting pharmacy   Problem: Subarachnoid Hemorrhage Stroke:Admission Day 5-Discharge  Goal: Activity/Safety  Outcome: Progressing Towards Goal  Goal: Nutrition/Diet  Outcome: Progressing Towards Goal  Goal: *Hemodynamically stable  Outcome: Progressing Towards Goal  Goal: *Absence of signs and symptoms of DVT  Outcome: Progressing Towards Goal   Problem: Pain  Goal: *Control of Pain  Outcome: Progressing Towards Goal

## 2021-08-10 NOTE — PROGRESS NOTES
Neurocritical Care Brief Progress Note:     60-year-old male with SAH with cerebellar AVM with multiple feeders coming off the left PCA, left anterior choroidal and left SCA with a large single draining vein that drains into the vein of Gaston. 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    Patient seen and examined today. Patient is going home today. Discharge instructions included regarding blood pressure management/logs at home. Patient should aim to keep systolic BP < 437. He has been started on four new medications. He will follow up with Dr. Colleen Pena, Dr. Kevin Jane and primary care as an outpatient. He will be receiving home health and physical and occupational therapy.     Physical Exam:  Gen: NAD, calm, cooperative. Neuro: A&Ox4. Follows commands. Speech clear. Affect normal. PERRL, 3 mm bilaterally. Visual fields full to confrontation. However, has stated double vision at a distance. Mild disconjugate gaze present at times. EOMI. Left lower facial droop with stated left facial numbness noted to left cheek on light touch. Beni spontaneously. Strength 5/5 in UE and LE BL. Negative drift. Bulk and tone normal. No involuntary movements. Gait deferred. No ataxia.   Skin: Warm, dry, color appropriate for ethnicity.      PLAN: Discharge.               Rayna Vaughn NP  Neurocritical Care Nurse Practitioner

## 2021-08-10 NOTE — PROGRESS NOTES
[facility]   [address] [facility], [address] [place] [address] [address], [facility]  Phone: [phone]   [phone]       Nephrology Progress Note  Lawnolberto Stamp     1960     [id number]  Date of Admission : 7/20/2021  08/10/21    CC: Follow up for secondary HTN       Assessment and Plan   Resistant HTN:  - suspect primary HTN  - negative secondary w/u   - cont current meds  - ok for d/c from renal perspective    Volume overload:  - improving    Cerebellar AVM w/ SAH   - per NIS    Chronic heavy smoker    Elevated TSH     Interval History:  Seen and examined. BP relatively stable over the past 24 hrs. No cp, sob, n/v/d reported. For d/c today    Review of Systems: A comprehensive review of systems was negative except for that written in the HPI.     Current Medications:   Current Facility-Administered Medications   Medication Dose Route Frequency    cloNIDine HCL (CATAPRES) tablet 0.2 mg  0.2 mg Oral Q8H    hydrALAZINE (APRESOLINE) tablet 100 mg  100 mg Oral TID    white petrolatum-mineral oiL (AKWA TEARS) 83-15 % ophthalmic ointment   Left Eye QHS    polyvinyl alcohol-povidone (NATURAL TEARS) 0.5-0.6 % ophthalmic solution 1 Drop  1 Drop Left Eye TID    metoclopramide HCl (REGLAN) injection 10 mg  10 mg IntraVENous Q6H PRN    prazosin (MINIPRESS) capsule 2 mg  2 mg Oral TID    lisinopriL (PRINIVIL, ZESTRIL) tablet 40 mg  40 mg Oral QHS    chlorthalidone (HYGROTON) tablet 25 mg  25 mg Oral DAILY    senna-docusate (PERICOLACE) 8.6-50 mg per tablet 1 Tablet  1 Tablet Oral DAILY    polyethylene glycol (MIRALAX) packet 17 g  17 g Oral DAILY PRN    bisacodyL (DULCOLAX) tablet 10 mg  10 mg Oral DAILY PRN    labetaloL (NORMODYNE;TRANDATE) injection 10 mg  10 mg IntraVENous Q4H PRN    hydrALAZINE (APRESOLINE) 20 mg/mL injection 20 mg  20 mg IntraVENous Q6H PRN    insulin lispro (HUMALOG) injection   SubCUTAneous AC&HS    glucose chewable tablet 16 g  4 Tablet Oral PRN    dextrose (D50W) injection syrg 12.5-25 g  12.5-25 g IntraVENous PRN    glucagon (GLUCAGEN) injection 1 mg  1 mg IntraMUSCular PRN    ondansetron (ZOFRAN) injection 4 mg  4 mg IntraVENous Q6H PRN    naloxone (NARCAN) injection 0.4 mg  0.4 mg IntraVENous PRN    acetaminophen (TYLENOL) tablet 650 mg  650 mg Oral Q4H PRN    Or    acetaminophen (TYLENOL) solution 650 mg  650 mg Per NG tube Q4H PRN    Or    acetaminophen (TYLENOL) suppository 650 mg  650 mg Rectal Q4H PRN    levETIRAcetam (KEPPRA) tablet 500 mg  500 mg Oral BID      No Known Allergies    Objective:  Vitals:    Vitals:    08/10/21 0600 08/10/21 0637 08/10/21 0854 08/10/21 0855   BP: 134/68  129/64 129/64   Pulse: 67 (!) 53 64 65   Resp: 18   14   Temp: 99.7 °F (37.6 °C)      SpO2: 100%      Weight:       Height:         Intake and Output:  No intake/output data recorded. 08/08 1901 - 08/10 0700  In: 1324 [P.O.:1324]  Out: 3080 [Urine:3080]    Physical Examination:    General: NAD,Conversant   Neck:  Supple, no mass  Resp:  Lungs CTA B/L, no wheezing , normal respiratory effort  CV:  RRR,  no murmur or rub,2+ LE edema  GI:  Soft, NT, + Bowel sounds, no hepatosplenomegaly  Neurologic:  Non focal  Psych:             AAO x 3 appropriate affect     []    High complexity decision making was performed  []    Patient is at high-risk of decompensation with multiple organ involvement    Lab Data Personally Reviewed: I have reviewed all the pertinent labs, microbiology data and radiology studies during assessment.     Recent Labs     08/10/21  0213 08/09/21  0551 08/08/21  1120 08/07/21  1259   * 136 134* 136   K 3.8 4.0 4.2 3.5    104 105 105   CO2 26 26 23 26   * 112* 100 101*   BUN 15 12 15 16   CREA 0.81 0.86 0.83 0.74   CA 9.4 9.1 8.9 7.8*   MG 2.3 2.2 2.2  --    PHOS 4.5 4.0 3.2  --      Recent Labs     08/10/21  0213 08/09/21  0551   WBC 4.9 4.9   HGB 12.9 12.9   HCT 38.5 37.6    168     No results found for: SDES  Lab Results   Component Value Date/Time    Culture result: MRSA NOT PRESENT 03/11/2015 12:00 PM    Culture result:  03/11/2015 12:00 PM         Screening of patient nares for MRSA is for surveillance purposes and, if positive, to facilitate isolation considerations in high risk settings. It is not intended for automatic decolonization interventions per se as regimens are not sufficiently effective to warrant routine use.      Recent Results (from the past 24 hour(s))   GLUCOSE, POC    Collection Time: 08/09/21 11:47 AM   Result Value Ref Range    Glucose (POC) 93 65 - 117 mg/dL    [facility]    GLUCOSE, POC    Collection Time: 08/09/21  4:18 PM   Result Value Ref Range    Glucose (POC) 181 (H) 65 - 117 mg/dL    [facility]    GLUCOSE, POC    Collection Time: 08/09/21  9:27 PM   Result Value Ref Range    Glucose (POC) 173 (H) 65 - 117 mg/dL    Performed by Ariadna Cassidy    MAGNESIUM    Collection Time: 08/10/21  2:13 AM   Result Value Ref Range    Magnesium 2.3 1.6 - 2.4 mg/dL   PHOSPHORUS    Collection Time: 08/10/21  2:13 AM   Result Value Ref Range    Phosphorus 4.5 2.6 - 4.7 MG/DL   CBC W/O DIFF    Collection Time: 08/10/21  2:13 AM   Result Value Ref Range    WBC 4.9 4.1 - 11.1 K/uL    RBC 4.56 4.10 - 5.70 M/uL    HGB 12.9 12.1 - 17.0 g/dL    HCT 38.5 36.6 - 50.3 %    MCV 84.4 80.0 - 99.0 FL    MCH 28.3 26.0 - 34.0 PG    MCHC 33.5 30.0 - 36.5 g/dL    RDW 13.4 11.5 - 14.5 %    PLATELET 256 344 - 455 K/uL    MPV 10.8 8.9 - 12.9 FL    NRBC 0.0 0  WBC    ABSOLUTE NRBC 0.00 0.00 - 4.17 K/uL   METABOLIC PANEL, BASIC    Collection Time: 08/10/21  2:13 AM   Result Value Ref Range    Sodium 134 (L) 136 - 145 mmol/L    Potassium 3.8 3.5 - 5.1 mmol/L    Chloride 104 97 - 108 mmol/L    CO2 26 21 - 32 mmol/L    Anion gap 4 (L) 5 - 15 mmol/L    Glucose 113 (H) 65 - 100 mg/dL    BUN 15 6 - 20 MG/DL    Creatinine 0.81 0.70 - 1.30 MG/DL    BUN/Creatinine ratio 19 12 - 20      GFR est AA >60 >60 ml/min/1.73m2 GFR est non-AA >60 >60 ml/min/1.73m2    Calcium 9.4 8.5 - 10.1 MG/DL   GLUCOSE, POC    Collection Time: 08/10/21  7:51 AM   Result Value Ref Range    Glucose (POC) 92 65 - 117 mg/dL    Performed by Silverio Esposito            Total time spent with patient:  xxx   min. Care Plan discussed with:  Patient     Family      RN      Consulting Physician [id number],         I have reviewed the flowsheets. Chart and Pertinent Notes have been reviewed. No change in PMH ,family and social history from Consult note.       Anay Zavala MD

## 2021-08-10 NOTE — PROGRESS NOTES
Hospital follow-up PCP transitional care appointment has been scheduled with Dr. Adia Fletcher for Monday, 8/16/21 at 11:20 a.m. Pending patient discharge.   Lanette Heath, Care Management Specialist.

## 2021-08-10 NOTE — PROGRESS NOTES
Problem: Falls - Risk of  Goal: *Absence of Falls  Description: Document Jaden Clemente Fall Risk and appropriate interventions in the flowsheet.   Outcome: Progressing Towards Goal  Note: Fall Risk Interventions:  Mobility Interventions: Bed/chair exit alarm, OT consult for ADLs, Patient to call before getting OOB         Medication Interventions: Assess postural VS orthostatic hypotension, Patient to call before getting OOB, Teach patient to arise slowly    Elimination Interventions: Bed/chair exit alarm, Call light in reach, Stay With Me (per policy)    History of Falls Interventions: Bed/chair exit alarm, Room close to nurse's station, Evaluate medications/consider consulting pharmacy         Problem: Patient Education: Go to Patient Education Activity  Goal: Patient/Family Education  Outcome: Progressing Towards Goal     Problem: Patient Education: Go to Patient Education Activity  Goal: Patient/Family Education  Outcome: Progressing Towards Goal     Problem: Patient Education: Go to Patient Education Activity  Goal: Patient/Family Education  Outcome: Progressing Towards Goal     Problem: Patient Education: Go to Patient Education Activity  Goal: Patient/Family Education  Outcome: Progressing Towards Goal     Problem: Patient Education: Go to Patient Education Activity  Goal: Patient/Family Education  Outcome: Progressing Towards Goal     Problem: Patient Education:  Go to Education Activity  Goal: Patient/Family Education  Outcome: Progressing Towards Goal     Problem: Subarachnoid Hemorrhage Stroke:Admission Day 3  Goal: Activity/Safety  Outcome: Progressing Towards Goal  Goal: Diagnostic Test/Procedures  Outcome: Progressing Towards Goal  Goal: Nutrition/Diet  Outcome: Progressing Towards Goal  Goal: Medications  Outcome: Progressing Towards Goal  Goal: Patient maintains clear airway/absence of aspiration  Outcome: Progressing Towards Goal  Goal: Treatments/Interventions/Procedures  Outcome: Progressing Towards Goal  Goal: Psychosocial  Outcome: Progressing Towards Goal  Goal: *Hemodynamically stable  Outcome: Progressing Towards Goal  Goal: *Absence of signs and symptoms of DVT  Outcome: Progressing Towards Goal     Problem: Subarachnoid Hemorrhage Stroke:Admission Day 3  Goal: Activity/Safety  Outcome: Progressing Towards Goal  Goal: Diagnostic Test/Procedures  Outcome: Progressing Towards Goal  Goal: Nutrition/Diet  Outcome: Progressing Towards Goal  Goal: Medications  Outcome: Progressing Towards Goal  Goal: Patient maintains clear airway/absence of aspiration  Outcome: Progressing Towards Goal  Goal: Treatments/Interventions/Procedures  Outcome: Progressing Towards Goal  Goal: Psychosocial  Outcome: Progressing Towards Goal  Goal: *Hemodynamically stable  Outcome: Progressing Towards Goal  Goal: *Absence of signs and symptoms of DVT  Outcome: Progressing Towards Goal     Problem: Subarachnoid Hemorrhage Stroke:Admission Day 5-Discharge  Goal: Activity/Safety  Outcome: Progressing Towards Goal  Goal: Diagnostic Test/Procedures  Outcome: Progressing Towards Goal  Goal: Nutrition/Diet  Outcome: Progressing Towards Goal  Goal: Medications  Outcome: Progressing Towards Goal  Goal: Patient maintains clear airway/absence of aspiration  Outcome: Progressing Towards Goal  Goal: Treatments/Interventions/Procedures  Outcome: Progressing Towards Goal  Goal: Psychosocial  Outcome: Progressing Towards Goal  Goal: *Hemodynamically stable  Outcome: Progressing Towards Goal  Goal: *Absence of signs and symptoms of DVT  Outcome: Progressing Towards Goal     Problem: Subarachnoid Hemorrhage Stroke:Discharge Outcomes  Goal: *Verbalizes anxiety and depression are reduced or absent  Outcome: Progressing Towards Goal  Goal: *Verbalizes understanding of risk factor modification (Stroke Metric)  Outcome: Progressing Towards Goal  Goal: *Hemodynamically stable  Outcome: Progressing Towards Goal  Goal: *Aware of needed dietary changes  Outcome: Progressing Towards Goal  Goal: *Verbalizes understanding and describes medication purposes and frequencies  Outcome: Progressing Towards Goal  Goal: *Verbalizes understanding of plan for nimodipine administration  Description: until day 21 and when next dose is scheduled  Outcome: Progressing Towards Goal  Goal: *Absence of signs and symptoms of DVT  Outcome: Progressing Towards Goal  Goal: *Absence of aspiration  Outcome: Progressing Towards Goal  Goal: *Progressive mobility and function  Outcome: Progressing Towards Goal  Goal: *Home safety concerns addressed  Outcome: Progressing Towards Goal     Problem: Pain  Goal: *Control of Pain  Outcome: Progressing Towards Goal  Goal: *PALLIATIVE CARE:  Alleviation of Pain  Outcome: Progressing Towards Goal

## 2021-08-10 NOTE — DISCHARGE SUMMARY
Discharge Summary       PATIENT ID: Ramona Patel  MRN: [id number]   YOB: 1960    DATE OF ADMISSION: 7/20/2021 12:52 AM    DATE OF DISCHARGE: 8/10/21   PRIMARY CARE PROVIDER: Martinez Garzon MD     ATTENDING PHYSICIAN: Jennifer Gonzalez  DISCHARGING PROVIDER: Mark Keane MD    To contact this individual call [phone] and ask the  to page. If unavailable ask to be transferred the Adult Hospitalist Department. CONSULTATIONS: IP CONSULT TO NEUROSURGERY  IP CONSULT TO NEPHROLOGY    PROCEDURES/SURGERIES: * No surgery found *    ADMITTING DIAGNOSES & HOSPITAL COURSE:   \"This is 61-year-old gentleman with past medical history of hypertension,  currently getting transferred out of ICU after [facility] and hypertensive emergency treatment. [facility] presented from [facility] with headache and dizziness, found to have elevated blood pressure. CT of the head showed an acute subarachnoid hemorrhage anterior to the sarahi.  CTA of the head and neck showed an AVM in the left superior cerebellar peduncle, with the dominant drainage into the vein of Gaston. No large vessel occlusion, no hemodynamically significant stenosis. Redemonstrated subarachnoid hemorrhage in the basilar cisterns.  Patient was initially started on IV antihypertensives, now switched to oral antihypertensives.  He was also started on Nimodipine.       Assessment & Plan:      Acute SAH in setting of cerebellar AVM with associated flow related aneurysm  - S/P diagnostic cerebral angio on 07/20/21.  - completed 21 days of nimodipine 60 mg po q4h. - Continue levetiracetam 500 mg po bid.       Polyuria Hyponatremia   F/u with nephrology as out pt      Malignant HTN   - Continue chlorthalidone 25 mg po daily. - Continue clonidine 0.2 mg po q8h. - Continue hydralazine 100 mg po tid.    - Continue lisinopril 40 mg po daily.    - Prazosin 2 mg po TID   - CTA abdomen 08/05/21.- 1.  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 CT head within normal limit        DM II  Hyperglycemia resume home meds      Constipation   - Continue senna-docusate 8.6-50 mg po daily.      Obesity/ Body mass index is 34.18 kg/m². DISCHARGE DIAGNOSES / PLAN:      1. D/c home with [place] fu as instructed with NIS and Nephrology      ADDITIONAL CARE RECOMMENDATIONS:        PENDING TEST RESULTS:   At the time of discharge the following test results are still pending:     FOLLOW UP APPOINTMENTS:    Follow-up Information     Follow up With Specialties Details Why [address]   [address], [address]    Martinez Garzon MD Family Medicine In 1 week  [address] [facility]  [address] [facility]      Kathleen Jernigan MD Endovascular Surgical Neuroradiology In 2 weeks  [address] [facility]  [address] [facility]  [address] [facility]      Francisco Armijo MD Nephrology In 2 weeks  [facility]  [address] [facility]  [phone]               DIET: Cardiac Diet    ACTIVITY: Activity as tolerated    WOUND CARE:     EQUIPMENT needed:       DISCHARGE MEDICATIONS:  Current Discharge Medication List      START taking these medications    Details   chlorthalidone (HYGROTON) 25 mg tablet Take 1 Tablet by mouth daily for 30 days. Qty: 30 Tablet, Refills: 0  Start date: 8/11/2021, End date: 9/10/2021      cloNIDine HCL (CATAPRES) 0.2 mg tablet Take 1 Tablet by mouth every eight (8) hours for 30 days. Qty: 90 Tablet, Refills: 0  Start date: 8/10/2021, End date: 9/9/2021      levETIRAcetam (KEPPRA) 500 mg tablet Take 1 Tablet by mouth two (2) times a day for 30 days. Qty: 60 Tablet, Refills: 0  Start date: 8/10/2021, End date: 9/9/2021      prazosin (MINIPRESS) 2 mg capsule Take 1 Capsule by mouth three (3) times daily for 30 days.   Qty: 90 Capsule, Refills: 0  Start date: 8/10/2021, End date: 9/9/2021 CONTINUE these medications which have CHANGED    Details   hydrALAZINE (APRESOLINE) 100 mg tablet Take 1 Tablet by mouth three (3) times daily for 30 days. Qty: 90 Tablet, Refills: 0  Start date: 8/10/2021, End date: 9/9/2021      lisinopriL (PRINIVIL, ZESTRIL) 40 mg tablet Take 1 Tablet by mouth nightly for 30 days. Qty: 30 Tablet, Refills: 0  Start date: 8/10/2021, End date: 9/9/2021         STOP taking these medications       lancets misc Comments:   Reason for Stopping:         amLODIPine (NORVASC) 10 mg tablet Comments:   Reason for Stopping:         aspirin (ASPIRIN) 325 mg tablet Comments:   Reason for Stopping:         hydrochlorothiazide (HYDRODIURIL) 25 mg tablet Comments:   Reason for Stopping:         amLODIPine (NORVASC) 10 mg tablet Comments:   Reason for Stopping:                 NOTIFY YOUR PHYSICIAN FOR ANY OF THE FOLLOWING:   Fever over 101 degrees for 24 hours. Chest pain, shortness of breath, fever, chills, nausea, vomiting, diarrhea, change in mentation, falling, weakness, bleeding. Severe pain or pain not relieved by medications. Or, any other signs or symptoms that you may have questions about. DISPOSITION:    Home With:   OT  PT  HH  RN       Long term SNF/Inpatient Rehab    Independent/assisted living    Hospice    Other:       PATIENT CONDITION AT DISCHARGE:     Functional status    Poor     Deconditioned     Independent      Cognition     Lucid     Forgetful     Dementia      Catheters/lines (plus indication)    Quesada     PICC     PEG     None      Code status     Full code     DNR      PHYSICAL EXAMINATION AT DISCHARGE:  General:          Alert, cooperative, no distress, appears stated age. HEENT:           Atraumatic, anicteric sclerae, pink conjunctivae                          No oral ulcers, mucosa moist, throat clear, dentition fair  Neck:               Supple, symmetrical  Lungs:             Clear to auscultation bilaterally. No Wheezing or Rhonchi. No rales.   Chest wall:      No tenderness  No Accessory muscle use. Heart:              Regular  rhythm,  No  murmur   No edema  Abdomen:        Soft, non-tender. Not distended. Bowel sounds normal  Extremities:     No cyanosis. No clubbing,                            Skin turgor normal, Capillary refill normal  Skin:                Not pale. Not Jaundiced  No rashes   Psych:             Not anxious or agitated. Neurologic:      Alert, moves all extremities, answers questions appropriately and responds to commands       CHRONIC MEDICAL DIAGNOSES:  Problem List as of 8/10/2021 Date Reviewed: 7/20/2021        Codes Class Noted - Resolved    Celiac artery stenosis (HCC) (Chronic) ICD-10-CM: I77.4  ICD-9-CM: 447. 4 Chronic 7/21/2021 - Present        Vasogenic cerebral edema (HCC) ICD-10-CM: G93.6  ICD-9-CM: 497. 5 Acute 7/21/2021 - Present        Hypertensive emergency ICD-10-CM: I16.1  ICD-9-CM: 401.9 Acute 7/21/2021 - Present        Malignant hypertension ICD-10-CM: I10  ICD-9-CM: 401.0 Acute 7/20/2021 - Present        * (Principal) Cerebral arteriovenous malformation (AVM) ICD-10-CM: Q28.2  ICD-9-CM: 747.81 Acute 7/20/2021 - Present    Overview Signed 7/20/2021  7:03 AM by Xiomara Torres MD     ([id number])- CT (Brain): There is an AVM in the superior cerebellum with multiple aneurysms. One measures 4 mm. Another measures 4 mm within the superior cerebellum.              Acute hypokalemia ICD-10-CM: E87.6  ICD-9-CM: 276.8 Acute 7/20/2021 - Present        Cerebral vasospasm ICD-10-CM: I67.848  ICD-9-CM: 435.9  8/1/2021 - Present        SAH (subarachnoid hemorrhage) (HCC) ICD-10-CM: I60.9  ICD-9-CM: 430  7/20/2021 - Present        Type 2 diabetes mellitus without complication, without long-term current use of insulin (HCC) ICD-10-CM: E11.9  ICD-9-CM: 250.00  3/22/2019 - Present        Arthritis of hip ICD-10-CM: M16.10  ICD-9-CM: 716.95  3/18/2015 - Present        RESOLVED: Essential hypertension ICD-10-CM: I10  ICD-9-CM: 401.9  3/22/2019 - 8/3/2021              Greater than 30 minutes were spent with the patient on counseling and coordination of care    Signed:   Anya Slaughter MD  8/10/2021  9:15 AM

## 2021-08-12 NOTE — ADT AUTH CERT NOTES
Neurology [address] Day 21 (8/9/2021) by Eda Whalen       Review Status Review Entered   Completed 8/11/2021 13:39      Criteria Review      Care Day: 21 Care Date: 8/9/2021 Level of Care: Intermediate Care    Guideline Day 3    Level Of Care    (X) * Activity level acceptable    8/11/2021 13:39:18 EDT by Kaur Livingston in chair    ( ) * Complete discharge planning    Clinical Status    (X) * No infection, or status acceptable    8/11/2021 13:39:18 EDT by Eda Whalen      none noted    (X) * Isolation not needed, or status acceptable    8/11/2021 13:39:18 EDT by Eda Whalen      none noted    (X) * Respiratory status acceptable    8/11/2021 13:39:18 EDT by Eda Whalen      RR 17, 98% on RA    (X) * Pain and nausea absent or adequately managed    8/11/2021 13:39:18 EDT by Eda Whalen      Pain 0/10    (X) * Ventilatory status acceptable    8/11/2021 13:39:18 EDT by Eda Whalen      RR 17, 98% on RA    (X) * Neurologic problems absent or stabilized    8/11/2021 13:39:18 EDT by Eda Whalen      Neurologic: Moves all extremities.   AAOx3, CN II-XII reviewed    (X) * Muscle or nerve damage absent or stable    8/11/2021 13:39:18 EDT by Eda Whalen      Musculoskeletal: No edema, warm, 2+ pulses throughout    ( ) * General Discharge Criteria met    Interventions    (X) * Intake acceptable    8/11/2021 13:39:18 EDT by Eda Whalen      adult regular diet, PO meds    ( ) * No inpatient interventions needed    8/11/2021 13:39:18 EDT by Eda Whalen      daily CMP, daily CBC, neuro/vascular checks, adult regular diet, neurologic status assessment q2, daily phosphorus, daily magnesium, OOB in chair, incentive spirometry, aspiration precautions, elevate HOB, continuous oximetry, strict I&Os    * Milestone   Additional Notes   Date of care: 8/9/2021      IP- LOC-Intermediate Care      PT PN: Patient continues with skilled PT services and has progressed towards goals. Pt pleasant and agreeable to work with therapy. Marj Keepers with supervision overall - tolerated 600 ft ambulation with slow but steady gait using RW.  Pt lives with family and has no mobility questions or concerns for d/c.  Will sign off. 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 - S/P diagnostic cerebral andio on 07/20/21.   - Day 19 of 21 on nimodipine 60 mg po q4h. - Continue levetiracetam 500 mg po bid.     - repeat MRI/ MRA- reviewed       Polyuria   Hyponatremia    - Nephrology input appreciated. - Monitor for now.         HTN- HYPOTENSIVE THIS MORNING HOLD MEDS WILL READJUST    - Continue chlorthalidone 25 mg po daily. - Continue clonidine 0.2 mg po q8h. - Continue hydralazine 100 mg po tid.     - Continue lisinopril 40 mg po daily.     - Prazosin 2 mg po TID    - CTA abdomen 08/05/21.- 1. 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  CT head within normal limit       DM II   Hyperglycemia    - Continue to hold oral diabetes meds. - Continue FSBS with SSI correction scale.        Constipation    - Continue senna-docusate 8.6-50 mg po daily.     Constitutional: No acute distress, cooperative, pleasant    ENT: Oral mucosa moist, oropharynx benign. Resp: CTA bilaterally. No wheezing/rhonchi/rales. No accessory muscle use   CV: Regular rhythm, normal rate, no murmurs, gallops, rubs    GI: Soft, non distended, non tender.  normoactive bowel sounds, no hepatosplenomegaly     Musculoskeletal: No edema, warm, 2+ pulses throughout    Neurologic: Moves all extremities.  AAOx3, CN II-XII reviewed      Nephrology PN: Assessment and Plan    Resistant HTN:   - suspect primary HTN   - negative secondary w/u    - cont current meds       Volume overload    - improving       Cerebellar AVM w/ SAH    - per NIS       Chronic heavy smoker       Elevated TSH      Neurology PN: Plan: 1) Acute SAH in the setting of cerebellar AVM w/associated flow related aneurysm, Clayton Knox 1, Mistry Grade 2   - s/p diagnostic cerebral angiogram on 7/20/2021   - Day 21 of 21 of Nimotop for the prevention of delayed cerebral ischemia-DC'd   - continue Keppra 500 mg BID for seizure ppx as outpatient   - ECHO shows EF 65-70%, dilated left atrium, mild (grade 1) left ventricular diastolic dysfunction    - neuro checks every 4 hours   - SBP goal <140, on scheduled chlorthalidone, clonidine, hydralazine, prazosin, and lisinopril, stop Nimotop and monitor BP for additional day to assess if has rebound hypertension    - PT/OT recommending home with assist of family and HH   - follow up with Dr. Lata Roman after discharge for AVM treatment, anticipate gamma knife as outpatient   - NIS following, will need out patient follow up with Dr. Chavarria Rounds in 2-4 weeks without imaging       Polyuria--> resolved    - NA stable   - Nephrology consulted for further opinion of any ADH abnormalities in the setting of [facility]; no concern for cerebral salt wasting syndrome, autodiuresing       HTN   - SBP goal <140 as stated above   - Patient had a bilateral artery/venous renal duplex on 7/22 shows no evidence of significant renal artery stenosis.  Possible hemodynamically significant celiac artery stenosis. - Nephrology following, secondary HTN work up is negative. Neurocritical care PN: PLAN: Continue current plan per NIS.  Continue with SBP less than 140      Vitals: Temp 98.7, HR 53, /66, RR 17, 98% on RA      Labs:   8/9/2021 05:51   WBC: 4.9   RBC: 4.53   HCT: 37.6   Sodium: 136   Chloride: 104   CO2: 26   Anion gap: 6   Glucose: 112 (H)   BUN: 12   Creatinine: 0.86   BUN/Creatinine ratio: 14   Calcium: 9.1   Phosphorus: 4.0   Magnesium: 2.2   GFR est non-AA: >60      Medications:   Hygroton 25mg PO daily   clonidine 0.1mg PO q8   clonidine 0.2mg PO q8   Hydralazine 100mg PO TID   Hydralazine 50mg PO TID   Insulin lispro SC QID ACHS   Labetalol 10mg IV q4 PRN x1   Keppra 500mg PO BID   Lisinopril 40mg PO every bedtime   Nimotop 60mg PO q4   Minipress 2mg PO TID   Pericolace 8.6-50mg per tablet 1 tab PO daily      Plan: daily CMP, daily CBC, neuro/vascular checks, adult regular diet, neurologic status assessment q2, daily phosphorus, daily magnesium, OOB in chair, incentive spirometry, aspiration precautions, elevate HOB, continuous oximetry, strict I&Os, PT/OT/speech, SCDs               Neurology [facility] - Care Day 20 (8/8/2021) by Claudia Kim       Review Status Review Entered   Completed 8/11/2021 13:30      Criteria Review      Care Day: 20 Care Date: 8/8/2021 Level of Care: Intermediate Care    Guideline Day 3    Level Of Care    (X) * Activity level acceptable    8/11/2021 13:30:51 EDT by Christine Keller in chair    ( ) * Complete discharge planning    Clinical Status    (X) * No infection, or status acceptable    8/11/2021 13:30:51 EDT by Claudia Kim      none noted    (X) * Isolation not needed, or status acceptable    8/11/2021 13:30:51 EDT by Claudia Kim      none noted    ( ) * Respiratory status acceptable    8/11/2021 13:30:51 EDT by Claudia Kim      RR 20, 21, 19    (X) * Pain and nausea absent or adequately managed    8/11/2021 13:30:51 EDT by Claudia Kim      Pain 0/10    (X) * Ventilatory status acceptable    8/11/2021 13:30:51 EDT by Claudia Kim      96% on RA    (X) * Neurologic problems absent or stabilized    8/11/2021 13:30:51 EDT by Claudia Kim      Repeat MRI/MRA within normal limit no acute issues    (X) * Muscle or nerve damage absent or stable    8/11/2021 13:30:51 EDT by Avi Coffman      Musculoskeletal: No edema, warm, 2+ pulses throughout    ( ) * General Discharge Criteria met    Interventions    (X) * Intake acceptable    8/11/2021 13:30:51 EDT by Avi Coffman      adult regular diet, PO meds    ( ) * No inpatient interventions needed    8/11/2021 13:30:51 EDT by Avi Coffman      daily CMP, daily CBC, neuro/vascular checks, adult regular diet, neurologic status assessment q2, daily phosphorus, daily magnesium, OOB in chair, incentive spirometry, aspiration precautions, elevate HOB, continuous oximetry, strict I&Os    * Milestone   Additional Notes   Date of care: 8/8/2021      IP- LOC-Intermediate Care      Neurology PN: Plan: Acute SAH in the setting of cerebellar AVM w/associated flow related aneurysm, Clayton Knox 1, Mistry Grade 2   - s/p diagnostic cerebral angiogram on 7/20/2021   - Day 20 of 21 of Nimotop for the prevention of delayed cerebral ischemia   - continue Keppra 500 mg BID for seizure ppx-Will need to consider when/if to wean this off   - Fluids stopped in anticipation of discharge    - rCTA of the head and neck on 8/7/2021 shows unchanged left pontine AVM.  No large vessel occlusion.  MRI/MRA 8/7 shows no interval change in AVM with chronic hemorrhage to left anterior sarahi and left brachium pontis   - ECHO shows EF 65-70%, dilated left atrium, mild (grade 1) left ventricular diastolic dysfunction    - neuro checks every 4 hours   - SBP goal <140, on scheduled chlorthalidone, clonidine, hydralazine, prazosin, and lisinopril, defer management to primary team   - PT/OT recommending home with assist of family and HH   - follow up with Dr. Allyson Dumont after discharge for AVM treatment, anticipate gamma knife as outpatient   - NIS following, will need out patient follow up with Dr. Ewa Lau pending       Polyuria--> improved    - NA stable   - Nephrology consulted for further opinion of any ADH abnormalities in the setting of SAH; no concern for cerebral salt wasting syndrome, autodiuresing .   - Strict I's and O's, continue to monitor       HTN   - SBP goal <140 as stated above   - Patient had a bilateral artery/venous renal duplex on 7/22 shows no evidence of significant renal artery stenosis.  Possible hemodynamically significant celiac artery stenosis. - Nephrology following, secondary HTN work up is negative. 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 - S/P diagnostic cerebral andio on 07/20/21.   - Day 19 of 21 on nimodipine 60 mg po q4h. - Continue levetiracetam 500 mg po bid.     - repeat MRI/ MRA- reviewed       Polyuria   Hyponatremia    - Nephrology input appreciated. - Monitor for now.         HTN- HYPOTENSIVE THIS MORNING HOLD MEDS WILL READJUST    - Continue chlorthalidone 25 mg po daily. - Continue clonidine 0.1 mg po q8h. - Continue hydralazine 50 mg po tid.     - Continue lisinopril 40 mg po daily.     - Prazosin 2 mg po TID    - Plan for CTA abdomen 08/05/21.- 1. 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  CT head within normal limit       DM II   Hyperglycemia    - Continue to hold oral diabetes meds. - Continue FSBS with SSI correction scale.        Constipation    - Continue senna-docusate 8.6-50 mg po daily   Constitutional: No acute distress, cooperative, pleasant    ENT: Oral mucosa moist, oropharynx benign. Resp: CTA bilaterally. No wheezing/rhonchi/rales.  No accessory muscle use   CV: Regular rhythm, normal rate, no murmurs, gallops, rubs    GI: Soft, non distended, non tender. normoactive bowel sounds, no hepatosplenomegaly     Musculoskeletal: No edema, warm, 2+ pulses throughout    Neurologic: Moves all extremities.   AAOx3, CN II-XII reviewed      Vitals: Temp 98.9, HR 57, /62, RR 20, 21, 19, 96% on RA      Labs:   8/8/2021 11:20   Sodium: 134 (L)   Chloride: 105   CO2: 23   Anion gap: 6   Glucose: 100   BUN: 15   Creatinine: 0.83   BUN/Creatinine ratio: 18   Calcium: 8.9   Phosphorus: 3.2   Magnesium: 2.2   GFR est non-AA: >60      Medications:   Hygroton 25mg PO daily   Hydralazine 50mg PO TID   Insulin lispro SC QID ACHS   Labetalol 10mg IV q4 PRN x1   Keppra 500mg PO BID   Lisinopril 40mg PO every bedtime   Nimotop 60mg PO q4   Minipress 2mg PO TID   Pericolace 8.6-50mg per tablet 1 tab PO daily      Plan: daily CMP, daily CBC, neuro/vascular checks, adult regular diet, neurologic status assessment q2, daily phosphorus, daily magnesium, OOB in chair, incentive spirometry, aspiration precautions, elevate HOB, continuous oximetry, strict I&Os, PT/OT/speech, SCDs               Neurology [facility] - Care Day 19 (8/7/2021) by Juliana Lopez       Review Status Review Entered   Completed 8/11/2021 13:22      Criteria Review      Care Day: 19 Care Date: 8/7/2021 Level of Care: Intermediate Care    Guideline Day 3    Level Of Care    (X) * Activity level acceptable    8/11/2021 13:22:56 EDT by Ro Petty in chair    ( ) * Complete discharge planning    Clinical Status    (X) * No infection, or status acceptable    8/11/2021 13:22:56 EDT by Juliana Lopez      none noted    (X) * Isolation not needed, or status acceptable    8/11/2021 13:22:56 EDT by Juliana Lopez      none noted    (X) * Respiratory status acceptable    8/11/2021 13:22:56 EDT by Juliana Lopez      RR 14, 96% on RA    (X) * Pain and nausea absent or adequately managed    8/11/2021 13:22:56 EDT by Silverio Serrano      none noted    (X) * Ventilatory status acceptable    8/11/2021 13:22:56 EDT by Silverio Serrano      RR 14, 96% on RA    (X) * Neurologic problems absent or stabilized    8/11/2021 13:22:56 EDT by Silverio Serrano      Neurologic: Moves all extremities.  AAOx3, CN II-XII reviewed    (X) * Muscle or nerve damage absent or stable    8/11/2021 13:22:56 EDT by Silverio Serrano      Left side seems weaker    ( ) * General Discharge Criteria met    Interventions    (X) * Intake acceptable    8/11/2021 13:22:56 EDT by Silverio Serrano      adult regular diet, PO meds    ( ) * No inpatient interventions needed    8/11/2021 13:22:56 EDT by Silverio Serrano      daily CMP, daily CBC, neuro/vascular checks, adult regular diet, neurologic status assessment q2, daily phosphorus, daily magnesium, OOB in chair, incentive spirometry, aspiration precautions, elevate HOB, continuous oximetry, strict I&Os    * Milestone   Additional Notes   Date of care: 8/7/2021      IP- LOC-Intermediate Care      Neurointerventional surgery PN: Plan:   Acute SAH in the setting of 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  No large vessel occlusion.  CTA on 8/5 shows unchanged left pontine AVM.  Resolution of prepontine cistern hemorrhage.  No evidence of vasospasm.  No significant carotid stenosis.   - Dr. Deanna Hood feels left facial weakness is worse and has some concern for left eye proptosis.  Ordered stat MRI of Brain and MRV of Brain to assess for any new changes and rule out venous thrombosis   - Strict I's and O's, patient -500 mls on  I's and O's, continue IVF to 100 ml/hr to maintain euvolemia, patient is essentially out of vasospasm window.   - ECHO shows EF 65-70%, dilated left atrium. No shunting seen. Mild (grade 1) left ventricular diastolic dysfunction. - neuro checks every 4 hours   - SBP goal <140, on scheduled chlorthalidone, clonidine, hydralazine, prazosin, and lisinopril, defer management to primary team   - PT/OT following   - assess NICOM PRN for fluid responsiveness    - TCDs completed    - follow up with Dr. Lata Roman after discharge for AVM treatment     - NIS following       Polyuria--> improved    - patient has been previously diuresed for fluid overload    - patient with intermittent polyuria, possibly autodiuresing    - trend serum sodium, last sodium 134, BMP daily    - Nephrology consulted for further opinion of any ADH abnormalities in the setting of SAH and per review of notes there is no concern for cerebral salt wasting syndrome.   - Strict I's and O's, continue to monitor   - Nephrology following        HTN   - SBP goal <140 as stated above   - patient on scheduled oral meds as stated above   - Patient had a bilateral artery/venous renal duplex on 7/22 which shows no evidence of significant renal artery stenosis.  No evidence of significant renal parenchymal disease.  Possible hemodynamically significant celiac artery stenosis. - Nephrology following, secondary HTN work up is negative. 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     - S/P diagnostic cerebral andio on 07/20/21.   - Day 19 of 21 on nimodipine 60 mg po q4h. - Continue levetiracetam 500 mg po bid.     - repeat MRI/ MRA today        Polyuria   Hyponatremia    - Nephrology input appreciated. - Monitor for now.         HTN- HYPOTENSIVE THIS MORNING HOLD MEDS WILL READJUST    - Continue chlorthalidone 25 mg po daily. - Continue clonidine 0.1 mg po q8h. - Continue hydralazine 50 mg po tid.     - Continue lisinopril 40 mg po daily.     - Prazosin 2 mg po TID    - Plan for CTA abdomen 08/05/21.- 1. 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  CT head within normal limit       DM II   Hyperglycemia    - Continue to hold oral diabetes meds. - Continue FSBS with SSI correction scale.        Constipation    - Continue senna-docusate 8.6-50 mg po daily.     Constitutional: No acute distress, cooperative, pleasant    ENT: Oral mucosa moist, oropharynx benign. Resp: CTA bilaterally. No wheezing/rhonchi/rales. No accessory muscle use   CV: Regular rhythm, normal rate, no murmurs, gallops, rubs    GI: Soft, non distended, non tender. normoactive bowel sounds, no hepatosplenomegaly     Musculoskeletal: No edema, warm, 2+ pulses throughout    Neurologic: Moves all extremities.  AAOx3, CN II-XII reviewed      Neurocritical care PN: 59-year-old male with SAH with cerebellar AVM with multiple feeders coming off the left PCA, left anterior choroidal and left SCA with a large single draining vein that drains into the vein of Gaston. 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 Patient denies any headaches all day. Had a repeat MRI/MRV. Showed stable imaging findings related to prepontine AV malformation with chronic hemorrhage in the left anterior sarahi and left brachium pontis. MRA showed  left pontine AVM with draining vein on the left extending to the vein of Gaston. No evidence of dural venous sinus thrombosis. No evidence of ophthalmic vein thrombosis. PLAN: Continue current plan per NIS.  Continue with SBP less than 140. Recommended trying to sleep with HOB slightly elevated to see if that will help with his headaches that have been coming at night. Vitals: Temp 97.8, HR 63, /62, RR 14, 96% on RA      Labs:   8/7/2021 12:59   Sodium: 136   Chloride: 105   CO2: 26   Anion gap: 5   Glucose: 101 (H)   BUN: 16   Creatinine: 0.74   BUN/Creatinine ratio: 22 (H)   Calcium: 7.8 (L)   GFR est non-AA: >60   Head CT:     IMPRESSION   No acute findings   Head and Neck CTA: IMPRESSION   1.  No large vessel occlusion or flow-limiting stenosis. 2.  No change in left pontine AV malformation. 3.  No new hemorrhage or herniation. MRV Brain:    IMPRESSION   Left pontine AVM with draining vein on the left extending to the vein of Gaston. There is no evidence of dural venous sinus thrombosis. There is no evidence of ophthalmic vein thrombosis. MRI brain: IMPRESSION   Stable imaging findings related to prepontine AV malformation with chronic   hemorrhage in the left anterior sarahi and left brachium pontis. No new diffusion restriction abnormality.    No acute intracranial process is demonstrated      Medications:   Xanax 0.5mg PO x1   LR 250mL IV bolus x1   LR 500mL IV bolus x1   Hygroton 25mg PO daily   Clonidine 0.1mg PO q8   Clonidine 0.2mg PO q8   Hydralazine 20mg IV q6 PRN x1   Hydralazine 100mg PO TID   Hydralazine 50mg PO TID   Insulin lispro SC QID ACHS   LR 100mL/hr IV   Keppra 500mg PO BID   Lisinopril 40mg PO every bedtime   Reglan 10mg IV q6 PRN x1   Niotop 60mg PO q4   Minipress 2mg PO TID   Pericolace 8.6-50mg per tablet 1 tab PO daily      Plan: daily CMP, daily CBC, neuro/vascular checks, adult regular diet, neurologic status assessment q2, daily phosphorus, daily magnesium, OOB in chair, incentive spirometry, aspiration precautions, elevate HOB, continuous oximetry, strict I&Os, PT/OT/speech, SCDs

## 2021-08-14 LAB
DOPAMINE SERPL-MCNC: <30 PG/ML (ref 0–48)
EPINEPH PLAS-MCNC: <15 PG/ML (ref 0–62)
NOREPINEPH PLAS-MCNC: 142 PG/ML (ref 0–874)

## 2021-08-16 ENCOUNTER — OFFICE VISIT (OUTPATIENT)
Dept: FAMILY MEDICINE CLINIC | Age: 61
End: 2021-08-16
Payer: COMMERCIAL

## 2021-08-16 VITALS
SYSTOLIC BLOOD PRESSURE: 117 MMHG | BODY MASS INDEX: 27.52 KG/M2 | OXYGEN SATURATION: 100 % | DIASTOLIC BLOOD PRESSURE: 69 MMHG | WEIGHT: 203.2 LBS | HEART RATE: 71 BPM | TEMPERATURE: 97.7 F | HEIGHT: 72 IN | RESPIRATION RATE: 16 BRPM

## 2021-08-16 DIAGNOSIS — I60.9 SAH (SUBARACHNOID HEMORRHAGE) (HCC): ICD-10-CM

## 2021-08-16 DIAGNOSIS — Q28.2 CEREBRAL ARTERIOVENOUS MALFORMATION (AVM): ICD-10-CM

## 2021-08-16 DIAGNOSIS — I10 ESSENTIAL HYPERTENSION: ICD-10-CM

## 2021-08-16 DIAGNOSIS — E11.9 TYPE 2 DIABETES MELLITUS WITHOUT COMPLICATION, WITHOUT LONG-TERM CURRENT USE OF INSULIN (HCC): ICD-10-CM

## 2021-08-16 DIAGNOSIS — Z09 HOSPITAL DISCHARGE FOLLOW-UP: Primary | ICD-10-CM

## 2021-08-16 PROCEDURE — 1111F DSCHRG MED/CURRENT MED MERGE: CPT | Performed by: FAMILY MEDICINE

## 2021-08-16 PROCEDURE — 99214 OFFICE O/P EST MOD 30 MIN: CPT | Performed by: FAMILY MEDICINE

## 2021-08-16 RX ORDER — PRAZOSIN HYDROCHLORIDE 2 MG/1
2 CAPSULE ORAL 3 TIMES DAILY
Qty: 270 CAPSULE | Refills: 1 | Status: SHIPPED | OUTPATIENT
Start: 2021-08-16 | End: 2021-09-16 | Stop reason: SDUPTHER

## 2021-08-16 RX ORDER — LEVETIRACETAM 500 MG/1
500 TABLET ORAL 2 TIMES DAILY
Qty: 180 TABLET | Refills: 1 | Status: SHIPPED | OUTPATIENT
Start: 2021-08-16 | End: 2021-09-09 | Stop reason: SDUPTHER

## 2021-08-16 RX ORDER — CLONIDINE HYDROCHLORIDE 0.2 MG/1
0.2 TABLET ORAL EVERY 8 HOURS
Qty: 270 TABLET | Refills: 1 | Status: SHIPPED | OUTPATIENT
Start: 2021-08-16 | End: 2021-09-16 | Stop reason: SDUPTHER

## 2021-08-16 RX ORDER — CHLORTHALIDONE 25 MG/1
25 TABLET ORAL DAILY
Qty: 90 TABLET | Refills: 1 | Status: SHIPPED | OUTPATIENT
Start: 2021-08-16 | End: 2021-09-09 | Stop reason: SDUPTHER

## 2021-08-16 RX ORDER — LISINOPRIL 40 MG/1
40 TABLET ORAL
Qty: 90 TABLET | Refills: 1 | Status: SHIPPED | OUTPATIENT
Start: 2021-08-16 | End: 2021-11-11 | Stop reason: SDUPTHER

## 2021-08-16 RX ORDER — HYDRALAZINE HYDROCHLORIDE 100 MG/1
100 TABLET, FILM COATED ORAL 3 TIMES DAILY
Qty: 270 TABLET | Refills: 1 | Status: SHIPPED | OUTPATIENT
Start: 2021-08-16 | End: 2021-09-16 | Stop reason: SDUPTHER

## 2021-08-16 NOTE — PROGRESS NOTES
Transitional Care Management Progress Note    Patient: Herman Henderson  : 1960  PCP: Saul Elliott MD    Date of office visit: 2021   Date of admission: 21  Date of discharge: 8/10/21  Hospital: [facility]    Call initiated w/i 2 business dates of discharge:Home health sees him and had call  Date of the most recent call to the patient: as above    Assessment/Plan:   Diagnoses and all orders for this visit:    1. Hospital discharge follow-up  -     RI DISCHARGE MEDS RECONCILED W/ CURRENT OUTPATIENT MED LIST  Stable on current meds  Has FU with NEUROSURG and INTERVENT RAD scheduled    2. Essential hypertension at goal, continue meds  -     prazosin (MINIPRESS) 2 mg capsule; Take 1 Capsule by mouth three (3) times daily. -     lisinopriL (PRINIVIL, ZESTRIL) 40 mg tablet; Take 1 Tablet by mouth nightly. -     hydrALAZINE (APRESOLINE) 100 mg tablet; Take 1 Tablet by mouth three (3) times daily. -     cloNIDine HCL (CATAPRES) 0.2 mg tablet; Take 1 Tablet by mouth every eight (8) hours. -     chlorthalidone (HYGROTON) 25 mg tablet; Take 1 Tablet by mouth daily. 3. SAH (subarachnoid hemorrhage) (HCC)  -     levETIRAcetam (KEPPRA) 500 mg tablet; Take 1 Tablet by mouth two (2) times a day. Stable on meds    4. Cerebral arteriovenous malformation (AVM)  FU scheduled. 5. Type 2 diabetes mellitus without complication, without long-term current use of insulin (HCC)  DC metformin and just stick to diet  A1c was non diabetic when in hospital.  He notes NORMAL blood sugars at home    FU one month to recheck BP and DM labs at that time. Subjective:   Herman Henderson is a 64 y.o. male presenting today for follow-up after hospital discharge. This encounter and supporting documentation was reviewed if available. Medication reconciliation was performed today. The main problem requiring admission was [facility] and bleeding AVM.    Complications during admission: left facial numbness, inability to close left eye and loss of hearing left ear. Interval history/Current status: Still driving. No problems with meds  Needs refills  Has FU appointments with interventional radiology and neurosurgery. Taking meds correctly  Still has facial sx as above. Getting home health. Admitting symptoms have: significantly improved      Medications marked \"taking\" at this time:  Prior to Admission medications    Medication Sig Start Date End Date Taking? Authorizing Provider   prazosin (MINIPRESS) 2 mg capsule Take 1 Capsule by mouth three (3) times daily. 8/16/21  Yes Deric Patel MD   lisinopriL (PRINIVIL, ZESTRIL) 40 mg tablet Take 1 Tablet by mouth nightly. 8/16/21  Yes Deric Patel MD   levETIRAcetam (KEPPRA) 500 mg tablet Take 1 Tablet by mouth two (2) times a day. 8/16/21  Yes Deric Patel MD   hydrALAZINE (APRESOLINE) 100 mg tablet Take 1 Tablet by mouth three (3) times daily. 8/16/21  Yes Deric Patel MD   cloNIDine HCL (CATAPRES) 0.2 mg tablet Take 1 Tablet by mouth every eight (8) hours. 8/16/21  Yes Deric Patel MD   chlorthalidone (HYGROTON) 25 mg tablet Take 1 Tablet by mouth daily. 8/16/21  Yes Deric Patel MD   chlorthalidone (HYGROTON) 25 mg tablet Take 1 Tablet by mouth daily for 30 days. 8/11/21 8/16/21  Hilario Monsalve MD   cloNIDine HCL (CATAPRES) 0.2 mg tablet Take 1 Tablet by mouth every eight (8) hours for 30 days. 8/10/21 8/16/21  Hilario Monsalve MD   hydrALAZINE (APRESOLINE) 100 mg tablet Take 1 Tablet by mouth three (3) times daily for 30 days. 8/10/21 8/16/21  Hilario Monsalve MD   levETIRAcetam (KEPPRA) 500 mg tablet Take 1 Tablet by mouth two (2) times a day for 30 days. 8/10/21 8/16/21  Hilario Monsalve MD   lisinopriL (PRINIVIL, ZESTRIL) 40 mg tablet Take 1 Tablet by mouth nightly for 30 days. 8/10/21 8/16/21  Hilario Monsalve MD   prazosin (MINIPRESS) 2 mg capsule Take 1 Capsule by mouth three (3) times daily for 30 days.  8/10/21 8/16/21 Calvin Johnson MD   metFORMIN (GLUCOPHAGE) 500 mg tablet Take 1 Tab by mouth daily (with breakfast). 3/22/19 8/16/21  Alisa Turner MD   bisacodyl (DULCOLAX, BISACODYL,) 10 mg suppository Insert 10 mg into rectum daily. 3/20/15   MELANI Sheikh   oxyCODONE-acetaminophen (PERCOCET 7.5) 7.5-325 mg per tablet Take 1-2 Tabs by mouth every four (4) hours as needed for Pain. Max Daily Amount: 12 Tabs. 3/20/15 8/16/21  MELANI Sheikh   traMADol (ULTRAM) 50 mg tablet Take 1 Tab by mouth every six (6) hours as needed for Pain. Max Daily Amount: 200 mg. 3/20/15 8/16/21  MELANI Sheikh   promethazine (PHENERGAN) 25 mg tablet Take 1 Tab by mouth every six (6) hours as needed for Nausea. 3/20/15 8/16/21  MELANI Sheikh   metFORMIN (GLUCOPHAGE) 500 mg tablet Take 500 mg by mouth Daily (before breakfast). 8/16/21  Provider, Historical        ROS:  RESP:  A little dyspnea on exertion  Denies chest pain  Numbness left face and inability to close left eye or hear left ear. Objective:     Visit Vitals  /69 (BP 1 Location: Right upper arm, BP Patient Position: Sitting, BP Cuff Size: Adult)   Pulse 71   Temp 97.7 °F (36.5 °C) (Temporal)   Resp 16   Ht 6' (1.829 m)   Wt 203 lb 3.2 oz (92.2 kg)   SpO2 100%   BMI 27.56 kg/m²        General Well appearing, A&O X 4  Proptosis left eye. Neck without nodes normal thyroid  Lungs clear to ausculation  CV RRR, No M, R, or G. No edema. Neuro Normal Speech, walking with cane. Psych Oriented to person place and time with normal affect and mood. No hallucinations or abnormal thought      We discussed the expected course, resolution and complications of the diagnosis(es) in detail. Medication risks, benefits, costs, interactions, and alternatives were discussed as indicated. I advised him to contact the office if his condition worsens, changes or fails to improve as anticipated. He expressed understanding with the diagnosis(es) and plan.      Yashira Pike Sandy Laguerre MD

## 2021-08-16 NOTE — PROGRESS NOTES
Robert Newman is a 64 y.o. male      Chief Complaint   Patient presents with   [facility] Follow Up     subarachnoid hemorrhage anterior to the sarahi  - [facility] 7/20/21         1. Have you been to the ER, urgent care clinic since your last visit? Yes subarachnoid hemorrhage anterior to the sarahi Hospitalized since your last visit? Yes 7/20/21      2. Have you seen or consulted any other health care providers outside of the [address] [place] [address] since your last visit? Include any pap smears or colon screening.   No

## 2021-08-23 ENCOUNTER — OFFICE VISIT (OUTPATIENT)
Dept: NEUROSURGERY | Age: 61
End: 2021-08-23
Payer: COMMERCIAL

## 2021-08-23 VITALS
TEMPERATURE: 98.6 F | DIASTOLIC BLOOD PRESSURE: 58 MMHG | SYSTOLIC BLOOD PRESSURE: 110 MMHG | HEART RATE: 67 BPM | WEIGHT: 209.4 LBS | HEIGHT: 72 IN | OXYGEN SATURATION: 98 % | BODY MASS INDEX: 28.36 KG/M2

## 2021-08-23 DIAGNOSIS — Q28.2 ARTERIOVENOUS MALFORMATION OF BRAIN: Primary | ICD-10-CM

## 2021-08-23 PROCEDURE — 99214 OFFICE O/P EST MOD 30 MIN: CPT | Performed by: STUDENT IN AN ORGANIZED HEALTH CARE EDUCATION/TRAINING PROGRAM

## 2021-08-23 NOTE — PATIENT INSTRUCTIONS
No Neuro Interventional services needed at this time. Follow up with Dr Ian Douglas on 8/30/2021. A Healthy Lifestyle: Care Instructions  Your Care Instructions     A healthy lifestyle can help you feel good, stay at a healthy weight, and have plenty of energy for both work and play. A healthy lifestyle is something you can share with your whole family. A healthy lifestyle also can lower your risk for serious health problems, such as high blood pressure, heart disease, and diabetes. You can follow a few steps listed below to improve your health and the health of your family. Follow-up care is a key part of your treatment and safety. Be sure to make and go to all appointments, and call your doctor if you are having problems. It's also a good idea to know your test results and keep a list of the medicines you take. How can you care for yourself at home? · Do not eat too much sugar, fat, or fast foods. You can still have dessert and treats now and then. The goal is moderation. · Start small to improve your eating habits. Pay attention to portion sizes, drink less juice and soda pop, and eat more fruits and vegetables. ? Eat a healthy amount of food. A 3-ounce serving of meat, for example, is about the size of a deck of cards. Fill the rest of your plate with vegetables and whole grains. ? Limit the amount of soda and sports drinks you have every day. Drink more water when you are thirsty. ? Eat plenty of fruits and vegetables every day. Have an apple or some carrot sticks as an afternoon snack instead of a candy bar. Try to have fruits and/or vegetables at every meal.  · Make exercise part of your daily routine. You may want to start with simple activities, such as walking, bicycling, or slow swimming. Try to be active 30 to 60 minutes every day. You do not need to do all 30 to 60 minutes all at once. For example, you can exercise 3 times a day for 10 or 20 minutes.  Moderate exercise is safe for most people, but it is always a good idea to talk to your doctor before starting an exercise program.  · Keep moving. Nemesio Handy the lawn, work in the garden, or [employer]. Take the stairs instead of the elevator at work. · If you smoke, quit. People who smoke have an increased risk for heart attack, stroke, cancer, and other lung illnesses. Quitting is hard, but there are ways to boost your chance of quitting tobacco for good. ? Use nicotine gum, patches, or lozenges. ? Ask your doctor about stop-smoking programs and medicines. ? Keep trying. In addition to reducing your risk of diseases in the future, you will notice some benefits soon after you stop using tobacco. If you have shortness of breath or asthma symptoms, they will likely get better within a few weeks after you quit. · Limit how much alcohol you drink. Moderate amounts of alcohol (up to 2 drinks a day for men, 1 drink a day for women) are okay. But drinking too much can lead to liver problems, high blood pressure, and other health problems. Family health  If you have a family, there are many things you can do together to improve your health. · Eat meals together as a family as often as possible. · Eat healthy foods. This includes fruits, vegetables, lean meats and dairy, and whole grains. · Include your family in your fitness plan. Most people think of activities such as jogging or tennis as the way to fitness, but there are many ways you and your family can be more active. Anything that makes you breathe hard and gets your heart pumping is exercise. Here are some tips:  ? Walk to do errands or to take your child to school or the bus.  ? Go for a family bike ride after dinner instead of watching TV. Where can you learn more? Go to http://www.gray.com/  Enter A507 in the search box to learn more about \"A Healthy Lifestyle: Care Instructions. \"  Current as of: September 23, 2020               Content Version: 12.8  © 1252-2688 [employer]. Care instructions adapted under license by [employer] (which disclaims liability or warranty for this information). If you have questions about a medical condition or this instruction, always ask your healthcare professional. [address] any warranty or liability for your use of this information.

## 2021-08-23 NOTE — PROGRESS NOTES
[facility] follow up presenting with AVM, s/p SAH. Patient reports he continues with dizziness, left side facial numbness and left side hearing loss. Denies headaches, blurred or double vision, neck pain or stiffness. No acute problems reported.

## 2021-08-24 NOTE — PROGRESS NOTES
NeuroInterventional Surgery Note  Marquise Knowles MD    Patient: Yazmin Reeves MRN: [id number]  SSN: [id number]    YOB: 1960  Age: 64 y.o. Sex: male      Chief Complaint: AVM    Subjective:      Yazmin Reeves is a 64 y.o. male who is being seen for brain stem AVM. Past Medical History:   Diagnosis Date    Acute hypokalemia 2021    Arthritis     Celiac artery stenosis ([address] 2021    Cerebral arteriovenous malformation (AVM) 2021    Diabetes ([address]     type 2    ED (erectile dysfunction)     Hearing loss     Hepatitis     Hypertension     Hypertensive emergency 2021    Impaired fasting glucose     Malignant hypertension 2021    Muscle weakness     [facility]ogenic cerebral edema [facility] 2021    Visual disturbance      Family History   Problem Relation Age of Onset    Diabetes Mother         type 2    Hypertension Mother     Heart Disease Mother         CHF    Hypertension Father     Heart Disease Father 48        MI    Cancer Maternal Grandmother      Social History     Tobacco Use    Smoking status: Former Smoker     Packs/day: 2.00     Years: 20.00     Pack years: 40.00     Types: Cigarettes     Quit date: 2015     Years since quittin.5    Smokeless tobacco: Former User    Tobacco comment: Patient reports he is Vaping    Substance Use Topics    Alcohol use: Not Currently     Alcohol/week: 6.0 standard drinks     Types: 6 Cans of beer per week      Cannot display prior to admission medications because the patient has not been admitted in this contact. No Known Allergies    Review of Systems:  A comprehensive review of systems was negative except for that written in the History of Present Illness. Denies numbness, tingling, chest pain, leg pain, nausea, vomiting, difficulty swallowing, headache, and dyspnea.      Objective:     Vitals:    21 1037   BP: (!) 110/58   Pulse: 67   Temp: 98.6 °F (37 °C)   TempSrc: Temporal   SpO2: 98%   Weight: 209 lb 6.4 oz (95 kg)   Height: 6' (1.829 m)      Physical Exam:  GENERAL: Calm, cooperative, NAD  SKIN: Warm, dry, color appropriate for ethnicity. Lungs: clear to auscultation bilaterally  Heart: regular rate and rhythm, S1, S2 normal, no murmur, click, rub or gallop    Neurologic Exam:  Mental Status:  Alert and oriented x 4. Appropriate affect, mood and behavior. Language:    Normal fluency, repetition, comprehension and naming. Cranial Nerves:   Pupils 3 mm, equal, round and reactive to light. Visual fields full to confrontation. Left VI, VII and VIII nerve palsy      No dysarthria. Tongue protrudes to midline, palate elevates symmetrically. Shoulder shrug 5/5 bilaterally. Motor:    No pronator drift. Bulk and tone normal.      5/5 power in all extremities proximally and distally. No involuntary movements. Sensation:    Sensation intact throughout to light touch, temperature    Reflexes:    Reflexes are 2+ at the biceps, triceps, patella and achilles bilaterally. Coordination & Gait: Normal. FTN and HTS intact with no ataxia present.     Labs:  Lab Results   Component Value Date/Time    WBC 4.9 08/10/2021 02:13 AM    HGB 12.9 08/10/2021 02:13 AM    HCT 38.5 08/10/2021 02:13 AM    PLATELET 696 75/79/6360 02:13 AM    MCV 84.4 08/10/2021 02:13 AM      Lab Results   Component Value Date/Time    Sodium 134 (L) 08/10/2021 02:13 AM    Potassium 3.8 08/10/2021 02:13 AM    Chloride 104 08/10/2021 02:13 AM    CO2 26 08/10/2021 02:13 AM    Anion gap 4 (L) 08/10/2021 02:13 AM    Glucose 113 (H) 08/10/2021 02:13 AM    BUN 15 08/10/2021 02:13 AM    Creatinine 0.81 08/10/2021 02:13 AM    BUN/Creatinine ratio 19 08/10/2021 02:13 AM    GFR est AA >60 08/10/2021 02:13 AM    GFR est non-AA >60 08/10/2021 02:13 AM    Calcium 9.4 08/10/2021 02:13 AM     Lab Results   Component Value Date/Time    Troponin-I, Qt. <0.05 07/24/2021 09:09 PM       Imaging:  CT Results (maximum last 3): Results from [facility] encounter on 07/20/21    CTA HEAD NECK W CONT    Narrative  EXAM:  CTA HEAD NECK W CONT    INDICATION:   sudden severe headache in patient with recent [facility] AVM and  unsecured aneurysm    COMPARISON:  CTA head and neck from 8/5/2021. CONTRAST:  100 mL of Isovue-370. TECHNIQUE:  Unenhanced  images were obtained to localize the volume for  acquisition. Multislice helical axial CT angiography was performed from the  aortic arch to the top of the head during uneventful rapid bolus intravenous  contrast administration. Coronal and sagittal reformations and 3D post  processing was performed. CT dose reduction was achieved through use of a  standardized protocol tailored for this examination and automatic exposure  control for dose modulation. FINDINGS:    CT: No abnormal enhancement. No new infarct, or herniation. CTA Head:  There is no evidence of large vessel occlusion or flow-limiting stenosis of the  intracranial internal carotid, anterior cerebral, and middle cerebral arteries. The anterior communicating artery is patent. .    There is no evidence of large vessel occlusion or flow-limiting stenosis of the  intracranial vertebral arteries, basilar artery, or posterior cerebral arteries. The posterior communicating arteries are patent. There is a redemonstrated left pontine AV malformation measuring 16 x 8 mm axial  with the arterial supply predominantly from the left superior cerebellar artery  and draining into the vein of Gaston. The dural venous sinuses and deep cerebral  venous system are patent. CTA NECK:  NASCET method was utilized for calculating stenosis. The aortic arch is unremarkable. The common carotid arteries demonstrate no  significant stenosis. There is no evidence of significant stenosis in the  cervical right internal carotid artery.  There is no evidence of significant  stenosis in the cervical left internal carotid artery. There is a codominant vertebrobasilar arterial system. The cervical vertebral  arteries are normal in course, size and contour without significant stenosis. Visualized soft tissues of the neck are unremarkable. Partially imaged  groundglass opacity in the right lung measuring 10 mm. Suspicious osseous  lesions. Multilevel degenerative changes of the cervical spine. Impression  1. No large vessel occlusion or flow-limiting stenosis. 2.  No change in left pontine AV malformation. 3.  No new hemorrhage or herniation. CT HEAD WO CONT    Narrative  EXAM: CT HEAD WO CONT    INDICATION: 8/10 sudden onset headache in patient  with recent SAH and pontine  bleed. AVM and  unsecured aneurysm    COMPARISON: CT 8/5/2021. Elizabeth Low CONTRAST: None. TECHNIQUE: Unenhanced CT of the head was performed using 5 mm images. Brain and  bone windows were generated. Coronal and sagittal reformats. CT dose reduction  was achieved through use of a standardized protocol tailored for this  examination and automatic exposure control for dose modulation. FINDINGS:  The ventricles and sulci are normal in size, shape and configuration. . There is  no significant white matter disease. There is no intracranial hemorrhage,  extra-axial collection, or mass effect. The basilar cisterns are open. No CT  evidence of acute infarct. The bone windows demonstrate no abnormalities. The visualized portions of the  paranasal sinuses and mastoid air cells are clear. Impression  No acute findings      CTA ABD    Narrative  CLINICAL HISTORY:  Malignant hypertension, evaluate for renal artery stenosis. COMPARISON: None    TECHNIQUE: CT of the abdomen with  IV contrast , 60 cc Isovue-370 is performed. Axial images from the diaphragm to the iliac crests is obtained. Manual  post-processing of the images and coronal reformatting is also performed.   Standard dose modulation was utilized to reduce the overall radiation dose  administered to the patient. Multiplanar reformatted imaging was performed. Sagittal and coronal reformatting. CT dose reduction was achieved through use  of a standardized protocol tailored for this examination and automatic exposure  control for dose modulation. 3-D MIP reconstructed images were obtained in the coronal and sagittal plane. 3-D spin and 3-D shading reconstructions were also performed    FINDINGS:  Vasculature: Aorta: Normal abdominal aorta. No aneurysm or dissection. Visceral arteries: Celiac, superior mesenteric, and inferior mesenteric arteries  are widely patent. Renal arteries: There is an accessory left renal artery which perfuses the left  lower pole. The left accessory and bilateral main renal arteries are widely  patent, without stenosis or significant atherosclerotic disease. Right iliac arteries:    Left iliac arteries:    Right femoral trunk:    Left femoral trunk:    Lower chest:  Small bilateral pleural effusions with mild bibasilar atelectasis. Abdomen/Pelvis: There symmetric cortical medullary enhancement. Hyperdensity in the collecting  systems is compatible with excretion of recently administered intravenous  contrast. No renal masses, no filling defects, or nephrolithiasis. There are no focal abnormalities within the  spleen, pancreas, adrenal glands. Stomach, duodenum, visualized colon and small bowel are unremarkable. No retroperitoneal lymphadenopathy or mass. Impression  1. 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 Otherwise, the renal vasculature is unremarkable, there is no stenosis. The  kidneys are also unremarkable, with symmetric perfusion, no renal mass, no  filling defects. 2. Small bilateral pleural effusions. Assessment and Plan:   A 64year old male with PMHx of HTN, and recently hospitalized for ruptured brain stem AVM presenting for follow up. Not currently on any antiplatelet. On Keppra. Patient refers doing well. No new symptoms. He has left facial nerve palsy, hearing loss on the left ear, left VI nerve palsy. No worsening headaches. Patient is seeing Dr Arsen Costa on august 31st, 2021 for evaluation of brainstem AVM. Plan:  Follow up with Dr Arsen Costa    Thank you for this consult and participating in the care of this patient.   Signed By: Erendira Rucker MD     August 24, 2021

## 2021-09-03 ENCOUNTER — TELEPHONE (OUTPATIENT)
Dept: FAMILY MEDICINE CLINIC | Age: 61
End: 2021-09-03

## 2021-09-03 NOTE — TELEPHONE ENCOUNTER
----- Message from Servando Harris sent at 9/3/2021 10:03 AM EDT -----  Regarding: /telephone  General Message/Vendor Calls    Caller's first and last name: Pt       Reason for call: He is requesting an update on his disability paperwork       Callback required yes/no and why: yes - to discuss paperwork       Best contact number(s): [phone]       Details to clarify the request: May Pike

## 2021-09-08 ENCOUNTER — TELEPHONE (OUTPATIENT)
Dept: FAMILY MEDICINE CLINIC | Age: 61
End: 2021-09-08

## 2021-09-08 NOTE — TELEPHONE ENCOUNTER
----- Message from Fabiano Arndt sent at 9/8/2021  2:08 PM EDT -----  Regarding: Dr. Cristiano Hayes: [phone]  General Message/Vendor Calls    Caller's first and last name: Pt.       Reason for call: Dropped off papers 2 weeks ago for pcp to fill out, was told he woul dget acall back when filled out. Has not heard back. Callback required yes/no and why: Yes, confirm papers are ready. Best contact number(s): [phone]      Details to clarify the request: N/a.       Fabiano Arndt

## 2021-09-09 DIAGNOSIS — I60.9 SAH (SUBARACHNOID HEMORRHAGE) (HCC): ICD-10-CM

## 2021-09-09 DIAGNOSIS — I10 ESSENTIAL HYPERTENSION: ICD-10-CM

## 2021-09-09 RX ORDER — LEVETIRACETAM 500 MG/1
500 TABLET ORAL 2 TIMES DAILY
Qty: 180 TABLET | Refills: 1 | Status: SHIPPED | OUTPATIENT
Start: 2021-09-09 | End: 2021-11-11 | Stop reason: SDUPTHER

## 2021-09-09 RX ORDER — CHLORTHALIDONE 25 MG/1
25 TABLET ORAL DAILY
Qty: 90 TABLET | Refills: 1 | Status: SHIPPED | OUTPATIENT
Start: 2021-09-09 | End: 2021-11-11 | Stop reason: SDUPTHER

## 2021-09-16 ENCOUNTER — OFFICE VISIT (OUTPATIENT)
Dept: FAMILY MEDICINE CLINIC | Age: 61
End: 2021-09-16
Payer: COMMERCIAL

## 2021-09-16 VITALS
TEMPERATURE: 98.3 F | SYSTOLIC BLOOD PRESSURE: 142 MMHG | HEART RATE: 66 BPM | DIASTOLIC BLOOD PRESSURE: 73 MMHG | HEIGHT: 72 IN | RESPIRATION RATE: 16 BRPM | BODY MASS INDEX: 28.66 KG/M2 | OXYGEN SATURATION: 97 % | WEIGHT: 211.6 LBS

## 2021-09-16 DIAGNOSIS — E11.9 TYPE 2 DIABETES MELLITUS WITHOUT COMPLICATION, WITHOUT LONG-TERM CURRENT USE OF INSULIN (HCC): ICD-10-CM

## 2021-09-16 DIAGNOSIS — Q28.2 CEREBRAL ARTERIOVENOUS MALFORMATION (AVM): ICD-10-CM

## 2021-09-16 DIAGNOSIS — B02.9 HERPES ZOSTER WITHOUT COMPLICATION: Primary | ICD-10-CM

## 2021-09-16 DIAGNOSIS — I10 ESSENTIAL HYPERTENSION: ICD-10-CM

## 2021-09-16 PROCEDURE — 99214 OFFICE O/P EST MOD 30 MIN: CPT | Performed by: FAMILY MEDICINE

## 2021-09-16 RX ORDER — CLONIDINE HYDROCHLORIDE 0.2 MG/1
0.2 TABLET ORAL EVERY 8 HOURS
Qty: 270 TABLET | Refills: 1 | Status: SHIPPED | OUTPATIENT
Start: 2021-09-16 | End: 2021-11-11 | Stop reason: SDUPTHER

## 2021-09-16 RX ORDER — HYDRALAZINE HYDROCHLORIDE 100 MG/1
100 TABLET, FILM COATED ORAL 3 TIMES DAILY
Qty: 270 TABLET | Refills: 1 | Status: SHIPPED | OUTPATIENT
Start: 2021-09-16 | End: 2021-11-11 | Stop reason: SDUPTHER

## 2021-09-16 RX ORDER — ACYCLOVIR 800 MG/1
800 TABLET ORAL
Qty: 35 TABLET | Refills: 0 | Status: SHIPPED | OUTPATIENT
Start: 2021-09-16 | End: 2021-09-23

## 2021-09-16 RX ORDER — PRAZOSIN HYDROCHLORIDE 2 MG/1
2 CAPSULE ORAL 3 TIMES DAILY
Qty: 270 CAPSULE | Refills: 1 | Status: SHIPPED | OUTPATIENT
Start: 2021-09-16 | End: 2021-11-11 | Stop reason: SDUPTHER

## 2021-09-16 RX ORDER — HYDROCODONE BITARTRATE AND ACETAMINOPHEN 5; 325 MG/1; MG/1
1 TABLET ORAL
Qty: 10 TABLET | Refills: 0 | Status: SHIPPED | OUTPATIENT
Start: 2021-09-16 | End: 2021-09-19

## 2021-09-16 NOTE — PROGRESS NOTES
Lizbeth Schultz is a 64 y.o. male      Chief Complaint   Patient presents with    Pre-op Exam     Cataracts surgery    Abdominal Pain     x 5 days LUQ          1. Have you been to the ER, urgent care clinic since your last visit? No  Hospitalized since your last visit? No       2. Have you seen or consulted any other health care providers outside of the [address] [place] [address] since your last visit? Include any pap smears or colon screening.    Dr Raegan Chen)

## 2021-09-16 NOTE — PROGRESS NOTES
Adriana Aase  64 y.o. male  1960  [id number]  [facility] MEDICINE  Progress Note     Encounter Date: 9/16/2021    Assessment and Plan:     Encounter Diagnoses     ICD-10-CM ICD-9-CM   1. Herpes zoster without complication  M54.3 117.1   2. Essential hypertension  I10 401.9   3. Type 2 diabetes mellitus without complication, without long-term current use of insulin (HCC)  E11.9 250.00   4. Cerebral arteriovenous malformation (AVM)  Q28.2 747.81       1. Herpes zoster without complication  Left mid abdomen dermatome  Take tylenol for pain during day and hydrocodone if needed at night.  - acyclovir (ZOVIRAX) 800 mg tablet; Take 1 Tablet by mouth five (5) times daily for 7 days. Dispense: 35 Tablet; Refill: 0  - HYDROcodone-acetaminophen (NORCO) 5-325 mg per tablet; Take 1 Tablet by mouth every six (6) hours as needed for Pain for up to 3 days. Max Daily Amount: 4 Tablets. Dispense: 10 Tablet; Refill: 0    2. Essential hypertension  At goal  Out of 3 meds refilled and reminded not to run out. Have pharmacy call us!  - prazosin (MINIPRESS) 2 mg capsule; Take 1 Capsule by mouth three (3) times daily. Dispense: 270 Capsule; Refill: 1  - cloNIDine HCL (CATAPRES) 0.2 mg tablet; Take 1 Tablet by mouth every eight (8) hours. Dispense: 270 Tablet; Refill: 1  - hydrALAZINE (APRESOLINE) 100 mg tablet; Take 1 Tablet by mouth three (3) times daily. Dispense: 270 Tablet; Refill: 1    3. Type 2 diabetes mellitus without complication, without long-term current use of insulin (HCC)  Check status  - CBC WITH AUTOMATED DIFF; Future  - HEMOGLOBIN A1C WITH EAG; Future  - METABOLIC PANEL, BASIC; Future  - MICROALBUMIN, UR, RAND W/ MICROALB/CREAT RATIO; Future    4. Cerebral arteriovenous malformation (AVM)  Has seen NEUROSURG and RENAL. I have discussed the diagnosis with the patient and the intended plan as seen in the above orders. he has expressed understanding.   The patient has received an after-visit summary and questions were answered concerning future plans. I have discussed medication side effects and warnings with the patient as well. Electronically Signed: May Broderick MD    Current Medications after this visit     Current Outpatient Medications   Medication Sig    prazosin (MINIPRESS) 2 mg capsule Take 1 Capsule by mouth three (3) times daily.  cloNIDine HCL (CATAPRES) 0.2 mg tablet Take 1 Tablet by mouth every eight (8) hours.  hydrALAZINE (APRESOLINE) 100 mg tablet Take 1 Tablet by mouth three (3) times daily.  acyclovir (ZOVIRAX) 800 mg tablet Take 1 Tablet by mouth five (5) times daily for 7 days.  HYDROcodone-acetaminophen (NORCO) 5-325 mg per tablet Take 1 Tablet by mouth every six (6) hours as needed for Pain for up to 3 days. Max Daily Amount: 4 Tablets.  levETIRAcetam (KEPPRA) 500 mg tablet Take 1 Tablet by mouth two (2) times a day.  chlorthalidone (HYGROTON) 25 mg tablet Take 1 Tablet by mouth daily.  lisinopriL (PRINIVIL, ZESTRIL) 40 mg tablet Take 1 Tablet by mouth nightly.  bisacodyl (DULCOLAX, BISACODYL,) 10 mg suppository Insert 10 mg into rectum daily. No current facility-administered medications for this visit. Medications Discontinued During This Encounter   Medication Reason    prazosin (MINIPRESS) 2 mg capsule REORDER    hydrALAZINE (APRESOLINE) 100 mg tablet REORDER    cloNIDine HCL (CATAPRES) 0.2 mg tablet REORDER     ~~~~~~~~~~~~~~~~~~~~~~~~~~~~~~~~~~~~~~~~~~~~~~~~~~~~~~~~~~~    Chief Complaint   Patient presents with    Pre-op Exam     Cataracts surgery    Abdominal Pain     x 5 days LUQ        History provided by patient  History of Present Illness   Jane Donaldson is a 64 y.o. male who presents to clinic today for:  Pre-op Exam (Cataracts surgery) and Abdominal Pain (x 5 days LUQ )    Abdominal pain  5 days  J&J shot two weeks ago. Did the shot cause this?  (unlikely)  No fever or chills. Burning type of pain.   No urinary sx:  No hematuria or dysuria  No nausea or vomiting  No diarrhea  No testicular pain    Breathing remains fine. Hypertension  Remains good  Was OK at NEUROSURGERY follow up. Out of 3 of his BP meds since Tuesday. DM2  Fasting sugars 140 fasting  During the day:  160-170  Previously on Metformin. Has seen RENAL   They did labs that looked OK  He was sent results but forgot to bring them. Health Maintenance  Will do at future visit, Asked patient to schedule a Wellness appt to discuss issues. Health Maintenance Due   Topic Date Due    Hepatitis C Screening  Never done    Foot Exam Q1  Never done    Eye Exam Retinal or Dilated  Never done    Colorectal Cancer Screening Combo  Never done    Shingrix Vaccine Age 50> (1 of 2) Never done    Low dose CT lung screening  Never done    MICROALBUMIN Q1  03/22/2020    DTaP/Tdap/Td series (2 - Td or Tdap) 09/07/2020    Flu Vaccine (1) Never done     Review of Systems   Review of Systems   Constitutional: Negative for chills, fever and weight loss. Respiratory: Negative for cough and shortness of breath. Cardiovascular: Negative for chest pain. Gastrointestinal: Positive for abdominal pain. Negative for blood in stool, constipation, diarrhea, heartburn, nausea and vomiting. Genitourinary: Negative for dysuria, frequency, hematuria and urgency. Neurological: Negative for headaches. Psychiatric/Behavioral: Negative. Vitals/Objective:     Vitals:    09/16/21 1024 09/16/21 1028   BP: (!) 150/80 (!) 142/73   Pulse: 66    Resp: 16    Temp: 98.3 °F (36.8 °C)    TempSrc: Temporal    SpO2: 97%    Weight: 211 lb 9.6 oz (96 kg)    Height: 6' (1.829 m)      Body mass index is 28.7 kg/m². Wt Readings from Last 3 Encounters:   09/16/21 211 lb 9.6 oz (96 kg)   08/23/21 209 lb 6.4 oz (95 kg)   08/16/21 203 lb 3.2 oz (92.2 kg)         Objective  Physical Exam  Vitals and nursing note reviewed.    Constitutional:       Appearance: Normal appearance. He is not toxic-appearing. HENT:      Head: Normocephalic and atraumatic. Cardiovascular:      Rate and Rhythm: Normal rate and regular rhythm. Heart sounds: Normal heart sounds. No murmur heard. No gallop. Pulmonary:      Effort: Pulmonary effort is normal. No respiratory distress. Breath sounds: Normal breath sounds. No wheezing, rhonchi or rales. Abdominal:      General: Abdomen is flat. There is no distension. Palpations: Abdomen is soft. There is no mass. Tenderness: There is no abdominal tenderness. There is no right CVA tenderness, left CVA tenderness, guarding or rebound. Musculoskeletal:      Cervical back: No muscular tenderness. Lymphadenopathy:      Cervical: No cervical adenopathy. Skin:     Comments: Cluster of vesicles left lateral abdomen at level of umbilicus. Neurological:      Mental Status: He is alert. Psychiatric:         Mood and Affect: Mood normal.         Behavior: Behavior normal.         Thought Content: Thought content normal.         Judgment: Judgment normal.       General: Patient alert and oriented and in NAD  Eyes: PER/EOMI, no conjunctival pallor or scleral icterus. ENT: Nares normal, TM's clear with normal architecture and light reflex, Mouth normal, throat without exudate, uvula midline  Neck: No thyromegaly or cervical lymphadenopathy  Cardiovascular: Heart has regular rate and rhythm, No murmurs, rubs or gallops. No edema  Respiratory: Lungs are clear to auscultation bilaterally, no wheezing, rales or rhonchi, normal chest excursion and no increased work of breathing. Gastorintestinal: abdomen is non-tender, non-distended, without organomegaly or masses  Genitourinary: exam deferred  Musculoskeletal: All four extremities present and functional.   Skin: No rashes or lesions noted on exposed skin  Neuro: AAOx3, normal gait and speech. No gross neurologic deficits.   Psych: Appropriate mood and affect, no homicidal or suicidal ideation, no obsessions, delusions or hallucinations, normal psychomotor status. No results found for this or any previous visit (from the past 24 hour(s)). Disposition     Follow-up and Dispositions  ·   Return in about 2 weeks (around 2021) for Preop eye exam.       Future Appointments   Date Time Provider Shay Corbin   2021 11:20 AM Steven Boothe MD [facility] AMB       History   Patient's past medical, surgical and family histories were reviewed and updated.     Past Medical History:   Diagnosis Date    Acute hypokalemia 2021    Arthritis     [facility]ac artery stenosis ([facility] 2021    Cerebral arteriovenous malformation (AVM) 2021    Diabetes ([facility] [facility]     type 2    ED (erectile dysfunction)     Hearing loss     Hepatitis     Hypertension     Hypertensive emergency 2021    Impaired fasting glucose     Malignant hypertension 2021    Muscle weakness     Vasogenic cerebral edema (HCC) 2021    Visual disturbance      Past Surgical History:   Procedure Laterality Date    HX TONSIL AND ADENOIDECTOMY       Family History   Problem Relation Age of Onset    Diabetes Mother         type 2    Hypertension Mother     Heart Disease Mother         CHF    Hypertension Father     Heart Disease Father 48        MI    Cancer Maternal Grandmother      Social History     Tobacco Use    Smoking status: Former Smoker     Packs/day: 2.00     Years: 20.00     Pack years: 40.00     Types: Cigarettes     Quit date: 2015     Years since quittin.6    Smokeless tobacco: Former User    Tobacco comment: Patient reports he is Vaping    Vaping Use    Vaping Use: Never used   Substance Use Topics    Alcohol use: Not Currently     Alcohol/week: 6.0 standard drinks     Types: 6 Cans of beer per week    Drug use: Never       Allergies   No Known Allergies

## 2021-09-17 LAB
ALBUMIN/CREAT UR: 10 MG/G CREAT (ref 0–29)
BASOPHILS # BLD AUTO: 0 X10E3/UL (ref 0–0.2)
BASOPHILS NFR BLD AUTO: 1 %
BUN SERPL-MCNC: 8 MG/DL (ref 8–27)
BUN/CREAT SERPL: 7 (ref 10–24)
CALCIUM SERPL-MCNC: 9.9 MG/DL (ref 8.6–10.2)
CHLORIDE SERPL-SCNC: 101 MMOL/L (ref 96–106)
CO2 SERPL-SCNC: 23 MMOL/L (ref 20–29)
CREAT SERPL-MCNC: 1.12 MG/DL (ref 0.76–1.27)
CREAT UR-MCNC: 90.6 MG/DL
EOSINOPHIL # BLD AUTO: 0.4 X10E3/UL (ref 0–0.4)
EOSINOPHIL NFR BLD AUTO: 6 %
ERYTHROCYTE [DISTWIDTH] IN BLOOD BY AUTOMATED COUNT: 13.1 % (ref 11.6–15.4)
EST. AVERAGE GLUCOSE BLD GHB EST-MCNC: 143 MG/DL
GLUCOSE SERPL-MCNC: 109 MG/DL (ref 65–99)
HBA1C MFR BLD: 6.6 % (ref 4.8–5.6)
HCT VFR BLD AUTO: 44.1 % (ref 37.5–51)
HGB BLD-MCNC: 14.7 G/DL (ref 13–17.7)
IMM GRANULOCYTES # BLD AUTO: 0 X10E3/UL (ref 0–0.1)
IMM GRANULOCYTES NFR BLD AUTO: 1 %
LYMPHOCYTES # BLD AUTO: 2.1 X10E3/UL (ref 0.7–3.1)
LYMPHOCYTES NFR BLD AUTO: 32 %
MCH RBC QN AUTO: 27.7 PG (ref 26.6–33)
MCHC RBC AUTO-ENTMCNC: 33.3 G/DL (ref 31.5–35.7)
MCV RBC AUTO: 83 FL (ref 79–97)
MICROALBUMIN UR-MCNC: 8.8 UG/ML
MONOCYTES # BLD AUTO: 0.7 X10E3/UL (ref 0.1–0.9)
MONOCYTES NFR BLD AUTO: 11 %
NEUTROPHILS # BLD AUTO: 3.3 X10E3/UL (ref 1.4–7)
NEUTROPHILS NFR BLD AUTO: 49 %
PLATELET # BLD AUTO: 256 X10E3/UL (ref 150–450)
POTASSIUM SERPL-SCNC: 4 MMOL/L (ref 3.5–5.2)
RBC # BLD AUTO: 5.3 X10E6/UL (ref 4.14–5.8)
SODIUM SERPL-SCNC: 141 MMOL/L (ref 134–144)
SPECIMEN STATUS REPORT, ROLRST: NORMAL
WBC # BLD AUTO: 6.7 X10E3/UL (ref 3.4–10.8)

## 2021-09-17 NOTE — PROGRESS NOTES
Your blood sugar control remains fine. Stick to your diabetic diet as we have discussed. See me back in the office as scheduled.   [facility]

## 2021-09-27 ENCOUNTER — OFFICE VISIT (OUTPATIENT)
Dept: FAMILY MEDICINE CLINIC | Age: 61
End: 2021-09-27
Payer: COMMERCIAL

## 2021-09-27 VITALS
HEART RATE: 98 BPM | SYSTOLIC BLOOD PRESSURE: 118 MMHG | BODY MASS INDEX: 28.53 KG/M2 | RESPIRATION RATE: 16 BRPM | DIASTOLIC BLOOD PRESSURE: 66 MMHG | WEIGHT: 210.6 LBS | OXYGEN SATURATION: 99 % | TEMPERATURE: 97.6 F | HEIGHT: 72 IN

## 2021-09-27 DIAGNOSIS — Z01.818 PREOPERATIVE EXAMINATION: Primary | ICD-10-CM

## 2021-09-27 DIAGNOSIS — B02.9 HERPES ZOSTER WITHOUT COMPLICATION: ICD-10-CM

## 2021-09-27 PROCEDURE — 99214 OFFICE O/P EST MOD 30 MIN: CPT | Performed by: FAMILY MEDICINE

## 2021-09-27 NOTE — PROGRESS NOTES
Ghassan Pedraza is a 64 y.o. male      Chief Complaint   Patient presents with    Shingles      This ia a f/u visit    Pre-op Exam     Cataract surgey         1. Have you been to the ER, urgent care clinic since your last visit? No   Hospitalized since your last visit? No       2. Have you seen or consulted any other health care providers outside of the [address] [place] [address] since your last visit? Include any pap smears or colon screening.   No

## 2021-09-27 NOTE — PROGRESS NOTES
Yazmin Reeves  64 y.o. male  1960  FNZ:[id number]  [facility]  Progress Note     Encounter Date: 2021      Preoperative Evaluation    Date of Exam: 2021    Yazmin Reeves is a 64 y.o. male (:1960) who presents for preoperative evaluation. Procedure/Surgery: Cataract with IOL    Date of Procedure/Surgery: 10/5 left, 10/19 Right    Surgeon: Dr. Carine Maldonado: Teresa Acosta    Primary Physician: Kisha Miranda MD    Latex Allergy: no    Recent use of: No recent use of aspirin (ASA), NSAIDS or steroids    Tetanus up to date: last tetanus booster 11 years ago    Anesthesia Complications: None    History of abnormal bleeding : None    History of Blood Transfusions: no    Health Care Directive or Living Will: no    Functional Capacity: is able to climb a flight of stairs without chest pain or severe SOB      Allergies- reviewed:   No Known Allergies      Medications- reviewed:   Current Outpatient Medications   Medication Sig    prazosin (MINIPRESS) 2 mg capsule Take 1 Capsule by mouth three (3) times daily.  cloNIDine HCL (CATAPRES) 0.2 mg tablet Take 1 Tablet by mouth every eight (8) hours.  hydrALAZINE (APRESOLINE) 100 mg tablet Take 1 Tablet by mouth three (3) times daily.  levETIRAcetam (KEPPRA) 500 mg tablet Take 1 Tablet by mouth two (2) times a day.  chlorthalidone (HYGROTON) 25 mg tablet Take 1 Tablet by mouth daily.  lisinopriL (PRINIVIL, ZESTRIL) 40 mg tablet Take 1 Tablet by mouth nightly.  bisacodyl (DULCOLAX, BISACODYL,) 10 mg suppository Insert 10 mg into rectum daily. No current facility-administered medications for this visit.          Past Medical History- reviewed:  Past Medical History:   Diagnosis Date    Acute hypokalemia 2021    Arthritis     Celiac artery stenosis ([address] 2021    Cerebral arteriovenous malformation (AVM) 2021    Diabetes ([address]     type 2    ED (erectile dysfunction)     Hearing loss     Hepatitis     Herpes zoster 2021    Left abdominal dermatome    Hypertension     Hypertensive emergency 2021    Impaired fasting glucose     Malignant hypertension 2021    Muscle weakness     Vasogenic cerebral edema (HCC) 2021    Visual disturbance          Past Surgical History- reviewed:   Past Surgical History:   Procedure Laterality Date    HX TONSIL AND ADENOIDECTOMY           Social History- reviewed:  Social History     Socioeconomic History    Marital status:      Spouse name: Not on file    Number of children: Not on file    Years of education: Not on file    Highest education level: Not on file   Occupational History    Not on file   Tobacco Use    Smoking status: Former Smoker     Packs/day: 2.00     Years: 20.00     Pack years: 40.00     Types: Cigarettes     Quit date: 2015     Years since quittin.6    Smokeless tobacco: Former User    Tobacco comment: Patient reports he is Vaping    Vaping Use    Vaping Use: Never used   Substance and Sexual Activity    Alcohol use: Not Currently     Alcohol/week: 6.0 standard drinks     Types: 6 Cans of beer per week    Drug use: Never    Sexual activity: Yes   Other Topics Concern    Not on file   Social History Narrative    ** Merged History Encounter **          Social Determinants of Health     Financial Resource Strain:     Difficulty of Paying Living Expenses:    Food Insecurity:     Worried About Running Out of Food in the Last Year:     Ran Out of Food in the Last Year:    Transportation Needs:     Lack of Transportation (Medical):      Lack of Transportation (Non-Medical):    Physical Activity:     Days of Exercise per Week:     Minutes of Exercise per Session:    Stress:     Feeling of Stress :    Social Connections:     Frequency of Communication with Friends and Family:     Frequency of Social Gatherings with Friends and Family:     Attends [employer] Services:     Active Member of Clubs or Organizations:     Attends Club or Organization Meetings:     Marital Status:    Intimate Partner Violence:     Fear of Current or Ex-Partner:     Emotionally Abused:     Physically Abused:     Sexually Abused:          Immunizations- reviewed:   Immunization History   Administered Date(s) Administered    COVID-19, J&J, PF, 0.5 mL Dose 08/31/2021    Tdap 09/07/2010     Flu: not done  Covid vaccine J&J 9/2021    REVIEW OF SYSTEMS:  Review of Systems -   General ROS: negative for - chills or fever  Respiratory ROS: negative for - cough, shortness of breath or wheezing  Cardiovascular ROS: negative for - chest pain or palpitations  Gastrointestinal ROS: negative for - abdominal pain, constipation, diarrhea, nausea, vomiting  Genitourinary ROS:  negative for - dysuria, hematuria  Neurological ROS: negative for - dizziness or headaches  Dermatological ROS: Herpes Zoster left mid abdomen 9/16/2021, still some pain in that dermatome        EXAM:   Visit Vitals  /66 (BP 1 Location: Right upper arm, BP Patient Position: Sitting, BP Cuff Size: Adult long)   Pulse 98   Temp 97.6 °F (36.4 °C) (Temporal)   Resp 16   Ht 6' (1.829 m)   Wt 210 lb 9.6 oz (95.5 kg)   SpO2 99%   BMI 28.56 kg/m²       General: Patient alert and oriented and in NAD  HEENT: PER/diplopia noted on lateral gaze left. , no conjunctival pallor or scleral icterus. No thyromegaly or cervical lymphadenopathy  Heart: Regular rate and rhythm, No murmurs, rubs or gallops. Lungs: Clear to auscultation bilaterally, no wheezing, rales or rhonchi  Abd: +BS, non-tender, non-distended  Ext: No edema  Skin: No rashes or lesions noted on exposed skin, healing rash of zoster left mid abdomen. Neuro: AAOx3 Left 6,7 and 8th nerve palsies  Psych: Appropriate mood and affect          IMPRESSION:     Pt presents for preoperative evaluation for cataract and is  intermediate risk for this low risk surgery.   Chronic conditions that may impact the pre-op, intra-op, and post-op courses include: recent subarachnoid hemorrhage 7/2021 and herpes zoster 9/16/2021. Gamma knife surgery for 9/28/2021. Recommend that patient has Neurointerventional approval for eye surgery post gamma knife surgery. Otherwise stable for surgery. No orders of the defined types were placed in this encounter.           May Broderick MD

## 2021-11-11 ENCOUNTER — OFFICE VISIT (OUTPATIENT)
Dept: FAMILY MEDICINE CLINIC | Age: 61
End: 2021-11-11
Payer: COMMERCIAL

## 2021-11-11 VITALS
TEMPERATURE: 98.2 F | BODY MASS INDEX: 29.45 KG/M2 | DIASTOLIC BLOOD PRESSURE: 67 MMHG | OXYGEN SATURATION: 98 % | SYSTOLIC BLOOD PRESSURE: 137 MMHG | RESPIRATION RATE: 18 BRPM | HEIGHT: 72 IN | HEART RATE: 81 BPM | WEIGHT: 217.4 LBS

## 2021-11-11 DIAGNOSIS — I60.9 SAH (SUBARACHNOID HEMORRHAGE) (HCC): ICD-10-CM

## 2021-11-11 DIAGNOSIS — I10 ESSENTIAL HYPERTENSION: ICD-10-CM

## 2021-11-11 PROCEDURE — 99213 OFFICE O/P EST LOW 20 MIN: CPT | Performed by: FAMILY MEDICINE

## 2021-11-11 RX ORDER — PRAZOSIN HYDROCHLORIDE 2 MG/1
2 CAPSULE ORAL 3 TIMES DAILY
Qty: 270 CAPSULE | Refills: 1 | Status: SHIPPED | OUTPATIENT
Start: 2021-11-11 | End: 2022-04-18

## 2021-11-11 RX ORDER — CLONIDINE HYDROCHLORIDE 0.2 MG/1
0.2 TABLET ORAL EVERY 8 HOURS
Qty: 270 TABLET | Refills: 1 | Status: SHIPPED | OUTPATIENT
Start: 2021-11-11 | End: 2022-06-15 | Stop reason: SDUPTHER

## 2021-11-11 RX ORDER — LISINOPRIL 40 MG/1
40 TABLET ORAL
Qty: 90 TABLET | Refills: 1 | Status: SHIPPED | OUTPATIENT
Start: 2021-11-11 | End: 2022-06-15 | Stop reason: SDUPTHER

## 2021-11-11 RX ORDER — LEVETIRACETAM 500 MG/1
500 TABLET ORAL 2 TIMES DAILY
Qty: 180 TABLET | Refills: 1 | Status: SHIPPED | OUTPATIENT
Start: 2021-11-11 | End: 2022-06-15

## 2021-11-11 RX ORDER — CHLORTHALIDONE 25 MG/1
25 TABLET ORAL DAILY
Qty: 90 TABLET | Refills: 1 | Status: SHIPPED | OUTPATIENT
Start: 2021-11-11 | End: 2022-06-15 | Stop reason: SDUPTHER

## 2021-11-11 RX ORDER — HYDRALAZINE HYDROCHLORIDE 100 MG/1
100 TABLET, FILM COATED ORAL 3 TIMES DAILY
Qty: 270 TABLET | Refills: 1 | Status: SHIPPED | OUTPATIENT
Start: 2021-11-11 | End: 2022-04-18

## 2021-11-11 NOTE — PROGRESS NOTES
Leah Lanier  64 y.o. male  1960  [id number]  [facility] MEDICINE  Progress Note     Encounter Date: 11/11/2021    Assessment and Plan:     Encounter Diagnoses     ICD-10-CM ICD-9-CM   1. Essential hypertension  I10 401.9   2. SAH (subarachnoid hemorrhage) (HCC)  I60.9 430       1. Essential hypertension  At goal, continue meds  FU after holidays for physical cancer screening update, etc  - chlorthalidone (HYGROTON) 25 mg tablet; Take 1 Tablet by mouth daily. Dispense: 90 Tablet; Refill: 1  - cloNIDine HCL (CATAPRES) 0.2 mg tablet; Take 1 Tablet by mouth every eight (8) hours. Dispense: 270 Tablet; Refill: 1  - hydrALAZINE (APRESOLINE) 100 mg tablet; Take 1 Tablet by mouth three (3) times daily. Dispense: 270 Tablet; Refill: 1  - lisinopriL (PRINIVIL, ZESTRIL) 40 mg tablet; Take 1 Tablet by mouth nightly. Dispense: 90 Tablet; Refill: 1  - prazosin (MINIPRESS) 2 mg capsule; Take 1 Capsule by mouth three (3) times daily. Dispense: 270 Capsule; Refill: 1    2. SAH (subarachnoid hemorrhage) ([address]  FU with Neurosurg as scheduled. - levETIRAcetam (KEPPRA) 500 mg tablet; Take 1 Tablet by mouth two (2) times a day. Dispense: 180 Tablet; Refill: 1      I have discussed the diagnosis with the patient and the intended plan as seen in the above orders. he has expressed understanding. The patient has received an after-visit summary and questions were answered concerning future plans. I have discussed medication side effects and warnings with the patient as well. Electronically Signed: Yas Hurtado MD    Current Medications after this visit     Current Outpatient Medications   Medication Sig    chlorthalidone (HYGROTON) 25 mg tablet Take 1 Tablet by mouth daily.  cloNIDine HCL (CATAPRES) 0.2 mg tablet Take 1 Tablet by mouth every eight (8) hours.  hydrALAZINE (APRESOLINE) 100 mg tablet Take 1 Tablet by mouth three (3) times daily.     levETIRAcetam (KEPPRA) 500 mg tablet Take 1 Tablet by mouth two (2) times a day.  lisinopriL (PRINIVIL, ZESTRIL) 40 mg tablet Take 1 Tablet by mouth nightly.  prazosin (MINIPRESS) 2 mg capsule Take 1 Capsule by mouth three (3) times daily.  bisacodyl (DULCOLAX, BISACODYL,) 10 mg suppository Insert 10 mg into rectum daily. No current facility-administered medications for this visit. Medications Discontinued During This Encounter   Medication Reason    lisinopriL (PRINIVIL, ZESTRIL) 40 mg tablet REORDER    levETIRAcetam (KEPPRA) 500 mg tablet REORDER    chlorthalidone (HYGROTON) 25 mg tablet REORDER    prazosin (MINIPRESS) 2 mg capsule REORDER    cloNIDine HCL (CATAPRES) 0.2 mg tablet REORDER    hydrALAZINE (APRESOLINE) 100 mg tablet REORDER     ~~~~~~~~~~~~~~~~~~~~~~~~~~~~~~~~~~~~~~~~~~~~~~~~~~~~~~~~~~~    Chief Complaint   Patient presents with    Follow-up     Medication    Medication Refill       History provided by patient  History of Present Illness   Hermes Varma is a 64 y.o. male who presents to clinic today for:  Follow-up (Medication) and Medication Refill    Had cataract surgery and it went well. Hypertension  At goal  Ran out of meds at [facility]  We had sent in 90 days plus refill in September and August.  Unclear what happened     Keppra  Follow sup with Neurosurg near future  No seizures  Doing well physically and eating and sleeping well    Health Maintenance  Will do at future visit, Asked patient to schedule a Wellness appt to discuss issues.   Health Maintenance Due   Topic Date Due    Hepatitis C Screening  Never done    Foot Exam Q1  Never done    Eye Exam Retinal or Dilated  Never done    Colorectal Cancer Screening Combo  Never done    Shingrix Vaccine Age 50> (1 of 2) Never done    Low dose CT lung screening  Never done    DTaP/Tdap/Td series (2 - Td or Tdap) 09/07/2020    Flu Vaccine (1) Never done    COVID-19 Vaccine (2 - Booster for [place] series) 10/26/2021     Review of Systems   Review of Systems   Constitutional: Negative for chills, fever and weight loss. Respiratory: Negative for shortness of breath. Cardiovascular: Negative for chest pain and leg swelling. Gastrointestinal: Negative for blood in stool. Genitourinary: Negative for hematuria. Psychiatric/Behavioral: Negative. Vitals/Objective:     Vitals:    11/11/21 0855 11/11/21 0858   BP: (!) 147/70 137/67   Pulse: 81    Resp: 18    Temp: 98.2 °F (36.8 °C)    TempSrc: Temporal    SpO2: 98%    Weight: 217 lb 6.4 oz (98.6 kg)    Height: 6' (1.829 m)      Body mass index is 29.48 kg/m². Wt Readings from Last 3 Encounters:   11/11/21 217 lb 6.4 oz (98.6 kg)   09/27/21 210 lb 9.6 oz (95.5 kg)   09/16/21 211 lb 9.6 oz (96 kg)         Objective  Physical Exam  Vitals and nursing note reviewed. Constitutional:       Appearance: Normal appearance. He is not toxic-appearing. HENT:      Head: Normocephalic and atraumatic. Cardiovascular:      Rate and Rhythm: Normal rate and regular rhythm. Heart sounds: Normal heart sounds. No murmur heard. No gallop. Pulmonary:      Effort: Pulmonary effort is normal. No respiratory distress. Breath sounds: Normal breath sounds. No wheezing, rhonchi or rales. Musculoskeletal:      Cervical back: No muscular tenderness. Lymphadenopathy:      Cervical: No cervical adenopathy. Neurological:      Mental Status: He is alert. Psychiatric:         Mood and Affect: Mood normal.         Behavior: Behavior normal.         Thought Content: Thought content normal.         Judgment: Judgment normal.           No results found for this or any previous visit (from the past 24 hour(s)). Disposition     Follow-up and Dispositions  ·   Return in about 3 months (around 2/11/2022) for Annual exam.       No future appointments. History   Patient's past medical, surgical and family histories were reviewed and updated.     Past Medical History:   Diagnosis Date    Acute hypokalemia 2021    Arthritis     C[facility]ac artery stenosis ([facility] 2021    Cerebral arteriovenous malformation (AVM) 2021    with subarachnoid hemorrhage.     Diabetes ([facility] [facility]     type 2    ED (erectile dysfunction)     Hearing loss     Hepatitis     Herpes zoster 2021    Left abdominal dermatome    Hypertension     Hypertensive emergency 2021    Impaired fasting glucose     Malignant hypertension 2021    Muscle weakness     Vasogenic cerebral edema (HCC) 2021    Visual disturbance      Past Surgical History:   Procedure Laterality Date    HX TONSIL AND ADENOIDECTOMY       Family History   Problem Relation Age of Onset    Diabetes Mother         type 2    Hypertension Mother     Heart Disease Mother         CHF    Hypertension Father     Heart Disease Father 48        MI    Cancer Maternal Grandmother      Social History     Tobacco Use    Smoking status: Former Smoker     Packs/day: 2.00     Years: 20.00     Pack years: 40.00     Types: Cigarettes     Quit date: 2015     Years since quittin.7    Smokeless tobacco: Former User    Tobacco comment: Patient reports he is Vaping    Vaping Use    Vaping Use: Never used   Substance Use Topics    Alcohol use: Not Currently     Alcohol/week: 6.0 standard drinks     Types: 6 Cans of beer per week    Drug use: Never       Allergies   No Known Allergies

## 2021-11-11 NOTE — PROGRESS NOTES
Jamal Gibbons is a 64 y.o. male      Chief Complaint   Patient presents with    Follow-up     Medication    Medication Refill         1. Have you been to the ER, urgent care clinic since your last visit? No   Hospitalized since your last visit? No      2. Have you seen or consulted any other health care providers outside of the [address] [place] [address] since your last visit? Include any pap smears or colon screening.   Eye surgery

## 2022-03-18 PROBLEM — E11.9 TYPE 2 DIABETES MELLITUS WITHOUT COMPLICATION, WITHOUT LONG-TERM CURRENT USE OF INSULIN (HCC): Status: ACTIVE | Noted: 2019-03-22

## 2022-03-18 PROBLEM — E87.6 ACUTE HYPOKALEMIA: Status: ACTIVE | Noted: 2021-07-20

## 2022-03-18 PROBLEM — G93.6 VASOGENIC CEREBRAL EDEMA (HCC): Status: ACTIVE | Noted: 2021-07-21

## 2022-03-19 PROBLEM — I16.1 HYPERTENSIVE EMERGENCY: Status: ACTIVE | Noted: 2021-07-21

## 2022-03-19 PROBLEM — I77.1 CELIAC ARTERY STENOSIS (HCC): Status: ACTIVE | Noted: 2021-07-21

## 2022-03-19 PROBLEM — I67.848 CEREBRAL VASOSPASM: Status: ACTIVE | Noted: 2021-08-01

## 2022-03-19 PROBLEM — I60.9 SAH (SUBARACHNOID HEMORRHAGE) (HCC): Status: ACTIVE | Noted: 2021-07-20

## 2022-03-19 PROBLEM — Q28.2 CEREBRAL ARTERIOVENOUS MALFORMATION (AVM): Status: ACTIVE | Noted: 2021-07-20

## 2022-03-19 PROBLEM — I10 MALIGNANT HYPERTENSION: Status: ACTIVE | Noted: 2021-07-20

## 2022-06-15 ENCOUNTER — OFFICE VISIT (OUTPATIENT)
Dept: FAMILY MEDICINE CLINIC | Age: 62
End: 2022-06-15
Payer: COMMERCIAL

## 2022-06-15 VITALS
HEART RATE: 79 BPM | SYSTOLIC BLOOD PRESSURE: 101 MMHG | RESPIRATION RATE: 18 BRPM | OXYGEN SATURATION: 98 % | BODY MASS INDEX: 30.02 KG/M2 | DIASTOLIC BLOOD PRESSURE: 61 MMHG | HEIGHT: 72 IN | TEMPERATURE: 98.2 F | WEIGHT: 221.6 LBS

## 2022-06-15 DIAGNOSIS — I10 ESSENTIAL HYPERTENSION: Primary | ICD-10-CM

## 2022-06-15 DIAGNOSIS — E11.9 TYPE 2 DIABETES MELLITUS WITHOUT COMPLICATION, WITHOUT LONG-TERM CURRENT USE OF INSULIN (HCC): ICD-10-CM

## 2022-06-15 PROCEDURE — 99214 OFFICE O/P EST MOD 30 MIN: CPT | Performed by: FAMILY MEDICINE

## 2022-06-15 RX ORDER — PRAZOSIN HYDROCHLORIDE 2 MG/1
2 CAPSULE ORAL 2 TIMES DAILY
Qty: 180 CAPSULE | Refills: 1 | Status: SHIPPED | OUTPATIENT
Start: 2022-06-15 | End: 2022-09-16

## 2022-06-15 RX ORDER — HYDRALAZINE HYDROCHLORIDE 100 MG/1
100 TABLET, FILM COATED ORAL 3 TIMES DAILY
Qty: 270 TABLET | Refills: 1 | Status: SHIPPED | OUTPATIENT
Start: 2022-06-15

## 2022-06-15 RX ORDER — CLONIDINE HYDROCHLORIDE 0.2 MG/1
0.2 TABLET ORAL EVERY 8 HOURS
Qty: 270 TABLET | Refills: 1 | Status: SHIPPED | OUTPATIENT
Start: 2022-06-15

## 2022-06-15 RX ORDER — CHLORTHALIDONE 25 MG/1
25 TABLET ORAL DAILY
Qty: 90 TABLET | Refills: 1 | Status: SHIPPED | OUTPATIENT
Start: 2022-06-15 | End: 2022-06-17

## 2022-06-15 RX ORDER — LISINOPRIL 40 MG/1
40 TABLET ORAL
Qty: 90 TABLET | Refills: 1 | Status: SHIPPED | OUTPATIENT
Start: 2022-06-15

## 2022-06-15 NOTE — PROGRESS NOTES
Chief Complaint   Patient presents with    Medication Refill    Medication Evaluation     1. Have you been to the ER, urgent care clinic since your last visit? Hospitalized since your last visit? No         2. Have you seen or consulted any other health care providers outside of the [address] [place] [address] since your last visit? Include any pap smears or colon screening.   No

## 2022-06-15 NOTE — PROGRESS NOTES
Cassi Bergeron  58 y.o. male  1960  [id number]  [facility]  Progress Note     Encounter Date: 6/15/2022    Assessment and Plan:     Encounter Diagnoses     ICD-10-CM ICD-9-CM   1. Essential hypertension  I10 401.9   2. Type 2 diabetes mellitus without complication, without long-term current use of insulin (HCC)  E11.9 250.00       1. Essential hypertension  At goal  Cut his prazosin to reflect what he is actually taking  FU if BP's are low  - lisinopriL (PRINIVIL, ZESTRIL) 40 mg tablet; Take 1 Tablet by mouth nightly. Dispense: 90 Tablet; Refill: 1  - cloNIDine HCL (CATAPRES) 0.2 mg tablet; Take 1 Tablet by mouth every eight (8) hours. Dispense: 270 Tablet; Refill: 1  - hydrALAZINE (APRESOLINE) 100 mg tablet; Take 1 Tablet by mouth three (3) times daily. Dispense: 270 Tablet; Refill: 1  - chlorthalidone (HYGROTON) 25 mg tablet; Take 1 Tablet by mouth daily. Dispense: 90 Tablet; Refill: 1  - prazosin (MINIPRESS) 2 mg capsule; Take 1 Capsule by mouth two (2) times a day. Dispense: 180 Capsule; Refill: 1    2. Type 2 diabetes mellitus without complication, without long-term current use of insulin ([address]  Update labs  Diet discussed  Feet OK  Eyes are up to date. - CBC WITH AUTOMATED DIFF  - HEMOGLOBIN A1C WITH EAG  - METABOLIC PANEL, BASIC  - HEPATITIS C AB  - HM DIABETES FOOT EXAM      I have discussed the diagnosis with the patient and the intended plan as seen in the above orders. he has expressed understanding. The patient has received an after-visit summary and questions were answered concerning future plans. I have discussed medication side effects and warnings with the patient as well. Electronically Signed: Jeyson Mckeon MD    Current Medications after this visit     Current Outpatient Medications   Medication Sig    lisinopriL (PRINIVIL, ZESTRIL) 40 mg tablet Take 1 Tablet by mouth nightly.     cloNIDine HCL (CATAPRES) 0.2 mg tablet Take 1 Tablet by mouth every eight (8) hours.  hydrALAZINE (APRESOLINE) 100 mg tablet Take 1 Tablet by mouth three (3) times daily.  chlorthalidone (HYGROTON) 25 mg tablet Take 1 Tablet by mouth daily.  prazosin (MINIPRESS) 2 mg capsule Take 1 Capsule by mouth two (2) times a day.  bisacodyl (DULCOLAX, BISACODYL,) 10 mg suppository Insert 10 mg into rectum daily. No current facility-administered medications for this visit. Medications Discontinued During This Encounter   Medication Reason    levETIRAcetam (KEPPRA) 500 mg tablet Not A Current Medication    chlorthalidone (HYGROTON) 25 mg tablet REORDER    cloNIDine HCL (CATAPRES) 0.2 mg tablet REORDER    lisinopriL (PRINIVIL, ZESTRIL) 40 mg tablet REORDER    hydrALAZINE (APRESOLINE) 100 mg tablet REORDER    prazosin (MINIPRESS) 2 mg capsule REORDER     ~~~~~~~~~~~~~~~~~~~~~~~~~~~~~~~~~~~~~~~~~~~~~~~~~~~~~~~~~~~    Chief Complaint   Patient presents with    Medication Refill    Medication Evaluation       History provided by patient  History of Present Illness   Jordan Coulter is a 58 y.o. male who presents to clinic today for:  Medication Refill and Medication Evaluation    Hypertension  BP's at home from 748-732 systolic. Had cut the prazosin to twice a day. He thought it was a fluid pill  Getting up once a night to urinate   Urination is OK. Flow is good. DM2  Blood sugars at home are usually in the 120 range   Sometimes over 200 later in the day. Eyes up to date  Feet OK  No sugar diet    Non smoker. Just had left rotator cuff repaired    Health Maintenance  Completed HM gaps at today's visit.  Requested colonoscopy records  Health Maintenance Due   Topic Date Due    Hepatitis C Screening  Never done    Pneumococcal 0-64 years (1 - PCV) Never done    Eye Exam Retinal or Dilated  Never done    Colorectal Cancer Screening Combo  Never done    Shingrix Vaccine Age 50> (1 of 2) Never done    Low dose CT lung screening  Never done    DTaP/Tdap/Td series (2 - Td or Tdap) 09/07/2020    COVID-19 Vaccine (2 - Booster for [place] series) 10/26/2021     Review of Systems   Review of Systems   Respiratory: Negative for shortness of breath. Cardiovascular: Negative for chest pain and leg swelling. Gastrointestinal: Negative for blood in stool. Genitourinary: Negative for hematuria. Musculoskeletal: Positive for joint pain. Psychiatric/Behavioral: Negative. Vitals/Objective:     Vitals:    06/15/22 1332   BP: 101/61   Pulse: 79   Resp: 18   Temp: 98.2 °F (36.8 °C)   TempSrc: Temporal   SpO2: 98%   Weight: 221 lb 9.6 oz (100.5 kg)   Height: 6' (1.829 m)     Body mass index is 30.05 kg/m². Wt Readings from Last 3 Encounters:   06/15/22 221 lb 9.6 oz (100.5 kg)   11/11/21 217 lb 6.4 oz (98.6 kg)   09/27/21 210 lb 9.6 oz (95.5 kg)         Objective  Physical Exam  Vitals and nursing note reviewed. Constitutional:       Appearance: Normal appearance. He is not toxic-appearing. HENT:      Head: Normocephalic and atraumatic. Cardiovascular:      Rate and Rhythm: Normal rate and regular rhythm. Heart sounds: Normal heart sounds. No murmur heard. No gallop. Pulmonary:      Effort: Pulmonary effort is normal. No respiratory distress. Breath sounds: Normal breath sounds. No wheezing, rhonchi or rales. Musculoskeletal:      Cervical back: No muscular tenderness. Lymphadenopathy:      Cervical: No cervical adenopathy. Neurological:      Mental Status: He is alert. Psychiatric:         Mood and Affect: Mood normal.         Behavior: Behavior normal.         Thought Content: Thought content normal.         Judgment: Judgment normal.       Left arm in sling       Diabetic Foot Exam:  Protective sensation is intact bilaterally. Pedal pulses are 2+ and normal bilaterally.   L foot skin inspection:  normal skin and soft tissue with no gross edema or evidence of acute injury or foot ulcer  R foot skin inspection:  skin and soft tissue appear normal with no significant edema or evidence of acute injury or foot ulcer     No results found for this or any previous visit (from the past 24 hour(s)). Disposition     Follow-up and Dispositions  ·   Return in about 6 months (around 12/15/2022) for Blood pressure follow up, Diabetes follow up, Medication follow up. No future appointments. History   Patient's past medical, surgical and family histories were reviewed and updated. Past Medical History:   Diagnosis Date    Acute hypokalemia 2021    Arthritis     Celiac artery stenosis ([address] 2021    Cerebral arteriovenous malformation (AVM) 2021    with subarachnoid hemorrhage.     Diabetes ([address]     type 2    ED (erectile dysfunction)     Hearing loss     Hepatitis     Herpes zoster 2021    Left abdominal dermatome    Hypertension     Hypertensive emergency 2021    Impaired fasting glucose     Malignant hypertension 2021    Muscle weakness     Vasogenic cerebral edema (HCC) 2021    Visual disturbance      Past Surgical History:   Procedure Laterality Date    HX ROTATOR CUFF REPAIR Left 2022        HX TONSIL AND ADENOIDECTOMY       Family History   Problem Relation Age of Onset    Diabetes Mother         type 2    Hypertension Mother     Heart Disease Mother         CHF    Hypertension Father     Heart Disease Father 48        MI    Cancer Maternal Grandmother      Social History     Tobacco Use    Smoking status: Former Smoker     Packs/day: 2.00     Years: 20.00     Pack years: 40.00     Types: Cigarettes     Quit date: 2015     Years since quittin.3    Smokeless tobacco: Former User    Tobacco comment: Patient reports he is Vaping    Vaping Use    Vaping Use: Never used   Substance Use Topics    Alcohol use: Not Currently     Alcohol/week: 6.0 standard drinks     Types: 6 Cans of beer per week    Drug use: Never       Allergies   No Known Allergies

## 2022-06-15 NOTE — PATIENT INSTRUCTIONS
Diabetes:  Blood sugar goals:  Hemoglobin A1c under 7  Fasting blood sugar   Blood sugar 2 hours after a meal under 180, 4 hours after a meal under 120  No hypoglycemia (sugars under 70 and symptomatic low sugars)    Blood sugar control with diet and exercise:  Exercise 45 minutes per day. This makes your insulin work better. It also allows insulin levels to fall helping with weight loss. Every night, try to fast from your evening meal to breakfast (at least 12 hours) without eating anything. This uses stored energy in your liver and makes insulin work better. Avoid simples sugars such as table sugar in drinks (sodas, lemonade, sweet tea, wine), desserts, candy. Also avoid fruit juices and high fructose corn syrup. Avoid frequent consumption of fruit especially grapes and bananas. A single serving of fruit daily is all you should have. Finally, drink less milk which has milk sugar in it. Control your starch intake. Men should have 3-4 carb portions per meal.  Women should have 2-3 carb portions per meal.  A carb serving is the equivalent of a slice of bread. Control your blood pressure and cholesterol. These problems are common in diabetes. AND, don't smoke. All of these problems contribute to heart disease and stroke risk. Get a yearly eye exam and flu shot. Make sure your vaccines are up to date particularly the pneumonia vaccines. Inspect your feet daily. Wear comfortable protective shoes and clean socks. Seek medical care if there are sore, calluses or numbness and burning of your feet. See your doctor at least every 6 months if you are on oral medicines or more often if the diabetic control is not at goal or if you are on insulin. Take your medicines religiously. STOP the SUGAR    The first step in dietary efforts at weight loss is removing as much sugar from the diet as possible.   Most dietary sugar is in the forms of table sugar (sucrose), fruit sugar (fructose) and milk sugar (lactose). But, you need to watch labels to see if processed foods have added sugars under other names. To eliminate sucrose, eliminate sweet drinks entirely including soft drinks, sports drinks, lemonade, sweet tea and wine. Also, eliminate candy and make desserts a \"special occasion only treat\". Don't eat desserts with every meal or every night. Most fructose is found in fruit and this should be markedly limited. Fruit juice should be avoided. If you do eat fruit, eat fruits that are lower in sugar such as: strawberries, blackberries, raspberries, lemon, grapefruit and watermelon. Eat small portions and think of the fruit as a garnish or small treat. Bananas, mangos, cherries and grapes are higher in sugar and should be avoided. Avoid high fructose corn syrup (an artificial sweetener). Milk sugar, or lactose, should be avoided as well. Milk is a good source of calcium but not for people struggling with weight issues. Put a little cream in your coffee or tea but otherwise avoid milk. How can you avoid sugar? For starters, don't buy it and don't bring it in the house. It won't tempt you that way! For more information:    Try the internet for videos about sugar addiction or try diet doctor.com. Carb Counting in Diabetes    Carbohydrate or carb counting is a method of calculating grams of carbohydrate consumed at meals and snacks. Foods that contain carbohydrate have the greatest effect on blood sugar compared to foods that contain protein or fat. A low carb diet has the greatest likelihood of promoting weight loss. What Foods Have Carbohydrate?   Foods that contain carbohydrate or carbs are:    -grains like wheat, rice, oatmeal, and barley  -grain-based foods like bread, cereal, pasta, and crackers  -starchy vegetables like potatoes, peas and corn  -fruit and juice  -milk and yogurt  -dried beans and peas and soy products like veggie burgers  -sweets and snack foods like sodas, juice drinks, cake, cookies, candy, and chips    Non-starchy vegetables like lettuce, cucumbers, broccoli, and cauliflower have very little carbohydrate and minimal impact on your blood glucose. Carbohydrate Servings    -In meal planning, 1 serving of a food with carbohydrate has about  15 grams of carbohydrate:  -Check serving sizes with measuring cups and spoons or a food scale. -Read the Nutrition Facts on food labels to find out how many grams of carbohydrate are in foods you eat.   -1 carb serving (15 grams) is roughly equal to one piece of bread    How much carbohydrate should I eat? Diabetics are advised to eat: For women-30-45 grams or 2-3 carb servings per meal.  For men-45-60 grams or 3-4 carb servings per meal.    For weight loss, patients should try to eat under 100 grams of carbs per day. How do I \"manage\" carbs?    -First, eliminate sugar in the form of soft drinks, candy and desserts. Minimize cakes, cookies, smoothies and juices.  -Next, identify the carbs you are eating. Watch labels and know when you are eating a starch. Eat less bread, noodles, pasta, rice, potatoes, french fries, cereals, chips, crackers and yogurt  -Eat more healthy proteins and fats with more eggs, full fat dairy products, non starchy vegetables, salads, meat, poultry, fish, cheese, berries, nuts, olive oil and butter.  -Avoid beer. Europeans refer to beer as \"liquid bread\" and it is made from grains with a high carb content. Where can I find more information? There is a lot of information about carb counting on the internet and there are books about carb counting. Try the American Diabetes Association and Dietdoctor. com          So, what can I eat? 1) Protein-protein consumption promotes weight loss. Eat protein at every meal.  Good sources of protein include meat, fish, poultry and eggs.       2) More soluble fiber-soluble fiber helps us feel \"full\" and helps improve many markers for cardiovascular disease. But, we have to watch out for products with added sugar or large carb servings! Sources of soluble fiber include oatmeal, root vegetables such as sweet potatoes, turnips and carrots, vegetables such as broccoli and Brussel sprouts. 3) Healthy fats and oils-certain fats are better for our blood vessels and cardiovascular system. The oils in fish and seafood tend to be better for us as well as olive oil and the nuts on trees (walnuts, almonds, pistachios and hazelnuts). So, again, try to eat more fish. Use olive oil for cooking and for salad dressings. Nuts can be used in salads and in other dishes and they make a good low carb snack. 4) Non starchy vegetables-Green and yellow vegetables offer a lot of nutrients and very low amounts of carbs that can lead to weight gain. Salads can add a lot to our diet and also pack in a lot of nutrients. Cautions:  avoid starchy vegetables such as potatoes, corn and peas. Also, be careful about what is added to vegetables such as fruit or salad dressings that contain sugar. 5) Full fat dairy products-Cheeses make a good snack or appetizer. Cottage cheese can be a good basis for breakfast.  Yogurt is a good addition to our diet but watch out for yogurt that has added sugar. Avoid milk.

## 2022-06-17 DIAGNOSIS — I10 ESSENTIAL HYPERTENSION: ICD-10-CM

## 2022-06-17 LAB
BASOPHILS # BLD AUTO: 0 X10E3/UL (ref 0–0.2)
BASOPHILS NFR BLD AUTO: 1 %
BUN SERPL-MCNC: 19 MG/DL (ref 8–27)
BUN/CREAT SERPL: 9 (ref 10–24)
CALCIUM SERPL-MCNC: 10.1 MG/DL (ref 8.6–10.2)
CHLORIDE SERPL-SCNC: 98 MMOL/L (ref 96–106)
CO2 SERPL-SCNC: 25 MMOL/L (ref 20–29)
CREAT SERPL-MCNC: 2.06 MG/DL (ref 0.76–1.27)
EGFR: 36 ML/MIN/1.73
EOSINOPHIL # BLD AUTO: 0.3 X10E3/UL (ref 0–0.4)
EOSINOPHIL NFR BLD AUTO: 5 %
ERYTHROCYTE [DISTWIDTH] IN BLOOD BY AUTOMATED COUNT: 13.4 % (ref 11.6–15.4)
EST. AVERAGE GLUCOSE BLD GHB EST-MCNC: 157 MG/DL
GLUCOSE SERPL-MCNC: 59 MG/DL (ref 65–99)
HBA1C MFR BLD: 7.1 % (ref 4.8–5.6)
HCT VFR BLD AUTO: 40.2 % (ref 37.5–51)
HCV AB S/CO SERPL IA: <0.1 S/CO RATIO (ref 0–0.9)
HGB BLD-MCNC: 13.1 G/DL (ref 13–17.7)
IMM GRANULOCYTES # BLD AUTO: 0 X10E3/UL (ref 0–0.1)
IMM GRANULOCYTES NFR BLD AUTO: 0 %
LYMPHOCYTES # BLD AUTO: 2.4 X10E3/UL (ref 0.7–3.1)
LYMPHOCYTES NFR BLD AUTO: 32 %
MCH RBC QN AUTO: 27 PG (ref 26.6–33)
MCHC RBC AUTO-ENTMCNC: 32.6 G/DL (ref 31.5–35.7)
MCV RBC AUTO: 83 FL (ref 79–97)
MONOCYTES # BLD AUTO: 0.9 X10E3/UL (ref 0.1–0.9)
MONOCYTES NFR BLD AUTO: 12 %
NEUTROPHILS # BLD AUTO: 3.8 X10E3/UL (ref 1.4–7)
NEUTROPHILS NFR BLD AUTO: 50 %
PLATELET # BLD AUTO: 405 X10E3/UL (ref 150–450)
POTASSIUM SERPL-SCNC: 4.3 MMOL/L (ref 3.5–5.2)
RBC # BLD AUTO: 4.85 X10E6/UL (ref 4.14–5.8)
SODIUM SERPL-SCNC: 139 MMOL/L (ref 134–144)
WBC # BLD AUTO: 7.4 X10E3/UL (ref 3.4–10.8)

## 2022-06-17 RX ORDER — CHLORTHALIDONE 25 MG/1
TABLET ORAL
Qty: 90 TABLET | Refills: 1 | Status: SHIPPED | OUTPATIENT
Start: 2022-06-17

## 2022-08-15 ENCOUNTER — OFFICE VISIT (OUTPATIENT)
Dept: FAMILY MEDICINE CLINIC | Age: 62
End: 2022-08-15
Payer: COMMERCIAL

## 2022-08-15 VITALS
TEMPERATURE: 98.2 F | OXYGEN SATURATION: 100 % | RESPIRATION RATE: 18 BRPM | HEART RATE: 84 BPM | HEIGHT: 72 IN | BODY MASS INDEX: 29.28 KG/M2 | SYSTOLIC BLOOD PRESSURE: 132 MMHG | WEIGHT: 216.2 LBS | DIASTOLIC BLOOD PRESSURE: 75 MMHG

## 2022-08-15 DIAGNOSIS — R22.1 NECK MASS: Primary | ICD-10-CM

## 2022-08-15 PROBLEM — N18.30 CHRONIC RENAL DISEASE, STAGE III (HCC): Status: ACTIVE | Noted: 2022-08-15

## 2022-08-15 PROCEDURE — 99214 OFFICE O/P EST MOD 30 MIN: CPT | Performed by: NURSE PRACTITIONER

## 2022-08-15 NOTE — PROGRESS NOTES
Ирина Gomez is a 58 y.o. male who presents to clinic today for the following:    Chief Complaint   Patient presents with    Other      Lump on collar bone x 1-2 years - No pain or soreness        Vitals:    08/15/22 1447   BP: 132/75   Pulse: 84   Resp: 18   Temp: 98.2 °F (36.8 °C)   TempSrc: Temporal   SpO2: 100%   Weight: 216 lb 3.2 oz (98.1 kg)   Height: 6' (1.829 m)       Body mass index is 29.32 kg/m². Patients past medical, surgical and family histories were reviewed. Allergies and Medications reviewed and updated. Current Outpatient Medications:     chlorthalidone (HYGROTON) 25 mg tablet, Take 1 tablet by mouth once daily, Disp: 90 Tablet, Rfl: 1    lisinopriL (PRINIVIL, ZESTRIL) 40 mg tablet, Take 1 Tablet by mouth nightly., Disp: 90 Tablet, Rfl: 1    cloNIDine HCL (CATAPRES) 0.2 mg tablet, Take 1 Tablet by mouth every eight (8) hours. , Disp: 270 Tablet, Rfl: 1    hydrALAZINE (APRESOLINE) 100 mg tablet, Take 1 Tablet by mouth three (3) times daily. , Disp: 270 Tablet, Rfl: 1    prazosin (MINIPRESS) 2 mg capsule, Take 1 Capsule by mouth two (2) times a day., Disp: 180 Capsule, Rfl: 1    bisacodyl (DULCOLAX, BISACODYL,) 10 mg suppository, Insert 10 mg into rectum daily. , Disp: 2 Suppository, Rfl: 0    No Known Allergies    Past Medical History:   Diagnosis Date    Acute hypokalemia 7/20/2021    Arthritis     Celiac artery stenosis ([address] 7/21/2021    Cerebral arteriovenous malformation (AVM) 07/20/2021    with subarachnoid hemorrhage.     Diabetes ([address]     type 2    ED (erectile dysfunction)     Hearing loss     Hepatitis     Herpes zoster 09/2021    Left abdominal dermatome    Hypertension     Hypertensive emergency 7/21/2021    Impaired fasting glucose     Malignant hypertension 7/20/2021    Muscle weakness     Vasogenic cerebral edema (HCC) 7/21/2021    Visual disturbance        Past Surgical History:   Procedure Laterality Date    HX ROTATOR CUFF REPAIR Left 2022        HX TONSIL AND ADENOIDECTOMY         Family History   Problem Relation Age of Onset    Diabetes Mother         type 2    Hypertension Mother     Heart Disease Mother         CHF    Hypertension Father     Heart Disease Father 48        MI    Cancer Maternal Grandmother        Social History     Socioeconomic History    Marital status:      Spouse name: Not on file    Number of children: Not on file    Years of education: Not on file    Highest education level: Not on file   Occupational History    Not on file   Tobacco Use    Smoking status: Former     Packs/day: 2.00     Years: 20.00     Pack years: 40.00     Types: Cigarettes     Quit date: 2015     Years since quittin.5    Smokeless tobacco: Former    Tobacco comments:     Patient reports he is Vaping    Vaping Use    Vaping Use: Never used   Substance and Sexual Activity    Alcohol use: Not Currently     Alcohol/week: 6.0 standard drinks     Types: 6 Cans of beer per week    Drug use: Never    Sexual activity: Yes   Other Topics Concern    Not on file   Social History Narrative    ** Merged History Encounter **          Social Determinants of Health     Financial Resource Strain: Not on file   Food Insecurity: Not on file   Transportation Needs: Not on file   Physical Activity: Not on file   Stress: Not on file   Social Connections: Not on file   Intimate Partner Violence: Not on file   Housing Stability: Not on file           Physical Exam  Vitals and nursing note reviewed. Constitutional:       Appearance: He is normal weight. HENT:      Head: Normocephalic. Right Ear: Tympanic membrane normal.      Left Ear: Tympanic membrane normal.      Nose: Nose normal.      Mouth/Throat:      Mouth: Mucous membranes are moist.   Eyes:      Pupils: Pupils are equal, round, and reactive to light. Cardiovascular:      Rate and Rhythm: Normal rate. Pulses: Normal pulses.       Heart sounds: Normal heart sounds. Pulmonary:      Effort: Pulmonary effort is normal.      Breath sounds: Normal breath sounds. Abdominal:      General: Abdomen is flat. Musculoskeletal:         General: Deformity present. Skin:     General: Skin is warm. Capillary Refill: Capillary refill takes less than 2 seconds. Neurological:      Mental Status: He is alert and oriented to person, place, and time. Psychiatric:         Mood and Affect: Mood normal.        Patient seen in office with complaint of left clavicular deformity. He reports this is been ongoing for 2 years. He states that it is begun to move and be more pronounced in the left clavicle area. There was deformity noted on palpation. No palpable mass. CT has been ordered of the neck and chest for further evaluation. He does report that this was examined about 2 years ago. Review of Systems   Constitutional:  Negative for fever and malaise/fatigue. HENT:  Negative for congestion, sinus pain and sore throat. Respiratory:  Negative for cough and shortness of breath. Cardiovascular:  Negative for chest pain. Gastrointestinal:  Negative for diarrhea, nausea and vomiting. Genitourinary:  Negative for dysuria. Musculoskeletal:  Positive for joint pain. Skin: Negative. Neurological:  Negative for dizziness and headaches. Endo/Heme/Allergies:  Negative for environmental allergies. Psychiatric/Behavioral: Negative. No results found. No results found for this or any previous visit (from the past 24 hour(s)). Assessment and Plan:    Encounter Diagnoses   Name Primary?  Neck mass Yes                I have discussed the diagnosis with the patient and the intended plan as seen in the above orders. he has expressed understanding. The patient has received an after-visit summary and questions were answered concerning future plans.   I have discussed medication side effects and warnings with the patient as well.    Follow-up and Dispositions    Return in about 4 weeks (around 9/12/2022).          Electronically Signed: Marilyn Vasques NP

## 2022-08-15 NOTE — PATIENT INSTRUCTIONS
Take medication as prescribed. Scheduling will call in 24-48 hours to schedule, call office if you have not heard from them in that time.

## 2022-08-15 NOTE — PROGRESS NOTES
Ramonita Zuniga is a 58 y.o. male      Chief Complaint   Patient presents with    Other      Lump on collar bone x 1-2 years - No pain or soreness          1. Have you been to the ER, urgent care clinic since your last visit? No  Hospitalized since your last visit? No       2. Have you seen or consulted any other health care providers outside of the [address] [place] [address] since your last visit? Include any pap smears or colon screening.   No

## 2022-08-17 ENCOUNTER — TELEPHONE (OUTPATIENT)
Dept: FAMILY MEDICINE CLINIC | Age: 62
End: 2022-08-17

## 2022-08-17 NOTE — TELEPHONE ENCOUNTER
Please advise!!    Sean Daily from [facility] department letting us know Laura Lee needs to do a peer to peer from CT of chest. Phone number [phone]

## 2022-08-22 ENCOUNTER — OFFICE VISIT (OUTPATIENT)
Dept: NEUROSURGERY | Age: 62
End: 2022-08-22
Payer: COMMERCIAL

## 2022-08-22 VITALS
HEIGHT: 72 IN | SYSTOLIC BLOOD PRESSURE: 140 MMHG | TEMPERATURE: 98.5 F | OXYGEN SATURATION: 99 % | DIASTOLIC BLOOD PRESSURE: 78 MMHG | HEART RATE: 75 BPM | WEIGHT: 215.4 LBS | BODY MASS INDEX: 29.17 KG/M2

## 2022-08-22 DIAGNOSIS — I60.9 SAH (SUBARACHNOID HEMORRHAGE) (HCC): Primary | ICD-10-CM

## 2022-08-22 PROCEDURE — 99213 OFFICE O/P EST LOW 20 MIN: CPT | Performed by: PSYCHIATRY & NEUROLOGY

## 2022-08-22 NOTE — PROGRESS NOTES
Annual follow up for AVM. Patient reports continued dizziness, headaches, numbness on the left side of his face. Reports Dr Debo Chang referred him back to [facility]. No acute problems reported.

## 2022-08-22 NOTE — PROGRESS NOTES
NeuroInterventional Surgery   Established Patient Visit  Tommy Johnson DO    Patient: Aldo Hatchet MRN: [id number]  SSN: [id number]    YOB: 1960  Age: 58 y.o. Sex: male      Chief Complaint: AVM    Subjective:      Aldo Hatchet is a 58 y.o. male who is being seen for ruptured brainstem AVM 2021 ast seen 1 yr ago. Has occasional dizziness but ambulated independently. C/o occasional diplopia. No HA. Radiosurgery was performed late     Past Medical History:   Diagnosis Date    Acute hypokalemia 2021    Arthritis     Celiac artery stenosis ([address] 2021    Cerebral arteriovenous malformation (AVM) 2021    with subarachnoid hemorrhage. Diabetes ([address]     type 2    ED (erectile dysfunction)     Hearing loss     Hepatitis     Herpes zoster 2021    Left abdominal dermatome    Hypertension     Hypertensive emergency 2021    Impaired fasting glucose     Malignant hypertension 2021    Muscle weakness     Vasogenic cerebral edema ([address] 2021    Visual disturbance      Family History   Problem Relation Age of Onset    Diabetes Mother         type 2    Hypertension Mother     Heart Disease Mother         CHF    Hypertension Father     Heart Disease Father 48        MI    Cancer Maternal Grandmother      Social History     Tobacco Use    Smoking status: Former     Packs/day: 2.00     Years: 20.00     Pack years: 40.00     Types: Cigarettes     Quit date: 2015     Years since quittin.5    Smokeless tobacco: Former    Tobacco comments:     Patient reports he is Vaping    Substance Use Topics    Alcohol use: Not Currently     Alcohol/week: 6.0 standard drinks     Types: 6 Cans of beer per week      Cannot display prior to admission medications because the patient has not been admitted in this contact.        No Known Allergies    Review of Systems:  A comprehensive review of systems was negative except for that written in the History of Present Illness. Denies numbness, tingling, chest pain, leg pain, nausea, vomiting, difficulty swallowing, headache, and dyspnea. Objective:     Vitals:    08/22/22 1037   BP: (!) 140/78   Pulse: 75   Temp: 98.5 °F (36.9 °C)   TempSrc: Temporal   SpO2: 99%   Weight: 215 lb 6.4 oz (97.7 kg)   Height: 6' (1.829 m)      Physical Exam:  GENERAL: Calm, cooperative, NAD  Heart: regular rate and rhythm, S1, S2 normal, no murmur, click, rub or gallop    Neurologic Exam:  Mental Status:  Alert and oriented x 4. Appropriate affect, mood and behavior. Language:    Normal fluency, repetition, comprehension and naming. Cranial Nerves:   Pupils 3 mm, equal, round and reactive to light. Visual fields full to confrontation. EOMI     No dysarthria. Tongue protrudes to midline, palate elevates symmetrically. Shoulder shrug 5/5 bilaterally. Motor:    No pronator drift. Bulk and tone normal.      5/5 power in all extremities proximally and distally. No involuntary movements. Sensation:    Sensation intact throughout to light touch, temperature    Reflexes:    Reflexes are 2+ at the biceps, triceps, patella and achilles bilaterally. Coordination & Gait: FTN and HTS intact with no ataxia present.  Gait somewhat unsteady    Labs:  Lab Results   Component Value Date/Time    WBC 7.4 06/15/2022 02:14 PM    HGB 13.1 06/15/2022 02:14 PM    HCT 40.2 06/15/2022 02:14 PM    PLATELET 518 00/19/9509 02:14 PM    MCV 83 06/15/2022 02:14 PM      Lab Results   Component Value Date/Time    Sodium 139 06/15/2022 02:14 PM    Potassium 4.3 06/15/2022 02:14 PM    Chloride 98 06/15/2022 02:14 PM    CO2 25 06/15/2022 02:14 PM    Anion gap 4 (L) 08/10/2021 02:13 AM    Glucose 59 (L) 06/15/2022 02:14 PM    BUN 19 06/15/2022 02:14 PM    Creatinine 2.06 (H) 06/15/2022 02:14 PM    BUN/Creatinine ratio 9 (L) 06/15/2022 02:14 PM    GFR est AA 82 09/16/2021 12:00 AM    GFR est non-AA 71 09/16/2021 12:00 AM    Calcium 10.1 06/15/2022 02:14 PM     Lab Results   Component Value Date/Time    Troponin-I, Qt. <0.05 07/24/2021 09:09 PM       Imaging:  MRI and DSA reviewed: small L pontine AVM single venous drainage      Assessment and Plan:   Hx of ruptured brainstem AVM s/p radiosurgery    - normotension  - will repeat DSA in 2024  - f/u 1 yr    Thank you for this consult and participating in the care of this patient.   Signed By: Abigail Burden DO     August 22, 2022

## 2022-08-25 ENCOUNTER — APPOINTMENT (OUTPATIENT)
Dept: CT IMAGING | Age: 62
End: 2022-08-25
Attending: NURSE PRACTITIONER
Payer: COMMERCIAL

## 2022-08-25 ENCOUNTER — HOSPITAL ENCOUNTER (OUTPATIENT)
Dept: CT IMAGING | Age: 62
Discharge: HOME | End: 2022-08-25
Attending: NURSE PRACTITIONER
Payer: COMMERCIAL

## 2022-08-25 DIAGNOSIS — R22.1 NECK MASS: ICD-10-CM

## 2022-08-25 PROCEDURE — 82565 ASSAY OF CREATININE: CPT

## 2022-08-25 PROCEDURE — 70491 CT SOFT TISSUE NECK W/DYE: CPT

## 2022-08-25 PROCEDURE — 74011000636 HC RX REV CODE- 636: Performed by: NURSE PRACTITIONER

## 2022-08-25 RX ADMIN — IOPAMIDOL 100 ML: 612 INJECTION, SOLUTION INTRAVENOUS at 09:30

## 2022-08-29 LAB — CREAT BLD-MCNC: 1.5 MG/DL (ref 0.6–1.3)

## 2023-01-23 ENCOUNTER — OFFICE VISIT (OUTPATIENT)
Dept: FAMILY MEDICINE CLINIC | Age: 63
End: 2023-01-23
Payer: COMMERCIAL

## 2023-01-23 VITALS
RESPIRATION RATE: 20 BRPM | SYSTOLIC BLOOD PRESSURE: 120 MMHG | HEIGHT: 72 IN | WEIGHT: 215.8 LBS | BODY MASS INDEX: 29.23 KG/M2 | DIASTOLIC BLOOD PRESSURE: 64 MMHG | OXYGEN SATURATION: 100 % | TEMPERATURE: 97.9 F | HEART RATE: 71 BPM

## 2023-01-23 DIAGNOSIS — N18.31 STAGE 3A CHRONIC KIDNEY DISEASE (HCC): ICD-10-CM

## 2023-01-23 DIAGNOSIS — I77.1 CELIAC ARTERY STENOSIS (HCC): ICD-10-CM

## 2023-01-23 DIAGNOSIS — I10 ESSENTIAL HYPERTENSION: Primary | ICD-10-CM

## 2023-01-23 DIAGNOSIS — E11.9 TYPE 2 DIABETES MELLITUS WITHOUT COMPLICATION, WITHOUT LONG-TERM CURRENT USE OF INSULIN (HCC): ICD-10-CM

## 2023-01-23 DIAGNOSIS — R35.1 NOCTURIA: ICD-10-CM

## 2023-01-23 PROCEDURE — 3074F SYST BP LT 130 MM HG: CPT | Performed by: FAMILY MEDICINE

## 2023-01-23 PROCEDURE — 3078F DIAST BP <80 MM HG: CPT | Performed by: FAMILY MEDICINE

## 2023-01-23 PROCEDURE — 99214 OFFICE O/P EST MOD 30 MIN: CPT | Performed by: FAMILY MEDICINE

## 2023-01-23 RX ORDER — CHLORTHALIDONE 25 MG/1
25 TABLET ORAL DAILY
Qty: 90 TABLET | Refills: 1 | Status: SHIPPED | OUTPATIENT
Start: 2023-01-23

## 2023-01-23 RX ORDER — HYDRALAZINE HYDROCHLORIDE 100 MG/1
100 TABLET, FILM COATED ORAL 3 TIMES DAILY
Qty: 270 TABLET | Refills: 1 | Status: SHIPPED | OUTPATIENT
Start: 2023-01-23

## 2023-01-23 RX ORDER — LISINOPRIL 40 MG/1
40 TABLET ORAL
Qty: 90 TABLET | Refills: 1 | Status: SHIPPED | OUTPATIENT
Start: 2023-01-23 | End: 2023-01-23 | Stop reason: SDUPTHER

## 2023-01-23 RX ORDER — PRAZOSIN HYDROCHLORIDE 2 MG/1
2 CAPSULE ORAL 3 TIMES DAILY
Qty: 270 CAPSULE | Refills: 1 | Status: SHIPPED | OUTPATIENT
Start: 2023-01-23

## 2023-01-23 RX ORDER — CLONIDINE HYDROCHLORIDE 0.2 MG/1
0.2 TABLET ORAL EVERY 8 HOURS
Qty: 270 TABLET | Refills: 1 | Status: SHIPPED | OUTPATIENT
Start: 2023-01-23

## 2023-01-23 RX ORDER — LISINOPRIL 20 MG/1
20 TABLET ORAL DAILY
Qty: 90 TABLET | Refills: 1 | Status: SHIPPED | OUTPATIENT
Start: 2023-01-23

## 2023-01-23 NOTE — PROGRESS NOTES
Jean Dawkins  58 y.o. male  1960  MEM:[id number]  [facility]  Progress Note     Encounter Date: 1/23/2023    Assessment and Plan:     Encounter Diagnoses     ICD-10-CM ICD-9-CM   1. Essential hypertension  I10 401.9   2. Celiac artery stenosis (HCC)  I77.1 447.1   3. Type 2 diabetes mellitus without complication, without long-term current use of insulin (HCC)  E11.9 250.00   4. Stage 3a chronic kidney disease (HCC)  N18.31 585.3   5. Nocturia  R35.1 788.43       1. Essential hypertension  Symptomatic low BP's from meds  Decrease lisinopril to 20 mg  Recheck one month  - METABOLIC PANEL, BASIC  - HEPATIC FUNCTION PANEL  - LIPID PANEL  - prazosin (MINIPRESS) 2 mg capsule; Take 1 Capsule by mouth three (3) times daily. Dispense: 270 Capsule; Refill: 1  - lisinopriL (PRINIVIL, ZESTRIL) 20 mg tablet; Take 1 Tablet by mouth daily. Dispense: 90 Tablet; Refill: 1  - hydrALAZINE (APRESOLINE) 100 mg tablet; Take 1 Tablet by mouth three (3) times daily. Dispense: 270 Tablet; Refill: 1  - cloNIDine HCL (CATAPRES) 0.2 mg tablet; Take 1 Tablet by mouth every eight (8) hours. Dispense: 270 Tablet; Refill: 1  - chlorthalidone (HYGROTON) 25 mg tablet; Take 1 Tablet by mouth daily. Dispense: 90 Tablet; Refill: 1    2. Celiac artery stenosis (HCC)  assymptomatic    3. Type 2 diabetes mellitus without complication, without long-term current use of insulin ([facility])  Check status  GLUCOSES good at home  - HEMOGLOBIN A1C WITH EAG; Future  - CBC WITH AUTOMATED DIFF; Future  - HEMOGLOBIN A1C WITH EAG  - CBC WITH AUTOMATED DIFF    4. Stage 3a chronic kidney disease (HCC)  Check status    5. Nocturia  Likely BPH  On prazosin,   Check UA  May try finasteride.  - URINALYSIS W/ REFLEX CULTURE      I have discussed the diagnosis with the patient and the intended plan as seen in the above orders. he has expressed understanding.   The patient has received an after-visit summary and questions were answered concerning future plans. I have discussed medication side effects and warnings with the patient as well. Electronically Signed: Jose Ivy MD    Current Medications after this visit     Current Outpatient Medications   Medication Sig    prazosin (MINIPRESS) 2 mg capsule Take 1 Capsule by mouth three (3) times daily. lisinopriL (PRINIVIL, ZESTRIL) 20 mg tablet Take 1 Tablet by mouth daily. hydrALAZINE (APRESOLINE) 100 mg tablet Take 1 Tablet by mouth three (3) times daily. cloNIDine HCL (CATAPRES) 0.2 mg tablet Take 1 Tablet by mouth every eight (8) hours. chlorthalidone (HYGROTON) 25 mg tablet Take 1 Tablet by mouth daily. No current facility-administered medications for this visit. Medications Discontinued During This Encounter   Medication Reason    bisacodyl (DULCOLAX, BISACODYL,) 10 mg suppository Not A Current Medication    lisinopriL (PRINIVIL, ZESTRIL) 40 mg tablet REORDER    cloNIDine HCL (CATAPRES) 0.2 mg tablet REORDER    hydrALAZINE (APRESOLINE) 100 mg tablet REORDER    chlorthalidone (HYGROTON) 25 mg tablet REORDER    prazosin (MINIPRESS) 2 mg capsule REORDER    lisinopriL (PRINIVIL, ZESTRIL) 40 mg tablet REORDER     ~~~~~~~~~~~~~~~~~~~~~~~~~~~~~~~~~~~~~~~~~~~~~~~~~~~~~~~~~~~    Chief Complaint   Patient presents with    Medication Refill     All medications       History provided by patient  History of Present Illness   Chiquita Farmer is a 58 y.o. male who presents to clinic today for:  Medication Refill (All medications)    BP sometimes too low  Dizziness at times  Sometimes has to lie down quickly  No chest pain or palpitations  Joined a gym and no problems exercising. Wants to resume work as . No syncope. Urinating a lot  Up three times a night   Past several months.   No dysuria or hematuria    DM   Checks every morning   Usually low 100's  No meds   Last A1c 7.1      Health Maintenance  Will do at future visit  Health Maintenance Due   Topic Date Due Pneumococcal 0-64 years (1 - PCV) Never done    Eye Exam Retinal or Dilated  Never done    Colorectal Cancer Screening Combo  Never done    Shingles Vaccine (1 of 2) Never done    Low dose CT lung screening  Never done    DTaP/Tdap/Td series (2 - Td or Tdap) 09/07/2020    COVID-19 Vaccine (2 - Booster for [place] series) 10/26/2021    Lipid Screen  07/20/2022    Flu Vaccine (1) Never done    Diabetic Alb to Cr ratio (uACR) test  09/16/2022     Review of Systems   Review of Systems   Respiratory:  Negative for shortness of breath. Cardiovascular:  Negative for chest pain, palpitations and leg swelling. Genitourinary:  Positive for frequency. Negative for dysuria and hematuria. Neurological:  Positive for dizziness. Psychiatric/Behavioral: Negative. Vitals/Objective:     Vitals:    01/23/23 0954 01/23/23 0956   BP: (!) 147/78 120/64   Pulse: 71    Resp: 20    Temp: 97.9 °F (36.6 °C)    TempSrc: Temporal    SpO2: 100%    Weight: 215 lb 12.8 oz (97.9 kg)    Height: 6' (1.829 m)      Body mass index is 29.27 kg/m². Wt Readings from Last 3 Encounters:   01/23/23 215 lb 12.8 oz (97.9 kg)   08/22/22 215 lb 6.4 oz (97.7 kg)   08/15/22 216 lb 3.2 oz (98.1 kg)         Objective  Physical Exam  Vitals and nursing note reviewed. Constitutional:       Appearance: Normal appearance. He is not toxic-appearing. HENT:      Head: Normocephalic and atraumatic. Cardiovascular:      Rate and Rhythm: Normal rate and regular rhythm. Heart sounds: Normal heart sounds. No murmur heard. No gallop. Pulmonary:      Effort: Pulmonary effort is normal. No respiratory distress. Breath sounds: Normal breath sounds. No wheezing, rhonchi or rales. Abdominal:      Palpations: Abdomen is soft. There is no mass. Tenderness: There is no abdominal tenderness. There is no guarding or rebound. Hernia: There is no hernia in the left inguinal area. Genitourinary:     Penis: Normal and circumcised.  No tenderness. Testes:         Right: Mass, tenderness or swelling not present. Left: Mass, tenderness or swelling not present. Musculoskeletal:      Cervical back: No muscular tenderness. Lymphadenopathy:      Cervical: No cervical adenopathy. Neurological:      Mental Status: He is alert. Psychiatric:         Mood and Affect: Mood normal.         Behavior: Behavior normal.         Thought Content: Thought content normal.         Judgment: Judgment normal.         No results found for this or any previous visit (from the past 24 hour(s)). Disposition     Follow-up and Dispositions    Return in about 1 month (around 2/23/2023) for Blood pressure follow up, Medication follow up. Future Appointments   Date Time Provider Shay Emerald   2/23/2023  9:40 AM Eloina William MD [facility] BS AMB       History   Patient's past medical, surgical and family histories were reviewed and updated. Past Medical History:   Diagnosis Date    Acute hypokalemia 7/20/2021    Arthritis     Celiac artery stenosis ([address] 7/21/2021    Cerebral arteriovenous malformation (AVM) 07/20/2021    with subarachnoid hemorrhage.     Diabetes ([address]     type 2    ED (erectile dysfunction)     Hearing loss     Hepatitis     Herpes zoster 09/2021    Left abdominal dermatome    Hypertension     Hypertensive emergency 7/21/2021    Impaired fasting glucose     Malignant hypertension 7/20/2021    Muscle weakness     Vasogenic cerebral edema ([address] 7/21/2021    Visual disturbance      Past Surgical History:   Procedure Laterality Date    HX ROTATOR CUFF REPAIR Left 05/2022        HX TONSIL AND ADENOIDECTOMY       Family History   Problem Relation Age of Onset    Diabetes Mother         type 2    Hypertension Mother     Heart Disease Mother         CHF    Hypertension Father     Heart Disease Father 48        MI    Cancer Maternal Grandmother      Social History     Tobacco Use    Smoking status: Former     Packs/day: 2.00     Years: 20.00     Pack years: 40.00     Types: Cigarettes     Quit date: 2015     Years since quittin.9    Smokeless tobacco: Former    Tobacco comments:     Patient reports he is Vaping    Vaping Use    Vaping Use: Never used   Substance Use Topics    Alcohol use: Not Currently     Alcohol/week: 6.0 standard drinks     Types: 6 Cans of beer per week    Drug use: Never       Allergies   No Known Allergies

## 2023-01-23 NOTE — PROGRESS NOTES
Patient stated name &   Chief Complaint   Patient presents with    Medication Refill     All medications        Health Maintenance Due   Topic    Pneumococcal 0-64 years (1 - PCV)    Eye Exam Retinal or Dilated     Colorectal Cancer Screening Combo     Shingles Vaccine (1 of 2)    Low dose CT lung screening     DTaP/Tdap/Td series (2 - Td or Tdap)    COVID-19 Vaccine (2 - Booster for [employer] series)    Lipid Screen     Flu Vaccine (1)    Diabetic Alb to Cr ratio (uACR) test        Wt Readings from Last 3 Encounters:   23 215 lb 12.8 oz (97.9 kg)   22 215 lb 6.4 oz (97.7 kg)   08/15/22 216 lb 3.2 oz (98.1 kg)     Temp Readings from Last 3 Encounters:   23 97.9 °F (36.6 °C) (Temporal)   22 98.5 °F (36.9 °C) (Temporal)   08/15/22 98.2 °F (36.8 °C) (Temporal)     BP Readings from Last 3 Encounters:   23 (!) 147/78   22 (!) 140/78   08/15/22 132/75     Pulse Readings from Last 3 Encounters:   23 71   22 75   08/15/22 84         Learning Assessment:  :     Learning Assessment 2022   PRIMARY LEARNER Patient Patient   PRIMARY LANGUAGE ENGLISH ENGLISH   LEARNER PREFERENCE PRIMARY READING READING   ANSWERED BY patient patient   RELATIONSHIP SELF SELF       Depression Screening:  :     3 most recent PHQ Screens 8/15/2022   Little interest or pleasure in doing things Not at all   Feeling down, depressed, irritable, or hopeless Not at all   Total Score PHQ 2 0       Fall Risk Assessment:  :     No flowsheet data found. Abuse Screening:  :     Abuse Screening Questionnaire 3/22/2019   Do you ever feel afraid of your partner? N   Are you in a relationship with someone who physically or mentally threatens you? N   Is it safe for you to go home? Y       Coordination of Care Questionnaire:  :   1. \"Have you been to the ER, urgent care clinic since your last visit? Hospitalized since your last visit? \" No    2.  \"Have you seen or consulted any other health care providers outside of the [address] [place] [address] since your last visit? \" No     3. For patients aged 39-70: Has the patient had a colonoscopy / FIT/ Cologuard? No      If the patient is female:    4. For patients aged 41-77: Has the patient had a mammogram within the past 2 years? No      5. For patients aged 21-65: Has the patient had a pap smear? No     3) Do you have an Advance Directive on file? no  Are you interested in receiving information about Advance Directives? no    Patient is accompanied by self I have received verbal consent from Karen Lan to discuss any/all medical information while they are present in the room.

## 2023-01-24 LAB
ALBUMIN SERPL-MCNC: 4.8 G/DL (ref 3.8–4.8)
ALP SERPL-CCNC: 62 IU/L (ref 44–121)
ALT SERPL-CCNC: 23 IU/L (ref 0–44)
APPEARANCE UR: CLEAR
AST SERPL-CCNC: 18 IU/L (ref 0–40)
BACTERIA #/AREA URNS HPF: NORMAL /[HPF]
BASOPHILS # BLD AUTO: 0 X10E3/UL (ref 0–0.2)
BASOPHILS NFR BLD AUTO: 1 %
BILIRUB DIRECT SERPL-MCNC: 0.23 MG/DL (ref 0–0.4)
BILIRUB SERPL-MCNC: 0.8 MG/DL (ref 0–1.2)
BILIRUB UR QL STRIP: NEGATIVE
BUN SERPL-MCNC: 13 MG/DL (ref 8–27)
BUN/CREAT SERPL: 12 (ref 10–24)
CALCIUM SERPL-MCNC: 9.7 MG/DL (ref 8.6–10.2)
CASTS URNS QL MICRO: NORMAL /LPF
CHLORIDE SERPL-SCNC: 104 MMOL/L (ref 96–106)
CHOLEST SERPL-MCNC: 123 MG/DL (ref 100–199)
CO2 SERPL-SCNC: 25 MMOL/L (ref 20–29)
COLOR UR: YELLOW
CREAT SERPL-MCNC: 1.12 MG/DL (ref 0.76–1.27)
EGFR: 74 ML/MIN/1.73
EOSINOPHIL # BLD AUTO: 0.1 X10E3/UL (ref 0–0.4)
EOSINOPHIL NFR BLD AUTO: 1 %
EPI CELLS #/AREA URNS HPF: NORMAL /HPF (ref 0–10)
ERYTHROCYTE [DISTWIDTH] IN BLOOD BY AUTOMATED COUNT: 13.8 % (ref 11.6–15.4)
EST. AVERAGE GLUCOSE BLD GHB EST-MCNC: 131 MG/DL
GLUCOSE SERPL-MCNC: 103 MG/DL (ref 70–99)
GLUCOSE UR QL STRIP: NEGATIVE
HBA1C MFR BLD: 6.2 % (ref 4.8–5.6)
HCT VFR BLD AUTO: 46.1 % (ref 37.5–51)
HDLC SERPL-MCNC: 44 MG/DL
HGB BLD-MCNC: 14.8 G/DL (ref 13–17.7)
HGB UR QL STRIP: NEGATIVE
IMM GRANULOCYTES # BLD AUTO: 0 X10E3/UL (ref 0–0.1)
IMM GRANULOCYTES NFR BLD AUTO: 0 %
KETONES UR QL STRIP: NEGATIVE
LDLC SERPL CALC-MCNC: 65 MG/DL (ref 0–99)
LEUKOCYTE ESTERASE UR QL STRIP: NEGATIVE
LYMPHOCYTES # BLD AUTO: 1.8 X10E3/UL (ref 0.7–3.1)
LYMPHOCYTES NFR BLD AUTO: 40 %
MCH RBC QN AUTO: 26.4 PG (ref 26.6–33)
MCHC RBC AUTO-ENTMCNC: 32.1 G/DL (ref 31.5–35.7)
MCV RBC AUTO: 82 FL (ref 79–97)
MICRO URNS: NORMAL
MICRO URNS: NORMAL
MONOCYTES # BLD AUTO: 0.5 X10E3/UL (ref 0.1–0.9)
MONOCYTES NFR BLD AUTO: 12 %
NEUTROPHILS # BLD AUTO: 2 X10E3/UL (ref 1.4–7)
NEUTROPHILS NFR BLD AUTO: 46 %
NITRITE UR QL STRIP: NEGATIVE
PH UR STRIP: 6.5 [PH] (ref 5–7.5)
PLATELET # BLD AUTO: 292 X10E3/UL (ref 150–450)
POTASSIUM SERPL-SCNC: 4.4 MMOL/L (ref 3.5–5.2)
PROT SERPL-MCNC: 6.7 G/DL (ref 6–8.5)
PROT UR QL STRIP: NEGATIVE
RBC # BLD AUTO: 5.61 X10E6/UL (ref 4.14–5.8)
RBC #/AREA URNS HPF: NORMAL /HPF (ref 0–2)
SODIUM SERPL-SCNC: 147 MMOL/L (ref 134–144)
SP GR UR STRIP: 1.01 (ref 1–1.03)
TRIGL SERPL-MCNC: 67 MG/DL (ref 0–149)
URINALYSIS REFLEX, 377202: NORMAL
UROBILINOGEN UR STRIP-MCNC: 0.2 MG/DL (ref 0.2–1)
VLDLC SERPL CALC-MCNC: 14 MG/DL (ref 5–40)
WBC # BLD AUTO: 4.5 X10E3/UL (ref 3.4–10.8)
WBC #/AREA URNS HPF: NORMAL /HPF (ref 0–5)

## 2023-01-24 NOTE — PROGRESS NOTES
Your diabetic control remains good on just diet. The cholesterol remains good. Your kidney function is normal and the urine tests are normal.  See me back next month to recheck the blood pressure.   [facility]

## 2023-02-23 ENCOUNTER — OFFICE VISIT (OUTPATIENT)
Dept: FAMILY MEDICINE CLINIC | Age: 63
End: 2023-02-23
Payer: COMMERCIAL

## 2023-02-23 VITALS
OXYGEN SATURATION: 100 % | SYSTOLIC BLOOD PRESSURE: 126 MMHG | BODY MASS INDEX: 28.99 KG/M2 | HEIGHT: 72 IN | TEMPERATURE: 97.7 F | HEART RATE: 75 BPM | RESPIRATION RATE: 20 BRPM | DIASTOLIC BLOOD PRESSURE: 58 MMHG | WEIGHT: 214 LBS

## 2023-02-23 DIAGNOSIS — E11.9 TYPE 2 DIABETES MELLITUS WITHOUT COMPLICATION, WITHOUT LONG-TERM CURRENT USE OF INSULIN (HCC): ICD-10-CM

## 2023-02-23 DIAGNOSIS — Z12.11 SCREENING FOR COLON CANCER: ICD-10-CM

## 2023-02-23 DIAGNOSIS — E11.22 TYPE 2 DIABETES MELLITUS WITH STAGE 3A CHRONIC KIDNEY DISEASE, WITHOUT LONG-TERM CURRENT USE OF INSULIN (HCC): ICD-10-CM

## 2023-02-23 DIAGNOSIS — N18.31 TYPE 2 DIABETES MELLITUS WITH STAGE 3A CHRONIC KIDNEY DISEASE, WITHOUT LONG-TERM CURRENT USE OF INSULIN (HCC): ICD-10-CM

## 2023-02-23 DIAGNOSIS — I10 ESSENTIAL HYPERTENSION: ICD-10-CM

## 2023-02-23 DIAGNOSIS — R42 DIZZINESS: Primary | ICD-10-CM

## 2023-02-23 PROCEDURE — 3078F DIAST BP <80 MM HG: CPT | Performed by: FAMILY MEDICINE

## 2023-02-23 PROCEDURE — 3044F HG A1C LEVEL LT 7.0%: CPT | Performed by: FAMILY MEDICINE

## 2023-02-23 PROCEDURE — 3074F SYST BP LT 130 MM HG: CPT | Performed by: FAMILY MEDICINE

## 2023-02-23 PROCEDURE — 99214 OFFICE O/P EST MOD 30 MIN: CPT | Performed by: FAMILY MEDICINE

## 2023-02-23 RX ORDER — ALPRAZOLAM 0.5 MG/1
TABLET ORAL
COMMUNITY
Start: 2022-05-11

## 2023-02-23 RX ORDER — CHLORHEXIDINE GLUCONATE 1.2 MG/ML
RINSE ORAL
COMMUNITY
Start: 2023-02-18

## 2023-02-23 NOTE — PROGRESS NOTES
Renetta Livers  61 y.o. male  1960  TOW:[id number]  [facility]  Progress Note     Encounter Date: 2/23/2023    Assessment and Plan:     Encounter Diagnoses     ICD-10-CM ICD-9-CM   1. Dizziness  R42 780.4   2. Essential hypertension  I10 401.9   3. Type 2 diabetes mellitus without complication, without long-term current use of insulin (HCC)  E11.9 250.00   4. Type 2 diabetes mellitus with stage 3a chronic kidney disease, without long-term current use of insulin (HCC)  E11.22 250.40    N18.31 585.3   5. Screening for colon cancer  Z12.11 V76.51       1. Dizziness  Seems reasonable to see Dr. Alissa Gabriel with history of hearing loss and intracranial bleeding  - REFERRAL TO ENT-OTOLARYNGOLOGY    2. Essential hypertension  At goal  Dizziness no different with reduction of ACE dose    3. Type 2 diabetes mellitus without complication, without long-term current use of insulin (HCC)  A1c 6.2 on diet only    4. Type 2 diabetes mellitus with stage 3a chronic kidney disease, without long-term current use of insulin (HCC)  GFR now normal, no proteinuria  Had SHILO when hospitalized. Now resolved and normal GFR    5. Screening for colon cancer  TO DR. Eleazar Aj. Patient may have transportation problems, lives in [facility]    I have discussed the diagnosis with the patient and the intended plan as seen in the above orders. he has expressed understanding. The patient has received an after-visit summary and questions were answered concerning future plans. I have discussed medication side effects and warnings with the patient as well.     Electronically Signed: Alice Sanabria MD    Current Medications after this visit     Current Outpatient Medications   Medication Sig    ALPRAZolam (XANAX) 0.5 mg tablet TAKE ONE TABLET BY MOUTH 30 MINUTES PRIOR TO DENTAL APPOINTMENT    chlorhexidine (PERIDEX) 0.12 % solution RINSE WITH 15ML FOR 30 SECONDS AND SPIT, USE TWICE DAILY IN THE MORNING AND EVENING AFTER BRUSHING AND FLOSSING . DO NOT SWALLOW    prazosin (MINIPRESS) 2 mg capsule Take 1 Capsule by mouth three (3) times daily. lisinopriL (PRINIVIL, ZESTRIL) 20 mg tablet Take 1 Tablet by mouth daily. hydrALAZINE (APRESOLINE) 100 mg tablet Take 1 Tablet by mouth three (3) times daily. cloNIDine HCL (CATAPRES) 0.2 mg tablet Take 1 Tablet by mouth every eight (8) hours. chlorthalidone (HYGROTON) 25 mg tablet Take 1 Tablet by mouth daily. No current facility-administered medications for this visit. There are no discontinued medications. ~~~~~~~~~~~~~~~~~~~~~~~~~~~~~~~~~~~~~~~~~~~~~~~~~~~~~~~~~~~    Chief Complaint   Patient presents with    Hypertension     Follow up       History provided by patient  History of Present Illness   Amalia Dietrich is a 61 y.o. male who presents to clinic today for:  Hypertension (Follow up)    Hypertension  Deaf AS  He is set up to see Dr. Kia Bess  Hearing was lost when he had brain hemorrhage. No syncope  No orthostatic sx with his BP meds  Urination at night is better  Dizziness is more like imbalance and vertigo than syncope or near syncope    Needs colonoscopy  Has quit smoking    Labs reviewed with patient. A1c 6.2 Diet only. Health Maintenance  Completed HM gaps at today's visit  Health Maintenance Due   Topic Date Due    Pneumococcal 0-64 years (1 - PCV) Never done    Eye Exam Retinal or Dilated  Never done    Colorectal Cancer Screening Combo  Never done    Shingles Vaccine (1 of 2) Never done    Low dose CT lung screening  Never done    DTaP/Tdap/Td series (2 - Td or Tdap) 09/07/2020    COVID-19 Vaccine (2 - Booster for [place] series) 10/26/2021    Flu Vaccine (1) Never done    Diabetic Alb to Cr ratio (uACR) test  09/16/2022     Review of Systems   Review of Systems   HENT:  Positive for hearing loss. Negative for ear discharge, ear pain and tinnitus. Respiratory:  Negative for shortness of breath.     Cardiovascular: Negative for chest pain. Gastrointestinal:  Negative for blood in stool. Genitourinary:  Negative for hematuria. Neurological:  Positive for dizziness. Psychiatric/Behavioral: Negative. Vitals/Objective:     Vitals:    02/23/23 0946   BP: (!) 126/58   Pulse: 75   Resp: 20   Temp: 97.7 °F (36.5 °C)   TempSrc: Temporal   SpO2: 100%   Weight: 214 lb (97.1 kg)   Height: 6' (1.829 m)     Body mass index is 29.02 kg/m². Wt Readings from Last 3 Encounters:   02/23/23 214 lb (97.1 kg)   01/23/23 215 lb 12.8 oz (97.9 kg)   08/22/22 215 lb 6.4 oz (97.7 kg)         Objective  Physical Exam  Vitals and nursing note reviewed. Constitutional:       General: He is not in acute distress. Appearance: Normal appearance. He is well-developed. He is not toxic-appearing or diaphoretic. HENT:      Head: Normocephalic and atraumatic. Right Ear: Tympanic membrane and external ear normal.      Left Ear: Tympanic membrane and external ear normal.      Nose: Nose normal.      Mouth/Throat:      Pharynx: No oropharyngeal exudate or posterior oropharyngeal erythema. Comments: Two remaining teeth planning to get dentures  Eyes:      General:         Right eye: No discharge. Left eye: No discharge. Conjunctiva/sclera: Conjunctivae normal.   Neck:      Thyroid: No thyromegaly. Cardiovascular:      Rate and Rhythm: Normal rate and regular rhythm. Heart sounds: Normal heart sounds. No murmur heard. No friction rub. No gallop. Pulmonary:      Effort: Pulmonary effort is normal. No respiratory distress. Breath sounds: Normal breath sounds. No wheezing, rhonchi or rales. Musculoskeletal:      Cervical back: Neck supple. No muscular tenderness. Lymphadenopathy:      Cervical: No cervical adenopathy. Neurological:      Mental Status: He is alert and oriented to person, place, and time.    Psychiatric:         Mood and Affect: Mood normal.         Behavior: Behavior normal. Thought Content: Thought content normal.         Judgment: Judgment normal.         No results found for this or any previous visit (from the past 24 hour(s)). Disposition     No future appointments. History   Patient's past medical, surgical and family histories were reviewed and updated. Past Medical History:   Diagnosis Date    Acute hypokalemia 2021    Arthritis     Celiac artery stenosis ([address] 2021    Cerebral arteriovenous malformation (AVM) 2021    with subarachnoid hemorrhage.     Diabetes ([address]     type 2    ED (erectile dysfunction)     Hearing loss     Hepatitis     Herpes zoster 2021    Left abdominal dermatome    Hypertension     Hypertensive emergency 2021    Impaired fasting glucose     Malignant hypertension 2021    Muscle weakness     Vasogenic cerebral edema ([address] 2021    Visual disturbance      Past Surgical History:   Procedure Laterality Date    HX ROTATOR CUFF REPAIR Left 2022        HX TONSIL AND ADENOIDECTOMY       Family History   Problem Relation Age of Onset    Diabetes Mother         type 2    Hypertension Mother     Heart Disease Mother         CHF    Hypertension Father     Heart Disease Father 48        MI    Cancer Maternal Grandmother      Social History     Tobacco Use    Smoking status: Former     Packs/day: 2.00     Years: 20.00     Pack years: 40.00     Types: Cigarettes     Quit date: 2015     Years since quittin.0    Smokeless tobacco: Former    Tobacco comments:     Patient reports he is Vaping    Vaping Use    Vaping Use: Never used   Substance Use Topics    Alcohol use: Not Currently     Alcohol/week: 6.0 standard drinks     Types: 6 Cans of beer per week    Drug use: Never       Allergies   No Known Allergies

## 2023-02-23 NOTE — PROGRESS NOTES
Patient stated name &   Chief Complaint   Patient presents with    Hypertension     Follow up        Health Maintenance Due   Topic    Pneumococcal 0-64 years (1 - PCV)    Eye Exam Retinal or Dilated     Colorectal Cancer Screening Combo     Shingles Vaccine (1 of 2)    Low dose CT lung screening     DTaP/Tdap/Td series (2 - Td or Tdap)    COVID-19 Vaccine (2 - Booster for Saavn series)    Flu Vaccine (1)    Diabetic Alb to Cr ratio (uACR) test        Wt Readings from Last 3 Encounters:   23 214 lb (97.1 kg)   23 215 lb 12.8 oz (97.9 kg)   22 215 lb 6.4 oz (97.7 kg)     Temp Readings from Last 3 Encounters:   23 97.7 °F (36.5 °C) (Temporal)   23 97.9 °F (36.6 °C) (Temporal)   22 98.5 °F (36.9 °C) (Temporal)     BP Readings from Last 3 Encounters:   23 (!) 126/58   23 120/64   22 (!) 140/78     Pulse Readings from Last 3 Encounters:   23 75   23 71   22 75         Learning Assessment:  :     Learning Assessment 2022   PRIMARY LEARNER Patient Patient   PRIMARY LANGUAGE ENGLISH ENGLISH   LEARNER PREFERENCE PRIMARY READING READING   ANSWERED BY patient patient   RELATIONSHIP SELF SELF       Depression Screening:  :     3 most recent PHQ Screens 2023   Little interest or pleasure in doing things Not at all   Feeling down, depressed, irritable, or hopeless Not at all   Total Score PHQ 2 0       Fall Risk Assessment:  :     No flowsheet data found. Abuse Screening:  :     Abuse Screening Questionnaire 3/22/2019   Do you ever feel afraid of your partner? N   Are you in a relationship with someone who physically or mentally threatens you? N   Is it safe for you to go home? Y       Coordination of Care Questionnaire:  :   1. \"Have you been to the ER, urgent care clinic since your last visit? Hospitalized since your last visit? \" No    2.  \"Have you seen or consulted any other health care providers outside of the [facility] since your last visit? \" No     3. For patients aged 39-70: Has the patient had a colonoscopy / FIT/ Cologuard? No      If the patient is female:    4. For patients aged 41-77: Has the patient had a mammogram within the past 2 years? No      5. For patients aged 21-65: Has the patient had a pap smear? No     3) Do you have an Advance Directive on file? no  Are you interested in receiving information about Advance Directives? no    Patient is accompanied by self I have received verbal consent from Everette Devries to discuss any/all medical information while they are present in the room.

## 2023-07-07 ENCOUNTER — TELEPHONE (OUTPATIENT)
Age: 63
End: 2023-07-07

## 2023-07-07 NOTE — TELEPHONE ENCOUNTER
Patient called before office opened. Returned patient call to let him know that no imaging will be required prior to his one year follow-up, but give us a call back and we will get him scheduled with Dr. Annemarie Roldan.

## 2023-07-07 NOTE — TELEPHONE ENCOUNTER
LVM for patient to call back as we have an insurance question. We entered patient's insurance onfile from visit in 8/2022 which had an [address] prefix. Insurance is coming back stating that insurance is now [address]. If this is correct, we are OUT OF NETWORK with [employer]. Requested call back from patient to confirm.

## 2023-07-19 DIAGNOSIS — Q28.2 ARTERIOVENOUS MALFORMATION OF CEREBRAL VESSELS: Primary | ICD-10-CM

## 2023-08-02 ENCOUNTER — TELEPHONE (OUTPATIENT)
Facility: CLINIC | Age: 63
End: 2023-08-02

## 2023-08-02 NOTE — TELEPHONE ENCOUNTER
Faxed referral letter to Dr. Orion Jain, [address] [facility],  [address] [place] [address] [phone]

## 2023-10-18 NOTE — TELEPHONE ENCOUNTER
PCP: Denisha Mota MD    Last appt: [unfilled]  No future appointments.     Requested Prescriptions     Pending Prescriptions Disp Refills    chlorthalidone (HYGROTON) 25 MG tablet [Pharmacy Med Name: Chlorthalidone 25 MG Oral Tablet] 90 tablet 0     Sig: Take 1 tablet by mouth once daily       Prior labs and Blood pressures:  BP Readings from Last 3 Encounters:   02/23/23 (!) 126/58   01/23/23 120/64   08/22/22 (!) 140/78     Lab Results   Component Value Date/Time     01/23/2023 10:59 AM    K 4.4 01/23/2023 10:59 AM     01/23/2023 10:59 AM    CO2 25 01/23/2023 10:59 AM    BUN 13 01/23/2023 10:59 AM    GFRAA 57 08/25/2022 09:19 AM     No results found for: \"HBA1C\", \"KFX3RTBS\"  Lab Results   Component Value Date/Time    CHOL 123 01/23/2023 10:59 AM    HDL 44 01/23/2023 10:59 AM    VLDL 14 01/23/2023 10:59 AM     No results found for: \"VITD3\", \"VD3RIA\"    Lab Results   Component Value Date/Time    TSH 6.77 08/02/2021 07:20 AM

## 2023-10-20 RX ORDER — CHLORTHALIDONE 25 MG/1
25 TABLET ORAL DAILY
Qty: 90 TABLET | Refills: 0 | Status: SHIPPED | OUTPATIENT
Start: 2023-10-20

## 2023-11-13 ENCOUNTER — NURSE TRIAGE (OUTPATIENT)
Dept: OTHER | Facility: CLINIC | Age: 63
End: 2023-11-13

## 2023-11-13 NOTE — TELEPHONE ENCOUNTER
[facility]    Received call from [place] at [facility] with [employer]. Subjective: Caller states \"Cough for about a month with SOB, cough is productive but clear, pain in chest with taking a deep breath. Nose is stopped up\"     Current Symptoms: Cough, SOB. Onset: 1 month ago; gradual, unchanged    Associated Symptoms: reduced activity    Pain Severity: 3/10; sharp; constant    Temperature: Denies     What has been tried: NA    Recommended disposition: See PCP within 24 Hours    Care advice provided, patient verbalizes understanding; denies any other questions or concerns; instructed to call back for any new or worsening symptoms. Patient/Caller agrees with recommended disposition; writer provided warm transfer to [facility] at [facility] for appointment scheduling    Attention Provider: Thank you for allowing me to participate in the care of your patient. The patient was connected to triage in response to information provided to the ECC/PSC. Please do not respond through this encounter as the response is not directed to a shared pool.       Reason for Disposition   SEVERE coughing spells (e.g., whooping sound after coughing, vomiting after coughing)    Protocols used: Cough - Acute Productive-ADULT-AH

## 2023-11-14 ENCOUNTER — TELEPHONE (OUTPATIENT)
Facility: CLINIC | Age: 63
End: 2023-11-14

## 2023-11-14 ENCOUNTER — APPOINTMENT (OUTPATIENT)
Facility: HOSPITAL | Age: 63
End: 2023-11-14
Payer: COMMERCIAL

## 2023-11-14 ENCOUNTER — HOSPITAL ENCOUNTER (EMERGENCY)
Facility: HOSPITAL | Age: 63
Discharge: HOME | End: 2023-11-14
Attending: EMERGENCY MEDICINE
Payer: COMMERCIAL

## 2023-11-14 VITALS
HEART RATE: 65 BPM | DIASTOLIC BLOOD PRESSURE: 61 MMHG | HEIGHT: 72 IN | OXYGEN SATURATION: 99 % | RESPIRATION RATE: 13 BRPM | BODY MASS INDEX: 27.71 KG/M2 | TEMPERATURE: 98 F | WEIGHT: 204.59 LBS | SYSTOLIC BLOOD PRESSURE: 151 MMHG

## 2023-11-14 DIAGNOSIS — R07.89 ATYPICAL CHEST PAIN: Primary | ICD-10-CM

## 2023-11-14 LAB
ALBUMIN SERPL-MCNC: 4 G/DL (ref 3.5–5)
ALBUMIN/GLOB SERPL: 1.3 (ref 1.1–2.2)
ALP SERPL-CCNC: 51 U/L (ref 45–117)
ALT SERPL-CCNC: 23 U/L (ref 12–78)
ANION GAP SERPL CALC-SCNC: 7 MMOL/L (ref 5–15)
AST SERPL-CCNC: 23 U/L (ref 15–37)
BASOPHILS # BLD: 0 K/UL (ref 0–0.1)
BASOPHILS NFR BLD: 0 % (ref 0–1)
BILIRUB SERPL-MCNC: 1.2 MG/DL (ref 0.2–1)
BUN SERPL-MCNC: 15 MG/DL (ref 6–20)
BUN/CREAT SERPL: 12 (ref 12–20)
CALCIUM SERPL-MCNC: 9.7 MG/DL (ref 8.5–10.1)
CHLORIDE SERPL-SCNC: 100 MMOL/L (ref 97–108)
CO2 SERPL-SCNC: 29 MMOL/L (ref 21–32)
COMMENT:: NORMAL
CREAT SERPL-MCNC: 1.29 MG/DL (ref 0.7–1.3)
DIFFERENTIAL METHOD BLD: ABNORMAL
EOSINOPHIL # BLD: 0.1 K/UL (ref 0–0.4)
EOSINOPHIL NFR BLD: 2 % (ref 0–7)
ERYTHROCYTE [DISTWIDTH] IN BLOOD BY AUTOMATED COUNT: 13.2 % (ref 11.5–14.5)
GLOBULIN SER CALC-MCNC: 3.1 G/DL (ref 2–4)
GLUCOSE SERPL-MCNC: 228 MG/DL (ref 65–100)
HCT VFR BLD AUTO: 44.3 % (ref 36.6–50.3)
HGB BLD-MCNC: 15.1 G/DL (ref 12.1–17)
IMM GRANULOCYTES # BLD AUTO: 0 K/UL (ref 0–0.04)
IMM GRANULOCYTES NFR BLD AUTO: 0 % (ref 0–0.5)
LYMPHOCYTES # BLD: 1.9 K/UL (ref 0.8–3.5)
LYMPHOCYTES NFR BLD: 45 % (ref 12–49)
MCH RBC QN AUTO: 28 PG (ref 26–34)
MCHC RBC AUTO-ENTMCNC: 34.1 G/DL (ref 30–36.5)
MCV RBC AUTO: 82 FL (ref 80–99)
MONOCYTES # BLD: 0.5 K/UL (ref 0–1)
MONOCYTES NFR BLD: 12 % (ref 5–13)
NEUTS SEG # BLD: 1.7 K/UL (ref 1.8–8)
NEUTS SEG NFR BLD: 41 % (ref 32–75)
NRBC # BLD: 0 K/UL (ref 0–0.01)
NRBC BLD-RTO: 0 PER 100 WBC
PLATELET # BLD AUTO: 256 K/UL (ref 150–400)
PMV BLD AUTO: 9.6 FL (ref 8.9–12.9)
POTASSIUM SERPL-SCNC: 3.6 MMOL/L (ref 3.5–5.1)
PROT SERPL-MCNC: 7.1 G/DL (ref 6.4–8.2)
RBC # BLD AUTO: 5.4 M/UL (ref 4.1–5.7)
SODIUM SERPL-SCNC: 136 MMOL/L (ref 136–145)
SPECIMEN HOLD: NORMAL
TROPONIN I SERPL HS-MCNC: 7 NG/L (ref 0–76)
TROPONIN I SERPL HS-MCNC: 7 NG/L (ref 0–76)
WBC # BLD AUTO: 4.2 K/UL (ref 4.1–11.1)

## 2023-11-14 PROCEDURE — 84484 ASSAY OF TROPONIN QUANT: CPT

## 2023-11-14 PROCEDURE — 80053 COMPREHEN METABOLIC PANEL: CPT

## 2023-11-14 PROCEDURE — 71045 X-RAY EXAM CHEST 1 VIEW: CPT

## 2023-11-14 PROCEDURE — 93005 ELECTROCARDIOGRAM TRACING: CPT | Performed by: EMERGENCY MEDICINE

## 2023-11-14 PROCEDURE — 99285 EMERGENCY DEPT VISIT HI MDM: CPT

## 2023-11-14 PROCEDURE — 85025 COMPLETE CBC W/AUTO DIFF WBC: CPT

## 2023-11-14 PROCEDURE — 36415 COLL VENOUS BLD VENIPUNCTURE: CPT

## 2023-11-14 ASSESSMENT — PATIENT HEALTH QUESTIONNAIRE - PHQ9
SUM OF ALL RESPONSES TO PHQ QUESTIONS 1-9: 0
SUM OF ALL RESPONSES TO PHQ9 QUESTIONS 1 & 2: 0
SUM OF ALL RESPONSES TO PHQ QUESTIONS 1-9: 0
2. FEELING DOWN, DEPRESSED OR HOPELESS: 0
SUM OF ALL RESPONSES TO PHQ QUESTIONS 1-9: 0
1. LITTLE INTEREST OR PLEASURE IN DOING THINGS: 0
SUM OF ALL RESPONSES TO PHQ QUESTIONS 1-9: 0

## 2023-11-14 ASSESSMENT — PAIN DESCRIPTION - FREQUENCY
FREQUENCY: CONTINUOUS

## 2023-11-14 ASSESSMENT — PAIN DESCRIPTION - DESCRIPTORS
DESCRIPTORS: DULL

## 2023-11-14 ASSESSMENT — PAIN DESCRIPTION - ORIENTATION
ORIENTATION: MID

## 2023-11-14 ASSESSMENT — PAIN SCALES - GENERAL
PAINLEVEL_OUTOF10: 1

## 2023-11-14 ASSESSMENT — PAIN - FUNCTIONAL ASSESSMENT
PAIN_FUNCTIONAL_ASSESSMENT: ACTIVITIES ARE NOT PREVENTED

## 2023-11-14 ASSESSMENT — PAIN DESCRIPTION - LOCATION
LOCATION: CHEST

## 2023-11-14 ASSESSMENT — LIFESTYLE VARIABLES
HOW OFTEN DO YOU HAVE A DRINK CONTAINING ALCOHOL: NEVER
HOW MANY STANDARD DRINKS CONTAINING ALCOHOL DO YOU HAVE ON A TYPICAL DAY: PATIENT DOES NOT DRINK

## 2023-11-14 NOTE — ED TRIAGE NOTES
Pt ambulated to the treatment area with a steady gait. Pt states \"I started having chest pain and some shortness of breath 3 days ago I called my doctor and they said to go to the ER. Also I have some tingling to my left arm. \" Pt appears in no distress respirations even and non labored spo2 96% RA pt scales pain level 1/10 at this time.

## 2023-11-14 NOTE — TELEPHONE ENCOUNTER
----- Message from [place] sent at 11/13/2023 10:38 AM EST -----  Subject: Message to Provider    QUESTIONS  Information for Provider? Patient was triaged for a 24 hour dispo to be   seen in office for SOB and chest pain. ECC could not reach anyone in   office either via calling or POC chat. Patient chose to go to ER per NT   indicating if nothing was found in office he was to go to ER.  ---------------------------------------------------------------------------  --------------  Stephanie Marine Darshan  [phone]; OK to leave message on voicemail  ---------------------------------------------------------------------------  --------------  SCRIPT ANSWERS  Relationship to Patient?  Self

## 2023-11-14 NOTE — TELEPHONE ENCOUNTER
Pt seen and discharged from [facility] ED today 11/14/23, notes and labs and EKG in chart.

## 2023-11-14 NOTE — TELEPHONE ENCOUNTER
Pt triaged by nurse line yesterday 11/13/23, see chart     Called and left a detailed voice message for callback 8:27 AM   Dandy Hammond, CCT 11/14/23

## 2023-11-15 LAB
EKG ATRIAL RATE: 92 BPM
EKG DIAGNOSIS: NORMAL
EKG P AXIS: 76 DEGREES
EKG P-R INTERVAL: 162 MS
EKG Q-T INTERVAL: 348 MS
EKG QRS DURATION: 78 MS
EKG QTC CALCULATION (BAZETT): 430 MS
EKG R AXIS: 62 DEGREES
EKG T AXIS: 71 DEGREES
EKG VENTRICULAR RATE: 92 BPM

## 2023-11-15 PROCEDURE — 93010 ELECTROCARDIOGRAM REPORT: CPT | Performed by: SPECIALIST

## 2023-12-05 PROBLEM — E11.22 TYPE 2 DIABETES MELLITUS WITH CHRONIC KIDNEY DISEASE (HCC): Status: RESOLVED | Noted: 2023-02-23 | Resolved: 2023-12-05

## 2023-12-05 PROBLEM — I16.1 HYPERTENSIVE EMERGENCY: Status: RESOLVED | Noted: 2021-07-21 | Resolved: 2023-12-05

## 2023-12-05 PROBLEM — N18.30 CHRONIC RENAL DISEASE, STAGE III (HCC): Status: RESOLVED | Noted: 2022-08-15 | Resolved: 2023-12-05

## 2024-01-25 NOTE — TELEPHONE ENCOUNTER
PCP: Gustavo Jarvis MD    Last appt: [unfilled]  No future appointments.    Requested Prescriptions     Pending Prescriptions Disp Refills    chlorthalidone (HYGROTON) 25 MG tablet [Pharmacy Med Name: Chlorthalidone 25 MG Oral Tablet] 90 tablet 0     Sig: Take 1 tablet by mouth once daily       Prior labs and Blood pressures:  BP Readings from Last 3 Encounters:   11/14/23 (!) 151/61   02/23/23 (!) 126/58   01/23/23 120/64     Lab Results   Component Value Date/Time     11/14/2023 10:30 AM    K 3.6 11/14/2023 10:30 AM     11/14/2023 10:30 AM    CO2 29 11/14/2023 10:30 AM    BUN 15 11/14/2023 10:30 AM    GFRAA 57 08/25/2022 09:19 AM     No results found for: \"HBA1C\", \"[id number]\"  Lab Results   Component Value Date/Time    CHOL 123 01/23/2023 10:59 AM    HDL 44 01/23/2023 10:59 AM    VLDL 14 01/23/2023 10:59 AM     No results found for: \"VITD3\", \"VD3RIA\"    Lab Results   Component Value Date/Time    TSH 6.77 08/02/2021 07:20 AM

## 2024-01-27 LAB
ESTIMATED AVERAGE GLUCOSE: NORMAL
HBA1C MFR BLD: 6.3 %

## 2024-01-28 RX ORDER — CHLORTHALIDONE 25 MG/1
25 TABLET ORAL DAILY
Qty: 90 TABLET | Refills: 0 | Status: SHIPPED | OUTPATIENT
Start: 2024-01-28

## 2024-01-28 NOTE — TELEPHONE ENCOUNTER
Call patient.  I refilled their medication but they are due to be seen in the office.  Reason:  BP and meds.  NO additional refills without an in person appt in clinic.